# Patient Record
Sex: MALE | Race: WHITE | NOT HISPANIC OR LATINO | Employment: OTHER | URBAN - METROPOLITAN AREA
[De-identification: names, ages, dates, MRNs, and addresses within clinical notes are randomized per-mention and may not be internally consistent; named-entity substitution may affect disease eponyms.]

---

## 2017-08-13 ENCOUNTER — GENERIC CONVERSION - ENCOUNTER (OUTPATIENT)
Dept: OTHER | Facility: OTHER | Age: 67
End: 2017-08-13

## 2020-07-30 ENCOUNTER — OFFICE VISIT (OUTPATIENT)
Dept: FAMILY MEDICINE CLINIC | Facility: CLINIC | Age: 70
End: 2020-07-30
Payer: COMMERCIAL

## 2020-07-30 ENCOUNTER — LAB (OUTPATIENT)
Dept: LAB | Facility: MEDICAL CENTER | Age: 70
End: 2020-07-30
Payer: COMMERCIAL

## 2020-07-30 VITALS
BODY MASS INDEX: 27.5 KG/M2 | SYSTOLIC BLOOD PRESSURE: 158 MMHG | WEIGHT: 203 LBS | HEIGHT: 72 IN | DIASTOLIC BLOOD PRESSURE: 90 MMHG | TEMPERATURE: 97.4 F

## 2020-07-30 DIAGNOSIS — Z12.11 SCREENING FOR COLORECTAL CANCER: Primary | ICD-10-CM

## 2020-07-30 DIAGNOSIS — F41.1 GENERALIZED ANXIETY DISORDER: Primary | ICD-10-CM

## 2020-07-30 DIAGNOSIS — Z11.59 ENCOUNTER FOR HEPATITIS C SCREENING TEST FOR LOW RISK PATIENT: ICD-10-CM

## 2020-07-30 DIAGNOSIS — Z13.29 SCREENING FOR HYPOTHYROIDISM: ICD-10-CM

## 2020-07-30 DIAGNOSIS — Z13.220 SCREENING FOR HYPERLIPIDEMIA: ICD-10-CM

## 2020-07-30 DIAGNOSIS — Z13.228 SCREENING FOR METABOLIC DISORDER: ICD-10-CM

## 2020-07-30 DIAGNOSIS — Z00.00 MEDICARE ANNUAL WELLNESS VISIT, SUBSEQUENT: ICD-10-CM

## 2020-07-30 DIAGNOSIS — Z12.12 SCREENING FOR COLORECTAL CANCER: Primary | ICD-10-CM

## 2020-07-30 LAB
ALBUMIN SERPL BCP-MCNC: 4 G/DL (ref 3.5–5)
ALP SERPL-CCNC: 64 U/L (ref 46–116)
ALT SERPL W P-5'-P-CCNC: 28 U/L (ref 12–78)
ANION GAP SERPL CALCULATED.3IONS-SCNC: 6 MMOL/L (ref 4–13)
AST SERPL W P-5'-P-CCNC: 14 U/L (ref 5–45)
BILIRUB SERPL-MCNC: 0.83 MG/DL (ref 0.2–1)
BUN SERPL-MCNC: 21 MG/DL (ref 5–25)
CALCIUM ALBUM COR SERPL-MCNC: 10.4 MG/DL (ref 8.3–10.1)
CALCIUM SERPL-MCNC: 10.4 MG/DL (ref 8.3–10.1)
CHLORIDE SERPL-SCNC: 110 MMOL/L (ref 100–108)
CHOLEST SERPL-MCNC: 213 MG/DL (ref 50–200)
CO2 SERPL-SCNC: 24 MMOL/L (ref 21–32)
CREAT SERPL-MCNC: 1.04 MG/DL (ref 0.6–1.3)
GFR SERPL CREATININE-BSD FRML MDRD: 73 ML/MIN/1.73SQ M
GLUCOSE P FAST SERPL-MCNC: 114 MG/DL (ref 65–99)
HCV AB SER QL: NORMAL
HDLC SERPL-MCNC: 73 MG/DL
LDLC SERPL CALC-MCNC: 117 MG/DL (ref 0–100)
NONHDLC SERPL-MCNC: 140 MG/DL
POTASSIUM SERPL-SCNC: 4.1 MMOL/L (ref 3.5–5.3)
PROT SERPL-MCNC: 6.8 G/DL (ref 6.4–8.2)
SODIUM SERPL-SCNC: 140 MMOL/L (ref 136–145)
TRIGL SERPL-MCNC: 113 MG/DL
TSH SERPL DL<=0.05 MIU/L-ACNC: 1.83 UIU/ML (ref 0.36–3.74)

## 2020-07-30 PROCEDURE — 1125F AMNT PAIN NOTED PAIN PRSNT: CPT | Performed by: FAMILY MEDICINE

## 2020-07-30 PROCEDURE — 36415 COLL VENOUS BLD VENIPUNCTURE: CPT | Performed by: FAMILY MEDICINE

## 2020-07-30 PROCEDURE — 1160F RVW MEDS BY RX/DR IN RCRD: CPT | Performed by: FAMILY MEDICINE

## 2020-07-30 PROCEDURE — 1170F FXNL STATUS ASSESSED: CPT | Performed by: FAMILY MEDICINE

## 2020-07-30 PROCEDURE — 80061 LIPID PANEL: CPT | Performed by: FAMILY MEDICINE

## 2020-07-30 PROCEDURE — 80053 COMPREHEN METABOLIC PANEL: CPT | Performed by: FAMILY MEDICINE

## 2020-07-30 PROCEDURE — 3008F BODY MASS INDEX DOCD: CPT | Performed by: FAMILY MEDICINE

## 2020-07-30 PROCEDURE — 4040F PNEUMOC VAC/ADMIN/RCVD: CPT | Performed by: FAMILY MEDICINE

## 2020-07-30 PROCEDURE — 84443 ASSAY THYROID STIM HORMONE: CPT | Performed by: FAMILY MEDICINE

## 2020-07-30 PROCEDURE — 3288F FALL RISK ASSESSMENT DOCD: CPT | Performed by: FAMILY MEDICINE

## 2020-07-30 PROCEDURE — 86803 HEPATITIS C AB TEST: CPT

## 2020-07-30 PROCEDURE — 1101F PT FALLS ASSESS-DOCD LE1/YR: CPT | Performed by: FAMILY MEDICINE

## 2020-07-30 PROCEDURE — G0439 PPPS, SUBSEQ VISIT: HCPCS | Performed by: FAMILY MEDICINE

## 2020-07-30 PROCEDURE — 1036F TOBACCO NON-USER: CPT | Performed by: FAMILY MEDICINE

## 2020-07-30 RX ORDER — TRAVOPROST OPHTHALMIC SOLUTION 0.04 MG/ML
SOLUTION OPHTHALMIC
COMMUNITY
Start: 2020-07-20

## 2020-07-30 RX ORDER — BRIMONIDINE TARTRATE 0.1 %
DROPS OPHTHALMIC (EYE)
COMMUNITY
Start: 2020-07-20

## 2020-07-30 RX ORDER — ALPRAZOLAM 0.25 MG/1
TABLET ORAL
Qty: 15 TABLET | Refills: 0 | Status: SHIPPED | OUTPATIENT
Start: 2020-07-30

## 2020-07-30 NOTE — PROGRESS NOTES
Assessment and Plan:     Problem List Items Addressed This Visit     None      Visit Diagnoses     Medicare annual wellness visit, initial    -  Primary        BMI Counseling: Body mass index is 27 53 kg/m²  The BMI is above normal  Nutrition recommendations include decreasing portion sizes, encouraging healthy choices of fruits and vegetables, decreasing fast food intake, moderation in carbohydrate intake and increasing intake of lean protein  Exercise recommendations include exercising 3-5 times per week  No pharmacotherapy was ordered  Patient referred to PCP due to patient being overweight  Preventive health issues were discussed with patient, and age appropriate screening tests were ordered as noted in patient's After Visit Summary  Personalized health advice and appropriate referrals for health education or preventive services given if needed, as noted in patient's After Visit Summary  History of Present Illness:     Patient presents for Welcome to Medicare visit  Patient Care Team:  Giovana Bright DO as PCP - General (Family Medicine)     Review of Systems:     Review of Systems   Constitutional: Negative for activity change, appetite change, diaphoresis, fatigue and fever  HENT: Negative  Eyes: Negative  Respiratory: Negative for apnea, cough, chest tightness, shortness of breath and wheezing  Cardiovascular: Negative for chest pain, palpitations and leg swelling  Gastrointestinal: Negative for abdominal distention, abdominal pain, anal bleeding, constipation, diarrhea, nausea and vomiting  Endocrine: Negative for cold intolerance, heat intolerance, polydipsia, polyphagia and polyuria  Genitourinary: Negative for difficulty urinating, dysuria, flank pain, hematuria and urgency  Musculoskeletal: Negative for arthralgias, back pain, gait problem, joint swelling and myalgias  Skin: Negative for color change, rash and wound     Allergic/Immunologic: Negative for environmental allergies, food allergies and immunocompromised state  Neurological: Negative for dizziness, seizures, syncope, speech difficulty, numbness and headaches  Hematological: Negative for adenopathy  Does not bruise/bleed easily  Psychiatric/Behavioral: Negative for agitation, behavioral problems, hallucinations, sleep disturbance and suicidal ideas  The patient is nervous/anxious  Problem List:     There is no problem list on file for this patient  Past Medical and Surgical History:     History reviewed  No pertinent past medical history    Past Surgical History:   Procedure Laterality Date    APPENDECTOMY  2006      Family History:     Family History   Problem Relation Age of Onset    Heart disease Father     Colon cancer Brother     Colon cancer Daughter       Social History:        Social History     Socioeconomic History    Marital status: /Civil Union     Spouse name: None    Number of children: None    Years of education: None    Highest education level: None   Occupational History    None   Social Needs    Financial resource strain: None    Food insecurity:     Worry: None     Inability: None    Transportation needs:     Medical: None     Non-medical: None   Tobacco Use    Smoking status: Never Smoker    Smokeless tobacco: Never Used   Substance and Sexual Activity    Alcohol use: Yes     Frequency: Monthly or less     Drinks per session: 1 or 2     Comment: Rare    Drug use: Never    Sexual activity: None   Lifestyle    Physical activity:     Days per week: None     Minutes per session: None    Stress: None   Relationships    Social connections:     Talks on phone: None     Gets together: None     Attends Oriental orthodox service: None     Active member of club or organization: None     Attends meetings of clubs or organizations: None     Relationship status: None    Intimate partner violence:     Fear of current or ex partner: None     Emotionally abused: None     Physically abused: None     Forced sexual activity: None   Other Topics Concern    None   Social History Narrative    None      Medications and Allergies:     Current Outpatient Medications   Medication Sig Dispense Refill    ALPHAGAN P 0 1 %       Multiple Vitamins-Minerals (ICAPS PO) Take by mouth daily      Omega-3 Fatty Acids (OMEGA ESSENTIALS BASIC PO) Take by mouth daily      travoprost (TRAVATAN-Z) 0 004 % ophthalmic solution        No current facility-administered medications for this visit  No Known Allergies   Immunizations:     Immunization History   Administered Date(s) Administered    Influenza TIV (IM) 10/04/2013, 10/08/2014, 11/09/2015, 09/07/2016    Pneumococcal Conjugate 13-Valent 08/13/2017    Tdap 09/07/2016    Zoster 10/08/2014    influenza, trivalent, adjuvanted 09/20/2019      Health Maintenance:         Topic Date Due    Hepatitis C Screening  1950    CRC Screening: Colonoscopy  1950         Topic Date Due    Pneumococcal Vaccine: 65+ Years (2 of 2 - PPSV23) 08/13/2018      Medicare Screening Tests and Risk Assessments:     Melissa Saunders is here for his Subsequent Wellness visit  Health Risk Assessment:   Patient rates overall health as excellent  Patient feels that their physical health rating is much better  Eyesight was rated as same  Hearing was rated as same  Patient feels that their emotional and mental health rating is same  Pain experienced in the last 7 days has been none  Patient states that he has experienced no weight loss or gain in last 6 months  Depression Screening:   PHQ-2 Score: 0      Fall Risk Screening: In the past year, patient has experienced: no history of falling in past year      Home Safety:  Patient does not have trouble with stairs inside or outside of their home  Patient has working smoke alarms and has working carbon monoxide detector  Home safety hazards include: none  Nutrition:   Current diet is Regular       Medications:   Patient is currently taking over-the-counter supplements  OTC medications include: Fish oil and vitamin  Patient is able to manage medications  Activities of Daily Living (ADLs)/Instrumental Activities of Daily Living (IADLs):   Walk and transfer into and out of bed and chair?: Yes  Dress and groom yourself?: Yes    Bathe or shower yourself?: Yes    Feed yourself? Yes  Do your laundry/housekeeping?: Yes  Manage your money, pay your bills and track your expenses?: Yes  Make your own meals?: Yes    Do your own shopping?: Yes    Previous Hospitalizations:   Any hospitalizations or ED visits within the last 12 months?: No      Advance Care Planning:   Living will: Yes    Durable POA for healthcare:  Yes    Advanced directive: Yes    Advanced directive counseling given: Yes    Five wishes given: Yes    Patient declined ACP directive: No    End of Life Decisions reviewed with patient: Yes    Provider agrees with end of life decisions: Yes      Cognitive Screening:   Provider or family/friend/caregiver concerned regarding cognition?: No    PREVENTIVE SCREENINGS      Cardiovascular Screening:    General: Risks and Benefits Discussed      Diabetes Screening:     General: Risks and Benefits Discussed      Colorectal Cancer Screening:     General: Risks and Benefits Discussed    Due for: FOBT/FIT      Prostate Cancer Screening:    General: Risks and Benefits Discussed      Osteoporosis Screening:    General: Risks and Benefits Discussed      Abdominal Aortic Aneurysm (AAA) Screening:    Risk factors include: age between 73-69 yo        General: Screening Not Indicated      Lung Cancer Screening:     General: Screening Not Indicated      Hepatitis C Screening:    General: Risks and Benefits Discussed    No exam data present     Physical Exam:     /90   Temp (!) 97 4 °F (36 3 °C) (Temporal)   Ht 6' (1 829 m)   Wt 92 1 kg (203 lb)   BMI 27 53 kg/m²     Physical Exam   Constitutional: He is oriented to person, place, and time  He appears well-developed and well-nourished  No distress  HENT:   Head: Normocephalic  Right Ear: External ear normal    Left Ear: External ear normal    Nose: Nose normal    Mouth/Throat: Oropharynx is clear and moist    Eyes: Pupils are equal, round, and reactive to light  Conjunctivae and EOM are normal  Right eye exhibits no discharge  Left eye exhibits no discharge  No scleral icterus  Neck: Normal range of motion  No tracheal deviation present  No thyromegaly present  Cardiovascular: Normal rate, regular rhythm and normal heart sounds  Exam reveals no gallop and no friction rub  No murmur heard  Pulmonary/Chest: Effort normal and breath sounds normal  No respiratory distress  He has no wheezes  Abdominal: Soft  Bowel sounds are normal  He exhibits no mass  There is no tenderness  There is no guarding  Genitourinary: Prostate normal    Musculoskeletal: He exhibits no edema or deformity  Lymphadenopathy:     He has no cervical adenopathy  Neurological: He is alert and oriented to person, place, and time  No cranial nerve deficit  Skin: Skin is warm and dry  No rash noted  He is not diaphoretic  No erythema  Psychiatric: He has a normal mood and affect   Thought content normal        Justa Cardenas DO

## 2020-07-30 NOTE — PATIENT INSTRUCTIONS

## 2020-08-01 ENCOUNTER — LAB (OUTPATIENT)
Dept: LAB | Facility: MEDICAL CENTER | Age: 70
End: 2020-08-01
Payer: COMMERCIAL

## 2020-08-01 DIAGNOSIS — Z12.12 SCREENING FOR COLORECTAL CANCER: ICD-10-CM

## 2020-08-01 DIAGNOSIS — Z12.11 SCREENING FOR COLORECTAL CANCER: ICD-10-CM

## 2020-08-01 LAB — HEMOCCULT STL QL IA: NEGATIVE

## 2020-08-01 PROCEDURE — G0328 FECAL BLOOD SCRN IMMUNOASSAY: HCPCS

## 2021-02-12 ENCOUNTER — IMMUNIZATIONS (OUTPATIENT)
Dept: FAMILY MEDICINE CLINIC | Facility: HOSPITAL | Age: 71
End: 2021-02-12

## 2021-02-12 DIAGNOSIS — Z23 ENCOUNTER FOR IMMUNIZATION: Primary | ICD-10-CM

## 2021-02-12 PROCEDURE — 91301 SARS-COV-2 / COVID-19 MRNA VACCINE (MODERNA) 100 MCG: CPT

## 2021-02-12 PROCEDURE — 0011A SARS-COV-2 / COVID-19 MRNA VACCINE (MODERNA) 100 MCG: CPT

## 2021-03-10 ENCOUNTER — IMMUNIZATIONS (OUTPATIENT)
Dept: FAMILY MEDICINE CLINIC | Facility: HOSPITAL | Age: 71
End: 2021-03-10

## 2021-03-10 DIAGNOSIS — Z23 ENCOUNTER FOR IMMUNIZATION: Primary | ICD-10-CM

## 2021-03-10 PROCEDURE — 0012A SARS-COV-2 / COVID-19 MRNA VACCINE (MODERNA) 100 MCG: CPT

## 2021-03-10 PROCEDURE — 91301 SARS-COV-2 / COVID-19 MRNA VACCINE (MODERNA) 100 MCG: CPT

## 2021-04-13 ENCOUNTER — VBI (OUTPATIENT)
Dept: ADMINISTRATIVE | Facility: OTHER | Age: 71
End: 2021-04-13

## 2021-08-13 ENCOUNTER — VBI (OUTPATIENT)
Dept: ADMINISTRATIVE | Facility: OTHER | Age: 71
End: 2021-08-13

## 2021-11-16 ENCOUNTER — VBI (OUTPATIENT)
Dept: ADMINISTRATIVE | Facility: OTHER | Age: 71
End: 2021-11-16

## 2022-09-08 ENCOUNTER — VBI (OUTPATIENT)
Dept: ADMINISTRATIVE | Facility: OTHER | Age: 72
End: 2022-09-08

## 2023-01-01 ENCOUNTER — APPOINTMENT (EMERGENCY)
Dept: RADIOLOGY | Facility: HOSPITAL | Age: 73
End: 2023-01-01
Payer: COMMERCIAL

## 2023-01-01 ENCOUNTER — APPOINTMENT (EMERGENCY)
Dept: CT IMAGING | Facility: HOSPITAL | Age: 73
End: 2023-01-01
Payer: COMMERCIAL

## 2023-01-01 ENCOUNTER — HOSPITAL ENCOUNTER (EMERGENCY)
Facility: HOSPITAL | Age: 73
End: 2023-12-24
Attending: EMERGENCY MEDICINE
Payer: COMMERCIAL

## 2023-01-01 VITALS — BODY MASS INDEX: 28.38 KG/M2 | HEIGHT: 74 IN | WEIGHT: 221.12 LBS

## 2023-01-01 DIAGNOSIS — I46.9 CARDIAC ARREST (HCC): Primary | ICD-10-CM

## 2023-01-01 DIAGNOSIS — J69.0 ASPIRATION PNEUMONIA (HCC): ICD-10-CM

## 2023-01-01 DIAGNOSIS — J96.90 RESPIRATORY FAILURE (HCC): ICD-10-CM

## 2023-01-01 LAB
ALBUMIN SERPL BCP-MCNC: 4.1 G/DL (ref 3.5–5)
ALP SERPL-CCNC: 72 U/L (ref 34–104)
ALT SERPL W P-5'-P-CCNC: 59 U/L (ref 7–52)
ANION GAP SERPL CALCULATED.3IONS-SCNC: 14 MMOL/L
ARTERIAL PATENCY WRIST A: YES
AST SERPL W P-5'-P-CCNC: 40 U/L (ref 13–39)
BASE EXCESS BLDA CALC-SCNC: -12.1 MMOL/L
BASOPHILS # BLD MANUAL: 0 THOUSAND/UL (ref 0–0.1)
BASOPHILS NFR MAR MANUAL: 0 % (ref 0–1)
BILIRUB SERPL-MCNC: 0.82 MG/DL (ref 0.2–1)
BUN SERPL-MCNC: 23 MG/DL (ref 5–25)
CALCIUM SERPL-MCNC: 9.4 MG/DL (ref 8.4–10.2)
CARDIAC TROPONIN I PNL SERPL HS: 44 NG/L
CHLORIDE SERPL-SCNC: 106 MMOL/L (ref 96–108)
CK SERPL-CCNC: 119 U/L (ref 39–308)
CO2 SERPL-SCNC: 21 MMOL/L (ref 21–32)
CREAT SERPL-MCNC: 1.33 MG/DL (ref 0.6–1.3)
EOSINOPHIL # BLD MANUAL: 0.12 THOUSAND/UL (ref 0–0.4)
EOSINOPHIL NFR BLD MANUAL: 1 % (ref 0–6)
ERYTHROCYTE [DISTWIDTH] IN BLOOD BY AUTOMATED COUNT: 12.5 % (ref 11.6–15.1)
GFR SERPL CREATININE-BSD FRML MDRD: 52 ML/MIN/1.73SQ M
GLUCOSE SERPL-MCNC: 192 MG/DL (ref 65–140)
HCO3 BLDA-SCNC: 19.9 MMOL/L (ref 22–28)
HCT VFR BLD AUTO: 45.1 % (ref 36.5–49.3)
HGB BLD-MCNC: 14 G/DL (ref 12–17)
HOROWITZ INDEX BLDA+IHG-RTO: 100 MM[HG]
LACTATE SERPL-SCNC: 8 MMOL/L (ref 0.5–2)
LYMPHOCYTES # BLD AUTO: 49 % (ref 14–44)
LYMPHOCYTES # BLD AUTO: 7.21 THOUSAND/UL (ref 0.6–4.47)
MCH RBC QN AUTO: 30.4 PG (ref 26.8–34.3)
MCHC RBC AUTO-ENTMCNC: 31 G/DL (ref 31.4–37.4)
MCV RBC AUTO: 98 FL (ref 82–98)
MONOCYTES # BLD AUTO: 0.58 THOUSAND/UL (ref 0–1.22)
MONOCYTES NFR BLD: 5 % (ref 4–12)
NEUTROPHILS # BLD MANUAL: 3.72 THOUSAND/UL (ref 1.85–7.62)
NEUTS SEG NFR BLD AUTO: 32 % (ref 43–75)
O2 CT BLDA-SCNC: 16.3 ML/DL (ref 16–23)
OXYHGB MFR BLDA: 75.1 % (ref 94–97)
PCO2 BLDA: 74.2 MM HG (ref 36–44)
PEEP RESPIRATORY: 10 CM[H2O]
PH BLDA: 7.05 [PH] (ref 7.35–7.45)
PLATELET # BLD AUTO: 130 THOUSANDS/UL (ref 149–390)
PLATELET BLD QL SMEAR: ABNORMAL
PMV BLD AUTO: 12.3 FL (ref 8.9–12.7)
PO2 BLDA: 53.3 MM HG (ref 75–129)
POTASSIUM SERPL-SCNC: 3.9 MMOL/L (ref 3.5–5.3)
PROT SERPL-MCNC: 5.8 G/DL (ref 6.4–8.4)
RBC # BLD AUTO: 4.61 MILLION/UL (ref 3.88–5.62)
RBC MORPH BLD: NORMAL
SODIUM SERPL-SCNC: 141 MMOL/L (ref 135–147)
SPECIMEN SOURCE: ABNORMAL
VARIANT LYMPHS # BLD AUTO: 13 %
VENT AC: 18
VENT- AC: AC
VT SETTING VENT: 470 ML
WBC # BLD AUTO: 11.63 THOUSAND/UL (ref 4.31–10.16)

## 2023-01-01 PROCEDURE — 85027 COMPLETE CBC AUTOMATED: CPT | Performed by: EMERGENCY MEDICINE

## 2023-01-01 PROCEDURE — 84484 ASSAY OF TROPONIN QUANT: CPT | Performed by: EMERGENCY MEDICINE

## 2023-01-01 PROCEDURE — 71250 CT THORAX DX C-: CPT

## 2023-01-01 PROCEDURE — 36415 COLL VENOUS BLD VENIPUNCTURE: CPT | Performed by: EMERGENCY MEDICINE

## 2023-01-01 PROCEDURE — 96366 THER/PROPH/DIAG IV INF ADDON: CPT

## 2023-01-01 PROCEDURE — 71045 X-RAY EXAM CHEST 1 VIEW: CPT

## 2023-01-01 PROCEDURE — 85007 BL SMEAR W/DIFF WBC COUNT: CPT | Performed by: EMERGENCY MEDICINE

## 2023-01-01 PROCEDURE — 96375 TX/PRO/DX INJ NEW DRUG ADDON: CPT

## 2023-01-01 PROCEDURE — 92950 HEART/LUNG RESUSCITATION CPR: CPT

## 2023-01-01 PROCEDURE — 70450 CT HEAD/BRAIN W/O DYE: CPT

## 2023-01-01 PROCEDURE — 82550 ASSAY OF CK (CPK): CPT | Performed by: EMERGENCY MEDICINE

## 2023-01-01 PROCEDURE — G1004 CDSM NDSC: HCPCS

## 2023-01-01 PROCEDURE — 96367 TX/PROPH/DG ADDL SEQ IV INF: CPT

## 2023-01-01 PROCEDURE — 36600 WITHDRAWAL OF ARTERIAL BLOOD: CPT

## 2023-01-01 PROCEDURE — 96376 TX/PRO/DX INJ SAME DRUG ADON: CPT

## 2023-01-01 PROCEDURE — 94760 N-INVAS EAR/PLS OXIMETRY 1: CPT

## 2023-01-01 PROCEDURE — 94002 VENT MGMT INPAT INIT DAY: CPT

## 2023-01-01 PROCEDURE — 96368 THER/DIAG CONCURRENT INF: CPT

## 2023-01-01 PROCEDURE — 96365 THER/PROPH/DIAG IV INF INIT: CPT

## 2023-01-01 PROCEDURE — 31500 INSERT EMERGENCY AIRWAY: CPT

## 2023-01-01 PROCEDURE — 83605 ASSAY OF LACTIC ACID: CPT | Performed by: EMERGENCY MEDICINE

## 2023-01-01 PROCEDURE — 80053 COMPREHEN METABOLIC PANEL: CPT | Performed by: EMERGENCY MEDICINE

## 2023-01-01 PROCEDURE — 82805 BLOOD GASES W/O2 SATURATION: CPT | Performed by: EMERGENCY MEDICINE

## 2023-01-01 PROCEDURE — 99291 CRITICAL CARE FIRST HOUR: CPT

## 2023-01-01 RX ORDER — SODIUM CHLORIDE, SODIUM GLUCONATE, SODIUM ACETATE, POTASSIUM CHLORIDE, MAGNESIUM CHLORIDE, SODIUM PHOSPHATE, DIBASIC, AND POTASSIUM PHOSPHATE .53; .5; .37; .037; .03; .012; .00082 G/100ML; G/100ML; G/100ML; G/100ML; G/100ML; G/100ML; G/100ML
2000 INJECTION, SOLUTION INTRAVENOUS ONCE
Status: DISCONTINUED | OUTPATIENT
Start: 2023-01-01 | End: 2023-01-01 | Stop reason: HOSPADM

## 2023-01-01 RX ORDER — SODIUM CHLORIDE, SODIUM GLUCONATE, SODIUM ACETATE, POTASSIUM CHLORIDE, MAGNESIUM CHLORIDE, SODIUM PHOSPHATE, DIBASIC, AND POTASSIUM PHOSPHATE .53; .5; .37; .037; .03; .012; .00082 G/100ML; G/100ML; G/100ML; G/100ML; G/100ML; G/100ML; G/100ML
1000 INJECTION, SOLUTION INTRAVENOUS ONCE
Status: COMPLETED | OUTPATIENT
Start: 2023-01-01 | End: 2023-01-01

## 2023-01-01 RX ORDER — DEXMEDETOMIDINE HYDROCHLORIDE 4 UG/ML
.1-.7 INJECTION, SOLUTION INTRAVENOUS
Status: DISCONTINUED | OUTPATIENT
Start: 2023-01-01 | End: 2023-01-01 | Stop reason: HOSPADM

## 2023-01-01 RX ORDER — EPINEPHRINE 0.1 MG/ML
INJECTION INTRAVENOUS CODE/TRAUMA/SEDATION MEDICATION
Status: COMPLETED | OUTPATIENT
Start: 2023-01-01 | End: 2023-01-01

## 2023-01-01 RX ORDER — FENTANYL CITRATE 50 UG/ML
50 INJECTION, SOLUTION INTRAMUSCULAR; INTRAVENOUS ONCE
Status: COMPLETED | OUTPATIENT
Start: 2023-01-01 | End: 2023-01-01

## 2023-01-01 RX ORDER — SODIUM BICARBONATE 84 MG/ML
INJECTION, SOLUTION INTRAVENOUS CODE/TRAUMA/SEDATION MEDICATION
Status: COMPLETED | OUTPATIENT
Start: 2023-01-01 | End: 2023-01-01

## 2023-01-01 RX ORDER — CLINDAMYCIN PHOSPHATE 600 MG/50ML
600 INJECTION, SOLUTION INTRAVENOUS ONCE
Status: DISCONTINUED | OUTPATIENT
Start: 2023-01-01 | End: 2023-01-01 | Stop reason: HOSPADM

## 2023-01-01 RX ORDER — AMIODARONE HYDROCHLORIDE 50 MG/ML
INJECTION, SOLUTION INTRAVENOUS CODE/TRAUMA/SEDATION MEDICATION
Status: COMPLETED | OUTPATIENT
Start: 2023-01-01 | End: 2023-01-01

## 2023-01-01 RX ORDER — MAGNESIUM SULFATE HEPTAHYDRATE 40 MG/ML
2 INJECTION, SOLUTION INTRAVENOUS ONCE
Status: COMPLETED | OUTPATIENT
Start: 2023-01-01 | End: 2023-01-01

## 2023-01-01 RX ORDER — CALCIUM CHLORIDE 100 MG/ML
SYRINGE (ML) INTRAVENOUS CODE/TRAUMA/SEDATION MEDICATION
Status: COMPLETED | OUTPATIENT
Start: 2023-01-01 | End: 2023-01-01

## 2023-01-01 RX ADMIN — SODIUM BICARBONATE 50 MEQ: 84 INJECTION, SOLUTION INTRAVENOUS at 08:53

## 2023-01-01 RX ADMIN — EPINEPHRINE 1 MG: 0.1 INJECTION INTRAVENOUS at 09:03

## 2023-01-01 RX ADMIN — EPINEPHRINE 1 MG: 0.1 INJECTION INTRAVENOUS at 08:59

## 2023-01-01 RX ADMIN — EPINEPHRINE 1 MG: 0.1 INJECTION INTRAVENOUS at 09:16

## 2023-01-01 RX ADMIN — AMIODARONE HYDROCHLORIDE 300 MG: 50 INJECTION, SOLUTION INTRAVENOUS at 09:09

## 2023-01-01 RX ADMIN — EPINEPHRINE 1 MG: 0.1 INJECTION INTRAVENOUS at 08:49

## 2023-01-01 RX ADMIN — SODIUM BICARBONATE 50 MEQ: 84 INJECTION, SOLUTION INTRAVENOUS at 09:04

## 2023-01-01 RX ADMIN — EPINEPHRINE 1 MG: 0.1 INJECTION INTRAVENOUS at 08:45

## 2023-01-01 RX ADMIN — EPINEPHRINE 1 MG: 0.1 INJECTION INTRAVENOUS at 09:20

## 2023-01-01 RX ADMIN — MAGNESIUM SULFATE HEPTAHYDRATE 2 G: 40 INJECTION, SOLUTION INTRAVENOUS at 09:12

## 2023-01-01 RX ADMIN — DEXMEDETOMIDINE HYDROCHLORIDE 0.2 MCG/KG/HR: 400 INJECTION INTRAVENOUS at 10:22

## 2023-01-01 RX ADMIN — SODIUM CHLORIDE, SODIUM GLUCONATE, SODIUM ACETATE, POTASSIUM CHLORIDE, MAGNESIUM CHLORIDE, SODIUM PHOSPHATE, DIBASIC, AND POTASSIUM PHOSPHATE 1000 ML: .53; .5; .37; .037; .03; .012; .00082 INJECTION, SOLUTION INTRAVENOUS at 11:35

## 2023-01-01 RX ADMIN — EPINEPHRINE 1 MG: 0.1 INJECTION INTRAVENOUS at 09:06

## 2023-01-01 RX ADMIN — EPINEPHRINE 1 MG: 0.1 INJECTION INTRAVENOUS at 12:00

## 2023-01-01 RX ADMIN — SODIUM CHLORIDE 100 MCG: 9 INJECTION, SOLUTION INTRAVENOUS at 10:35

## 2023-01-01 RX ADMIN — EPINEPHRINE 10 MCG/MIN: 1 INJECTION, SOLUTION, CONCENTRATE INTRAVENOUS at 09:26

## 2023-01-01 RX ADMIN — SODIUM BICARBONATE 50 MEQ: 84 INJECTION INTRAVENOUS at 10:52

## 2023-01-01 RX ADMIN — FENTANYL CITRATE 50 MCG: 50 INJECTION, SOLUTION INTRAMUSCULAR; INTRAVENOUS at 10:18

## 2023-01-01 RX ADMIN — SODIUM BICARBONATE 50 MEQ: 84 INJECTION, SOLUTION INTRAVENOUS at 12:01

## 2023-01-01 RX ADMIN — EPINEPHRINE 1 MG: 0.1 INJECTION INTRAVENOUS at 09:13

## 2023-01-01 RX ADMIN — EPINEPHRINE 1 MG: 0.1 INJECTION INTRAVENOUS at 08:52

## 2023-01-01 RX ADMIN — EPINEPHRINE 1 MG: 0.1 INJECTION INTRAVENOUS at 08:56

## 2023-01-01 RX ADMIN — CALCIUM CHLORIDE 1 G: 100 INJECTION PARENTERAL at 09:14

## 2023-01-01 RX ADMIN — SODIUM BICARBONATE 50 MEQ: 84 INJECTION, SOLUTION INTRAVENOUS at 09:17

## 2023-01-01 RX ADMIN — SODIUM BICARBONATE 50 MEQ: 84 INJECTION, SOLUTION INTRAVENOUS at 08:46

## 2023-01-01 RX ADMIN — VASOPRESSIN 0.04 UNITS/MIN: 20 INJECTION INTRAVENOUS at 11:27

## 2023-05-15 ENCOUNTER — APPOINTMENT (EMERGENCY)
Dept: RADIOLOGY | Facility: HOSPITAL | Age: 73
End: 2023-05-15

## 2023-05-15 ENCOUNTER — HOSPITAL ENCOUNTER (INPATIENT)
Facility: HOSPITAL | Age: 73
LOS: 1 days | End: 2023-05-16
Attending: EMERGENCY MEDICINE | Admitting: FAMILY MEDICINE

## 2023-05-15 ENCOUNTER — APPOINTMENT (EMERGENCY)
Dept: CT IMAGING | Facility: HOSPITAL | Age: 73
End: 2023-05-15

## 2023-05-15 DIAGNOSIS — R79.89 ELEVATED TSH: ICD-10-CM

## 2023-05-15 DIAGNOSIS — E87.20 LACTIC ACIDOSIS: ICD-10-CM

## 2023-05-15 DIAGNOSIS — R77.8 ELEVATED TROPONIN: ICD-10-CM

## 2023-05-15 DIAGNOSIS — I46.9 CARDIAC ARREST (HCC): Primary | ICD-10-CM

## 2023-05-15 DIAGNOSIS — E83.39 HYPERPHOSPHATEMIA: ICD-10-CM

## 2023-05-15 DIAGNOSIS — R09.2 RESPIRATORY ARREST (HCC): ICD-10-CM

## 2023-05-15 LAB
2HR DELTA HS TROPONIN: 84 NG/L
ABO GROUP BLD: NORMAL
ALBUMIN SERPL BCP-MCNC: 3.8 G/DL (ref 3.5–5)
ALP SERPL-CCNC: 72 U/L (ref 34–104)
ALT SERPL W P-5'-P-CCNC: 123 U/L (ref 7–52)
ANION GAP SERPL CALCULATED.3IONS-SCNC: 13 MMOL/L (ref 4–13)
APTT PPP: 25 SECONDS (ref 23–37)
AST SERPL W P-5'-P-CCNC: 131 U/L (ref 13–39)
BASE EX.OXY STD BLDV CALC-SCNC: 97.2 % (ref 60–80)
BASE EXCESS BLDV CALC-SCNC: -8.3 MMOL/L
BASOPHILS # BLD AUTO: 0.03 THOUSANDS/ÂΜL (ref 0–0.1)
BASOPHILS NFR BLD AUTO: 0 % (ref 0–1)
BILIRUB SERPL-MCNC: 0.58 MG/DL (ref 0.2–1)
BLD GP AB SCN SERPL QL: NEGATIVE
BUN SERPL-MCNC: 18 MG/DL (ref 5–25)
CALCIUM SERPL-MCNC: 8.6 MG/DL (ref 8.4–10.2)
CARDIAC TROPONIN I PNL SERPL HS: 360 NG/L
CARDIAC TROPONIN I PNL SERPL HS: 444 NG/L
CHLORIDE SERPL-SCNC: 104 MMOL/L (ref 96–108)
CO2 SERPL-SCNC: 21 MMOL/L (ref 21–32)
CREAT SERPL-MCNC: 1.42 MG/DL (ref 0.6–1.3)
EOSINOPHIL # BLD AUTO: 0.06 THOUSAND/ÂΜL (ref 0–0.61)
EOSINOPHIL NFR BLD AUTO: 1 % (ref 0–6)
ERYTHROCYTE [DISTWIDTH] IN BLOOD BY AUTOMATED COUNT: 11.9 % (ref 11.6–15.1)
GFR SERPL CREATININE-BSD FRML MDRD: 48 ML/MIN/1.73SQ M
GLUCOSE SERPL-MCNC: 187 MG/DL (ref 65–140)
GLUCOSE SERPL-MCNC: 193 MG/DL (ref 65–140)
HCO3 BLDV-SCNC: 19.1 MMOL/L (ref 24–30)
HCT VFR BLD AUTO: 39.4 % (ref 36.5–49.3)
HGB BLD-MCNC: 13.1 G/DL (ref 12–17)
IMM GRANULOCYTES # BLD AUTO: 0.11 THOUSAND/UL (ref 0–0.2)
IMM GRANULOCYTES NFR BLD AUTO: 2 % (ref 0–2)
INR PPP: 1.09 (ref 0.84–1.19)
LACTATE SERPL-SCNC: 5.6 MMOL/L (ref 0.5–2)
LYMPHOCYTES # BLD AUTO: 1.77 THOUSANDS/ÂΜL (ref 0.6–4.47)
LYMPHOCYTES NFR BLD AUTO: 26 % (ref 14–44)
MAGNESIUM SERPL-MCNC: 2.1 MG/DL (ref 1.9–2.7)
MCH RBC QN AUTO: 30.6 PG (ref 26.8–34.3)
MCHC RBC AUTO-ENTMCNC: 33.2 G/DL (ref 31.4–37.4)
MCV RBC AUTO: 92 FL (ref 82–98)
MONOCYTES # BLD AUTO: 0.56 THOUSAND/ÂΜL (ref 0.17–1.22)
MONOCYTES NFR BLD AUTO: 8 % (ref 4–12)
NEUTROPHILS # BLD AUTO: 4.29 THOUSANDS/ÂΜL (ref 1.85–7.62)
NEUTS SEG NFR BLD AUTO: 63 % (ref 43–75)
NRBC BLD AUTO-RTO: 0 /100 WBCS
O2 CT BLDV-SCNC: 19.5 ML/DL
PCO2 BLDV: 46.6 MM HG (ref 42–50)
PH BLDV: 7.23 [PH] (ref 7.3–7.4)
PHOSPHATE SERPL-MCNC: 6 MG/DL (ref 2.3–4.1)
PLATELET # BLD AUTO: 171 THOUSANDS/UL (ref 149–390)
PMV BLD AUTO: 10.9 FL (ref 8.9–12.7)
PO2 BLDV: 159 MM HG (ref 35–45)
POTASSIUM SERPL-SCNC: 3.8 MMOL/L (ref 3.5–5.3)
PROT SERPL-MCNC: 6.2 G/DL (ref 6.4–8.4)
PROTHROMBIN TIME: 14.3 SECONDS (ref 11.6–14.5)
RBC # BLD AUTO: 4.28 MILLION/UL (ref 3.88–5.62)
RH BLD: POSITIVE
SODIUM SERPL-SCNC: 138 MMOL/L (ref 135–147)
SPECIMEN EXPIRATION DATE: NORMAL
TSH SERPL DL<=0.05 MIU/L-ACNC: 5.98 UIU/ML (ref 0.45–4.5)
WBC # BLD AUTO: 6.82 THOUSAND/UL (ref 4.31–10.16)

## 2023-05-15 RX ORDER — PROPOFOL 10 MG/ML
5-50 INJECTION, EMULSION INTRAVENOUS
Status: DISCONTINUED | OUTPATIENT
Start: 2023-05-15 | End: 2023-05-16 | Stop reason: HOSPADM

## 2023-05-15 RX ORDER — ATROPINE SULFATE 1 MG/ML
1 INJECTION, SOLUTION INTRAVENOUS ONCE
Status: COMPLETED | OUTPATIENT
Start: 2023-05-15 | End: 2023-05-15

## 2023-05-15 RX ORDER — HYDROMORPHONE HCL/PF 1 MG/ML
1 SYRINGE (ML) INJECTION ONCE
Status: COMPLETED | OUTPATIENT
Start: 2023-05-16 | End: 2023-05-16

## 2023-05-15 RX ORDER — EPINEPHRINE IN SOD CHLOR,ISO 1 MG/10 ML
0.1 SYRINGE (ML) INTRAVENOUS ONCE
Status: COMPLETED | OUTPATIENT
Start: 2023-05-15 | End: 2023-05-15

## 2023-05-15 RX ORDER — HYDROMORPHONE HCL/PF 1 MG/ML
1 SYRINGE (ML) INJECTION ONCE
Status: COMPLETED | OUTPATIENT
Start: 2023-05-15 | End: 2023-05-15

## 2023-05-15 RX ORDER — EPINEPHRINE 0.1 MG/ML
2 SYRINGE (ML) INJECTION ONCE
Status: COMPLETED | OUTPATIENT
Start: 2023-05-15 | End: 2023-05-15

## 2023-05-15 RX ORDER — MIDAZOLAM HYDROCHLORIDE 1 MG/ML
1 INJECTION INTRAMUSCULAR; INTRAVENOUS ONCE
Status: COMPLETED | OUTPATIENT
Start: 2023-05-15 | End: 2023-05-15

## 2023-05-15 RX ADMIN — NOREPINEPHRINE BITARTRATE 10 MCG/MIN: 1 INJECTION, SOLUTION, CONCENTRATE INTRAVENOUS at 22:56

## 2023-05-15 RX ADMIN — PROPOFOL 20 MCG/KG/MIN: 10 INJECTION, EMULSION INTRAVENOUS at 21:19

## 2023-05-15 RX ADMIN — EPINEPHRINE 4 MCG/MIN: 1 INJECTION, SOLUTION INTRAMUSCULAR; SUBCUTANEOUS at 23:33

## 2023-05-15 RX ADMIN — IOHEXOL 100 ML: 350 INJECTION, SOLUTION INTRAVENOUS at 22:15

## 2023-05-15 RX ADMIN — EPINEPHRINE 0.1 MG: 0.1 INJECTION, SOLUTION ENDOTRACHEAL; INTRACARDIAC; INTRAVENOUS at 23:01

## 2023-05-15 RX ADMIN — SODIUM BICARBONATE 50 MEQ: 84 INJECTION INTRAVENOUS at 22:28

## 2023-05-15 RX ADMIN — HYDROMORPHONE HYDROCHLORIDE 1 MG: 1 INJECTION, SOLUTION INTRAMUSCULAR; INTRAVENOUS; SUBCUTANEOUS at 22:33

## 2023-05-15 RX ADMIN — ATROPINE SULFATE 1 MG: 1 INJECTION, SOLUTION INTRAVENOUS at 22:53

## 2023-05-15 RX ADMIN — SODIUM CHLORIDE 1000 ML: 0.9 INJECTION, SOLUTION INTRAVENOUS at 21:21

## 2023-05-16 ENCOUNTER — APPOINTMENT (INPATIENT)
Dept: CT IMAGING | Facility: HOSPITAL | Age: 73
End: 2023-05-16

## 2023-05-16 ENCOUNTER — APPOINTMENT (OUTPATIENT)
Dept: RADIOLOGY | Facility: HOSPITAL | Age: 73
End: 2023-05-16

## 2023-05-16 ENCOUNTER — APPOINTMENT (INPATIENT)
Dept: NON INVASIVE DIAGNOSTICS | Facility: HOSPITAL | Age: 73
End: 2023-05-16

## 2023-05-16 ENCOUNTER — APPOINTMENT (INPATIENT)
Dept: RADIOLOGY | Facility: HOSPITAL | Age: 73
End: 2023-05-16

## 2023-05-16 ENCOUNTER — HOSPITAL ENCOUNTER (INPATIENT)
Facility: HOSPITAL | Age: 73
LOS: 9 days | Discharge: HOME HEALTH | End: 2023-05-25
Attending: INTERNAL MEDICINE | Admitting: INTERNAL MEDICINE

## 2023-05-16 VITALS
TEMPERATURE: 98.6 F | WEIGHT: 217.81 LBS | OXYGEN SATURATION: 99 % | HEART RATE: 51 BPM | BODY MASS INDEX: 29.54 KG/M2 | RESPIRATION RATE: 15 BRPM | DIASTOLIC BLOOD PRESSURE: 57 MMHG | SYSTOLIC BLOOD PRESSURE: 110 MMHG

## 2023-05-16 DIAGNOSIS — E03.9 HYPOTHYROIDISM, UNSPECIFIED TYPE: ICD-10-CM

## 2023-05-16 DIAGNOSIS — R00.1 BRADYCARDIA: ICD-10-CM

## 2023-05-16 DIAGNOSIS — S22.43XA CLOSED FRACTURE OF MULTIPLE RIBS OF BOTH SIDES: ICD-10-CM

## 2023-05-16 DIAGNOSIS — I47.21 TORSADES DE POINTES (HCC): ICD-10-CM

## 2023-05-16 DIAGNOSIS — R41.0 DELIRIUM: ICD-10-CM

## 2023-05-16 DIAGNOSIS — J96.01 ACUTE RESPIRATORY FAILURE WITH HYPOXIA (HCC): ICD-10-CM

## 2023-05-16 DIAGNOSIS — I46.9 CARDIAC ARREST (HCC): Primary | ICD-10-CM

## 2023-05-16 DIAGNOSIS — K59.00 CONSTIPATION, UNSPECIFIED CONSTIPATION TYPE: ICD-10-CM

## 2023-05-16 DIAGNOSIS — I47.20 VENTRICULAR TACHYCARDIA (HCC): ICD-10-CM

## 2023-05-16 DIAGNOSIS — I48.0 PAROXYSMAL ATRIAL FIBRILLATION (HCC): ICD-10-CM

## 2023-05-16 DIAGNOSIS — Z86.74 HISTORY OF CARDIAC ARREST: ICD-10-CM

## 2023-05-16 PROBLEM — B44.9 ASPERGILLUS PNEUMONIA (HCC): Status: ACTIVE | Noted: 2023-05-16

## 2023-05-16 PROBLEM — R77.8 ELEVATED TROPONIN: Status: ACTIVE | Noted: 2023-05-16

## 2023-05-16 PROBLEM — E87.20 LACTIC ACID ACIDOSIS: Status: ACTIVE | Noted: 2023-05-16

## 2023-05-16 PROBLEM — R79.89 ELEVATED TROPONIN: Status: ACTIVE | Noted: 2023-05-16

## 2023-05-16 PROBLEM — J69.0 ASPIRATION PNEUMONIA (HCC): Status: ACTIVE | Noted: 2023-05-16

## 2023-05-16 PROBLEM — N17.9 AKI (ACUTE KIDNEY INJURY) (HCC): Status: ACTIVE | Noted: 2023-05-16

## 2023-05-16 PROBLEM — R57.9 SHOCK (HCC): Status: ACTIVE | Noted: 2023-05-16

## 2023-05-16 LAB
4HR DELTA HS TROPONIN: 32 NG/L
ABO GROUP BLD: NORMAL
ALBUMIN SERPL BCP-MCNC: 3.5 G/DL (ref 3.5–5)
ALP SERPL-CCNC: 66 U/L (ref 34–104)
ALT SERPL W P-5'-P-CCNC: 111 U/L (ref 7–52)
ANION GAP SERPL CALCULATED.3IONS-SCNC: 8 MMOL/L (ref 4–13)
ANION GAP SERPL CALCULATED.3IONS-SCNC: 9 MMOL/L (ref 4–13)
AORTIC ROOT: 3.1 CM
APICAL FOUR CHAMBER EJECTION FRACTION: 65 %
AST SERPL W P-5'-P-CCNC: 85 U/L (ref 13–39)
ATRIAL RATE: 36 BPM
ATRIAL RATE: 45 BPM
ATRIAL RATE: 48 BPM
ATRIAL RATE: 48 BPM
ATRIAL RATE: 53 BPM
ATRIAL RATE: 70 BPM
B BURGDOR IGG+IGM SER-ACNC: <0.2 AI
BACTERIA UR QL AUTO: ABNORMAL /HPF
BASE EX.OXY STD BLDV CALC-SCNC: 91.2 % (ref 60–80)
BASE EXCESS BLDA CALC-SCNC: -4.9 MMOL/L
BASE EXCESS BLDA CALC-SCNC: -6.3 MMOL/L
BASE EXCESS BLDV CALC-SCNC: -4.5 MMOL/L
BASOPHILS # BLD AUTO: 0 THOUSANDS/ÂΜL (ref 0–0.1)
BASOPHILS NFR BLD AUTO: 0 % (ref 0–1)
BILIRUB SERPL-MCNC: 0.52 MG/DL (ref 0.2–1)
BILIRUB UR QL STRIP: NEGATIVE
BUN SERPL-MCNC: 18 MG/DL (ref 5–25)
BUN SERPL-MCNC: 19 MG/DL (ref 5–25)
CA-I BLD-SCNC: 1.06 MMOL/L (ref 1.12–1.32)
CALCIUM SERPL-MCNC: 7.8 MG/DL (ref 8.4–10.2)
CALCIUM SERPL-MCNC: 8 MG/DL (ref 8.4–10.2)
CARDIAC TROPONIN I PNL SERPL HS: 329 NG/L (ref 8–18)
CARDIAC TROPONIN I PNL SERPL HS: 392 NG/L
CHLORIDE SERPL-SCNC: 107 MMOL/L (ref 96–108)
CHLORIDE SERPL-SCNC: 107 MMOL/L (ref 96–108)
CK SERPL-CCNC: 256 U/L (ref 39–308)
CLARITY UR: CLEAR
CO2 SERPL-SCNC: 22 MMOL/L (ref 21–32)
CO2 SERPL-SCNC: 22 MMOL/L (ref 21–32)
COLOR UR: YELLOW
CORTIS SERPL-MCNC: 21.9 UG/DL
CREAT SERPL-MCNC: 1.04 MG/DL (ref 0.6–1.3)
CREAT SERPL-MCNC: 1.09 MG/DL (ref 0.6–1.3)
EOSINOPHIL # BLD AUTO: 0 THOUSAND/ÂΜL (ref 0–0.61)
EOSINOPHIL NFR BLD AUTO: 0 % (ref 0–6)
ERYTHROCYTE [DISTWIDTH] IN BLOOD BY AUTOMATED COUNT: 12.2 % (ref 11.6–15.1)
EST. AVERAGE GLUCOSE BLD GHB EST-MCNC: 105 MG/DL
FERRITIN SERPL-MCNC: 446 NG/ML (ref 24–336)
FOLATE SERPL-MCNC: 19.2 NG/ML
FRACTIONAL SHORTENING: 32 (ref 28–44)
GFR SERPL CREATININE-BSD FRML MDRD: 67 ML/MIN/1.73SQ M
GFR SERPL CREATININE-BSD FRML MDRD: 71 ML/MIN/1.73SQ M
GLUCOSE SERPL-MCNC: 111 MG/DL (ref 65–140)
GLUCOSE SERPL-MCNC: 127 MG/DL (ref 65–140)
GLUCOSE SERPL-MCNC: 132 MG/DL (ref 65–140)
GLUCOSE SERPL-MCNC: 138 MG/DL (ref 65–140)
GLUCOSE SERPL-MCNC: 150 MG/DL (ref 65–140)
GLUCOSE SERPL-MCNC: 165 MG/DL (ref 65–140)
GLUCOSE SERPL-MCNC: 181 MG/DL (ref 65–140)
GLUCOSE UR STRIP-MCNC: NEGATIVE MG/DL
HBA1C MFR BLD: 5.3 %
HCO3 BLDA-SCNC: 20.6 MMOL/L (ref 22–28)
HCO3 BLDA-SCNC: 21.5 MMOL/L (ref 22–28)
HCO3 BLDV-SCNC: 21 MMOL/L (ref 24–30)
HCT VFR BLD AUTO: 36.7 % (ref 36.5–49.3)
HGB BLD-MCNC: 11.8 G/DL (ref 12–17)
HGB UR QL STRIP.AUTO: ABNORMAL
HOROWITZ INDEX BLDA+IHG-RTO: 40 MM[HG]
HOROWITZ INDEX BLDA+IHG-RTO: 40 MM[HG]
IMM GRANULOCYTES # BLD AUTO: 0.05 THOUSAND/UL (ref 0–0.2)
IMM GRANULOCYTES NFR BLD AUTO: 0 % (ref 0–2)
INTERVENTRICULAR SEPTUM IN DIASTOLE (PARASTERNAL SHORT AXIS VIEW): 0.9 CM
INTERVENTRICULAR SEPTUM: 0.9 CM (ref 0.6–1.1)
IRON SATN MFR SERPL: 7 % (ref 20–50)
IRON SERPL-MCNC: 17 UG/DL (ref 65–175)
KETONES UR STRIP-MCNC: ABNORMAL MG/DL
LAAS-AP2: 22.6 CM2
LAAS-AP4: 26.2 CM2
LACTATE SERPL-SCNC: 1.5 MMOL/L (ref 0.5–2)
LACTATE SERPL-SCNC: 1.9 MMOL/L (ref 0.5–2)
LACTATE SERPL-SCNC: 2.8 MMOL/L (ref 0.5–2)
LEFT ATRIUM SIZE: 4.9 CM
LEFT ATRIUM VOLUME INDEX (MOD BIPLANE): 38.6
LEFT INTERNAL DIMENSION IN SYSTOLE: 3.6 CM (ref 2.1–4)
LEFT VENTRICULAR INTERNAL DIMENSION IN DIASTOLE: 5.3 CM (ref 3.5–6)
LEFT VENTRICULAR POSTERIOR WALL IN END DIASTOLE: 0.9 CM
LEFT VENTRICULAR STROKE VOLUME: 78 ML
LEUKOCYTE ESTERASE UR QL STRIP: NEGATIVE
LIPASE SERPL-CCNC: 19 U/L (ref 11–82)
LVSV (TEICH): 78 ML
LYMPHOCYTES # BLD AUTO: 0.44 THOUSANDS/ÂΜL (ref 0.6–4.47)
LYMPHOCYTES NFR BLD AUTO: 3 % (ref 14–44)
MAGNESIUM SERPL-MCNC: 1.9 MG/DL (ref 1.9–2.7)
MAGNESIUM SERPL-MCNC: 2 MG/DL (ref 1.9–2.7)
MCH RBC QN AUTO: 30.5 PG (ref 26.8–34.3)
MCHC RBC AUTO-ENTMCNC: 32.2 G/DL (ref 31.4–37.4)
MCV RBC AUTO: 95 FL (ref 82–98)
MONOCYTES # BLD AUTO: 1.42 THOUSAND/ÂΜL (ref 0.17–1.22)
MONOCYTES NFR BLD AUTO: 11 % (ref 4–12)
MUCOUS THREADS UR QL AUTO: ABNORMAL
NEUTROPHILS # BLD AUTO: 11.36 THOUSANDS/ÂΜL (ref 1.85–7.62)
NEUTS SEG NFR BLD AUTO: 86 % (ref 43–75)
NITRITE UR QL STRIP: NEGATIVE
NON-SQ EPI CELLS URNS QL MICRO: ABNORMAL /HPF
NRBC BLD AUTO-RTO: 0 /100 WBCS
O2 CT BLDA-SCNC: 17 ML/DL (ref 16–23)
O2 CT BLDA-SCNC: 17.5 ML/DL (ref 16–23)
O2 CT BLDV-SCNC: 16.7 ML/DL
OXYHGB MFR BLDA: 93.1 % (ref 94–97)
OXYHGB MFR BLDA: 97.8 % (ref 94–97)
P AXIS: 72 DEGREES
P AXIS: 78 DEGREES
P AXIS: 80 DEGREES
P AXIS: 85 DEGREES
PCO2 BLDA: 39.7 MM HG (ref 36–44)
PCO2 BLDA: 52.3 MM HG (ref 36–44)
PCO2 BLDV: 40.2 MM HG (ref 42–50)
PEEP RESPIRATORY: 6 CM[H2O]
PH BLDA: 7.23 [PH] (ref 7.35–7.45)
PH BLDA: 7.33 [PH] (ref 7.35–7.45)
PH BLDV: 7.34 [PH] (ref 7.3–7.4)
PH UR STRIP.AUTO: 5.5 [PH]
PHOSPHATE SERPL-MCNC: 3.4 MG/DL (ref 2.3–4.1)
PHOSPHATE SERPL-MCNC: 3.5 MG/DL (ref 2.3–4.1)
PLATELET # BLD AUTO: 165 THOUSANDS/UL (ref 149–390)
PMV BLD AUTO: 10.9 FL (ref 8.9–12.7)
PO2 BLDA: 131.4 MM HG (ref 75–129)
PO2 BLDA: 80.2 MM HG (ref 75–129)
PO2 BLDV: 76.1 MM HG (ref 35–45)
POTASSIUM SERPL-SCNC: 4.4 MMOL/L (ref 3.5–5.3)
POTASSIUM SERPL-SCNC: 4.4 MMOL/L (ref 3.5–5.3)
PR INTERVAL: 190 MS
PR INTERVAL: 194 MS
PR INTERVAL: 200 MS
PR INTERVAL: 202 MS
PROCALCITONIN SERPL-MCNC: 1.05 NG/ML
PROT SERPL-MCNC: 5.4 G/DL (ref 6.4–8.4)
PROT UR STRIP-MCNC: NEGATIVE MG/DL
QRS AXIS: -11 DEGREES
QRS AXIS: -16 DEGREES
QRS AXIS: -18 DEGREES
QRS AXIS: -6 DEGREES
QRSD INTERVAL: 102 MS
QRSD INTERVAL: 104 MS
QRSD INTERVAL: 104 MS
QRSD INTERVAL: 118 MS
QRSD INTERVAL: 98 MS
QRSD INTERVAL: 98 MS
QT INTERVAL: 402 MS
QT INTERVAL: 442 MS
QT INTERVAL: 476 MS
QT INTERVAL: 484 MS
QT INTERVAL: 492 MS
QT INTERVAL: 500 MS
QTC INTERVAL: 414 MS
QTC INTERVAL: 420 MS
QTC INTERVAL: 432 MS
QTC INTERVAL: 432 MS
QTC INTERVAL: 434 MS
QTC INTERVAL: 459 MS
RA PRESSURE ESTIMATED: 10 MMHG
RBC # BLD AUTO: 3.87 MILLION/UL (ref 3.88–5.62)
RBC #/AREA URNS AUTO: ABNORMAL /HPF
RH BLD: POSITIVE
RIGHT ATRIUM AREA SYSTOLE A4C: 22.4 CM2
RIGHT VENTRICLE ID DIMENSION: 3.9 CM
SL CV LEFT ATRIUM LENGTH A2C: 5.6 CM
SL CV LV EF: 65
SL CV PED ECHO LEFT VENTRICLE DIASTOLIC VOLUME (MOD BIPLANE) 2D: 133 ML
SL CV PED ECHO LEFT VENTRICLE SYSTOLIC VOLUME (MOD BIPLANE) 2D: 55 ML
SODIUM SERPL-SCNC: 137 MMOL/L (ref 135–147)
SODIUM SERPL-SCNC: 138 MMOL/L (ref 135–147)
SP GR UR STRIP.AUTO: 1.02
SPECIMEN SOURCE: ABNORMAL
SPECIMEN SOURCE: ABNORMAL
T WAVE AXIS: 68 DEGREES
T WAVE AXIS: 75 DEGREES
T WAVE AXIS: 78 DEGREES
T WAVE AXIS: 79 DEGREES
T WAVE AXIS: 82 DEGREES
T WAVE AXIS: 91 DEGREES
T3 SERPL-MCNC: 0.8 NG/ML (ref 0.6–1.8)
T4 FREE SERPL-MCNC: 0.67 NG/DL (ref 0.76–1.46)
TIBC SERPL-MCNC: 231 UG/DL (ref 250–450)
TR MAX PG: 36 MMHG
TR PEAK VELOCITY: 3 M/S
TRICUSPID ANNULAR PLANE SYSTOLIC EXCURSION: 2 CM
TRICUSPID VALVE PEAK REGURGITATION VELOCITY: 3.01 M/S
UROBILINOGEN UR QL STRIP.AUTO: 0.2 E.U./DL
VENT AC: 14
VENT AC: 14
VENT- AC: AC
VENTRICULAR RATE: 44 BPM
VENTRICULAR RATE: 45 BPM
VENTRICULAR RATE: 48 BPM
VENTRICULAR RATE: 53 BPM
VENTRICULAR RATE: 56 BPM
VENTRICULAR RATE: 70 BPM
VIT B12 SERPL-MCNC: 139 PG/ML (ref 180–914)
VT SETTING VENT: 470 ML
VT SETTING VENT: 470 ML
WBC # BLD AUTO: 13.27 THOUSAND/UL (ref 4.31–10.16)
WBC #/AREA URNS AUTO: ABNORMAL /HPF

## 2023-05-16 PROCEDURE — 03HY32Z INSERTION OF MONITORING DEVICE INTO UPPER ARTERY, PERCUTANEOUS APPROACH: ICD-10-PCS | Performed by: INTERNAL MEDICINE

## 2023-05-16 PROCEDURE — 06HY33Z INSERTION OF INFUSION DEVICE INTO LOWER VEIN, PERCUTANEOUS APPROACH: ICD-10-PCS | Performed by: INTERNAL MEDICINE

## 2023-05-16 PROCEDURE — 4A133B1 MONITORING OF ARTERIAL PRESSURE, PERIPHERAL, PERCUTANEOUS APPROACH: ICD-10-PCS | Performed by: INTERNAL MEDICINE

## 2023-05-16 PROCEDURE — 4A133J1 MONITORING OF ARTERIAL PULSE, PERIPHERAL, PERCUTANEOUS APPROACH: ICD-10-PCS | Performed by: INTERNAL MEDICINE

## 2023-05-16 PROCEDURE — 5A2204Z RESTORATION OF CARDIAC RHYTHM, SINGLE: ICD-10-PCS | Performed by: INTERNAL MEDICINE

## 2023-05-16 PROCEDURE — 5A1945Z RESPIRATORY VENTILATION, 24-96 CONSECUTIVE HOURS: ICD-10-PCS | Performed by: INTERNAL MEDICINE

## 2023-05-16 RX ORDER — ACETAMINOPHEN 160 MG/5ML
975 SUSPENSION ORAL EVERY 6 HOURS
Status: DISCONTINUED | OUTPATIENT
Start: 2023-05-16 | End: 2023-05-20

## 2023-05-16 RX ORDER — ALBUTEROL SULFATE 2.5 MG/3ML
2.5 SOLUTION RESPIRATORY (INHALATION) EVERY 4 HOURS PRN
Status: DISCONTINUED | OUTPATIENT
Start: 2023-05-16 | End: 2023-05-25 | Stop reason: HOSPADM

## 2023-05-16 RX ORDER — ALBUMIN, HUMAN INJ 5% 5 %
SOLUTION INTRAVENOUS
Status: COMPLETED
Start: 2023-05-16 | End: 2023-05-16

## 2023-05-16 RX ORDER — LIDOCAINE 50 MG/G
1 PATCH TOPICAL DAILY
Status: DISCONTINUED | OUTPATIENT
Start: 2023-05-16 | End: 2023-05-25 | Stop reason: HOSPADM

## 2023-05-16 RX ORDER — SODIUM CHLORIDE, SODIUM GLUCONATE, SODIUM ACETATE, POTASSIUM CHLORIDE, MAGNESIUM CHLORIDE, SODIUM PHOSPHATE, DIBASIC, AND POTASSIUM PHOSPHATE .53; .5; .37; .037; .03; .012; .00082 G/100ML; G/100ML; G/100ML; G/100ML; G/100ML; G/100ML; G/100ML
1000 INJECTION, SOLUTION INTRAVENOUS ONCE
Status: COMPLETED | OUTPATIENT
Start: 2023-05-16 | End: 2023-05-16

## 2023-05-16 RX ORDER — CALCIUM GLUCONATE 20 MG/ML
1 INJECTION, SOLUTION INTRAVENOUS ONCE
Status: COMPLETED | OUTPATIENT
Start: 2023-05-16 | End: 2023-05-16

## 2023-05-16 RX ORDER — ATROPINE SULFATE 1 MG/ML
1 INJECTION, SOLUTION INTRAVENOUS ONCE
Status: COMPLETED | OUTPATIENT
Start: 2023-05-16 | End: 2023-05-16

## 2023-05-16 RX ORDER — CHLORHEXIDINE GLUCONATE 0.12 MG/ML
15 RINSE ORAL EVERY 12 HOURS SCHEDULED
Status: DISCONTINUED | OUTPATIENT
Start: 2023-05-16 | End: 2023-05-16

## 2023-05-16 RX ORDER — FENTANYL CITRATE-0.9 % NACL/PF 10 MCG/ML
50 PLASTIC BAG, INJECTION (ML) INTRAVENOUS CONTINUOUS
Status: DISCONTINUED | OUTPATIENT
Start: 2023-05-16 | End: 2023-05-19

## 2023-05-16 RX ORDER — SODIUM CHLORIDE, SODIUM GLUCONATE, SODIUM ACETATE, POTASSIUM CHLORIDE, MAGNESIUM CHLORIDE, SODIUM PHOSPHATE, DIBASIC, AND POTASSIUM PHOSPHATE .53; .5; .37; .037; .03; .012; .00082 G/100ML; G/100ML; G/100ML; G/100ML; G/100ML; G/100ML; G/100ML
500 INJECTION, SOLUTION INTRAVENOUS ONCE
Status: COMPLETED | OUTPATIENT
Start: 2023-05-16 | End: 2023-05-16

## 2023-05-16 RX ORDER — FENTANYL CITRATE 50 UG/ML
50 INJECTION, SOLUTION INTRAMUSCULAR; INTRAVENOUS
Status: DISCONTINUED | OUTPATIENT
Start: 2023-05-16 | End: 2023-05-19

## 2023-05-16 RX ORDER — SODIUM CHLORIDE, SODIUM GLUCONATE, SODIUM ACETATE, POTASSIUM CHLORIDE, MAGNESIUM CHLORIDE, SODIUM PHOSPHATE, DIBASIC, AND POTASSIUM PHOSPHATE .53; .5; .37; .037; .03; .012; .00082 G/100ML; G/100ML; G/100ML; G/100ML; G/100ML; G/100ML; G/100ML
100 INJECTION, SOLUTION INTRAVENOUS CONTINUOUS
Status: DISCONTINUED | OUTPATIENT
Start: 2023-05-16 | End: 2023-05-18

## 2023-05-16 RX ORDER — METRONIDAZOLE 500 MG/100ML
500 INJECTION, SOLUTION INTRAVENOUS EVERY 8 HOURS
Status: COMPLETED | OUTPATIENT
Start: 2023-05-16 | End: 2023-05-22

## 2023-05-16 RX ORDER — MIDAZOLAM HYDROCHLORIDE 2 MG/2ML
2 INJECTION, SOLUTION INTRAMUSCULAR; INTRAVENOUS EVERY 4 HOURS PRN
Status: DISCONTINUED | OUTPATIENT
Start: 2023-05-16 | End: 2023-05-18

## 2023-05-16 RX ORDER — FAMOTIDINE 10 MG/ML
20 INJECTION, SOLUTION INTRAVENOUS 2 TIMES DAILY
Status: DISCONTINUED | OUTPATIENT
Start: 2023-05-16 | End: 2023-05-20

## 2023-05-16 RX ORDER — ALBUMIN, HUMAN INJ 5% 5 %
25 SOLUTION INTRAVENOUS ONCE
Status: COMPLETED | OUTPATIENT
Start: 2023-05-16 | End: 2023-05-16

## 2023-05-16 RX ORDER — SODIUM CHLORIDE, SODIUM GLUCONATE, SODIUM ACETATE, POTASSIUM CHLORIDE, MAGNESIUM CHLORIDE, SODIUM PHOSPHATE, DIBASIC, AND POTASSIUM PHOSPHATE .53; .5; .37; .037; .03; .012; .00082 G/100ML; G/100ML; G/100ML; G/100ML; G/100ML; G/100ML; G/100ML
100 INJECTION, SOLUTION INTRAVENOUS CONTINUOUS
Status: DISPENSED | OUTPATIENT
Start: 2023-05-16 | End: 2023-05-16

## 2023-05-16 RX ORDER — ROCURONIUM BROMIDE 10 MG/ML
0.6 INJECTION, SOLUTION INTRAVENOUS ONCE
Status: DISCONTINUED | OUTPATIENT
Start: 2023-05-16 | End: 2023-05-16

## 2023-05-16 RX ORDER — MAGNESIUM SULFATE HEPTAHYDRATE 40 MG/ML
2 INJECTION, SOLUTION INTRAVENOUS ONCE
Status: COMPLETED | OUTPATIENT
Start: 2023-05-16 | End: 2023-05-16

## 2023-05-16 RX ORDER — CHLORHEXIDINE GLUCONATE 0.12 MG/ML
15 RINSE ORAL EVERY 12 HOURS SCHEDULED
Status: DISCONTINUED | OUTPATIENT
Start: 2023-05-16 | End: 2023-05-25 | Stop reason: HOSPADM

## 2023-05-16 RX ORDER — CEFTRIAXONE 1 G/50ML
1000 INJECTION, SOLUTION INTRAVENOUS EVERY 24 HOURS
Status: DISCONTINUED | OUTPATIENT
Start: 2023-05-16 | End: 2023-05-18

## 2023-05-16 RX ORDER — PROPOFOL 10 MG/ML
INJECTION, EMULSION INTRAVENOUS
Status: COMPLETED
Start: 2023-05-16 | End: 2023-05-16

## 2023-05-16 RX ORDER — INSULIN LISPRO 100 [IU]/ML
1-6 INJECTION, SOLUTION INTRAVENOUS; SUBCUTANEOUS EVERY 6 HOURS SCHEDULED
Status: DISCONTINUED | OUTPATIENT
Start: 2023-05-16 | End: 2023-05-19

## 2023-05-16 RX ORDER — MIDAZOLAM HYDROCHLORIDE 2 MG/2ML
INJECTION, SOLUTION INTRAMUSCULAR; INTRAVENOUS
Status: COMPLETED
Start: 2023-05-16 | End: 2023-05-16

## 2023-05-16 RX ORDER — PROPOFOL 10 MG/ML
5-50 INJECTION, EMULSION INTRAVENOUS
Status: DISCONTINUED | OUTPATIENT
Start: 2023-05-16 | End: 2023-05-19

## 2023-05-16 RX ORDER — ACETAMINOPHEN 160 MG/5ML
650 SUSPENSION ORAL EVERY 6 HOURS
Status: DISCONTINUED | OUTPATIENT
Start: 2023-05-16 | End: 2023-05-16

## 2023-05-16 RX ORDER — ENOXAPARIN SODIUM 100 MG/ML
40 INJECTION SUBCUTANEOUS DAILY
Status: DISCONTINUED | OUTPATIENT
Start: 2023-05-16 | End: 2023-05-19

## 2023-05-16 RX ORDER — ACETAMINOPHEN 160 MG/5ML
325 SUSPENSION ORAL ONCE
Status: COMPLETED | OUTPATIENT
Start: 2023-05-16 | End: 2023-05-16

## 2023-05-16 RX ADMIN — PROPOFOL 30 MCG/KG/MIN: 10 INJECTION, EMULSION INTRAVENOUS at 03:43

## 2023-05-16 RX ADMIN — PROPOFOL 20 MCG/KG/MIN: 10 INJECTION, EMULSION INTRAVENOUS at 16:17

## 2023-05-16 RX ADMIN — CALCIUM GLUCONATE 1 G: 20 INJECTION, SOLUTION INTRAVENOUS at 06:32

## 2023-05-16 RX ADMIN — HYDROMORPHONE HYDROCHLORIDE 1 MG: 1 INJECTION, SOLUTION INTRAMUSCULAR; INTRAVENOUS; SUBCUTANEOUS at 00:03

## 2023-05-16 RX ADMIN — SODIUM CHLORIDE, SODIUM GLUCONATE, SODIUM ACETATE, POTASSIUM CHLORIDE, MAGNESIUM CHLORIDE, SODIUM PHOSPHATE, DIBASIC, AND POTASSIUM PHOSPHATE 125 ML/HR: .53; .5; .37; .037; .03; .012; .00082 INJECTION, SOLUTION INTRAVENOUS at 08:46

## 2023-05-16 RX ADMIN — Medication 50 MCG/HR: at 03:56

## 2023-05-16 RX ADMIN — NOREPINEPHRINE BITARTRATE 10 MCG/MIN: 1 SOLUTION INTRAVENOUS at 08:36

## 2023-05-16 RX ADMIN — METRONIDAZOLE 500 MG: 500 INJECTION, SOLUTION INTRAVENOUS at 13:13

## 2023-05-16 RX ADMIN — FENTANYL CITRATE 50 MCG: 50 INJECTION INTRAMUSCULAR; INTRAVENOUS at 07:45

## 2023-05-16 RX ADMIN — ACETAMINOPHEN 650 MG: 650 SUSPENSION ORAL at 04:11

## 2023-05-16 RX ADMIN — ACETAMINOPHEN 325 MG: 650 SUSPENSION ORAL at 16:07

## 2023-05-16 RX ADMIN — EPINEPHRINE 2 MCG/MIN: 1 INJECTION, SOLUTION, CONCENTRATE INTRAVENOUS at 04:57

## 2023-05-16 RX ADMIN — ACETAMINOPHEN 975 MG: 650 SUSPENSION ORAL at 23:02

## 2023-05-16 RX ADMIN — SODIUM CHLORIDE, SODIUM GLUCONATE, SODIUM ACETATE, POTASSIUM CHLORIDE, MAGNESIUM CHLORIDE, SODIUM PHOSPHATE, DIBASIC, AND POTASSIUM PHOSPHATE 100 ML/HR: .53; .5; .37; .037; .03; .012; .00082 INJECTION, SOLUTION INTRAVENOUS at 16:08

## 2023-05-16 RX ADMIN — ACETAMINOPHEN 650 MG: 650 SUSPENSION ORAL at 09:15

## 2023-05-16 RX ADMIN — SODIUM CHLORIDE, SODIUM GLUCONATE, SODIUM ACETATE, POTASSIUM CHLORIDE, MAGNESIUM CHLORIDE, SODIUM PHOSPHATE, DIBASIC, AND POTASSIUM PHOSPHATE 125 ML/HR: .53; .5; .37; .037; .03; .012; .00082 INJECTION, SOLUTION INTRAVENOUS at 03:47

## 2023-05-16 RX ADMIN — MIDAZOLAM 2 MG: 1 INJECTION INTRAMUSCULAR; INTRAVENOUS at 17:28

## 2023-05-16 RX ADMIN — FAMOTIDINE 20 MG: 10 INJECTION, SOLUTION INTRAVENOUS at 08:37

## 2023-05-16 RX ADMIN — EPINEPHRINE 2 MCG/MIN: 1 INJECTION, SOLUTION, CONCENTRATE INTRAVENOUS at 10:17

## 2023-05-16 RX ADMIN — PROPOFOL 30 MCG/KG/MIN: 10 INJECTION, EMULSION INTRAVENOUS at 01:36

## 2023-05-16 RX ADMIN — SODIUM CHLORIDE, SODIUM GLUCONATE, SODIUM ACETATE, POTASSIUM CHLORIDE, MAGNESIUM CHLORIDE, SODIUM PHOSPHATE, DIBASIC, AND POTASSIUM PHOSPHATE 1000 ML: .53; .5; .37; .037; .03; .012; .00082 INJECTION, SOLUTION INTRAVENOUS at 06:31

## 2023-05-16 RX ADMIN — MAGNESIUM SULFATE HEPTAHYDRATE 2 G: 40 INJECTION, SOLUTION INTRAVENOUS at 09:06

## 2023-05-16 RX ADMIN — ALBUMIN (HUMAN) 25 G: 12.5 INJECTION, SOLUTION INTRAVENOUS at 07:56

## 2023-05-16 RX ADMIN — FAMOTIDINE 20 MG: 10 INJECTION, SOLUTION INTRAVENOUS at 17:57

## 2023-05-16 RX ADMIN — ENOXAPARIN SODIUM 40 MG: 40 INJECTION SUBCUTANEOUS at 08:37

## 2023-05-16 RX ADMIN — ATROPINE SULFATE 1 MG: 1 INJECTION, SOLUTION INTRAVENOUS at 04:06

## 2023-05-16 RX ADMIN — CALCIUM GLUCONATE 1 G: 20 INJECTION, SOLUTION INTRAVENOUS at 09:06

## 2023-05-16 RX ADMIN — PROPOFOL 30 MCG/KG/MIN: 10 INJECTION, EMULSION INTRAVENOUS at 06:50

## 2023-05-16 RX ADMIN — ACETAMINOPHEN 650 MG: 650 SUSPENSION ORAL at 14:17

## 2023-05-16 RX ADMIN — PROPOFOL 30 MCG/KG/MIN: 10 INJECTION, EMULSION INTRAVENOUS at 21:47

## 2023-05-16 RX ADMIN — METRONIDAZOLE 500 MG: 500 INJECTION, SOLUTION INTRAVENOUS at 20:14

## 2023-05-16 RX ADMIN — CHLORHEXIDINE GLUCONATE 0.12% ORAL RINSE 15 ML: 1.2 LIQUID ORAL at 08:37

## 2023-05-16 RX ADMIN — Medication 150 MCG/HR: at 19:07

## 2023-05-16 RX ADMIN — INSULIN LISPRO 1 UNITS: 100 INJECTION, SOLUTION INTRAVENOUS; SUBCUTANEOUS at 12:33

## 2023-05-16 RX ADMIN — DEXTROSE 150 MG: 50 INJECTION, SOLUTION INTRAVENOUS at 07:53

## 2023-05-16 RX ADMIN — PROPOFOL 30 MCG/KG/MIN: 10 INJECTION, EMULSION INTRAVENOUS at 08:36

## 2023-05-16 RX ADMIN — FENTANYL CITRATE 50 MCG: 50 INJECTION INTRAMUSCULAR; INTRAVENOUS at 15:03

## 2023-05-16 RX ADMIN — AZITHROMYCIN MONOHYDRATE 500 MG: 500 INJECTION, POWDER, LYOPHILIZED, FOR SOLUTION INTRAVENOUS at 04:20

## 2023-05-16 RX ADMIN — SODIUM CHLORIDE, SODIUM GLUCONATE, SODIUM ACETATE, POTASSIUM CHLORIDE, MAGNESIUM CHLORIDE, SODIUM PHOSPHATE, DIBASIC, AND POTASSIUM PHOSPHATE 500 ML: .53; .5; .37; .037; .03; .012; .00082 INJECTION, SOLUTION INTRAVENOUS at 10:17

## 2023-05-16 RX ADMIN — CHLORHEXIDINE GLUCONATE 0.12% ORAL RINSE 15 ML: 1.2 LIQUID ORAL at 23:01

## 2023-05-16 RX ADMIN — Medication 125 MCG/HR: at 13:58

## 2023-05-16 RX ADMIN — FENTANYL CITRATE 50 MCG: 50 INJECTION INTRAMUSCULAR; INTRAVENOUS at 17:57

## 2023-05-16 RX ADMIN — ALBUMIN, HUMAN INJ 5% 25 G: 5 SOLUTION at 07:56

## 2023-05-16 RX ADMIN — CEFTRIAXONE 1000 MG: 1 INJECTION, SOLUTION INTRAVENOUS at 03:57

## 2023-05-16 NOTE — ED PROVIDER NOTES
History  Chief Complaint   Patient presents with   • Cardiac Arrest     Witnessed arrest   paged out, 2016 on scene,  pulses back      The patient is a 70-year-old male with an unknown past medical history who presented to the emergency department postcardiac arrest   EMS was called to the scene of his home after witnessed arrest by his wife  His wife stated that she had seen him vomiting and choking and believe that he had been choking on his own vomit  The patient was unresponsive and appeared to be in PEA on the monitor upon EMS arrival   The patient was given 2 rounds of epinephrine and intubated in the field  He had a blood glucose level of 220 on scene  ROSC was achieved while in transport to the hospital           Prior to Admission Medications   Prescriptions Last Dose Informant Patient Reported? Taking? ALPHAGAN P 0 1 %   Yes No   ALPRAZolam (XANAX) 0 25 mg tablet   No No   Si tablet if needed for severe anxiety not to exceed 3 tablets per week   Multiple Vitamins-Minerals (ICAPS PO)   Yes No   Sig: Take by mouth daily   Omega-3 Fatty Acids (OMEGA ESSENTIALS BASIC PO)   Yes No   Sig: Take by mouth daily   travoprost (TRAVATAN-Z) 0 004 % ophthalmic solution   Yes No      Facility-Administered Medications: None       No past medical history on file  Past Surgical History:   Procedure Laterality Date   • APPENDECTOMY  2006       Family History   Problem Relation Age of Onset   • Heart disease Father    • Colon cancer Brother    • Colon cancer Daughter      I have reviewed and agree with the history as documented      E-Cigarette/Vaping     E-Cigarette/Vaping Substances     Social History     Tobacco Use   • Smoking status: Never   • Smokeless tobacco: Never   Substance Use Topics   • Alcohol use: Yes     Comment: Rare   • Drug use: Never        Review of Systems   Unable to perform ROS: Intubated       Physical Exam  ED Triage Vitals   Temperature Pulse Respirations Blood Pressure SpO2 05/15/23 2116 05/15/23 2102 05/15/23 2230 05/15/23 2102 05/15/23 2100   97 6 °F (36 4 °C) (!) 107 18 140/89 99 %      Temp Source Heart Rate Source Patient Position - Orthostatic VS BP Location FiO2 (%)   05/15/23 2116 -- -- -- --   Temporal          Pain Score       05/15/23 2233       Med Not Given for Pain - for MAR use only             Orthostatic Vital Signs  Vitals:    05/16/23 0026 05/16/23 0027 05/16/23 0032 05/16/23 0037   BP: 128/64 128/64 123/62 121/60   Pulse: (!) 51 (!) 51 (!) 51 (!) 51       Physical Exam  Vitals reviewed  Constitutional:       General: He is in acute distress  Appearance: He is normal weight  Comments: Intubated and sedated     HENT:      Head: Normocephalic and atraumatic  Nose: Nose normal       Mouth/Throat:      Mouth: Mucous membranes are moist       Comments: Intubated with airway clear    Eyes:      Pupils: Pupils are equal, round, and reactive to light  Cardiovascular:      Rate and Rhythm: Normal rate  Rhythm irregular  Pulses: Normal pulses  Heart sounds: Normal heart sounds  No murmur heard  No friction rub  Pulmonary:      Breath sounds: No stridor  No wheezing, rhonchi or rales  Comments: Intubated and sedated  Abdominal:      General: Abdomen is flat  Bowel sounds are normal  There is no distension  Palpations: Abdomen is soft  There is no mass  Hernia: No hernia is present  Musculoskeletal:         General: No swelling or tenderness  Normal range of motion  Cervical back: No rigidity  Right lower leg: No edema  Left lower leg: No edema  Skin:     General: Skin is warm and dry  Capillary Refill: Capillary refill takes less than 2 seconds  Coloration: Skin is not jaundiced  Findings: No erythema or rash     Neurological:      Comments: Intubated and sedated           ED Medications  Medications   propofol (DIPRIVAN) 1000 mg in 100 mL infusion (premix) (30 mcg/kg/min × 98 8 kg Intravenous Rate/Dose Change 5/15/23 2349)   norepinephrine (LEVOPHED) 4 mg (STANDARD CONCENTRATION) IV in sodium chloride 0 9% 250 mL (5 mcg/min Intravenous Rate/Dose Change 5/16/23 0037)   EPINEPHrine 3000 mcg (STANDARD CONCENTRATION) IV in sodium chloride 0 9% 250 mL (4 mcg/min Intravenous New Bag 5/15/23 2333)   midazolam (FOR EMS ONLY) (VERSED) 2 mg/2 mL injection 2 mg (0 mg Does not apply Given to EMS 5/15/23 2110)   EPINEPHrine (FOR EMS ONLY) (ADRENALIN) injection 2 mg (0 mg Does not apply Given to EMS 5/15/23 2110)   sodium chloride 0 9 % bolus 1,000 mL (0 mL Intravenous Stopped 5/15/23 2154)   sodium bicarbonate 8 4 % injection 50 mEq (50 mEq Intravenous Given 5/15/23 2228)   iohexol (OMNIPAQUE) 350 MG/ML injection (SINGLE-DOSE) 100 mL (100 mL Intravenous Given 5/15/23 2215)   HYDROmorphone (DILAUDID) injection 1 mg (1 mg Intravenous Given 5/15/23 2233)   EPINEPHrine (ADRENALIN) injection 0 1 mg (0 1 mg Intravenous Given 5/15/23 2301)   Atropine Sulfate injection 1 mg (1 mg Intravenous Given 5/15/23 2253)   HYDROmorphone (DILAUDID) injection 1 mg (1 mg Intravenous Given 5/16/23 0003)       Diagnostic Studies  Results Reviewed     Procedure Component Value Units Date/Time    Lactic acid 2 Hours [[id number]] Collected: 05/16/23 0032    Lab Status: In process Specimen: Blood from Hand, Left Updated: 05/16/23 0041    HS Troponin I 2hr [[id number]]  (Abnormal) Collected: 05/15/23 2325    Lab Status: Final result Specimen: Blood from Arm, Left Updated: 05/15/23 2353     hs TnI 2hr 444 ng/L      Delta 2hr hsTnI 84 ng/L     HS Troponin I 4hr [[id number]]     Lab Status: No result Specimen: Blood     Lactic acid, plasma (w/reflex if result > 2 0) [[id number]]  (Abnormal) Collected: 05/15/23 2122    Lab Status: Final result Specimen: Blood from Arm, Left Updated: 05/15/23 2227     LACTIC ACID 5 6 mmol/L     Narrative:      Result may be elevated if tourniquet was used during collection      TSH, 3rd generation with Free T4 reflex [[id number]]  (Abnormal) Collected: 05/15/23 2122    Lab Status: Final result Specimen: Blood from Arm, Left Updated: 05/15/23 2205     TSH 3RD GENERATON 5 976 uIU/mL     T4, free [[id number]] Collected: 05/15/23 2122    Lab Status:  In process Specimen: Blood from Arm, Left Updated: 05/15/23 2205    HS Troponin 0hr (reflex protocol) [[id number]]  (Abnormal) Collected: 05/15/23 2122    Lab Status: Final result Specimen: Blood from Arm, Left Updated: 05/15/23 2157     hs TnI 0hr 360 ng/L     Comprehensive metabolic panel [[id number]]  (Abnormal) Collected: 05/15/23 2122    Lab Status: Final result Specimen: Blood from Arm, Left Updated: 05/15/23 2152     Sodium 138 mmol/L      Potassium 3 8 mmol/L      Chloride 104 mmol/L      CO2 21 mmol/L      ANION GAP 13 mmol/L      BUN 18 mg/dL      Creatinine 1 42 mg/dL      Glucose 187 mg/dL      Calcium 8 6 mg/dL       U/L       U/L      Alkaline Phosphatase 72 U/L      Total Protein 6 2 g/dL      Albumin 3 8 g/dL      Total Bilirubin 0 58 mg/dL      eGFR 48 ml/min/1 73sq m     Narrative:      [place] guidelines for Chronic Kidney Disease (CKD):   •  Stage 1 with normal or high GFR (GFR > 90 mL/min/1 73 square meters)  •  Stage 2 Mild CKD (GFR = 60-89 mL/min/1 73 square meters)  •  Stage 3A Moderate CKD (GFR = 45-59 mL/min/1 73 square meters)  •  Stage 3B Moderate CKD (GFR = 30-44 mL/min/1 73 square meters)  •  Stage 4 Severe CKD (GFR = 15-29 mL/min/1 73 square meters)  •  Stage 5 End Stage CKD (GFR <15 mL/min/1 73 square meters)  Note: GFR calculation is accurate only with a steady state creatinine    Magnesium [[id number]]  (Normal) Collected: 05/15/23 2122    Lab Status: Final result Specimen: Blood from Arm, Left Updated: 05/15/23 2152     Magnesium 2 1 mg/dL     Phosphorus [[id number]]  (Abnormal) Collected: 05/15/23 2122    Lab Status: Final result Specimen: Blood from Arm, Left Updated: 05/15/23 0964     Phosphorus 6 0 mg/dL Protime-INR [[id number]]  (Normal) Collected: 05/15/23 2122    Lab Status: Final result Specimen: Blood from Arm, Left Updated: 05/15/23 2144     Protime 14 3 seconds      INR 1 09    APTT [[id number]]  (Normal) Collected: 05/15/23 2122    Lab Status: Final result Specimen: Blood from Arm, Left Updated: 05/15/23 2144     PTT 25 seconds     CBC and differential [[id number]] Collected: 05/15/23 2122    Lab Status: Final result Specimen: Blood from Arm, Left Updated: 05/15/23 2135     WBC 6 82 Thousand/uL      RBC 4 28 Million/uL      Hemoglobin 13 1 g/dL      Hematocrit 39 4 %      MCV 92 fL      MCH 30 6 pg      MCHC 33 2 g/dL      RDW 11 9 %      MPV 10 9 fL      Platelets 774 Thousands/uL      nRBC 0 /100 WBCs      Neutrophils Relative 63 %      Immat GRANS % 2 %      Lymphocytes Relative 26 %      Monocytes Relative 8 %      Eosinophils Relative 1 %      Basophils Relative 0 %      Neutrophils Absolute 4 29 Thousands/µL      Immature Grans Absolute 0 11 Thousand/uL      Lymphocytes Absolute 1 77 Thousands/µL      Monocytes Absolute 0 56 Thousand/µL      Eosinophils Absolute 0 06 Thousand/µL      Basophils Absolute 0 03 Thousands/µL     Blood gas, venous [[id number]]  (Abnormal) Collected: 05/15/23 2122    Lab Status: Final result Specimen: Blood from Arm, Left Updated: 05/15/23 2135     pH, Anant 7 231     pCO2, Anant 46 6 mm Hg      pO2, Anant 159 0 mm Hg      HCO3, Anant 19 1 mmol/L      Base Excess, Anant -8 3 mmol/L      O2 Content, Anant 19 5 ml/dL      O2 HGB, VENOUS 97 2 %     Fingerstick Glucose (POCT) [[id number]]  (Abnormal) Collected: 05/15/23 2109    Lab Status: Final result Updated: 05/15/23 2134     POC Glucose 193 mg/dl     Blood culture #1 [[id number]] Collected: 05/15/23 2122    Lab Status: In process Specimen: Blood from Arm, Left Updated: 05/15/23 2132    Blood culture #2 [[id number]] Collected: 05/15/23 2122    Lab Status:  In process Specimen: Blood from Arm, Left Updated: 05/15/23 2132                 CT head without contrast   Final Result by Vini Peterson MD (05/16 0018)      No acute intracranial abnormality  Workstation performed: [id number]         CT chest abdomen pelvis w contrast   Final Result by Vini Peterson MD (05/16 0030)      NG tube tip is coiled in the esophagus  The tip is above the field-of-view  Recommend repositioning  Small volume of air seen within the bladder likely from insertion of a Stewart catheter tip and balloon which are seen within the bladder  Dependent parenchymal changes are seen in the posterior aspect of the lungs bilaterally  Right 2nd-6th anterior rib fractures  Left second-fifth anterior rib fractures  Workstation performed: [id number]         XR chest 1 view portable   ED Interpretation by Yinka Gardiner DO (05/15 2202)   Chest congestion with possible aspiration bilaterally  Both lung fields are inflated with vascular markings throughout per my interpretation  Procedures  ECG 12 Lead Documentation Only    Date/Time: 5/15/2023 9:09 PM  Performed by: Yinka Gardiner DO  Authorized by: Yinka Gardiner DO     Indications / Diagnosis:  Cardiac arrest  ECG reviewed by me, the ED Provider: yes    Patient location:  ED  Previous ECG:     Previous ECG:  Unavailable    Comparison to cardiac monitor: No    Interpretation:     Interpretation: abnormal    Quality:     Tracing quality:  Limited by artifact  Rate:     ECG rate:  100    ECG rate assessment: tachycardic    Rhythm:     Rhythm: A-V block and atrial flutter    Ectopy:     Ectopy: none    QRS:     QRS axis:  Indeterminate    QRS intervals: Wide  ST segments:     ST segments:  Normal  T waves:     T waves: inverted      Inverted:  AVR and V1  Other findings:     Other findings: prolonged qTc interval    Comments:      Peers to be atrial flutter with a prolonged QT, wide QRS  No acute ischemia noted but difficult to determine due to artifact      ECG 12 Lead Documentation Only    Date/Time: 5/15/2023 9:10 PM  Performed by: Pablo Calderon DO  Authorized by: Pablo Calderon DO     Indications / Diagnosis:  Repeat  ECG reviewed by me, the ED Provider: yes    Patient location:  ED  Previous ECG:     Previous ECG:  Compared to current    Similarity:  No change    Comparison to cardiac monitor: No    Interpretation:     Interpretation: abnormal    Quality:     Tracing quality:  Limited by artifact  Rate:     ECG rate:  89    ECG rate assessment: normal    Rhythm:     Rhythm: atrial flutter    Ectopy:     Ectopy: none    QRS:     QRS axis:  Normal    QRS intervals: Wide  Conduction:     Conduction: normal    ST segments:     ST segments: Difficult to determine  T waves:     T waves comment:  Difficult to determine  Other findings:     Other findings: prolonged qTc interval    Comments:      Appears to be atrial flutter with low voltage QRS and prolonged QT but no acute ischemia  We are assessing troponin  ECG 12 Lead Documentation Only    Date/Time: 5/15/2023 11:08 PM  Performed by: Pablo Calderon DO  Authorized by: Pablo Calderon DO     Indications / Diagnosis:  Bradycardia  ECG reviewed by me, the ED Provider: yes    Patient location:  ED  Previous ECG:     Previous ECG:  Compared to current    Similarity:  Changes noted    Comparison to cardiac monitor: No    Interpretation:     Interpretation: abnormal    Quality:     Tracing quality:  Limited by artifact  Rate:     ECG rate:  45    ECG rate assessment: bradycardic    Rhythm:     Rhythm: sinus rhythm    Ectopy:     Ectopy: none    QRS:     QRS axis:  Normal    QRS intervals: Wide  Conduction:     Conduction: normal    ST segments:     ST segments:  Normal  T waves:     T waves: normal    Comments:      Sinus bradycardia with low voltage QRS  No acute ischemia  ED Course  ED Course as of 05/16/23 0044   Mon May 15, 2023   2159 hs TnI 0hr(!): 360  Initial troponin at 360    Likely secondary to cardiac arrest    2159 Phosphorus(!): 6 0   2159 Blood gas, venous(!)  pH of 7 23  We will order 1 amp of bicarb at this time  2202 XR chest 1 view portable  Chest congestion with possible aspiration bilaterally  Both lung fields are inflated with vascular markings throughout per my interpretation  Tue May 16, 2023   0016 ECG 12 lead  Peers to be atrial flutter with a prolonged QT, wide QRS  No acute ischemia noted but difficult to determine due to artifact  0021 I spoke with Dr Hu Mansfield of critical care about the patient  She recommended admitting the patient here at [facility]   We appreciate her recommendations and will follow  0025 CT head without contrast  IMPRESSION:     No acute intracranial abnormality  0034 CT chest abdomen pelvis w contrast  IMPRESSION:     NG tube tip is coiled in the esophagus  The tip is above the field-of-view  Recommend repositioning      Small volume of air seen within the bladder likely from insertion of a Stewart catheter tip and balloon which are seen within the bladder      Dependent parenchymal changes are seen in the posterior aspect of the lungs bilaterally      Right 2nd-6th anterior rib fractures  Left second-fifth anterior rib fractures  1 I spoke with inpatient [facility] who agreed to admission to the ICU under the care of Dr Orlando Fischer  Medical Decision Making  The patient is a 55-year-old male with an unknown past medical history who presented to the emergency department postcardiac arrest   Upon initial presentation the patient was sedated and intubated and being bagged via BVM  He appeared to be in atrial flutter on the monitor  He was placed on the ventilator with a tidal volume calculated at 6 times his ideal body weight  The patient was placed on a propofol infusion drip for sedation    Based on eyewitness account we have a high clinical suspicion for an aspiration related hypoxic arrest   We have ordered a CBC, CMP, troponin, BNP, mag, Phos, coags, VBG, blood cultures, lactic, chest x-ray, EKG as well as CT without contrast of his head and a CT with contrast of his chest and pelvis  Labs and imaging are pending  Amount and/or Complexity of Data Reviewed  Labs: ordered  Radiology: ordered  Risk  Prescription drug management  Disposition  Final diagnoses:   Cardiac arrest ([facility] [address] [facility] )   [facility]   Lactic acidosis   Elevated troponin   Hyperphosphatemia   Elevated TSH     Time reflects when diagnosis was documented in both MDM as applicable and the Disposition within this note     Time User Action Codes Description Comment    5/16/2023 12:22 AM Larisa Hummingbird Add [I46 9] Cardiac arrest ([facility] )     5/16/2023 12:22 AM Larisa Hummingbird Add [R09 2] Respiratory arrest ([facility] )     5/16/2023 12:22 AM Larias Hummingbird Add [E87 20] Lactic acidosis     5/16/2023 12:22 AM Larisa Hummingbird Add [R77 8] Elevated troponin     5/16/2023 12:23 AM Larisa Hummingbird Add [E83 39] Hyperphosphatemia     5/16/2023 12:23 AM Larisa Hummingbird Add [R79 89] Elevated TSH       ED Disposition     ED Disposition   Admit    Condition   Stable    Date/Time   Tue May 16, 2023 12:43 AM    Comment   Case was discussed with Dr Donneta Blizzard and sl resident and the patient's admission status was agreed to be Admission Status: inpatient status to the service of Dr Pranav Blackwell   Follow-up Information    None         Patient's Medications   Discharge Prescriptions    No medications on file     No discharge procedures on file  PDMP Review       Value Time User    PDMP Reviewed  Yes 7/30/2020  7:45 AM Dulce Rose DO           ED Provider  Attending physically available and evaluated [place]  I managed the patient along with the ED Attending      Electronically Signed by         Samuel Anne DO  05/16/23 0045

## 2023-05-16 NOTE — ASSESSMENT & PLAN NOTE
· CT chest abdomen pelvis at [facility] showing acute anterior fractures on right side 2 through 6, left side 2 through 5    Appear nondisplaced on my view  · On exam patient with symmetrical chest rise with ventilation  · Maintains 6 mL/kg for total volume goal  · Fentanyl infusion and as needed for pain control  · Start Tylenol around-the-clock

## 2023-05-16 NOTE — RESPIRATORY THERAPY NOTE
05/16/23 0155   Respiratory Assessment   Assessment Type Assess only   General Appearance Sedated   Respiratory Pattern Normal   Chest Assessment Chest expansion symmetrical   Cough None   Resp Comments Routine Ventilator check   Vent Information   Vent type     Vent Mode AC/VC   $ Vent Daily Charge-Subsequent Yes   $ Pulse Oximetry Spot Check Charge Completed   SpO2 99 %   AC/VC Settings   Resp Rate (BPM) 12 BPM   Vt (mL) 400 mL   FIO2 (%) (S)  40 %   PEEP (cmH2O) 6 cmH2O   Flow Pattern (LPM) 48 L/min   Trigger Sensitivity Flow (lpm) 3 %   Humidification Heater   Heater Temperature (Set) 98 6 °F (37 °C)   AC/VC Actuals   Resp Rate (BPM) 15 BPM   VT (mL) 418   MV 5 95   MAP (cmH2O) 6 7 cmH2O   Peak Pressure (cmH2O) 13 cmH2O   I/E Ratio (Obs) 1:3 8   $ ETCO2  Yes   Static Compliance (mL/cmH20) 68 mL/cmH2O   ETCO2 (mmHg) 38 mmHg   AC/VC Alarms   High Peak Pressure (cmH2O) 35   High Resp Rate (BPM) 35 BPM   High MV (L/min) 16 L/min   Low MV (L/min) 4 L/min   Vt High (mL) 700 mL   Vt Low (mL) 200 mL   AC/VC Apnea Settings   Resp Rate (BPM) 12 BPM   VT (mL) 400 mL   FIO2 (%) 60 %   Apnea Time (s) 20 S   Maintenance   Resuscitation bag with peep valve at bedside Yes   Water bag changed No   Circuit changed No   IHI Ventilator Associated Pneumonia Bundle   Head of Bed Elevated HOB 45

## 2023-05-16 NOTE — PROCEDURES
Arterial Line Insertion    Date/Time: 5/16/2023 8:50 AM  Performed by: CARMEL Cowan  Authorized by: CARMEL Cowan     Patient location:  ICU  Other Assisting Provider: No    Consent:     Consent obtained:  Verbal    Consent given by:  Spouse    Risks discussed:  Bleeding, ischemia, repeat procedure, infection and pain  Universal protocol:     Procedure explained and questions answered to patient or proxy's satisfaction: yes      Relevant documents present and verified: yes      Test results available and properly labeled: yes      Radiology Images displayed and confirmed  If images not available, report reviewed: yes      Required blood products, implants, devices, and special equipment available: yes      Site/side marked: yes      Immediately prior to procedure a time out was called: yes      Patient identity confirmed: Anonymous protocol, patient vented/unresponsive  Indications:     Indications: hemodynamic monitoring, multiple ABGs, continuous blood pressure monitoring and frequent labs / infusion    Pre-procedure details:     Skin preparation:  Chlorhexidine    Preparation: Patient was prepped and draped in sterile fashion    Anesthesia (see MAR for exact dosages): Anesthesia method:  None  Procedure details:     Location / Tip of Catheter:  Radial    Laterality:  Right    Needle gauge:  20 G    Placement technique:  Seldinger    Number of attempts:  1    Successful placement: yes      Transducer: waveform confirmed    Post-procedure details:     Post-procedure:  Secured with tape and sutured    CMS:  Normal    Patient tolerance of procedure:   Tolerated well, no immediate complications

## 2023-05-16 NOTE — PROCEDURES
Electrical Cardioversion    Date/Time: 5/16/2023 8:52 AM  Performed by: CARMEL Stovall  Authorized by: CARMEL Stovall     Verbal consent obtained?: No    Written consent obtained?: No    Emergent situation    Relevant documents present and verified: Yes    Test results available and properly labeled: Yes    Radiology Images displayed and confirmed  If images not available, report reviewed: Yes    Required items: Required blood products, implants, devices and special equipment available    Patient identity confirmed:   Anonymous protocol, patient vented/unresponsive  Time out: Immediately prior to the procedure a time out was called    Patient sedated: Yes    Sedation:  Propofol  Analgesia:  Fentanyl  Cardioversion basis:  Emergent  Pre-procedure rhythm:  Ventricular tachycardia  Position: Patient was placed in a supine position    Chest area exposed: Chest area was exposed    Electrodes:  Pads  Electrodes placed:  Anterior-lateral  Number of attempts:  1  Attempt 1:     Attempt 1 mode:  Synchronous    Attempt 1 waveform:  Monophasic    Attempt 1 shock (Joules):  170    Attempt 1 outcome:  Conversion to normal sinus rhythm  Post-procedure rhythm:  Normal sinus rhythm (sinus remedios )  Complications: no complications    Patient tolerance:  Patient tolerated the procedure well with no immediate complications

## 2023-05-16 NOTE — NURSING NOTE
1900: Patient's family at bedside  Patient currently on fentanyl, propofol, levophed, and isolyte  Check MAR for gtt rates  Current vent settings include: 18/470VT/50%/6PEEP  Patient is febrile with a temp of 100 9F esophageal probe  Will continue to monitor VS       2140: Patient transported to CT  Transported with RT and two RNs  Vital signs remain stable    2225: Patient transported back to ICU room 4  Patient attached back to ICU monitors  Cooling blanket restarted  Will continue to monitor  3031-2032: Patient HR dropped to 29 on monitor  Provider made aware  Atropine given  Check MAR      [id number]: Provider made aware of patient's low HR  Epi gtt restarted  Check MAR for gtt rate  0230: Patient's temp 95 4F esophageal probe  Provider made aware; will continue to monitor per provider

## 2023-05-16 NOTE — CONSULTS
[address]  Consult  Name: Camacho Mejía 67 y o  male I MRN: [id number]  Unit/Bed#: ICU 04-01 I Date of Admission: 5/16/2023   Date of Service: 5/16/2023 I Hospital Day: 0    Inpatient consult to Cardiology  Consult performed by: Vinnie Aguero MD  Consult ordered by: CARMEL Hair          Assessment/Plan   Elevated troponin  Assessment & Plan  Does not seem indicative of acute coronary syndrome  EKG this morning while patient was in sinus rhythm did not show any ST segment changes and echocardiogram is completely normal     Bradycardia  Assessment & Plan  Etiology unclear  There is junctional escape rhythm alternating with sinus beats  No profound pauses  TSH is slightly elevated and T4 is a bit low but I do not know if those values are low enough to explain current situation  Currently patient seems well perfused and I would not yet push for pacing  * Cardiac arrest [facility])  Assessment & Plan  Etiology remains unclear  He has been bradycardic since the beginning and certainly that could be a factor  If patient awakens fully and bradycardia persists then pacing will be considered for long-term care  Other summary comments:   Care reviewed with critical care  For now supportive care and antibiotics with close observation of rhythm  Pacing will remain an option  T3 is pending as well as a cortisol level  Outpatient Cardiologist: None    HPI: Camacho Mejía is a 67y o  year old male who presented with unresponsiveness at home  By report his wife started CPR and called 911  He was transferred from [facility] after he had return of spontaneous circulation  Initial creatinine was a little bit elevated but this has come down with fluid  He is on some pressors but these are weaning off  Patient is currently sedated  There was no report of chest pain    Although there has been persistent relative bradycardia with junctional rhythm, there have been no pauses per se  EKG:   Briefly in sinus rhythm without any ST segment abnormalities  Mainly rhythm is junctional with competing sinus bradycardia  MOST  RECENT CARDIAC IMAGING:   TTE this morning  Normal LV and RV systolic function without valve abnormalities  Review of Systems: Not obtainable  Historical Information   No past medical history on file  Past Surgical History:   Procedure Laterality Date   • APPENDECTOMY  2006     Social History     Substance and Sexual Activity   Alcohol Use Yes    Comment: Rare     Social History     Substance and Sexual Activity   Drug Use Never     Social History     Tobacco Use   Smoking Status Never   Smokeless Tobacco Never       Family History:   Not obtained  Meds/Allergies   all current active meds have been reviewed  Medications Prior to Admission   Medication   • ALPHAGAN P 0 1 %   • ALPRAZolam (XANAX) 0 25 mg tablet   • Multiple Vitamins-Minerals (ICAPS PO)   • Omega-3 Fatty Acids (OMEGA ESSENTIALS BASIC PO)   • travoprost (TRAVATAN-Z) 0 004 % ophthalmic solution       No Known Allergies    Objective   Vitals: Blood pressure 100/51, pulse (!) 40, temperature 99 3 °F (37 4 °C), resp  rate 14, height 6' (1 829 m), weight 100 kg (221 lb), SpO2 98 %  , Body mass index is 29 97 kg/m² ,   Orthostatic Blood Pressures    Flowsheet Row Most Recent Value   Blood Pressure 100/51 filed at 05/16/2023 0677          Systolic (02DEJ), IEB:867 , Min:74 , QYS:920     Diastolic (07CCA), XVK:67, Min:44, Max:89              Physical Exam:    General: On ventilator and sedated  Eyes:  Anicteric  Oral mucosa:  Moist   Neck:  No JVD  Carotid upstrokes are brisk without bruits  No masses  Chest:  Clear to auscultation  Cardiac:  No palpable PMI  Normal S1 and S2  No murmur gallop or rub  Abdomen:  Soft and nontender  No palpable organomegaly or aortic enlargement  Extremities:  No peripheral edema  Musculoskeletal:  Symmetric     Vascular:  Femoral pulses are brisk without bruits  Popliteal  pulses are intact bilaterally  Pedal pulses are diminished but feet are warm  Neuro: Not performed  Psych: Sedated        Lab Results:     Troponins:    Results from last 7 days   Lab Units 05/16/23  0200 05/15/23  2325 05/15/23  2122   HS TNI 0HR ng/L  --   --  360*   HS TNI 2HR ng/L  --  444*  --    HSTNI D2 ng/L  --  84*  --    HS TNI 4HR ng/L 392*  --   --    HSTNI D4 ng/L 32*  --   --      BNP:       CBC :   Results from last 7 days   Lab Units 05/16/23  0327 05/15/23  2122   WBC Thousand/uL 13 27* 6 82   HEMOGLOBIN g/dL 11 8* 13 1   HEMATOCRIT % 36 7 39 4   MCV fL 95 92   PLATELETS Thousands/uL 165 171     TSH:     CMP:   Results from last 7 days   Lab Units 05/16/23  0825 05/16/23  0327 05/15/23  2122   POTASSIUM mmol/L 4 4 4 4 3 8   CHLORIDE mmol/L 107 107 104   CO2 mmol/L 22 22 21   BUN mg/dL 19 18 18   CREATININE mg/dL 1 04 1 09 1 42*   AST U/L  --  85* 131*   ALT U/L  --  111* 123*   EGFR ml/min/1 73sq m 71 67 48     Lipid Profile:     Coags:   Results from last 7 days   Lab Units 05/15/23  2122   INR  1 09

## 2023-05-16 NOTE — RESPIRATORY THERAPY NOTE
05/16/23 0305   Respiratory Assessment   Assessment Type Assess only   General Appearance Sedated   Respiratory Pattern Normal   Chest Assessment Chest expansion symmetrical   Bilateral Breath Sounds Diminished   Resp Comments   (pt transfered from [address] [place] [address]   pt placed on AC 14/470/40% peep6  ETT 7 5 @ 26 lips  will continue to monitor pt  Pt assessed as per protocol )   O2 Device vent   Vent Information   Vent type     Vent Mode AC/VC   $ Vent Charge-INITIAL Yes   Ventilator Start Yes   $ Pulse Oximetry Spot Check Charge Completed   SpO2 98 %   AC/VC Settings   Resp Rate (BPM) 14 BPM   Vt (mL) 470 mL   FIO2 (%) 40 %   PEEP (cmH2O) 6 cmH2O   Flow Pattern (LPM) 50 L/min   Trigger Sensitivity Flow (lpm) 3 %   Humidification Heater   Heater Temperature (Set) 98 6 °F (37 °C)   AC/VC Actuals   Resp Rate (BPM) 14 BPM   VT (mL) 502   MV 6 68   MAP (cmH2O) 14 cmH2O   Peak Pressure (cmH2O) 20 cmH2O   I/E Ratio (Obs) 1 3 5   Static Compliance (mL/cmH20) 0 mL/cmH2O   Plateau Pressure (cm H2O) 14 cm H2O   AC/VC Alarms   High Peak Pressure (cmH2O) 35   High Resp Rate (BPM) 35 BPM   High MV (L/min) 16 L/min   Low MV (L/min) 4 L/min   Vt High (mL) 700 mL   Vt Low (mL) 200 mL   AC/VC Apnea Settings   Resp Rate (BPM) 14 BPM   VT (mL) 470 mL   FIO2 (%) 100 %   Apnea Time (s) 20 S   Apnea Flow (L/min) 50 L/min   Maintenance   Alarm (pink) cable attached No   Resuscitation bag with peep valve at bedside Yes   Water bag changed No  (new)   Circuit changed No  (new)   Daily Screen   Patient safety screen outcome: Failed   Not Ready for Weaning due to:  Underline problem not resolved     RT Ventilator Management Note  Zafar Vega 67 y o  male MRN: [id number]  Unit/Bed#: ICU 04-01 Encounter: [id number]      Daily Screen         5/16/2023  0305             Patient safety screen outcome[de-identified] Failed    Not Ready for Weaning due to[de-identified] Underline problem not resolved              Physical Exam:   Assessment Type: Assess only  General Appearance: Sedated  Respiratory Pattern: Normal  Chest Assessment: Chest expansion symmetrical  Bilateral Breath Sounds: Diminished  O2 Device: vent      Resp Comments:  (pt transfered from [employer]  pt placed on AC 14/470/40% peep6  ETT 7 5 @ 26 lips  will continue to monitor pt    Pt assessed as per protocol )

## 2023-05-16 NOTE — ASSESSMENT & PLAN NOTE
· Wife reports that he was vomiting at home and she noticed that he was choking on his vomit  Patient with cardiac arrest requiring intubation in the field  · Chest x-ray showing bilateral opacities, right greater than left, concerning for aspiration  · Patient with a severe Score and a low drip score:  We will start azithromycin and Rocephin  · [facility], will repeat blood cultures here  · Obtain UA and culture  · Check procalcitonin  · Trend fever curve and WBC

## 2023-05-16 NOTE — NURSING NOTE
During AM assessment, pt suctioned with inline suctioned  Pt went into vtach, [address] emergently notified, sedation stopped and pt positioned for CPR and [facility] spontanesously revolved  [address] at bedside to insert art line and pt again coughing and went into Sheila Avilla ordered cardioversion at 175 joules and vtach resolved  Amio bolus administered but gtt held per bridger  Sedation resumed  See MAR for medication titrations and administration

## 2023-05-16 NOTE — H&P
[address]  H&P  Name: Angeline Guzman 67 y o  male I MRN: [id number]  Unit/Bed#: ICU 04-01 I Date of Admission: 5/16/2023   Date of Service: 5/16/2023  Hospital Day: 0      Assessment/Plan   * Cardiac arrest [facility])  Assessment & Plan  · Patient from home and was a witnessed aspiration after vomiting  Wife noted that he appeared to be choking and then went unresponsive  When EMS arrived he was PEA cardiac arrest, CPR was started  He had 2 rounds of epi and had return of circulation  Patient intubated in the field and transferred to [address] [place] [address] Joseph he was bradycardic and hypotensive requiring vasopressors and 1 dose of atropine  Patient was transferred to [address] for further care  · PEA cardiac arrest secondary to hypoxia  · Maintain mechanical ventilator, avoid hypoxia and electrolyte abnormalities  · Avoid fevers, currently 36 °C by himself  · Start telemetry monitoring  · Check EKG now  · Check echo    Shock [facility])  Assessment & Plan  · Shock state postcardiac arrest, vasopressors started to wean off prior to transfer to [facility]   Upon arrival [address] was stopped  · Troponin peaked at 444  · Close monitoring of I/os status  Avoid hypotension  · Check lactic acid now  On admission to [facility] it was 5 6  · Check echocardiogram    Acute respiratory failure with hypoxia [facility])  Assessment & Plan  · Secondary to aspiration event and cardiac arrest   Intubated in the field after cardiac arrest  · Maintain AC/VC vent settings  · Patient with number ET tube, 26 at the lip  · Stat chest x-ray on arrival here showing ET tube in good position above the ruben, OG tube in good position below the diaphragm  · Current settings AC/VC 14/470/40/6 ideal body weight 78 3  · Start propofol and fentanyl infusion at 50 mics for sedation    Goal RASS -1  · Consider SAT/SBT in the morning  · VAP precautions    Aspiration pneumonia [facility])  Assessment & Plan  · Wife reports that he was vomiting at home and she noticed that he was choking on his vomit  Patient with cardiac arrest requiring intubation in the field  · Chest x-ray showing bilateral opacities, right greater than left, concerning for aspiration  · Patient with a severe Score and a low drip score: We will start azithromycin and Rocephin  · Blood cultures obtained at [facility], will repeat blood cultures here  · Obtain UA and culture  · Check procalcitonin  · Trend fever curve and WBC    Closed fracture of multiple ribs of both sides  Assessment & Plan  · CT chest abdomen pelvis at [facility] showing acute anterior fractures on right side 2 through 6, left side 2 through 5  Appear nondisplaced on my view  · On exam patient with symmetrical chest rise with ventilation  · Maintains 6 mL/kg for total volume goal  · Fentanyl infusion and as needed for pain control  · Start Tylenol around-the-clock    PIERO (acute kidney injury) ([facility] [address] )  Assessment & Plan  · Unclear baseline, patient was only seen once in our facility prior to today in 2020  Creatinine at that time was 1 08  On admission now creatinine is 1 42  · Stewart placed at [facility]  · Check UA and culture  · Close monitoring of I/os status and renal function daily    Lactic acid acidosis  Assessment & Plan  · Lactic acid postcardiac arrest, initially was 5 6 and trended down to 2 8 after a liter of IV fluids  · Stat lactic acid now         -------------------------------------------------------------------------------------------------------------  Chief Complaint: Witnessed cardiac arrest at home after choking on vomit    History of Present Illness     Ria Johnson is a 67 y o  male who transferred from [facility] after presenting to the ER with an out of hospital cardiac arrest   Wife reports that patient was vomiting and he seemed to be choking on his vomit when he became unresponsive    EMS arrived and he was in PEA cardiac arrest, received 2 rounds of epinephrine and had return of circulation  He was intubated in the field and initially hypotensive postarrest so he was started on vasopressors  In [facility] ER, he was noted to be bradycardic and given atropine  Vasopressors were started to wean off prior to transfer to [facility] for further management  CAT scan showing multiple rib fractures bilateral, right side acute anterior rib fractures from 2 through 6 and left side 2 through 5  On arrival at [address], Levophed was stopped due to stable blood pressures  Admit to ICU under critical care    History obtained from chart review  -------------------------------------------------------------------------------------------------------------  Dispo: Admit to Critical Care     Code Status: Level 1 - Full Code  --------------------------------------------------------------------------------------------------------------  Review of Systems   Unable to perform ROS: Intubated       Physical Exam  Vitals and nursing note reviewed  Constitutional:       Comments: Sedated and intubated on exam   HENT:      Head: Normocephalic and atraumatic  Nose: Nose normal       Mouth/Throat:      Mouth: Mucous membranes are moist       Pharynx: Oropharynx is clear  Eyes:      Conjunctiva/sclera: Conjunctivae normal       Pupils: Pupils are equal, round, and reactive to light  Comments: He was 3 mm bilateral and sluggish to light   Cardiovascular:      Rate and Rhythm: Regular rhythm  Bradycardia present  Pulses: Normal pulses  Heart sounds: Normal heart sounds  Pulmonary:      Breath sounds: Rhonchi present  Comments: AC/VC 14/470/40/6 with SPO2 100%, lung sounds with rhonchi bilateral  Abdominal:      General: Bowel sounds are normal  There is no distension  Palpations: Abdomen is soft  Tenderness: There is no abdominal tenderness     Genitourinary:     Comments: Fully intact with clear yellow urine draining  Musculoskeletal:         General: Normal range of motion  Cervical back: Normal range of motion and neck supple  Right lower leg: No edema  Left lower leg: No edema  Skin:     General: Skin is warm and dry  Neurological:      Cranial Nerves: No cranial nerve deficit  Comments: Sedated at transfer  Pupils 3 mm bilateral and sluggish reaction to light, withdrawing in all extremities equally         --------------------------------------------------------------------------------------------------------------  Vitals:   Vitals:    05/16/23 0305 05/16/23 0310   Pulse: (!) 46    Resp: 16    SpO2: 98%    Weight:  101 kg (221 lb 12 5 oz)     Temp  Min: 96 8 °F (36 °C)  Max: 98 6 °F (37 °C)        Body mass index is 30 08 kg/m²      Laboratory and Diagnostics:  Results from last 7 days   Lab Units 05/16/23  0327 05/15/23  2122   WBC Thousand/uL 13 27* 6 82   HEMOGLOBIN g/dL 11 8* 13 1   HEMATOCRIT % 36 7 39 4   PLATELETS Thousands/uL 165 171   NEUTROS PCT % 86* 63   MONOS PCT % 11 8     Results from last 7 days   Lab Units 05/15/23  2122   SODIUM mmol/L 138   POTASSIUM mmol/L 3 8   CHLORIDE mmol/L 104   CO2 mmol/L 21   ANION GAP mmol/L 13   BUN mg/dL 18   CREATININE mg/dL 1 42*   CALCIUM mg/dL 8 6   GLUCOSE RANDOM mg/dL 187*   ALT U/L 123*   AST U/L 131*   ALK PHOS U/L 72   ALBUMIN g/dL 3 8   TOTAL BILIRUBIN mg/dL 0 58     Results from last 7 days   Lab Units 05/15/23  2122   MAGNESIUM mg/dL 2 1   PHOSPHORUS mg/dL 6 0*      Results from last 7 days   Lab Units 05/15/23  2122   INR  1 09   PTT seconds 25          Results from last 7 days   Lab Units 05/16/23  0032 05/15/23  2122   LACTIC ACID mmol/L 2 8* 5 6*     ABG:    VBG:  Results from last 7 days   Lab Units 05/16/23  0327   PH MARIFER  7 336   PCO2 MARIFER mm Hg 40 2*   PO2 MARIFER mm Hg 76 1*   HCO3 MARIFER mmol/L 21 0*   BASE EXC MARIFER mmol/L -4 5           Micro:        EKG: Sinus bradycardia, 45 bpm  Imaging: I have personally reviewed pertinent reports  and I have personally reviewed pertinent films in PACS      Historical Information   No past medical history on file  Past Surgical History:   Procedure Laterality Date   • APPENDECTOMY  2006     Social History   Social History     Substance and Sexual Activity   Alcohol Use Yes    Comment: Rare     Social History     Substance and Sexual Activity   Drug Use Never     Social History     Tobacco Use   Smoking Status Never   Smokeless Tobacco Never     Exercise History: Ambulates  Family History:   Family History   Problem Relation Age of Onset   • Heart disease Father    • Colon cancer Brother    • Colon cancer Daughter            Medications:  Current Facility-Administered Medications   Medication Dose Route Frequency   • cefTRIAXone (ROCEPHIN) IVPB (premix in dextrose) 1,000 mg 50 mL  1,000 mg Intravenous Q24H    And   • azithromycin (ZITHROMAX) 500 mg in sodium chloride 0 9 % 250 mL IVPB  500 mg Intravenous Q24H   • chlorhexidine (PERIDEX) 0 12 % oral rinse 15 mL  15 mL Mouth/Throat Q12H [address]   • enoxaparin (LOVENOX) subcutaneous injection 40 mg  40 mg Subcutaneous Daily   • Famotidine (PF) (PEPCID) injection 20 mg  20 mg Intravenous BID   • fentaNYL 1000 mcg in sodium chloride 0 9% 100mL infusion  50 mcg/hr Intravenous Continuous   • fentanyl citrate (PF) 100 MCG/2ML 50 mcg  50 mcg Intravenous Q1H PRN   • insulin lispro (HumaLOG) 100 units/mL subcutaneous injection 1-6 Units  1-6 Units Subcutaneous Q6H [address]   • multi-electrolyte (PLASMALYTE-A/ISOLYTE-S PH 7 4) IV solution  125 mL/hr Intravenous Continuous   • NOREPINEPHRINE 4 MG  ML NSS (CMPD ORDER) infusion  1-30 mcg/min Intravenous Titrated   • propofol (DIPRIVAN) 1000 mg in 100 mL infusion (premix)  5-50 mcg/kg/min Intravenous Titrated     Home medications:  Prior to Admission Medications   Prescriptions Last Dose Informant Patient Reported? Taking?    ALPHAGAN P 0 1 %   Yes No   ALPRAZolam (XANAX) 0 25 mg tablet   No No   Si tablet if "needed for severe anxiety not to exceed 3 tablets per week   Multiple Vitamins-Minerals (ICAPS PO)   Yes No   Sig: Take by mouth daily   Omega-3 Fatty Acids (OMEGA ESSENTIALS BASIC PO)   Yes No   Sig: Take by mouth daily   travoprost (TRAVATAN-Z) 0 004 % ophthalmic solution   Yes No      Facility-Administered Medications: None     Allergies:  No Known Allergies    ------------------------------------------------------------------------------------------------------------  Advance Directive and Living Will:      Power of :    POLST:    ------------------------------------------------------------------------------------------------------------  Anticipated Length of Stay is > 2 midnights    Care Time Delivered:   Upon my evaluation, this patient had a high probability of imminent or life-threatening deterioration due to Acute hypoxic respiratory failure postcardiac arrest, which required my direct attention, intervention, and personal management  I have personally provided 60 minutes (0300 to 0400) of critical care time, exclusive of procedures, teaching, family meetings, and any prior time recorded by providers other than myself  CARMEL Gregory        Portions of the record may have been created with voice recognition software  Occasional wrong word or \"sound a like\" substitutions may have occurred due to the inherent limitations of voice recognition software    Read the chart carefully and recognize, using context, where substitutions have occurred  "

## 2023-05-16 NOTE — EMTALA/ACUTE CARE TRANSFER
[address] [facility] EMERGENCY DEPARTMENT  [address] [facility] [address]  Dept: [address] [place] TRANSFER CONSENT    NAME Jocelyne Connelly                                         1950                              MRN [id number]    I have been informed of my rights regarding examination, treatment, and transfer   by Dr Kaitlynn Helton DO    Benefits: Specialized equipment and/or services available at the receiving facility (Include comment)________________________ (Critical care)    Risks: Potential for delay in receiving treatment, Loss of IV, Increased discomfort during transfer, Potential deterioration of medical condition, Possible worsening of condition or death during transfer (Motor vehicle crash)      Consent for Transfer:  I acknowledge that my medical condition has been evaluated and explained to me by the emergency department physician or other qualified medical person and/or my attending physician, who has recommended that I be transferred to the service of  Accepting Physician: Dr Eugenie Ramos at [address] [place] [address] Name, [address] : Kristy; [facility]  The above potential benefits of such transfer, the potential risks associated with such transfer, and the probable risks of not being transferred have been explained to me, and I fully understand them  The doctor has explained that, in my case, the benefits of transfer outweigh the risks  I agree to be transferred  I authorize the performance of emergency medical procedures and treatments upon me in both transit and upon arrival at the receiving facility  Additionally, I authorize the release of any and all medical records to the receiving facility and request they be transported with me, if possible  I understand that the safest mode of transportation during a medical emergency is an ambulance and that the Hospital advocates the use of this mode of transport   Risks of traveling to the receiving facility by car, including absence of medical control, life sustaining equipment, such as oxygen, and medical personnel has been explained to me and I fully understand them  (LOUANN CORRECT BOX BELOW)  [  ]  I consent to the stated transfer and to be transported by ambulance/helicopter  [  ]  I consent to the stated transfer, but refuse transportation by ambulance and accept full responsibility for my transportation by car  I understand the risks of non-ambulance transfers and I exonerate the Hospital and its staff from any deterioration in my condition that results from this refusal     X___________________________________________    DATE  23  TIME________  Signature of patient or legally responsible individual signing on patient behalf           RELATIONSHIP TO PATIENT_________________________          Provider Certification    NAME Kimberly Kowalski                                        [facility] 1950                              MRN [id number]    A medical screening exam was performed on the above named patient  Based on the examination:    Condition Necessitating Transfer The primary encounter diagnosis was Cardiac arrest ([address] )  Diagnoses of Respiratory arrest ([address] ), Lactic acidosis, Elevated troponin, Hyperphosphatemia, and Elevated TSH were also pertinent to this visit      Patient Condition: The patient has been stabilized such that within reasonable medical probability, no material deterioration of the patient condition or the condition of the unborn child(boone) is likely to result from the transfer    Reason for Transfer: Level of Care needed not available at this facility    Transfer Requirements: [address]; [place]   · Space available and qualified personnel available for treatment as acknowledged by    · Agreed to accept transfer and to provide appropriate medical treatment as acknowledged by       Dr Feng All  · Appropriate medical records of the examination and treatment of the patient are provided at the time of transfer   [address] [facility], [address] _______  · Transfer will be performed by qualified personnel from    and appropriate transfer equipment as required, including the use of necessary and appropriate life support measures  Provider Certification: I have examined the patient and explained the following risks and benefits of being transferred/refusing transfer to the patient/family:  General risk, such as traffic hazards, adverse weather conditions, rough terrain or turbulence, possible failure of equipment (including vehicle or aircraft), or consequences of actions of persons outside the control of the transport personnel      Based on these reasonable risks and benefits to the patient and/or the unborn child(boone), and based upon the information available at the time of the patient’s examination, I certify that the medical benefits reasonably to be expected from the provision of appropriate medical treatments at another medical facility outweigh the increasing risks, if any, to the individual’s medical condition, and in the case of labor to the unborn child, from effecting the transfer      X____________________________________________ DATE 05/16/23        TIME_______      ORIGINAL - SEND TO MEDICAL RECORDS   COPY - SEND WITH PATIENT DURING TRANSFER

## 2023-05-16 NOTE — ASSESSMENT & PLAN NOTE
Etiology remains unclear  He has been bradycardic since the beginning and certainly that could be a factor  If patient awakens fully and bradycardia persists then pacing will be considered for long-term care

## 2023-05-16 NOTE — PLAN OF CARE
Problem: SAFETY,RESTRAINT: NV/NON-SELF DESTRUCTIVE BEHAVIOR  Goal: Remains free of harm/injury (restraint for non violent/non self-detsructive behavior)  Description: INTERVENTIONS:  - Instruct patient/family regarding restraint use   - Assess and monitor physiologic and psychological status   - Provide interventions and comfort measures to meet assessed patient needs   - Identify and implement measures to help patient regain control  - Assess readiness for release of restraint   Outcome: Progressing  Goal: Returns to optimal restraint-free functioning  Description: INTERVENTIONS:  - Assess the patient's behavior and symptoms that indicate continued need for restraint  - Identify and implement measures to help patient regain control  - Assess readiness for release of restraint   Outcome: Progressing     Problem: PAIN - ADULT  Goal: Verbalizes/displays adequate comfort level or baseline comfort level  Description: Interventions:  - Encourage patient to monitor pain and request assistance  - Assess pain using appropriate pain scale  - Administer analgesics based on type and severity of pain and evaluate response  - Implement non-pharmacological measures as appropriate and evaluate response  - Consider cultural and social influences on pain and pain management  - Notify physician/advanced practitioner if interventions unsuccessful or patient reports new pain  Outcome: Progressing     Problem: INFECTION - ADULT  Goal: Absence or prevention of progression during hospitalization  Description: INTERVENTIONS:  - Assess and monitor for signs and symptoms of infection  - Monitor lab/diagnostic results  - Monitor all insertion sites, i e  indwelling lines, tubes, and drains  - Monitor endotracheal if appropriate and nasal secretions for changes in amount and color  - [place] appropriate cooling/warming therapies per order  - Administer medications as ordered  - Instruct and encourage patient and family to use good hand hygiene technique  - Identify and instruct in appropriate isolation precautions for identified infection/condition  Outcome: Progressing  Goal: Absence of fever/infection during neutropenic period  Description: INTERVENTIONS:  - Monitor WBC    Outcome: Progressing     Problem: SAFETY ADULT  Goal: Patient will remain free of falls  Description: INTERVENTIONS:  - Educate patient/family on patient safety including physical limitations  - Instruct patient to call for assistance with activity   - Consult OT/PT to assist with strengthening/mobility   - Keep Call bell within reach  - Keep bed low and locked with side rails adjusted as appropriate  - Keep care items and personal belongings within reach  - Initiate and maintain comfort rounds  - Make Fall Risk Sign visible to staf  - Apply yellow socks and bracelet for high fall risk patients  - Consider moving patient to room near nurses station  Outcome: Progressing  Goal: Maintain or return to baseline ADL function  Description: INTERVENTIONS:  -  Assess patient's ability to carry out ADLs; assess patient's baseline for ADL function and identify physical deficits which impact ability to perform ADLs (bathing, care of mouth/teeth, toileting, grooming, dressing, etc )  - Assess/evaluate cause of self-care deficits   - Assess range of motion  - Assess patient's mobility; develop plan if impaired  - Assess patient's need for assistive devices and provide as appropriate  - Encourage maximum independence but intervene and supervise when necessary  - Involve family in performance of ADLs  - Assess for home care needs following discharge   - Consider OT consult to assist with ADL evaluation and planning for discharge  - Provide patient education as appropriate  Outcome: Progressing  Goal: Maintains/Returns to pre admission functional level  Description: INTERVENTIONS:  - Perform BMAT or MOVE assessment daily    - Set and communicate daily mobility goal to care team and patient/family/caregiver  - Collaborate with rehabilitation services on mobility goals if consulted  - Out of bed for toileting  - Record patient progress and toleration of activity level   Outcome: Progressing     Problem: DISCHARGE PLANNING  Goal: Discharge to home or other facility with appropriate resources  Description: INTERVENTIONS:  - Identify barriers to discharge w/patient and caregiver  - Arrange for needed discharge resources and transportation as appropriate  - Identify discharge learning needs (meds, wound care, etc )  - Arrange for interpretive services to assist at discharge as needed  - Refer to Case Management Department for coordinating discharge planning if the patient needs post-hospital services based on physician/advanced practitioner order or complex needs related to functional status, cognitive ability, or social support system  Outcome: Progressing     Problem: Knowledge Deficit  Goal: Patient/family/caregiver demonstrates understanding of disease process, treatment plan, medications, and discharge instructions  Description: Complete learning assessment and assess knowledge base    Interventions:  - Provide teaching at level of understanding  - Provide teaching via preferred learning methods  Outcome: Progressing     Problem: NEUROSENSORY - ADULT  Goal: Achieves stable or improved neurological status  Description: INTERVENTIONS  - Monitor and report changes in neurological status  - Monitor vital signs such as temperature, blood pressure, glucose, and any other labs ordered   - Initiate measures to prevent increased intracranial pressure  - Monitor for seizure activity and implement precautions if appropriate      Outcome: Progressing  Goal: Remains free of injury related to seizures activity  Description: INTERVENTIONS  - Maintain airway, patient safety  and administer oxygen as ordered  - Monitor patient for seizure activity, document and report duration and description of seizure to physician/advanced practitioner  - If seizure occurs,  ensure patient safety during seizure  - Reorient patient post seizure  - Seizure pads on all 4 side rails  - Instruct patient/family to notify RN of any seizure activity including if an aura is experienced  - Instruct patient/family to call for assistance with activity based on nursing assessment  - Administer anti-seizure medications if ordered    Outcome: Progressing  Goal: Achieves maximal functionality and self care  Description: INTERVENTIONS  - Monitor swallowing and airway patency with patient fatigue and changes in neurological status  - Encourage and assist patient to increase activity and self care     - Encourage visually impaired, hearing impaired and aphasic patients to use assistive/communication devices  Outcome: Progressing     Problem: CARDIOVASCULAR - ADULT  Goal: Maintains optimal cardiac output and hemodynamic stability  Description: INTERVENTIONS:  - Monitor I/O, vital signs and rhythm  - Monitor for S/S and trends of decreased cardiac output  - Administer and titrate ordered vasoactive medications to optimize hemodynamic stability  - Assess quality of pulses, skin color and temperature  - Assess for signs of decreased coronary artery perfusion  - Instruct patient to report change in severity of symptoms  Outcome: Progressing  Goal: Absence of cardiac dysrhythmias or at baseline rhythm  Description: INTERVENTIONS:  - Continuous cardiac monitoring, vital signs, obtain 12 lead EKG if ordered  - Administer antiarrhythmic and heart rate control medications as ordered  - Monitor electrolytes and administer replacement therapy as ordered  Outcome: Progressing     Problem: RESPIRATORY - ADULT  Goal: Achieves optimal ventilation and oxygenation  Description: INTERVENTIONS:  - Assess for changes in respiratory status  - Assess for changes in mentation and behavior  - Position to facilitate oxygenation and minimize respiratory effort  - Oxygen administered by appropriate delivery if ordered  - Initiate smoking cessation education as indicated  - Encourage broncho-pulmonary hygiene including cough, deep breathe, Incentive Spirometry  - Assess the need for suctioning and aspirate as needed  - Assess and instruct to report SOB or any respiratory difficulty  - Respiratory Therapy support as indicated  Outcome: Progressing     Problem: GENITOURINARY - ADULT  Goal: Maintains or returns to baseline urinary function  Description: INTERVENTIONS:  - Assess urinary function  - Encourage oral fluids to ensure adequate hydration if ordered  - Administer IV fluids as ordered to ensure adequate hydration  - Administer ordered medications as needed  - Offer frequent toileting  - Follow urinary retention protocol if ordered  Outcome: Progressing  Goal: Absence of urinary retention  Description: INTERVENTIONS:  - Assess patient’s ability to void and empty bladder  - Monitor I/O  - Bladder scan as needed  - Discuss with physician/AP medications to alleviate retention as needed  - Discuss catheterization for long term situations as appropriate  Outcome: Progressing  Goal: Urinary catheter remains patent  Description: INTERVENTIONS:  - Assess patency of urinary catheter  - If patient has a chronic franklin, consider changing catheter if non-functioning  - Follow guidelines for intermittent irrigation of non-functioning urinary catheter  Outcome: Progressing

## 2023-05-16 NOTE — ASSESSMENT & PLAN NOTE
· Lactic acid postcardiac arrest, initially was 5 6 and trended down to 2 8 after a liter of IV fluids  · Stat lactic acid now

## 2023-05-16 NOTE — UTILIZATION REVIEW
Initial Clinical Review    Admission: Date/Time/Statement:   Admission Orders (From admission, onward)     Ordered        23 0047  Inpatient Admission  Once                      Orders Placed This Encounter   Procedures   • Inpatient Admission     Standing Status:   Standing     Number of Occurrences:   1     Order Specific Question:   Level of Care     Answer:   Critical Care [15]     Order Specific Question:   Estimated length of stay     Answer:   More than 2 Midnights     Order Specific Question:   Certification     Answer:   I certify that inpatient services are medically necessary for this patient for a duration of greater than two midnights  See H&P and MD Progress Notes for additional information about the patient's course of treatment  ED Arrival Information     Expected   -    Arrival   5/15/2023 21:00    Acuity   Immediate            Means of arrival   Ambulance    Escorted by   [facility]    Service   Hospitalist    Admission type   Emergency            Arrival complaint   -           Chief Complaint   Patient presents with   • Cardiac Arrest     Witnessed arrest   paged out, 2016 on scene,  pulses back        Initial Presentation: 67 y o  male presents to ed via ems for evaluation and treatment of witnessed cardiac arrest at scene  Wife witness patient vomiting and choking on his vomit before becoming unresponsive  Intubated by ems and given iv epinephrine  PMHX UNKNOWN  Clinical assessment significant for VB 31 / 46 6 / 159 / 19 1,  Phos 6 0, CR 1 42, glucose 187, Trop 360, Tsh  5 976, LA 5 6  EKG : tachycardia, AV block and atrial flutter, inverted T waves, prolonged q TC interval, wide QRS  Unclear ischemia followed by bradycardia w/o ischemia  Initially treated with iv atropine, iv dilaudid ,iv epinephrine gtt, iv levophed gtt, iv Na bicarb, iv  9% ns bolus, iv propofol  Urinary catheter wit temperature probe placed, NG /OG placed   Intubated on mechanical ventilation  Date: 5-  Discharged to [facility] for higher level of care at [address] [place] [address])  Diagnoses of Respiratory arrest ([address] ), Lactic acidosis, Elevated troponin, Hyperphosphatemia, and Elevated TSH were also pertinent to this visit      ED Triage Vitals   05/15/23 2116 05/15/23 2102 05/15/23 2230 05/15/23 2102 05/15/23 2100   97 6 °F (36 4 °C) (!) 107 18 140/89 99 %      Temporal          Pain Score          05/16/23 100 kg (221 lb)     Additional Vital Signs:     Date and Time Temp Pulse SpO2 Resp BP   05/16/23 0213 98 6 °F (37 °C) 51 (Abnormal)   99 % 15 110/57   05/16/23 0208 98 6 °F (37 °C) 50 (Abnormal)   99 % -- 115/58   05/16/23 0200 98 6 °F (37 °C) 50 (Abnormal)   100 % -- 123/62   05/16/23 0155 -- 50 (Abnormal)   -- -- 111/55   05/16/23 0155 98 4 °F (36 9 °C) 49 (Abnormal)   -- -- 112/56   05/16/23 0155 -- -- 99 % -- --   05/16/23 0150 98 4 °F (36 9 °C) 51 (Abnormal)   98 % -- 126/65   05/16/23 0145 98 4 °F (36 9 °C) 51 (Abnormal)   99 % -- 96/50   05/16/23 0140 98 4 °F (36 9 °C) 51 (Abnormal)   99 % -- 116/57   05/16/23 0135 98 2 °F (36 8 °C) 51 (Abnormal)   99 % -- 109/59   05/16/23 0125 98 2 °F (36 8 °C) 50 (Abnormal)   99 % -- 111/55   05/16/23 0122 98 1 °F (36 7 °C) 51 (Abnormal)   99 % -- 113/59   05/16/23 0117 98 1 °F (36 7 °C) 51 (Abnormal)   99 % -- 130/59   05/16/23 0112 98 1 °F (36 7 °C) 54 (Abnormal)   98 % -- 161/77   05/16/23 0108 97 9 °F (36 6 °C) 52 (Abnormal)   100 % -- 142/62   05/16/23 0103 97 9 °F (36 6 °C) 54 (Abnormal)   99 % -- 180/81 (Abnormal)     05/16/23 0058 97 7 °F (36 5 °C) 53 (Abnormal)   99 % -- 141/70   05/16/23 0053 97 7 °F (36 5 °C) 54 (Abnormal)   100 % -- 110/59   05/16/23 0048 97 7 °F (36 5 °C) 51 (Abnormal)   100 % -- 117/61   05/16/23 0043 97 5 °F (36 4 °C) 51 (Abnormal)   100 % -- 120/59   05/16/23 0038 97 5 °F (36 4 °C) 51 (Abnormal)   100 % -- 121/60   05/16/23 0037 97 5 °F (36 4 °C) 51 (Abnormal)   100 % -- 121/60 05/16/23 0032 97 5 °F (36 4 °C) 51 (Abnormal)   100 % -- 123/62   05/16/23 0027 97 5 °F (36 4 °C) 51 (Abnormal)   100 % -- 128/64   05/16/23 0026 97 5 °F (36 4 °C) 51 (Abnormal)   100 % -- 128/64   05/16/23 0021 97 5 °F (36 4 °C) 50 (Abnormal)   100 % -- 130/64   05/16/23 0016 97 3 °F (36 3 °C)   (Abnormal)   50 (Abnormal)   100 % -- 132/64   05/16/23 0011 97 3 °F (36 3 °C)   (Abnormal)   50 (Abnormal)   100 % -- 137/66   05/16/23 0007 97 3 °F (36 3 °C)   (Abnormal)   50 (Abnormal)   100 % -- 150/75   05/16/23 0002 97 3 °F (36 3 °C)   (Abnormal)   50 (Abnormal)   100 % -- 144/72   05/16/23 0001 97 3 °F (36 3 °C)   (Abnormal)   50 (Abnormal)   100 % -- 144/72   05/15/23 2357 97 3 °F (36 3 °C)   (Abnormal)   49 (Abnormal)   100 % -- 143/70   05/15/23 2353 97 2 °F (36 2 °C) (Abnormal)   49 (Abnormal)   100 % -- 155/75   05/15/23 2352 97 2 °F (36 2 °C) (Abnormal)   48 (Abnormal)   100 % -- 155/75   05/15/23 2347 97 2 °F (36 2 °C)   (Abnormal)   45 (Abnormal)   100 % -- 136/70   05/15/23 2341 97 2 °F (36 2 °C)   (Abnormal)   41 (Abnormal)   100 % -- 128/68   05/15/23 2336 97 2 °F (36 2 °C)   (Abnormal)   41 (Abnormal)   100 % -- 127/68   05/15/23 2331 97 2 °F (36 2 °C)   (Abnormal)   41 (Abnormal)   100 % -- 132/70   05/15/23 2327 97 °F (36 1 °C)   (Abnormal)   41 (Abnormal)   100 % -- 135/71   05/15/23 2322 97 °F (36 1 °C)   (Abnormal)   41 (Abnormal)   100 % -- 129/67   05/15/23 2317 97 °F (36 1 °C)   (Abnormal)   40 (Abnormal)   100 % -- 131/67   05/15/23 2315 97 °F (36 1 °C)   (Abnormal)   40 (Abnormal)   -- -- --   05/15/23 2310 97 °F (36 1 °C)   (Abnormal)   42 (Abnormal)   100 % -- 139/67   05/15/23 2308 97 °F (36 1 °C)   (Abnormal)   44 (Abnormal)   100 % -- 182/88 (Abnormal)     05/15/23 2303 97 °F (36 1 °C) (Abnormal)   52 (Abnormal)   99 % -- 75/50 (Abnormal)     05/15/23 2258 97 °F (36 1 °C)   (Abnormal)   43 (Abnormal)   100 % -- 78/51 (Abnormal)     05/15/23 2253 97 °F (36 1 °C)   (Abnormal)   38 (Abnormal)   100 % -- 74/48 (Abnormal)     05/15/23 2251 97 °F (36 1 °C)   (Abnormal)   38 (Abnormal)   100 % -- 75/47 (Abnormal)     05/15/23 2246 96 8 °F (36 °C)   (Abnormal)   34 (Abnormal)   100 % -- 79/48 (Abnormal)     05/15/23 2241 96 8 °F (36 °C)   (Abnormal)   70 100 % -- 105/59   05/15/23 2240 -- -- -- -- 105/59   05/15/23 2236 96 8 °F (36 °C)   (Abnormal)   72 100 % -- 117/66   05/15/23 2233 -- -- -- -- --   05/15/23 2231 -- -- -- 18 --   05/15/23 2230 96 8 °F (36 °C)     (Abnormal)   75 100 % 18 112/62   05/15/23 2226 96 8 °F (36 °C)   (Abnormal)   77 100 % -- 110/65   05/15/23 2151 97 5 °F (36 4 °C) 80 100 % -- 89/54 (Abnormal)     05/15/23 2146 97 5 °F (36 4 °C) 101 100 % -- 101/69   05/15/23 2141 97 3 °F (36 3 °C)   (Abnormal)   68 100 % -- 105/72   05/15/23 2136 -- 89 100 % -- 105/66   05/15/23 2131 -- 67 98 % -- 87/55 (Abnormal)     05/15/23 2127 -- 100 98 % -- 99/54   05/15/23 2126 -- 93 98 % -- 99/54   05/15/23 2122 -- 96 99 % -- 85/60 (Abnormal)     05/15/23 2121 -- 88 99 % -- 85/60 (Abnormal)     05/15/23 2117 -- 83 98 % -- 97/63   05/15/23 2116 97 6 °F (36 4 °C) 85 99 % -- 97/63   05/15/23 2112 -- 100 98 % -- 142/70   05/15/23 2107 -- 102 99 % -- 142/89   05/15/23 2102 -- 107 (Abnormal)   97 % -- 140/89   05/15/23 2100 -- -- 99 % -- --         Pertinent Labs/Diagnostic Test Results:     Date/Time: 5/15/2023 9:09 PM        Indications / Diagnosis:  Cardiac arrest  ECG reviewed by me, the ED Provider: yes    Patient location:  ED  Previous ECG:     Previous ECG:  Unavailable    Comparison to cardiac monitor: No    Interpretation:     Interpretation: abnormal    Quality:     Tracing quality:  Limited by artifact  Rate:     ECG rate:  100    ECG rate assessment: tachycardic    Rhythm:     Rhythm: A-V block and atrial flutter    Ectopy:     Ectopy: none    QRS:     QRS axis:  Indeterminate    QRS intervals:   Wide  ST segments:     ST segments:  Normal  T waves:     T waves: inverted Inverted:  AVR and V1  Other findings:     Other findings: prolonged qTc interval    Comments:      Peers to be atrial flutter with a prolonged QT, wide QRS  No acute ischemia noted but difficult to determine due to artifact      Date/Time: 5/15/2023 11:08 PM       Indications / Diagnosis:  Bradycardia  ECG reviewed by me, the ED Provider: yes    Patient location:  ED  Previous ECG:     Previous ECG:  Compared to current    Similarity:  Changes noted    Comparison to cardiac monitor: No    Interpretation:     Interpretation: abnormal    Quality:     Tracing quality:  Limited by artifact  Rate:     ECG rate:  45    ECG rate assessment: bradycardic    Rhythm:     Rhythm: sinus rhythm    Ectopy:     Ectopy: none    QRS:     QRS axis:  Normal    QRS intervals: Wide  Conduction:     Conduction: normal    ST segments:     ST segments:  Normal  T waves:     T waves: normal    Comments:      Sinus bradycardia with low voltage QRS  No acute ischemia  XR chest 1 view portable   ED  (05/15 2202)   Chest congestion with possible aspiration bilaterally  Both lung fields are inflated with vascular markings throughout per my interpretation           Results from last 7 days   Lab Units 05/16/23  0327 05/15/23  2122   WBC Thousand/uL 13 27* 6 82   HEMOGLOBIN g/dL 11 8* 13 1   HEMATOCRIT % 36 7 39 4   PLATELETS Thousands/uL 165 171   NEUTROS ABS Thousands/µL 11 36* 4 29         Results from last 7 days   Lab Units 05/16/23  0825 05/16/23  0327 05/15/23  2122   SODIUM mmol/L 137 138 138   POTASSIUM mmol/L 4 4 4 4 3 8   CHLORIDE mmol/L 107 107 104   CO2 mmol/L 22 22 21   ANION GAP mmol/L 8 9 13   BUN mg/dL 19 18 18   CREATININE mg/dL 1 04 1 09 1 42*   EGFR ml/min/1 73sq m 71 67 48   CALCIUM mg/dL 7 8* 8 0* 8 6   CALCIUM, IONIZED mmol/L 1 06*  --   --    MAGNESIUM mg/dL 1 9 2 0 2 1   PHOSPHORUS mg/dL 3 4 3 5 6 0*     Results from last 7 days   Lab Units 05/16/23  0327 05/15/23  2122   AST U/L 85* 131*   ALT U/L 111* 123*   ALK PHOS U/L 66 72   TOTAL PROTEIN g/dL 5 4* 6 2*   ALBUMIN g/dL 3 5 3 8   TOTAL BILIRUBIN mg/dL 0 52 0 58     Results from last 7 days   Lab Units 05/16/23  0602 05/16/23  0321 05/15/23  2109   POC GLUCOSE mg/dl 132 165* 193*     Results from last 7 days   Lab Units 05/16/23  0825 05/16/23  0327 05/15/23  2122   GLUCOSE RANDOM mg/dL 181* 138 187*         Results from last 7 days   Lab Units 05/16/23 0327   HEMOGLOBIN A1C % 5 3   EAG mg/dl 105       Results from last 7 days   Lab Units 05/16/23  0825   PH ART  7 333*   PCO2 ART mm Hg 39 7   PO2 ART mm Hg 131 4*   HCO3 ART mmol/L 20 6*   BASE EXC ART mmol/L -4 9   O2 CONTENT ART mL/dL 17 0   O2 HGB, ARTERIAL % 97 8*   ABG SOURCE  Line, Arterial     Results from last 7 days   Lab Units 05/16/23  0327 05/15/23  2122   PH MARIFER  7 336 7 231*   PCO2 MARIFER mm Hg 40 2* 46 6   PO2 MARIFER mm Hg 76 1* 159 0*   HCO3 MARIFER mmol/L 21 0* 19 1*   BASE EXC MARIFER mmol/L -4 5 -8 3   O2 CONTENT MARIFER ml/dL 16 7 19 5   O2 HGB, VENOUS % 91 2* 97 2*             Results from last 7 days   Lab Units 05/16/23  0200 05/15/23  2325 05/15/23  2122   HS TNI 0HR ng/L  --   --  360*   HS TNI 2HR ng/L  --  444*  --    HSTNI D2 ng/L  --  84*  --    HS TNI 4HR ng/L 392*  --   --    HSTNI D4 ng/L 32*  --   --          Results from last 7 days   Lab Units 05/15/23  2122   PROTIME seconds 14 3   INR  1 09   PTT seconds 25     Results from last 7 days   Lab Units 05/15/23  2122   TSH 3RD GENERATON uIU/mL 5 976*     Results from last 7 days   Lab Units 05/16/23 0327   PROCALCITONIN ng/ml 1 05*     Results from last 7 days   Lab Units 05/16/23  0928 05/16/23  0327 05/16/23  0032 05/15/23  2122   LACTIC ACID mmol/L 1 5 1 9 2 8* 5 6*       Results from last 7 days   Lab Units 05/16/23  0333   CLARITY UA  Clear   COLOR UA  Yellow   SPEC GRAV UA  1 020   PH UA  5 5   GLUCOSE UA mg/dl Negative   KETONES UA mg/dl 15 (1+)*   BLOOD UA  2+*   PROTEIN UA mg/dl Negative   NITRITE UA  Negative   BILIRUBIN UA  Negative UROBILINOGEN UA E U /dl 0 2   LEUKOCYTES UA  Negative   WBC UA /hpf 0-1   RBC UA /hpf 10-20*   BACTERIA UA /hpf Occasional   EPITHELIAL CELLS WET PREP /hpf Occasional   MUCUS THREADS  Occasional*       Results from last 7 days   Lab Units 05/16/23  0326 05/15/23  2122   BLOOD CULTURE  Received in Microbiology Lab  Culture in Progress  Received in Microbiology Lab  Culture in Progress  Received in Microbiology Lab  Culture in Progress  Received in Microbiology Lab  Culture in Progress                 ED Treatment:   Medication Administration from 05/15/2023 2100 to 05/16/2023 1056       Date/Time Order Dose Route Action     05/15/2023 2110 EDT midazolam (FOR EMS ONLY) (VERSED) 2 mg/2 mL injection 2 mg   Given to EMS     05/15/2023 2110 EDT EPINEPHrine (FOR EMS ONLY) (ADRENALIN) injection 2 mg   Given to EMS     05/16/2023 0136 EDT propofol (DIPRIVAN) 1000 mg in 100 mL infusion (premix) 30 mcg/kg/min Intravenous New Bag     05/15/2023 2349 EDT propofol (DIPRIVAN) 1000 mg in 100 mL infusion (premix) 30 mcg/kg/min Intravenous Rate/Dose Change     05/15/2023 2229 EDT propofol (DIPRIVAN) 1000 mg in 100 mL infusion (premix) 25 mcg/kg/min Intravenous Rate/Dose Change     05/15/2023 2119 EDT propofol (DIPRIVAN) 1000 mg in 100 mL infusion (premix) 20 mcg/kg/min Intravenous New Bag     05/15/2023 2154 EDT sodium chloride 0 9 % bolus 1,000 mL 0 mL Intravenous Stopped     05/15/2023 2121 EDT sodium chloride 0 9 % bolus 1,000 mL 1,000 mL Intravenous New Bag     05/15/2023 2228 EDT sodium bicarbonate 8 4 % injection 50 mEq 50 mEq Intravenous Given     05/15/2023 2233 EDT HYDROmorphone (DILAUDID) injection 1 mg 1 mg Intravenous Given     05/16/2023 0206 EDT norepinephrine (LEVOPHED) 4 mg (STANDARD CONCENTRATION) IV in sodium chloride 0 9% 250 mL 2 mcg/min Intravenous Rate/Dose Change     05/16/2023 0117 EDT norepinephrine (LEVOPHED) 4 mg (STANDARD CONCENTRATION) IV in sodium chloride 0 9% 250 mL 3 mcg/min Intravenous Rate/Dose Change     05/16/2023 0106 EDT norepinephrine (LEVOPHED) 4 mg (STANDARD CONCENTRATION) IV in sodium chloride 0 9% 250 mL 4 mcg/min Intravenous Rate/Dose Change     05/16/2023 0037 EDT norepinephrine (LEVOPHED) 4 mg (STANDARD CONCENTRATION) IV in sodium chloride 0 9% 250 mL 5 mcg/min Intravenous Rate/Dose Change     05/16/2023 0027 EDT norepinephrine (LEVOPHED) 4 mg (STANDARD CONCENTRATION) IV in sodium chloride 0 9% 250 mL 6 mcg/min Intravenous Rate/Dose Change     05/16/2023 0017 EDT norepinephrine (LEVOPHED) 4 mg (STANDARD CONCENTRATION) IV in sodium chloride 0 9% 250 mL 7 mcg/min Intravenous Rate/Dose Change     05/16/2023 0006 EDT norepinephrine (LEVOPHED) 4 mg (STANDARD CONCENTRATION) IV in sodium chloride 0 9% 250 mL 8 mcg/min Intravenous Rate/Dose Change     05/15/2023 2356 EDT norepinephrine (LEVOPHED) 4 mg (STANDARD CONCENTRATION) IV in sodium chloride 0 9% 250 mL 9 mcg/min Intravenous Rate/Dose Change     05/15/2023 2256 EDT norepinephrine (LEVOPHED) 4 mg (STANDARD CONCENTRATION) IV in sodium chloride 0 9% 250 mL 10 mcg/min Intravenous New Bag     05/16/2023 0144 EDT EPINEPHrine 3000 mcg (STANDARD CONCENTRATION) IV in sodium chloride 0 9% 250 mL 0 mcg/min Intravenous Stopped     05/16/2023 0126 EDT EPINEPHrine 3000 mcg (STANDARD CONCENTRATION) IV in sodium chloride 0 9% 250 mL 1 mcg/min Intravenous Rate/Dose Change     05/16/2023 0116 EDT EPINEPHrine 3000 mcg (STANDARD CONCENTRATION) IV in sodium chloride 0 9% 250 mL 2 mcg/min Intravenous Rate/Dose Change     05/16/2023 0107 EDT EPINEPHrine 3000 mcg (STANDARD CONCENTRATION) IV in sodium chloride 0 9% 250 mL 3 mcg/min Intravenous Rate/Dose Change     05/15/2023 2333 EDT EPINEPHrine 3000 mcg (STANDARD CONCENTRATION) IV in sodium chloride 0 9% 250 mL 4 mcg/min Intravenous New Bag     05/15/2023 2301 EDT EPINEPHrine (ADRENALIN) injection 0 1 mg 0 1 mg Intravenous Given     05/15/2023 2253 EDT Atropine Sulfate injection 1 mg 1 mg Intravenous Given 05/16/2023 0003 EDT HYDROmorphone (DILAUDID) injection 1 mg 1 mg Intravenous Given        No past medical history on file  Present on Admission:  None       Admitting Diagnosis: Cardiac arrest [facility]) [I46 9]  Age/Sex: 67 y o  male      Network Utilization Review Department  ATTENTION: Please call with any questions or concerns to [phone] and carefully listen to the prompts so that you are directed to the right person  All voicemails are confidential   Yudith Hartley all requests for admission clinical reviews, approved or denied determinations and any other requests to dedicated fax number below belonging to the campus where the patient is receiving treatment   List of dedicated fax numbers for the Facilities:  [address] DENIALS (Administrative/Medical Necessity) [phone]   [address] (Maternity/NICU/Pediatrics) [phone]   [address] [phone]   [facility] [address] [phone]   [address] [address] [facility] [place] [address] [place] [address] [address] Fowler[facility]en [facility] [phone]   [address] [place] [address] [place] [address] 815 [facility] [phone]

## 2023-05-16 NOTE — NURSING NOTE
Late entry due to patient care  ~1889-9820: Assumed pt care at 1100  See flowsheets for assessment and MAR for meds given and titrations  ~1415: Pt's bladder temp increasing to 101; Dr Marivel Ahn aware and instructed to give scheduled Tylenol early at this time  ~1500: Pt's bladder temp continuing to increase to >101 5  Tamara Howell CRNP at bedside  Pt placed on cooling blanket with sheet between skin and cooling blanket for skin protection  Pt is also becoming restless at this time with SBP increasing to 170s  PRN Fentanyl given and Propofol gtt restarted  Epi gtt decreased per ANASTASIA Jaffe      ~3756-1566: Pt has large amounts of pink/red frothy sputum, Dr Marivel Ahn and ANASTASIA Ashely at bedside  CXR ordered, see MAR for meds given  Pt desatting to [de-identified], RT at bedside

## 2023-05-16 NOTE — ASSESSMENT & PLAN NOTE
· Unclear baseline, patient was only seen once in our facility prior to today in 2020  Creatinine at that time was 1 08    On admission now creatinine is 1 42  · Stewart placed at [facility]  · Check UA and culture  · Close monitoring of I/os status and renal function daily

## 2023-05-16 NOTE — ASSESSMENT & PLAN NOTE
· Shock state postcardiac arrest, vasopressors started to wean off prior to transfer to [facility]   Upon arrival [address] was stopped  · Troponin peaked at 444  · Close monitoring of I/os status  Avoid hypotension  · Check lactic acid now    On admission to [facility] it was 5 6  · Check echocardiogram

## 2023-05-16 NOTE — ASSESSMENT & PLAN NOTE
· Patient from home and was a witnessed aspiration after vomiting  Wife noted that he appeared to be choking and then went unresponsive  When EMS arrived he was PEA cardiac arrest, CPR was started  He had 2 rounds of epi and had return of circulation  Patient intubated in the field and transferred to [address] [place] [address] Joseph he was bradycardic and hypotensive requiring vasopressors and 1 dose of atropine  Patient was transferred to [address] for further care  · PEA cardiac arrest secondary to hypoxia  · Maintain mechanical ventilator, avoid hypoxia and electrolyte abnormalities  · Avoid fevers, currently 36 °C by himself    · Start telemetry monitoring  · Check EKG now  · Check echo

## 2023-05-16 NOTE — ASSESSMENT & PLAN NOTE
Etiology unclear  There is junctional escape rhythm alternating with sinus beats  No profound pauses  TSH is slightly elevated and T4 is a bit low but I do not know if those values are low enough to explain current situation  Currently patient seems well perfused and I would not yet push for pacing

## 2023-05-16 NOTE — ASSESSMENT & PLAN NOTE
· Secondary to aspiration event and cardiac arrest   Intubated in the field after cardiac arrest  · Maintain AC/VC vent settings  · Patient with number ET tube, 26 at the lip  · Stat chest x-ray on arrival here showing ET tube in good position above the ruben, OG tube in good position below the diaphragm  · Current settings AC/VC 14/470/40/6 ideal body weight 78 3  · Start propofol and fentanyl infusion at 50 mics for sedation    Goal RASS -1  · Consider SAT/SBT in the morning  · VAP precautions

## 2023-05-16 NOTE — NUTRITION
05/16/23 1049   Biochemical Data,Medical Tests, and Procedures   Biochemical Data/Medical Tests/Procedures Lab values reviewed; Meds reviewed   Labs (Comment) 5/16/23 glucose 181, Ca 7 8, ionized Ca 1 06   Meds (Comment) pepcid, insulin, magnesium sulfate, isolyte, epinephrine, fentanyl, plasmalyte, norepinephrine, propofol   Nutrition-Focused Physical Exam   Nutrition-Focused Physical Exam Findings RN skin assessment reviewed; No edema documented; No skin issues documented   Medical-Related Concerns Unknown past medical history  Current PO Intake   Current Diet Order NPO   Estimated calorie intake compared to estimated need Nutrient needs not met  PES Statement   Oral or Nutritional Support Intake (2) Inadequate oral intake NI-2 1   Related to Other (Comment)  (clinical condition)   As evidenced by: Intake < estimated needs   Recommendations/Interventions   Malnutrition/BMI Present No  (will continue to monitor)   Summary Consult - restraints/intubated  Unable to obtain nutrition history at this time  Pt is intubated and sedated  Chart utilized for assessment  Presents via EMS to [facility] ED s/p cardiac arrest with possible aspiration event  Unknown past medical history  Weight history reviewed - data limited  7/30/20 203#, 5/16/23 221# via bed scale  Skin integrity intact per flow sheet documentation  NPO in setting of being intubated and sedated  Propofol running at 17 8 mL/hr per flow sheets - providing 470 kcals daily  RD to follow closely  Interventions/Recommendations Monitor I & O's;Initiate diet; Initiate EN   Education Assessment   Education Patient/caregiver not appropriate for education at this time   Patient Nutrition Goals   Goal Nutrition via appropriate route

## 2023-05-16 NOTE — ASSESSMENT & PLAN NOTE
Does not seem indicative of acute coronary syndrome    EKG this morning while patient was in sinus rhythm did not show any ST segment changes and echocardiogram is completely normal

## 2023-05-16 NOTE — ED NOTES
50mg IV push bolus of Propofol given by Dr Diego Mccollum at this time        [facility]  05/15/23 6743

## 2023-05-16 NOTE — PROCEDURES
Central Line Insertion    Date/Time: 5/16/2023 8:56 AM  Performed by: CARMEL Christensen  Authorized by: CARMEL Christensen     Patient location:  ICU  Consent:     Consent obtained:  Verbal    Consent given by:  Spouse    Risks discussed:  Arterial puncture, incorrect placement, nerve damage, bleeding and infection  Universal protocol:     Procedure explained and questions answered to patient or proxy's satisfaction: yes      Relevant documents present and verified: yes      Test results available and properly labeled: yes      Radiology Images displayed and confirmed  If images not available, report reviewed: yes      Required blood products, implants, devices, and special equipment available: yes      Site/side marked: yes      Immediately prior to procedure, a time out was called: yes      Patient identity confirmed: Anonymous protocol, patient vented/unresponsive  Pre-procedure details:     Hand hygiene: Hand hygiene performed prior to insertion      Sterile barrier technique: All elements of maximal sterile technique followed      Skin preparation:  2% chlorhexidine    Skin preparation agent: Skin preparation agent completely dried prior to procedure    Indications:     Central line indications: medications requiring central line, hemodynamic monitoring and no peripheral vascular access    Anesthesia (see MAR for exact dosages):      Anesthesia method:  None  Procedure details:     Location:  Right femoral    Vessel type: vein      Laterality:  Right    Approach: percutaneous technique used      Patient position:  Flat    Catheter type:  Triple lumen 20cm    Catheter size:  7 Fr    Landmarks identified: yes      Ultrasound guidance: yes      Ultrasound image availability:  Not saved    Sterile ultrasound techniques: Sterile gel and sterile probe covers were used      Manometry confirmation: yes      Number of attempts:  1    Successful placement: yes    Post-procedure details:     Post-procedure: Dressing applied and line sutured    Assessment:  Blood return through all ports    Patient tolerance of procedure:   Tolerated well, no immediate complications

## 2023-05-16 NOTE — CASE MANAGEMENT
Case Management Progress Note    Patient name Angeline Guzman  Location ICU 04/ICU 04- MRN [id number]  : 1950 Date 2023       LOS (days): 0  Geometric Mean LOS (GMLOS) (days):   Days to [facility]:        OBJECTIVE:    Current admission status: Inpatient  Preferred Pharmacy:   [id number] [address]  [address] [place] [address] [address]  [facility] [address]  Phone: [phone] Fax: [phone]    Primary Care Provider: Saulo Hernandez DO    Primary Insurance: Yesenia Quiñones [facility]  Secondary Insurance:     PROGRESS NOTE:  Reviewed the chart for CM  purposes  Pt is S/P Cardiac arrest   Vented, will follow for care needs

## 2023-05-16 NOTE — RESPIRATORY THERAPY NOTE
05/15/23 2100   Respiratory Assessment   Assessment Type Assess only   General Appearance Sedated;Unresponsive   Respiratory Pattern Normal   Chest Assessment Chest expansion symmetrical   Bilateral Breath Sounds Clear;Diminished   Cough None   Resp Comments Patient placed on mechanical ventilation, after EMT's received ROSQUE   Vent Information   Vent type     Vent Mode AC/VC   $ Vent Charge-INITIAL Yes   Ventilator Start Yes   Is the patient reintubated? No   $ Pulse Oximetry Spot Check Charge Completed   SpO2 99 %   AC/VC Settings   Resp Rate (BPM) 12 BPM   Vt (mL) 400 mL   FIO2 (%) 60 %   PEEP (cmH2O) 6 cmH2O   Flow Pattern (LPM) 48 L/min   Trigger Sensitivity Flow (lpm) 3 %   Humidification Heat and moisture exchanger   AC/VC Actuals   Resp Rate (BPM) 23 BPM   VT (mL) 412   MV 9   MAP (cmH2O) 8 6 cmH2O   Peak Pressure (cmH2O) 14 cmH2O   I/E Ratio (Obs) 1:2 3   $ ETCO2  Yes   AC/VC Alarms   High Peak Pressure (cmH2O) 35   High Resp Rate (BPM) 35 BPM   High MV (L/min) 16 L/min   Low MV (L/min) 4 L/min   Vt High (mL) 700 mL   Vt Low (mL) 255 mL   AC/VC Apnea Settings   Resp Rate (BPM) 12 BPM   VT (mL) 400 mL   FIO2 (%) 60 %   Apnea Time (s) 20 S   Maintenance   Resuscitation bag with peep valve at bedside Yes   Water bag changed No  (HME being used)   Circuit changed Yes  (New initial circuit)     Patient intubated and placed on mechanical ventilation after EMT's achieved ROSC  ET tube (8 0) pulled back to 26 cm at the lip post CXR, lung sounds are audible bilaterally with good aeration

## 2023-05-16 NOTE — RESPIRATORY THERAPY NOTE
05/16/23 0305   Respiratory Protocol   Protocol Initiated? Yes   Protocol Selection Respiratory   Language Barrier? Yes  (pt intubated)   Medical & Social History Reviewed? Yes   Diagnostic Studies Reviewed? Yes   Physical Assessment Performed? Yes   Respiratory Plan Vent/NIV/HFNC   Respiratory Assessment   Assessment Type Assess only   General Appearance Sedated   Respiratory Pattern Normal   Chest Assessment Chest expansion symmetrical   Bilateral Breath Sounds Diminished   Resp Comments   (pt transfered from [facility]   pt placed on AC 14/470/40% peep6  ETT 7 5 @ 26 lips  will continue to monitor pt    Pt assessed as per protocol )   O2 Device vent   Additional Assessments   Pulse (!) 46   Respirations 16   SpO2 98 %

## 2023-05-17 ENCOUNTER — APPOINTMENT (OUTPATIENT)
Dept: RADIOLOGY | Facility: HOSPITAL | Age: 73
End: 2023-05-17

## 2023-05-17 ENCOUNTER — APPOINTMENT (INPATIENT)
Dept: GASTROENTEROLOGY | Facility: HOSPITAL | Age: 73
End: 2023-05-17

## 2023-05-17 PROBLEM — D64.9 ANEMIA: Status: ACTIVE | Noted: 2023-05-17

## 2023-05-17 PROBLEM — I47.21 TORSADES DE POINTES (HCC): Status: ACTIVE | Noted: 2023-05-17

## 2023-05-17 PROBLEM — N17.9 AKI (ACUTE KIDNEY INJURY) (HCC): Status: RESOLVED | Noted: 2023-05-16 | Resolved: 2023-05-17

## 2023-05-17 PROBLEM — E87.20 LACTIC ACID ACIDOSIS: Status: RESOLVED | Noted: 2023-05-16 | Resolved: 2023-05-17

## 2023-05-17 LAB
ALBUMIN SERPL BCP-MCNC: 3.2 G/DL (ref 3.5–5)
ALP SERPL-CCNC: 50 U/L (ref 34–104)
ALT SERPL W P-5'-P-CCNC: 59 U/L (ref 7–52)
ANION GAP SERPL CALCULATED.3IONS-SCNC: 12 MMOL/L (ref 4–13)
ANION GAP SERPL CALCULATED.3IONS-SCNC: 7 MMOL/L (ref 4–13)
ANION GAP SERPL CALCULATED.3IONS-SCNC: 8 MMOL/L (ref 4–13)
ANION GAP SERPL CALCULATED.3IONS-SCNC: 9 MMOL/L (ref 4–13)
AST SERPL W P-5'-P-CCNC: 28 U/L (ref 13–39)
ATRIAL RATE: 133 BPM
ATRIAL RATE: 22 BPM
ATRIAL RATE: 234 BPM
ATRIAL RATE: 37 BPM
ATRIAL RATE: 45 BPM
ATRIAL RATE: 51 BPM
ATRIAL RATE: 61 BPM
ATRIAL RATE: 88 BPM
ATRIAL RATE: 89 BPM
BASE EXCESS BLDA CALC-SCNC: -1.8 MMOL/L
BASE EXCESS BLDA CALC-SCNC: -3.9 MMOL/L
BASE EXCESS BLDA CALC-SCNC: -8 MMOL/L
BASOPHILS # BLD AUTO: 0.02 THOUSANDS/ÂΜL (ref 0–0.1)
BASOPHILS NFR BLD AUTO: 0 % (ref 0–1)
BILIRUB SERPL-MCNC: 0.54 MG/DL (ref 0.2–1)
BUN SERPL-MCNC: 14 MG/DL (ref 5–25)
BUN SERPL-MCNC: 16 MG/DL (ref 5–25)
BUN SERPL-MCNC: 17 MG/DL (ref 5–25)
BUN SERPL-MCNC: 19 MG/DL (ref 5–25)
CA-I BLD-SCNC: 1.14 MMOL/L (ref 1.12–1.32)
CA-I BLD-SCNC: 1.15 MMOL/L (ref 1.12–1.32)
CALCIUM ALBUM COR SERPL-MCNC: 8.7 MG/DL (ref 8.3–10.1)
CALCIUM SERPL-MCNC: 8.1 MG/DL (ref 8.4–10.2)
CALCIUM SERPL-MCNC: 8.1 MG/DL (ref 8.4–10.2)
CALCIUM SERPL-MCNC: 8.2 MG/DL (ref 8.4–10.2)
CALCIUM SERPL-MCNC: 8.3 MG/DL (ref 8.4–10.2)
CHLORIDE SERPL-SCNC: 107 MMOL/L (ref 96–108)
CO2 SERPL-SCNC: 20 MMOL/L (ref 21–32)
CO2 SERPL-SCNC: 21 MMOL/L (ref 21–32)
CO2 SERPL-SCNC: 22 MMOL/L (ref 21–32)
CO2 SERPL-SCNC: 24 MMOL/L (ref 21–32)
CREAT SERPL-MCNC: 0.84 MG/DL (ref 0.6–1.3)
CREAT SERPL-MCNC: 0.85 MG/DL (ref 0.6–1.3)
CREAT SERPL-MCNC: 0.88 MG/DL (ref 0.6–1.3)
CREAT SERPL-MCNC: 1.01 MG/DL (ref 0.6–1.3)
EOSINOPHIL # BLD AUTO: 0.01 THOUSAND/ÂΜL (ref 0–0.61)
EOSINOPHIL NFR BLD AUTO: 0 % (ref 0–6)
ERYTHROCYTE [DISTWIDTH] IN BLOOD BY AUTOMATED COUNT: 12.6 % (ref 11.6–15.1)
FLUAV RNA RESP QL NAA+PROBE: NEGATIVE
FLUBV RNA RESP QL NAA+PROBE: NEGATIVE
GFR SERPL CREATININE-BSD FRML MDRD: 73 ML/MIN/1.73SQ M
GFR SERPL CREATININE-BSD FRML MDRD: 85 ML/MIN/1.73SQ M
GFR SERPL CREATININE-BSD FRML MDRD: 87 ML/MIN/1.73SQ M
GFR SERPL CREATININE-BSD FRML MDRD: 87 ML/MIN/1.73SQ M
GLUCOSE SERPL-MCNC: 145 MG/DL (ref 65–140)
GLUCOSE SERPL-MCNC: 150 MG/DL (ref 65–140)
GLUCOSE SERPL-MCNC: 154 MG/DL (ref 65–140)
GLUCOSE SERPL-MCNC: 159 MG/DL (ref 65–140)
GLUCOSE SERPL-MCNC: 163 MG/DL (ref 65–140)
GLUCOSE SERPL-MCNC: 170 MG/DL (ref 65–140)
GLUCOSE SERPL-MCNC: 180 MG/DL (ref 65–140)
GLUCOSE SERPL-MCNC: 195 MG/DL (ref 65–140)
HCO3 BLDA-SCNC: 19.3 MMOL/L (ref 22–28)
HCO3 BLDA-SCNC: 21.5 MMOL/L (ref 22–28)
HCO3 BLDA-SCNC: 23.1 MMOL/L (ref 22–28)
HCT VFR BLD AUTO: 33.8 % (ref 36.5–49.3)
HGB BLD-MCNC: 10.9 G/DL (ref 12–17)
HOROWITZ INDEX BLDA+IHG-RTO: 50 MM[HG]
IMM GRANULOCYTES # BLD AUTO: 0.02 THOUSAND/UL (ref 0–0.2)
IMM GRANULOCYTES NFR BLD AUTO: 0 % (ref 0–2)
LYMPHOCYTES # BLD AUTO: 1.23 THOUSANDS/ÂΜL (ref 0.6–4.47)
LYMPHOCYTES NFR BLD AUTO: 12 % (ref 14–44)
LYMPHOCYTES NFR BLD AUTO: 3 %
MACROPHAGES NFR FLD: 20 %
MAGNESIUM SERPL-MCNC: 2.4 MG/DL (ref 1.9–2.7)
MAGNESIUM SERPL-MCNC: 2.5 MG/DL (ref 1.9–2.7)
MAGNESIUM SERPL-MCNC: 2.5 MG/DL (ref 1.9–2.7)
MAGNESIUM SERPL-MCNC: 2.6 MG/DL (ref 1.9–2.7)
MCH RBC QN AUTO: 30.5 PG (ref 26.8–34.3)
MCHC RBC AUTO-ENTMCNC: 32.2 G/DL (ref 31.4–37.4)
MCV RBC AUTO: 95 FL (ref 82–98)
MONOCYTES # BLD AUTO: 1.21 THOUSAND/ÂΜL (ref 0.17–1.22)
MONOCYTES NFR BLD AUTO: 12 % (ref 4–12)
NEUTROPHILS # BLD AUTO: 7.95 THOUSANDS/ÂΜL (ref 1.85–7.62)
NEUTS SEG NFR BLD AUTO: 76 % (ref 43–75)
NEUTS SEG NFR BLD AUTO: 77 %
NRBC BLD AUTO-RTO: 0 /100 WBCS
O2 CT BLDA-SCNC: 16.3 ML/DL (ref 16–23)
O2 CT BLDA-SCNC: 16.5 ML/DL (ref 16–23)
O2 CT BLDA-SCNC: 18.6 ML/DL (ref 16–23)
OXYHGB MFR BLDA: 90.9 % (ref 94–97)
OXYHGB MFR BLDA: 97.8 % (ref 94–97)
OXYHGB MFR BLDA: 97.8 % (ref 94–97)
P AXIS: 52 DEGREES
P AXIS: 70 DEGREES
P AXIS: 74 DEGREES
P AXIS: 79 DEGREES
P AXIS: 88 DEGREES
PCO2 BLDA: 39.5 MM HG (ref 36–44)
PCO2 BLDA: 40.5 MM HG (ref 36–44)
PCO2 BLDA: 46.8 MM HG (ref 36–44)
PEEP RESPIRATORY: 6 CM[H2O]
PH BLDA: 7.23 [PH] (ref 7.35–7.45)
PH BLDA: 7.34 [PH] (ref 7.35–7.45)
PH BLDA: 7.38 [PH] (ref 7.35–7.45)
PHOSPHATE SERPL-MCNC: 1.8 MG/DL (ref 2.3–4.1)
PHOSPHATE SERPL-MCNC: 2.5 MG/DL (ref 2.3–4.1)
PHOSPHATE SERPL-MCNC: 2.5 MG/DL (ref 2.3–4.1)
PHOSPHATE SERPL-MCNC: 2.7 MG/DL (ref 2.3–4.1)
PLATELET # BLD AUTO: 141 THOUSANDS/UL (ref 149–390)
PMV BLD AUTO: 10.7 FL (ref 8.9–12.7)
PO2 BLDA: 132 MM HG (ref 75–129)
PO2 BLDA: 218.3 MM HG (ref 75–129)
PO2 BLDA: 70.3 MM HG (ref 75–129)
POTASSIUM SERPL-SCNC: 3.6 MMOL/L (ref 3.5–5.3)
POTASSIUM SERPL-SCNC: 4 MMOL/L (ref 3.5–5.3)
POTASSIUM SERPL-SCNC: 4 MMOL/L (ref 3.5–5.3)
POTASSIUM SERPL-SCNC: 4.4 MMOL/L (ref 3.5–5.3)
PR INTERVAL: 182 MS
PR INTERVAL: 190 MS
PR INTERVAL: 198 MS
PR INTERVAL: 204 MS
PROCALCITONIN SERPL-MCNC: 1.9 NG/ML
PROT SERPL-MCNC: 5.2 G/DL (ref 6.4–8.4)
QRS AXIS: -11 DEGREES
QRS AXIS: -11 DEGREES
QRS AXIS: -13 DEGREES
QRS AXIS: -19 DEGREES
QRS AXIS: -34 DEGREES
QRS AXIS: -44 DEGREES
QRS AXIS: -5 DEGREES
QRS AXIS: -6 DEGREES
QRS AXIS: -7 DEGREES
QRSD INTERVAL: 100 MS
QRSD INTERVAL: 100 MS
QRSD INTERVAL: 102 MS
QRSD INTERVAL: 102 MS
QRSD INTERVAL: 104 MS
QRSD INTERVAL: 104 MS
QRSD INTERVAL: 124 MS
QRSD INTERVAL: 86 MS
QRSD INTERVAL: 92 MS
QT INTERVAL: 364 MS
QT INTERVAL: 398 MS
QT INTERVAL: 412 MS
QT INTERVAL: 420 MS
QT INTERVAL: 450 MS
QT INTERVAL: 456 MS
QT INTERVAL: 494 MS
QT INTERVAL: 536 MS
QT INTERVAL: 554 MS
QTC INTERVAL: 420 MS
QTC INTERVAL: 420 MS
QTC INTERVAL: 422 MS
QTC INTERVAL: 427 MS
QTC INTERVAL: 447 MS
QTC INTERVAL: 474 MS
QTC INTERVAL: 484 MS
QTC INTERVAL: 547 MS
QTC INTERVAL: 626 MS
RBC # BLD AUTO: 3.57 MILLION/UL (ref 3.88–5.62)
RSV RNA RESP QL NAA+PROBE: NEGATIVE
SARS-COV-2 RNA RESP QL NAA+PROBE: NEGATIVE
SODIUM SERPL-SCNC: 137 MMOL/L (ref 135–147)
SODIUM SERPL-SCNC: 137 MMOL/L (ref 135–147)
SODIUM SERPL-SCNC: 138 MMOL/L (ref 135–147)
SODIUM SERPL-SCNC: 139 MMOL/L (ref 135–147)
SPECIMEN SOURCE: ABNORMAL
T WAVE AXIS: 102 DEGREES
T WAVE AXIS: 106 DEGREES
T WAVE AXIS: 109 DEGREES
T WAVE AXIS: 164 DEGREES
T WAVE AXIS: 81 DEGREES
T WAVE AXIS: 95 DEGREES
T WAVE AXIS: 95 DEGREES
T WAVE AXIS: 96 DEGREES
T WAVE AXIS: 96 DEGREES
TOTAL CELLS COUNTED SPEC: 100
VENT AC: 18
VENTRICULAR RATE: 102 BPM
VENTRICULAR RATE: 37 BPM
VENTRICULAR RATE: 45 BPM
VENTRICULAR RATE: 51 BPM
VENTRICULAR RATE: 61 BPM
VENTRICULAR RATE: 71 BPM
VENTRICULAR RATE: 77 BPM
VENTRICULAR RATE: 89 BPM
VENTRICULAR RATE: 89 BPM
VT SETTING VENT: 470 ML
WBC # BLD AUTO: 10.44 THOUSAND/UL (ref 4.31–10.16)

## 2023-05-17 PROCEDURE — 02HK3JZ INSERTION OF PACEMAKER LEAD INTO RIGHT VENTRICLE, PERCUTANEOUS APPROACH: ICD-10-PCS | Performed by: INTERNAL MEDICINE

## 2023-05-17 PROCEDURE — 0BD18ZX EXTRACTION OF TRACHEA, VIA NATURAL OR ARTIFICIAL OPENING ENDOSCOPIC, DIAGNOSTIC: ICD-10-PCS | Performed by: INTERNAL MEDICINE

## 2023-05-17 PROCEDURE — 5A1223Z PERFORMANCE OF CARDIAC PACING, CONTINUOUS: ICD-10-PCS | Performed by: INTERNAL MEDICINE

## 2023-05-17 PROCEDURE — 5A12012 PERFORMANCE OF CARDIAC OUTPUT, SINGLE, MANUAL: ICD-10-PCS | Performed by: INTERNAL MEDICINE

## 2023-05-17 PROCEDURE — 0BDM8ZX EXTRACTION OF BILATERAL LUNGS, VIA NATURAL OR ARTIFICIAL OPENING ENDOSCOPIC, DIAGNOSTIC: ICD-10-PCS | Performed by: INTERNAL MEDICINE

## 2023-05-17 PROCEDURE — 0BD28ZX EXTRACTION OF CARINA, VIA NATURAL OR ARTIFICIAL OPENING ENDOSCOPIC, DIAGNOSTIC: ICD-10-PCS | Performed by: INTERNAL MEDICINE

## 2023-05-17 PROCEDURE — 5A2204Z RESTORATION OF CARDIAC RHYTHM, SINGLE: ICD-10-PCS | Performed by: INTERNAL MEDICINE

## 2023-05-17 PROCEDURE — 0B958ZX DRAINAGE OF RIGHT MIDDLE LOBE BRONCHUS, VIA NATURAL OR ARTIFICIAL OPENING ENDOSCOPIC, DIAGNOSTIC: ICD-10-PCS | Performed by: INTERNAL MEDICINE

## 2023-05-17 RX ORDER — EPINEPHRINE IN SOD CHLOR,ISO 1 MG/10 ML
SYRINGE (ML) INTRAVENOUS CODE/TRAUMA/SEDATION MEDICATION
Status: DISCONTINUED | OUTPATIENT
Start: 2023-05-17 | End: 2023-05-25 | Stop reason: HOSPADM

## 2023-05-17 RX ORDER — MAGNESIUM SULFATE 1 G/100ML
INJECTION INTRAVENOUS
Status: COMPLETED
Start: 2023-05-17 | End: 2023-05-17

## 2023-05-17 RX ORDER — SODIUM CHLORIDE, SODIUM GLUCONATE, SODIUM ACETATE, POTASSIUM CHLORIDE, MAGNESIUM CHLORIDE, SODIUM PHOSPHATE, DIBASIC, AND POTASSIUM PHOSPHATE .53; .5; .37; .037; .03; .012; .00082 G/100ML; G/100ML; G/100ML; G/100ML; G/100ML; G/100ML; G/100ML
500 INJECTION, SOLUTION INTRAVENOUS ONCE
Status: COMPLETED | OUTPATIENT
Start: 2023-05-17 | End: 2023-05-17

## 2023-05-17 RX ORDER — AMOXICILLIN 250 MG
2 CAPSULE ORAL
Status: DISCONTINUED | OUTPATIENT
Start: 2023-05-17 | End: 2023-05-25 | Stop reason: HOSPADM

## 2023-05-17 RX ORDER — POTASSIUM CHLORIDE 29.8 MG/ML
40 INJECTION INTRAVENOUS ONCE
Status: COMPLETED | OUTPATIENT
Start: 2023-05-17 | End: 2023-05-17

## 2023-05-17 RX ORDER — ATROPINE SULFATE 1 MG/ML
INJECTION, SOLUTION INTRAVENOUS
Status: COMPLETED
Start: 2023-05-17 | End: 2023-05-17

## 2023-05-17 RX ORDER — LIDOCAINE HYDROCHLORIDE 10 MG/ML
10 INJECTION, SOLUTION EPIDURAL; INFILTRATION; INTRACAUDAL; PERINEURAL ONCE
Status: COMPLETED | OUTPATIENT
Start: 2023-05-17 | End: 2023-05-17

## 2023-05-17 RX ORDER — CALCIUM GLUCONATE 20 MG/ML
1 INJECTION, SOLUTION INTRAVENOUS ONCE
Status: COMPLETED | OUTPATIENT
Start: 2023-05-17 | End: 2023-05-17

## 2023-05-17 RX ORDER — METHYLPREDNISOLONE SODIUM SUCCINATE 125 MG/2ML
60 INJECTION, POWDER, LYOPHILIZED, FOR SOLUTION INTRAMUSCULAR; INTRAVENOUS EVERY 8 HOURS SCHEDULED
Status: DISCONTINUED | OUTPATIENT
Start: 2023-05-17 | End: 2023-05-19

## 2023-05-17 RX ORDER — SODIUM CHLORIDE, SODIUM GLUCONATE, SODIUM ACETATE, POTASSIUM CHLORIDE, MAGNESIUM CHLORIDE, SODIUM PHOSPHATE, DIBASIC, AND POTASSIUM PHOSPHATE .53; .5; .37; .037; .03; .012; .00082 G/100ML; G/100ML; G/100ML; G/100ML; G/100ML; G/100ML; G/100ML
1000 INJECTION, SOLUTION INTRAVENOUS ONCE
Status: COMPLETED | OUTPATIENT
Start: 2023-05-17 | End: 2023-05-17

## 2023-05-17 RX ORDER — ATROPINE SULFATE 1 MG/ML
1 INJECTION, SOLUTION INTRAVENOUS ONCE
Status: COMPLETED | OUTPATIENT
Start: 2023-05-17 | End: 2023-05-17

## 2023-05-17 RX ADMIN — INSULIN LISPRO 1 UNITS: 100 INJECTION, SOLUTION INTRAVENOUS; SUBCUTANEOUS at 05:43

## 2023-05-17 RX ADMIN — NOREPINEPHRINE BITARTRATE 11 MCG/MIN: 1 SOLUTION INTRAVENOUS at 09:32

## 2023-05-17 RX ADMIN — PROPOFOL 30 MCG/KG/MIN: 10 INJECTION, EMULSION INTRAVENOUS at 23:38

## 2023-05-17 RX ADMIN — INSULIN LISPRO 1 UNITS: 100 INJECTION, SOLUTION INTRAVENOUS; SUBCUTANEOUS at 23:23

## 2023-05-17 RX ADMIN — CHLORHEXIDINE GLUCONATE 0.12% ORAL RINSE 15 ML: 1.2 LIQUID ORAL at 21:10

## 2023-05-17 RX ADMIN — ACETAMINOPHEN 975 MG: 650 SUSPENSION ORAL at 23:12

## 2023-05-17 RX ADMIN — CHLORHEXIDINE GLUCONATE 0.12% ORAL RINSE 15 ML: 1.2 LIQUID ORAL at 08:39

## 2023-05-17 RX ADMIN — ATROPINE SULFATE 1 MG: 1 INJECTION, SOLUTION INTRAVENOUS at 00:22

## 2023-05-17 RX ADMIN — FAMOTIDINE 20 MG: 10 INJECTION, SOLUTION INTRAVENOUS at 08:39

## 2023-05-17 RX ADMIN — SODIUM PHOSPHATE, MONOBASIC, MONOHYDRATE AND SODIUM PHOSPHATE, DIBASIC, ANHYDROUS 12 MMOL: 142; 276 INJECTION, SOLUTION INTRAVENOUS at 14:39

## 2023-05-17 RX ADMIN — POTASSIUM CHLORIDE 40 MEQ: 29.8 INJECTION, SOLUTION INTRAVENOUS at 08:39

## 2023-05-17 RX ADMIN — NOREPINEPHRINE BITARTRATE 6 MCG/MIN: 1 SOLUTION INTRAVENOUS at 00:41

## 2023-05-17 RX ADMIN — METRONIDAZOLE 500 MG: 500 INJECTION, SOLUTION INTRAVENOUS at 04:54

## 2023-05-17 RX ADMIN — METRONIDAZOLE 500 MG: 500 INJECTION, SOLUTION INTRAVENOUS at 11:59

## 2023-05-17 RX ADMIN — AZITHROMYCIN MONOHYDRATE 500 MG: 500 INJECTION, POWDER, LYOPHILIZED, FOR SOLUTION INTRAVENOUS at 04:06

## 2023-05-17 RX ADMIN — INSULIN LISPRO 1 UNITS: 100 INJECTION, SOLUTION INTRAVENOUS; SUBCUTANEOUS at 11:57

## 2023-05-17 RX ADMIN — METRONIDAZOLE 500 MG: 500 INJECTION, SOLUTION INTRAVENOUS at 19:39

## 2023-05-17 RX ADMIN — CEFTRIAXONE 1000 MG: 1 INJECTION, SOLUTION INTRAVENOUS at 03:15

## 2023-05-17 RX ADMIN — METHYLPREDNISOLONE SODIUM SUCCINATE 60 MG: 125 INJECTION, POWDER, FOR SOLUTION INTRAMUSCULAR; INTRAVENOUS at 16:24

## 2023-05-17 RX ADMIN — SENNOSIDES AND DOCUSATE SODIUM 2 TABLET: 50; 8.6 TABLET ORAL at 23:11

## 2023-05-17 RX ADMIN — ACETAMINOPHEN 975 MG: 650 SUSPENSION ORAL at 11:56

## 2023-05-17 RX ADMIN — PROPOFOL 20 MCG/KG/MIN: 10 INJECTION, EMULSION INTRAVENOUS at 17:40

## 2023-05-17 RX ADMIN — CALCIUM GLUCONATE 1 G: 20 INJECTION, SOLUTION INTRAVENOUS at 08:02

## 2023-05-17 RX ADMIN — FENTANYL CITRATE 50 MCG: 50 INJECTION INTRAMUSCULAR; INTRAVENOUS at 13:28

## 2023-05-17 RX ADMIN — SODIUM CHLORIDE, SODIUM GLUCONATE, SODIUM ACETATE, POTASSIUM CHLORIDE, MAGNESIUM CHLORIDE, SODIUM PHOSPHATE, DIBASIC, AND POTASSIUM PHOSPHATE 100 ML/HR: .53; .5; .37; .037; .03; .012; .00082 INJECTION, SOLUTION INTRAVENOUS at 03:19

## 2023-05-17 RX ADMIN — MIDAZOLAM 2 MG: 1 INJECTION INTRAMUSCULAR; INTRAVENOUS at 13:28

## 2023-05-17 RX ADMIN — ACETAMINOPHEN 975 MG: 650 SUSPENSION ORAL at 05:46

## 2023-05-17 RX ADMIN — SODIUM CHLORIDE, SODIUM GLUCONATE, SODIUM ACETATE, POTASSIUM CHLORIDE, MAGNESIUM CHLORIDE, SODIUM PHOSPHATE, DIBASIC, AND POTASSIUM PHOSPHATE 500 ML: .53; .5; .37; .037; .03; .012; .00082 INJECTION, SOLUTION INTRAVENOUS at 08:16

## 2023-05-17 RX ADMIN — ACETAMINOPHEN 975 MG: 650 SUSPENSION ORAL at 16:24

## 2023-05-17 RX ADMIN — PROPOFOL 30 MCG/KG/MIN: 10 INJECTION, EMULSION INTRAVENOUS at 03:15

## 2023-05-17 RX ADMIN — SODIUM CHLORIDE, SODIUM GLUCONATE, SODIUM ACETATE, POTASSIUM CHLORIDE, MAGNESIUM CHLORIDE, SODIUM PHOSPHATE, DIBASIC, AND POTASSIUM PHOSPHATE 100 ML/HR: .53; .5; .37; .037; .03; .012; .00082 INJECTION, SOLUTION INTRAVENOUS at 20:58

## 2023-05-17 RX ADMIN — Medication 50 MCG/HR: at 17:18

## 2023-05-17 RX ADMIN — Medication 75 MCG/HR: at 02:31

## 2023-05-17 RX ADMIN — PROPOFOL 30 MCG/KG/MIN: 10 INJECTION, EMULSION INTRAVENOUS at 13:28

## 2023-05-17 RX ADMIN — MAGNESIUM SULFATE HEPTAHYDRATE 1 G: 1 INJECTION, SOLUTION INTRAVENOUS at 08:02

## 2023-05-17 RX ADMIN — MIDAZOLAM 2 MG: 1 INJECTION INTRAMUSCULAR; INTRAVENOUS at 08:10

## 2023-05-17 RX ADMIN — NOREPINEPHRINE BITARTRATE 10 MCG/MIN: 1 SOLUTION INTRAVENOUS at 15:56

## 2023-05-17 RX ADMIN — SODIUM CHLORIDE, SODIUM GLUCONATE, SODIUM ACETATE, POTASSIUM CHLORIDE, MAGNESIUM CHLORIDE, SODIUM PHOSPHATE, DIBASIC, AND POTASSIUM PHOSPHATE 1000 ML: .53; .5; .37; .037; .03; .012; .00082 INJECTION, SOLUTION INTRAVENOUS at 09:12

## 2023-05-17 RX ADMIN — EPINEPHRINE 1 MG: 0.1 INJECTION INTRACARDIAC; INTRAVENOUS at 07:42

## 2023-05-17 RX ADMIN — LIDOCAINE HYDROCHLORIDE 10 ML: 10 INJECTION, SOLUTION EPIDURAL; INFILTRATION; INTRACAUDAL; PERINEURAL at 13:28

## 2023-05-17 RX ADMIN — METHYLPREDNISOLONE SODIUM SUCCINATE 60 MG: 125 INJECTION, POWDER, FOR SOLUTION INTRAMUSCULAR; INTRAVENOUS at 23:11

## 2023-05-17 RX ADMIN — ENOXAPARIN SODIUM 40 MG: 40 INJECTION SUBCUTANEOUS at 08:39

## 2023-05-17 RX ADMIN — FAMOTIDINE 20 MG: 10 INJECTION, SOLUTION INTRAVENOUS at 17:39

## 2023-05-17 NOTE — NURSING NOTE
[id number] RN in to assess pt  SAT performed  Pt following commands, pupils 3 round and reactive  Pt began coughing, oral and inline suctioning performed  Pt went into pulseless vtach  Code blue called  CPR initiated immediately  CARMEL Shanks at bedside and code leader  See code documentation for event  In brief, cpr performed x 2, shocked x 2, epi x 1, briefly transcutaneously paced, 1 gram Mg and 1 gram Ca  ROSC obtained  Aib RVR intial rhythm and converted to NSR  Dr Kannan Mcallister at bedside  80 Dr Henderson Click at bedside for cordis and transvenous pacer wire placement  Pacer placed and appropriate response noted  See flowsheets for pacer documentation

## 2023-05-17 NOTE — ASSESSMENT & PLAN NOTE
Further discussion with patient wife reveals some likelihood of aspiration of tobacco prior to arrest at home

## 2023-05-17 NOTE — UTILIZATION REVIEW
NOTIFICATION OF ADMISSION DISCHARGE   This is a Notification of Discharge from [address]  Please be advised that this patient has been discharge from our facility  Below you will find the admission and discharge date and time including the patient’s disposition  UTILIZATION REVIEW CONTACT:  Kaur Hoyt  Utilization   Network Utilization Review Department  Phone: [phone] x carefully listen to the prompts  All voicemails are confidential   Email: [email or web address]     ADMISSION INFORMATION  PRESENTATION DATE: 5/15/2023  9:00 PM  OBERVATION ADMISSION DATE:   INPATIENT ADMISSION DATE: 5/16/23 12:25 AM   DISCHARGE DATE: 5/16/2023  2:44 AM   DISPOSITION:[facility]    IMPORTANT INFORMATION:  Send all requests for admission clinical reviews, approved or denied determinations and any other requests to dedicated fax number below belonging to the campus where the patient is receiving treatment   List of dedicated fax numbers:  [address] DENIALS (Administrative/Medical Necessity) [phone]   [address] (Maternity/NICU/Pediatrics) [phone]   [facility] [phone]   [facility] [phone]   Marco[address] Michellea 134 [phone]   220 [facility] [phone] 90 [facility] [phone]   [address] [place], [place] [address] 119 [phone]   [facility]  [phone] 4058 [facility] [phone] 412 [facility] [address] [facility] [phone]

## 2023-05-17 NOTE — ASSESSMENT & PLAN NOTE
· Bradycardic in the 30s-40s since admission     · Continue EPI gtt as needed for HR goal 40+  · BP stable with bradycardia  · Cards following, likely need a PPM when more stable  · Pacer pads on pt

## 2023-05-17 NOTE — PHYSICAL THERAPY NOTE
Physical Therapy Cancellation Note       05/17/23 1115   PT Last Visit   PT Visit Date 05/17/23   Note Type   Note type Evaluation; Cancelled Session   Cancel Reasons Intubated/sedated       PT evaluation will be discontinued at this time  Please re-consult when medically appropriate      Shirline Hence

## 2023-05-17 NOTE — ASSESSMENT & PLAN NOTE
Etiology unclear  There is junctional escape rhythm alternating with sinus beats  No profound pauses  TSH is slightly elevated and T4 is a bit low but I do not know if those values are low enough to explain current situation  This remains at odds to his overall condition  Epinephrine has not helped heart rate very much in general   Seems worth trying Isuprel and if ineffective temporary pacing  We will review with critical care service

## 2023-05-17 NOTE — PLAN OF CARE
Problem: CARDIOVASCULAR - ADULT  Goal: Maintains optimal cardiac output and hemodynamic stability  Description: INTERVENTIONS:  - Monitor I/O, vital signs and rhythm  - Monitor for S/S and trends of decreased cardiac output  - Administer and titrate ordered vasoactive medications to optimize hemodynamic stability  - Assess quality of pulses, skin color and temperature  - Assess for signs of decreased coronary artery perfusion  - Instruct patient to report change in severity of symptoms  Outcome: Not Progressing  Goal: Absence of cardiac dysrhythmias or at baseline rhythm  Description: INTERVENTIONS:  - Continuous cardiac monitoring, vital signs, obtain 12 lead EKG if ordered  - Administer antiarrhythmic and heart rate control medications as ordered  - Monitor electrolytes and administer replacement therapy as ordered  Outcome: Not Progressing     Problem: RESPIRATORY - ADULT  Goal: Achieves optimal ventilation and oxygenation  Description: INTERVENTIONS:  - Assess for changes in respiratory status  - Assess for changes in mentation and behavior  - Position to facilitate oxygenation and minimize respiratory effort  - Oxygen administered by appropriate delivery if ordered  - Initiate smoking cessation education as indicated  - Encourage broncho-pulmonary hygiene including cough, deep breathe, Incentive Spirometry  - Assess the need for suctioning and aspirate as needed  - Assess and instruct to report SOB or any respiratory difficulty  - Respiratory Therapy support as indicated  Outcome: Not Progressing

## 2023-05-17 NOTE — ASSESSMENT & PLAN NOTE
Does not seem indicative of acute coronary syndrome  Echo remains normal reveals normal LV systolic function without wall motion maladies

## 2023-05-17 NOTE — RESPIRATORY THERAPY NOTE
05/16/23 2115   Respiratory Assessment   Assessment Type Assess only   General Appearance Unresponsive   Respiratory Pattern Assisted   Chest Assessment Chest expansion symmetrical   Bilateral Breath Sounds Diminished;Coarse   Resp Comments pt remains on vent day 1 on ACVC+ 470 18 50% +8 , Ett 7 5 @ 25 a2 teeth, moved from L to Center , skin and strap intact with two finger pas to back of head, no changes made pt going to ct scan for pan scan, pt remians unresponsive will cont to monitor   O2 Device vent   Vent Information   Vent ID [id number]   Vent type     Vent Mode AC/VC+   $ Pulse Oximetry Spot Check Charge Completed   SpO2 99 %   AC/VC+ Settings   Resp Rate (BPM) 18 BPM   VT (mL) 470 mL   Insp Time (S) 0 92 S   FIO2 (%) 50 %   PEEP (cmH2O) 6 cmH2O   Rise Time (%) 50 %   Trigger Sensitivity Flow (LPM) 3 LPM   Humidification Heater   Heater Temp 98 4 °F (36 9 °C)   AC/VC+ Actuals   Resp Rate (BPM) 20 BPM   VT (mL) 526 mL   MV (Obs) 8 96   MAP (cmH2O) 14 cmH2O   Peak Pressure (cmH2O) 31 cmH2O   I:E Ratio (Obs) 1:2 6   Static Compliance (mL/cmH20) 34 mL/cmH2O   Plateau Pressure (cm H2O) 22 cm H2O   Heater Temperature (Obs) 98 4 °F (36 9 °C)   AC/VC+ ALARMS   High Peak Pressure (cmH2O) 45 cmH2O   High Resp Rate (BPM) 40 BPM   High MV (L/min) 17 L/min   Low MV (L/min) 4 L/min   High [place] VTE (mL) 1200 mL   Low [place] VTE (mL) 350 mL   High Spont VTE (mL) 1200 mL   Low Spont VTE (mL) 350 mL   High Insp [place]anza VT (mL) 1130 mL   AC/VC+ Apnea Settings   Resp Rate (BPM) 18 BPM   VT (mL) 470 mL   FIO2 (%) 100 %   Apnea Time (s) 20 S   Apnea Flow (L/min) 50 L/min   Maintenance   Alarm (pink) cable attached No   Resuscitation bag with peep valve at bedside Yes   Water bag changed No   Circuit changed No   Daily Screen   Patient safety screen outcome: Failed   Not Ready for Weaning due to:  Underline problem not resolved;Going on Transport intubated   IHI Ventilator Associated Pneumonia Bundle   Daily Assessment of Readiness to Extubate Yes   Head of Bed Elevated HOB 30   ETT  Oral 7 5 mm   Placement Date: 05/15/23   Previously placed by?: Present on admission  Type: Oral  Tube Size: 7 5 mm  Secured at (cm): 26@ lips  Comments: pt intubated in field   Secured at (cm) 25   Measured from Teeth   Secured Location (S)  Center   Repositioned (S)  Left to [address] [place] by [employer] tube little  (skin and strap intact with two finger pass to back of head)   Site Condition Dry   Cuff Pressure (cm H2O)   (in green zone)   HI-LO Suction  Continuous low suction   HI-LO Secretions Thin;Moderate; Tan   HI-LO Intervention Flushed   RT Ventilator Management Note  Nghia Card 67 y o  male MRN: [id number]  Unit/Bed#: ICU 04-01 Encounter: [id number]      Daily Screen         5/16/2023  0733 5/16/2023 2115          Patient safety screen outcome[de-identified] Failed Failed      Not Ready for Weaning due to[de-identified] Underline problem not resolved Underline problem not resolved;Going on Transport intubated                Physical Exam:   Assessment Type: Assess only  General Appearance: Unresponsive  Respiratory Pattern: Assisted  Chest Assessment: Chest expansion symmetrical  Bilateral Breath Sounds: Diminished, Coarse  O2 Device: vent      Resp Comments: pt remains on vent day 1 on ACVC+ 470 18 50% +8 , Ett 7 5 @ 25 a2 teeth, moved from L to Center , skin and strap intact with two finger pas to back of head, no changes made pt going to ct scan for pan scan, pt remians unresponsive will cont to monitor

## 2023-05-17 NOTE — PLAN OF CARE
Problem: SAFETY,RESTRAINT: NV/NON-SELF DESTRUCTIVE BEHAVIOR  Goal: Remains free of harm/injury (restraint for non violent/non self-detsructive behavior)  Description: INTERVENTIONS:  - Instruct patient/family regarding restraint use   - Assess and monitor physiologic and psychological status   - Provide interventions and comfort measures to meet assessed patient needs   - Identify and implement measures to help patient regain control  - Assess readiness for release of restraint   Outcome: Progressing  Goal: Returns to optimal restraint-free functioning  Description: INTERVENTIONS:  - Assess the patient's behavior and symptoms that indicate continued need for restraint  - Identify and implement measures to help patient regain control  - Assess readiness for release of restraint   Outcome: Progressing     Problem: PAIN - ADULT  Goal: Verbalizes/displays adequate comfort level or baseline comfort level  Description: Interventions:  - Encourage patient to monitor pain and request assistance  - Assess pain using appropriate pain scale  - Administer analgesics based on type and severity of pain and evaluate response  - Implement non-pharmacological measures as appropriate and evaluate response  - Consider cultural and social influences on pain and pain management  - Notify physician/advanced practitioner if interventions unsuccessful or patient reports new pain  Outcome: Progressing     Problem: INFECTION - ADULT  Goal: Absence or prevention of progression during hospitalization  Description: INTERVENTIONS:  - Assess and monitor for signs and symptoms of infection  - Monitor lab/diagnostic results  - Monitor all insertion sites, i e  indwelling lines, tubes, and drains  - Monitor endotracheal if appropriate and nasal secretions for changes in amount and color  - [place] appropriate cooling/warming therapies per order  - Administer medications as ordered  - Instruct and encourage patient and family to use good hand hygiene technique  - Identify and instruct in appropriate isolation precautions for identified infection/condition  Outcome: Progressing  Goal: Absence of fever/infection during neutropenic period  Description: INTERVENTIONS:  - Monitor WBC    Outcome: Progressing     Problem: SAFETY ADULT  Goal: Patient will remain free of falls  Description: INTERVENTIONS:  - Educate patient/family on patient safety including physical limitations  - Instruct patient to call for assistance with activity   - Consult OT/PT to assist with strengthening/mobility   - Keep Call bell within reach  - Keep bed low and locked with side rails adjusted as appropriate  - Keep care items and personal belongings within reach  - Initiate and maintain comfort rounds  - Make Fall Risk Sign visible to staff  - Apply yellow socks and bracelet for high fall risk patients  - Consider moving patient to room near nurses station  Outcome: Progressing  Goal: Maintain or return to baseline ADL function  Description: INTERVENTIONS:  -  Assess patient's ability to carry out ADLs; assess patient's baseline for ADL function and identify physical deficits which impact ability to perform ADLs (bathing, care of mouth/teeth, toileting, grooming, dressing, etc )  - Assess/evaluate cause of self-care deficits   - Assess range of motion  - Assess patient's mobility; develop plan if impaired  - Assess patient's need for assistive devices and provide as appropriate  - Encourage maximum independence but intervene and supervise when necessary  - Involve family in performance of ADLs  - Assess for home care needs following discharge   - Consider OT consult to assist with ADL evaluation and planning for discharge  - Provide patient education as appropriate  Outcome: Progressing  Goal: Maintains/Returns to pre admission functional level  Description: INTERVENTIONS:  - Perform BMAT or MOVE assessment daily    - Set and communicate daily mobility goal to care team and patient/family/caregiver  - Collaborate with rehabilitation services on mobility goals if consulted  - Out of bed for toileting  - Record patient progress and toleration of activity level   Outcome: Progressing     Problem: DISCHARGE PLANNING  Goal: Discharge to home or other facility with appropriate resources  Description: INTERVENTIONS:  - Identify barriers to discharge w/patient and caregiver  - Arrange for needed discharge resources and transportation as appropriate  - Identify discharge learning needs (meds, wound care, etc )  - Arrange for interpretive services to assist at discharge as needed  - Refer to Case Management Department for coordinating discharge planning if the patient needs post-hospital services based on physician/advanced practitioner order or complex needs related to functional status, cognitive ability, or social support system  Outcome: Progressing     Problem: Knowledge Deficit  Goal: Patient/family/caregiver demonstrates understanding of disease process, treatment plan, medications, and discharge instructions  Description: Complete learning assessment and assess knowledge base    Interventions:  - Provide teaching at level of understanding  - Provide teaching via preferred learning methods  Outcome: Progressing     Problem: NEUROSENSORY - ADULT  Goal: Achieves stable or improved neurological status  Description: INTERVENTIONS  - Monitor and report changes in neurological status  - Monitor vital signs such as temperature, blood pressure, glucose, and any other labs ordered   - Initiate measures to prevent increased intracranial pressure  - Monitor for seizure activity and implement precautions if appropriate      Outcome: Progressing  Goal: Remains free of injury related to seizures activity  Description: INTERVENTIONS  - Maintain airway, patient safety  and administer oxygen as ordered  - Monitor patient for seizure activity, document and report duration and description of seizure to physician/advanced practitioner  - If seizure occurs,  ensure patient safety during seizure  - Reorient patient post seizure  - Seizure pads on all 4 side rails  - Instruct patient/family to notify RN of any seizure activity including if an aura is experienced  - Instruct patient/family to call for assistance with activity based on nursing assessment  - Administer anti-seizure medications if ordered    Outcome: Progressing  Goal: Achieves maximal functionality and self care  Description: INTERVENTIONS  - Monitor swallowing and airway patency with patient fatigue and changes in neurological status  - Encourage and assist patient to increase activity and self care     - Encourage visually impaired, hearing impaired and aphasic patients to use assistive/communication devices  Outcome: Progressing     Problem: CARDIOVASCULAR - ADULT  Goal: Maintains optimal cardiac output and hemodynamic stability  Description: INTERVENTIONS:  - Monitor I/O, vital signs and rhythm  - Monitor for S/S and trends of decreased cardiac output  - Administer and titrate ordered vasoactive medications to optimize hemodynamic stability  - Assess quality of pulses, skin color and temperature  - Assess for signs of decreased coronary artery perfusion  - Instruct patient to report change in severity of symptoms  Outcome: Progressing  Goal: Absence of cardiac dysrhythmias or at baseline rhythm  Description: INTERVENTIONS:  - Continuous cardiac monitoring, vital signs, obtain 12 lead EKG if ordered  - Administer antiarrhythmic and heart rate control medications as ordered  - Monitor electrolytes and administer replacement therapy as ordered  Outcome: Progressing     Problem: RESPIRATORY - ADULT  Goal: Achieves optimal ventilation and oxygenation  Description: INTERVENTIONS:  - Assess for changes in respiratory status  - Assess for changes in mentation and behavior  - Position to facilitate oxygenation and minimize respiratory effort  - Oxygen administered by appropriate delivery if ordered  - Initiate smoking cessation education as indicated  - Encourage broncho-pulmonary hygiene including cough, deep breathe, Incentive Spirometry  - Assess the need for suctioning and aspirate as needed  - Assess and instruct to report SOB or any respiratory difficulty  - Respiratory Therapy support as indicated  Outcome: Progressing     Problem: GENITOURINARY - ADULT  Goal: Maintains or returns to baseline urinary function  Description: INTERVENTIONS:  - Assess urinary function  - Encourage oral fluids to ensure adequate hydration if ordered  - Administer IV fluids as ordered to ensure adequate hydration  - Administer ordered medications as needed  - Offer frequent toileting  - Follow urinary retention protocol if ordered  Outcome: Progressing  Goal: Absence of urinary retention  Description: INTERVENTIONS:  - Assess patient’s ability to void and empty bladder  - Monitor I/O  - Bladder scan as needed  - Discuss with physician/AP medications to alleviate retention as needed  - Discuss catheterization for long term situations as appropriate  Outcome: Progressing  Goal: Urinary catheter remains patent  Description: INTERVENTIONS:  - Assess patency of urinary catheter  - If patient has a chronic franklin, consider changing catheter if non-functioning  - Follow guidelines for intermittent irrigation of non-functioning urinary catheter  Outcome: Progressing     Problem: Prexisting or High Potential for Compromised Skin Integrity  Goal: Skin integrity is maintained or improved  Description: INTERVENTIONS:  - Identify patients at risk for skin breakdown  - Assess and monitor skin integrity  - Assess and monitor nutrition and hydration status  - Monitor labs   - Assess for incontinence   - Turn and reposition patient  - Assist with mobility/ambulation  - Relieve pressure over bony prominences  - Avoid friction and shearing  - Provide appropriate hygiene as needed including keeping skin clean and dry  - Evaluate need for skin moisturizer/barrier cream  - Collaborate with interdisciplinary team   - Patient/family teaching  - Consider wound care consult   Outcome: Progressing     Problem: MOBILITY - ADULT  Goal: Maintain or return to baseline ADL function  Description: INTERVENTIONS:  -  Assess patient's ability to carry out ADLs; assess patient's baseline for ADL function and identify physical deficits which impact ability to perform ADLs (bathing, care of mouth/teeth, toileting, grooming, dressing, etc )  - Assess/evaluate cause of self-care deficits   - Assess range of motion  - Assess patient's mobility; develop plan if impaired  - Assess patient's need for assistive devices and provide as appropriate  - Encourage maximum independence but intervene and supervise when necessary  - Involve family in performance of ADLs  - Assess for home care needs following discharge   - Consider OT consult to assist with ADL evaluation and planning for discharge  - Provide patient education as appropriate  Outcome: Progressing  Goal: Maintains/Returns to pre admission functional level  Description: INTERVENTIONS:  - Perform BMAT or MOVE assessment daily    - Set and communicate daily mobility goal to care team and patient/family/caregiver  - Out of bed for toileting  - Record patient progress and toleration of activity level   Outcome: Progressing     Problem: Nutrition/Hydration-ADULT  Goal: Nutrient/Hydration intake appropriate for improving, restoring or maintaining nutritional needs  Description: Monitor and assess patient's nutrition/hydration status for malnutrition  Collaborate with interdisciplinary team and initiate plan and interventions as ordered  Monitor patient's weight and dietary intake as ordered or per policy  Utilize nutrition screening tool and intervene as necessary  Determine patient's food preferences and provide high-protein, high-caloric foods as appropriate       INTERVENTIONS:  - Monitor oral intake, urinary output, labs, and treatment plans  - Assess nutrition and hydration status and recommend course of action  - Evaluate amount of meals eaten  - Assist patient with eating if necessary   - Allow adequate time for meals  - Recommend/ encourage appropriate diets, oral nutritional supplements, and vitamin/mineral supplements  - Order, calculate, and assess calorie counts as needed  - Recommend, monitor, and adjust tube feedings and TPN/PPN based on assessed needs  - Assess need for intravenous fluids  - Provide specific nutrition/hydration education as appropriate  - Include patient/family/caregiver in decisions related to nutrition  Outcome: Progressing

## 2023-05-17 NOTE — UTILIZATION REVIEW
Initial Clinical Review    Admission: Date/Time/Statement:   Admission Orders (From admission, onward)     Ordered        05/16/23 0309  Inpatient Admission  Once                      Orders Placed This Encounter   Procedures   • Inpatient Admission     Standing Status:   Standing     Number of Occurrences:   1     Order Specific Question:   Level of Care     Answer:   Critical Care [15]     Order Specific Question:   Estimated length of stay     Answer:   More than 2 Midnights     Order Specific Question:   Certification     Answer:   I certify that inpatient services are medically necessary for this patient for a duration of greater than two midnights  See H&P and MD Progress Notes for additional information about the patient's course of treatment  Initial Presentation: 67 y o  male who presented initially to [facility] ED then transferred to [facility], admitted Inpatient status post PEA arrest  Family found him agonal breathing with food particles around his clothes, started CPR  EMS, received 2 rounds of epinephrine with ROSC  Intubated/shock state in the ER was bradycardic given atropine  Transferred to [facility] for higher level of care  Plan:  ICU, overnight sustained V Tach/with pulse prompted by suction first then by coughing, synchronized cardioverted  150 amiodarone  Was in junctional rhythm all night HR 40s  No TTM started  Prior to [address], pt sedated and having purposeful movements, opened eyes to stimuli  Continue intubation and norepinephrine/Epi, cardiology consulted, transcutaneous pacing prn, send sputum cx, keep NPO, accuchecks w/ SSI, monitor electrolytes and replete prn, fu on blood cxs, maintain OG tube and urinary catheter, Janell insertion  CM following for dispo planning  5/16 Per Cardiology: elevated trop does not seem indicative of acute coronary syndrome    EKG this morning while patient was in sinus rhythm did not show any ST segment changes and echocardiogram is completely normal  Unclear etiology for bradycardia and cardiac arrest  Continue supportive care and abx with close observation of rhythm, possible pacing, fu on T3 and cortisol level  Date: 5/17   Day 2:   Overnight remained on acvc  Requiring levophed for BP support and LD epi for HR  Continue above tx plan including intubation, monitor labs, cardiology following, maintain pacer pads on pt, titrate vasopressors, consider SAT/SBT daily, continue IV empiric abx for aspiration pneumonia  Continue fentanyl infusion for BL multiple rib fxs  Date: 5/18    Day 3: Has surpassed a 2nd midnight with active treatments and services  Continue ICU level of care  Freq neuro checks, cardiology following, fu on cxs, continue IV ABX, continue above tx plan       Triage Vitals   Temperature Pulse Respirations Blood Pressure SpO2   05/16/23 0356 05/16/23 0305 05/16/23 0305 05/16/23 0356 05/16/23 0305   99 1 °F (37 3 °C) (!) 46 16 94/53 98 %      Temp Source Heart Rate Source Patient Position - Orthostatic VS BP Location FiO2 (%)   05/16/23 0716 05/16/23 1200 05/16/23 1200 05/16/23 1200 05/16/23 1900   Bladder Monitor Lying Left arm 50      Pain Score       05/16/23 0356       Med Not Given for Pain - for MAR use only          Wt Readings from Last 1 Encounters:   05/18/23 108 kg (238 lb 12 1 oz)     Additional Vital Signs:   Date/Time Temp Pulse Resp BP MAP (mmHg) SpO2 FiO2 (%) O2 Device Patient Position - Orthostatic VS   05/17/23 0815 98 4 °F (36 9 °C) 60 18 -- -- 90 % -- -- --   05/17/23 0811 98 4 °F (36 9 °C) 64 18 -- -- 90 % -- -- --   05/17/23 0810 98 4 °F (36 9 °C) 62 14 -- -- 89 % Abnormal  -- -- --   05/17/23 0805 98 2 °F (36 8 °C) 64 27 Abnormal  -- -- 84 % Abnormal  -- -- --   05/17/23 0800 98 1 °F (36 7 °C) 106 Abnormal  16 -- -- 88 % Abnormal  -- -- --   05/17/23 0757 98 1 °F (36 7 °C) 106 Abnormal  18 -- -- 88 % Abnormal  -- -- --   05/17/23 0755 97 9 °F (36 6 °C) 104 16 -- -- 86 % Abnormal  -- -- --   05/17/23 0754 97 9 °F (36 6 °C) 103 20 -- -- 87 % Abnormal  -- -- --   05/17/23 0751 97 7 °F (36 5 °C) 110 Abnormal  25 Abnormal  -- -- 94 % -- -- --   05/17/23 0750 97 7 °F (36 5 °C) 119 Abnormal  24 Abnormal  -- -- 96 % -- -- --   05/17/23 0748 97 7 °F (36 5 °C) 122 Abnormal  17 -- -- 97 % -- -- --   05/17/23 0745 97 5 °F (36 4 °C) 127 Abnormal  7 Abnormal  -- -- 80 % Abnormal  -- -- --   05/17/23 0742 97 5 °F (36 4 °C) 112 Abnormal  18 -- -- 94 % -- -- --   05/17/23 0740 97 5 °F (36 4 °C) 0 Abnormal  97 Abnormal  -- -- 73 % Abnormal  -- -- --   05/17/23 0735 97 3 °F (36 3 °C) Abnormal  47 Abnormal  17 -- -- 100 % -- -- --   05/17/23 0730 97 3 °F (36 3 °C) Abnormal  45 Abnormal  20 -- -- 100 % -- -- --   05/17/23 0725 97 2 °F (36 2 °C) Abnormal  42 Abnormal  18 -- -- 100 % -- -- --   05/17/23 0720 97 2 °F (36 2 °C) Abnormal  42 Abnormal  18 -- -- 99 % -- -- --   05/17/23 0715 97 2 °F (36 2 °C) Abnormal  42 Abnormal  18 -- -- 99 % -- -- --   05/17/23 0710 97 °F (36 1 °C) Abnormal  41 Abnormal  18 -- -- 100 % -- -- --   05/17/23 0630 96 4 °F (35 8 °C) Abnormal  40 Abnormal  4 Abnormal  -- -- 100 % -- -- --   05/17/23 0600 96 3 °F (35 7 °C) Abnormal  39 Abnormal  18 -- -- 99 % -- -- --   05/17/23 0530 95 9 °F (35 5 °C) Abnormal  38 Abnormal  30 Abnormal  -- -- 99 % -- -- --   05/17/23 0500 95 5 °F (35 3 °C) Abnormal  37 Abnormal  18 -- -- 99 % -- -- --   05/17/23 0430 95 5 °F (35 3 °C) Abnormal  37 Abnormal  18 -- -- 99 % -- -- --   05/17/23 0400 95 4 °F (35 2 °C) Abnormal  37 Abnormal  18 -- -- 99 % -- -- --   05/17/23 0330 95 4 °F (35 2 °C) Abnormal  37 Abnormal  18 -- -- 97 % -- -- --   05/17/23 0300 95 4 °F (35 2 °C) Abnormal  37 Abnormal  18 -- -- 97 % -- -- --   05/17/23 0230 95 5 °F (35 3 °C) Abnormal  38 Abnormal  18 -- -- 97 % -- -- --   05/17/23 0200 95 4 °F (35 2 °C) Abnormal  39 Abnormal  18 -- -- 97 % -- -- --   05/17/23 0130 96 1 °F (35 6 °C) Abnormal  39 Abnormal  18 -- -- 96 % -- -- --   05/17/23 0100 97 2 °F (36 2 °C) Abnormal  38 Abnormal  18 -- -- 96 % -- -- --   05/17/23 0030 97 9 °F (36 6 °C) 41 Abnormal  18 -- -- 97 % -- -- --   05/17/23 0000 98 2 °F (36 8 °C) 77 17 113/55 73 97 % -- -- --   05/16/23 2300 99 9 °F (37 7 °C) 46 Abnormal  16 -- -- 95 % -- -- --   05/16/23 2230 99 5 °F (37 5 °C) 49 Abnormal  12 -- -- 98 % -- -- --   05/16/23 2200 99 3 °F (37 4 °C) 49 Abnormal  14 116/63 85 95 % -- -- --   05/16/23 2130 99 7 °F (37 6 °C) 45 Abnormal  12 -- -- 98 % -- -- --   05/16/23 2115 -- -- -- -- -- 99 % 50 Ventilator --   05/16/23 2100 99 9 °F (37 7 °C) 48 Abnormal  8 Abnormal  121/66 87 97 % -- -- --   05/16/23 2030 99 9 °F (37 7 °C) 48 Abnormal  11 Abnormal  -- -- 98 % -- -- --   05/16/23 2000 99 9 °F (37 7 °C) 48 Abnormal  11 Abnormal  113/66 85 97 % -- -- --   05/16/23 1930 100 2 °F (37 9 °C) 50 Abnormal  18 -- -- 91 % -- -- --   05/16/23 1900 100 9 °F (38 3 °C) Abnormal  45 Abnormal  18 96/53 69 96 % 50 Ventilator Lying   05/16/23 1845 -- 42 Abnormal  18 -- -- 94 % -- -- --   05/16/23 1830 -- 47 Abnormal  18 -- -- 91 % -- -- --   05/16/23 1821 -- -- -- -- -- 88 % Abnormal  -- -- --   05/16/23 1815 -- 44 Abnormal  14 -- -- 87 % Abnormal  -- -- --   05/16/23 1800 101 8 °F (38 8 °C) Abnormal  46 Abnormal  17 94/55 70 91 % -- -- --   05/16/23 1700 -- 45 Abnormal  12 140/64 92 93 % -- -- --   05/16/23 1600 103 °F (39 4 °C) Abnormal  50 Abnormal  22 136/60 87 97 % -- -- --   05/16/23 1559 -- -- -- -- -- 96 % -- -- --   05/16/23 1545 -- 50 Abnormal  20 -- -- 92 % -- -- --   05/16/23 1530 102 4 °F (39 1 °C) Abnormal  50 Abnormal  17 -- -- 91 % -- -- --   05/16/23 1515 -- 51 Abnormal  20 -- -- 92 % -- -- --   05/16/23 1500 101 9 °F (38 8 °C) Abnormal  49 Abnormal  20 142/65 94 99 % -- -- --   05/16/23 1445 101 3 °F (38 5 °C) Abnormal  49 Abnormal  17 -- -- 98 % -- -- --   05/16/23 1430 -- 48 Abnormal  18 -- -- 96 % -- -- --   05/16/23 1415 -- 47 Abnormal  18 -- -- 95 % -- -- --   05/16/23 1400 100 9 °F (38 3 °C) Abnormal  48 Abnormal  16 111/53 77 96 % -- -- --   05/16/23 1345 -- 46 Abnormal  18 -- -- 97 % -- -- --   05/16/23 1330 -- 46 Abnormal  15 -- -- 96 % -- -- --   05/16/23 1315 -- 45 Abnormal  15 -- -- 96 % -- -- --   05/16/23 1300 100 4 °F (38 °C) 45 Abnormal  17 123/56 81 96 % -- -- --   05/16/23 1245 -- 56 19 -- -- 97 % -- -- --   05/16/23 1230 -- 42 Abnormal  17 -- -- 96 % -- -- --   05/16/23 1215 -- 43 Abnormal  18 -- -- 97 % -- -- --   05/16/23 1200 99 9 °F (37 7 °C) 41 Abnormal  15 111/55 79 96 % -- Ventilator Lying   05/16/23 1145 -- 41 Abnormal  15 -- -- 96 % -- -- --   05/16/23 1130 -- 41 Abnormal  15 -- -- 96 % -- -- --   05/16/23 1115 -- 41 Abnormal  14 -- -- 96 % -- -- --   05/16/23 1110 -- -- -- -- -- 96 % -- -- --   05/16/23 1100 -- 41 Abnormal  16 107/57 78 96 % -- -- --   05/16/23 1030 99 9 °F (37 7 °C) 40 Abnormal  15 -- -- 97 % -- -- --   05/16/23 1015 99 9 °F (37 7 °C) 40 Abnormal  15 -- -- 97 % -- -- --   05/16/23 1000 99 9 °F (37 7 °C) 39 Abnormal  15 105/50 70 97 % -- -- --   05/16/23 0945 99 9 °F (37 7 °C) 38 Abnormal  14 -- -- 97 % -- -- --   05/16/23 0930 99 9 °F (37 7 °C) 39 Abnormal  14 -- -- 97 % -- -- --   05/16/23 0915 99 7 °F (37 6 °C) 40 Abnormal  14 -- -- 96 % -- -- --   05/16/23 0900 99 7 °F (37 6 °C) 40 Abnormal  14 107/56 76 98 % -- -- --   05/16/23 0845 99 5 °F (37 5 °C) 41 Abnormal  14 -- -- 97 % -- -- --   05/16/23 0830 99 5 °F (37 5 °C) 41 Abnormal  14 -- -- 96 % -- -- --   05/16/23 0815 99 5 °F (37 5 °C) 40 Abnormal  14 100/53 74 98 % -- -- --   05/16/23 0805 99 3 °F (37 4 °C) 40 Abnormal  14 100/51 71 98 % -- -- --   05/16/23 0800 99 3 °F (37 4 °C) 40 Abnormal  30 Abnormal  84/47 Abnormal  61 Abnormal  97 % -- -- --   05/16/23 0755 99 1 °F (37 3 °C) 46 Abnormal  14 88/46 Abnormal  65 98 % -- -- --   05/16/23 0750 99 1 °F (37 3 °C) 49 Abnormal  15 115/57 82 98 % -- -- --   05/16/23 0745 99 °F (37 2 °C) 62 14 150/67 96 99 % -- -- --   05/16/23 0740 99 °F (37 2 °C) 51 Abnormal  15 137/58 92 97 % -- -- --   05/16/23 0735 99 °F (37 2 °C) 50 Abnormal  15 157/67 97 100 % -- -- --   05/16/23 0730 99 °F (37 2 °C) 62 30 Abnormal  -- -- 100 % -- -- --   05/16/23 0729 -- 48 Abnormal  -- 107/55 -- -- -- -- --   05/16/23 0716 98 8 °F (37 1 °C) 48 Abnormal  15 107/55 78 99 % -- -- --   05/16/23 0630 99 °F (37 2 °C) 41 Abnormal  13 104/53 75 99 % -- -- --   05/16/23 0600 98 8 °F (37 1 °C) 41 Abnormal  14 83/50 Abnormal  61 Abnormal  99 % -- -- --   05/16/23 0530 98 8 °F (37 1 °C) 43 Abnormal  14 95/55 72 100 % -- -- --   05/16/23 0500 99 °F (37 2 °C) 42 Abnormal  14 90/55 70 99 % -- -- --   05/16/23 0430 99 °F (37 2 °C) 43 Abnormal  14 98/53 71 99 % -- -- --   05/16/23 0415 99 1 °F (37 3 °C) 47 Abnormal  16 82/48 Abnormal  61 Abnormal  99 % -- -- --   05/16/23 0400 99 1 °F (37 3 °C) 41 Abnormal  16 82/44 Abnormal  59 Abnormal  93 % -- -- --   05/16/23 0356 99 1 °F (37 3 °C) 44 Abnormal  14 94/53 68 98 % -- -- --   05/16/23 0330 -- 45 Abnormal  29 Abnormal  -- -- 90 % -- -- --   05/16/23 0305 -- 46 Abnormal  16 -- -- 98 % -- -- --     Pertinent Labs/Diagnostic Test Results:   XR chest portable ICU   Final Result by Marni Taveras MD (05/17 7817)      Tip of right IJ catheter near cavoatrial junction  Mild pulmonary vascular congestion  Bilateral pleural effusions  Probable atelectasis and/or consolidation in the bases of both lower lobes  Workstation performed: [id number]         XR chest portable   Final Result by Estrada Garza MD (05/17 9661)      NG tube below the diaphragm but coiled in the cervical esophagus  Moderate bibasilar opacity which could be due to atelectasis and/or pneumonia  Workstation performed: [id number]         CT head wo contrast   Final Result by Gil Sanchez MD (05/17 3621)      No acute intracranial CT abnormality  No significant interval change                    Workstation performed: [id number] CT chest abdomen pelvis wo contrast   Final Result by Joshua Gerard MD (05/17 1014)      1  Significant worsening of bilateral lower lobe aspiration/pneumonia  2  NG tube with tip and sideport in the stomach, noting that the sideport is near the gastroesophageal junction  Consider advancement  The study was marked in [facility] for immediate notification  Workstation performed: [id number]         XR chest portable ICU   Final Result by Purvi Maldonado MD (05/17 1548)      Mild pulmonary vascular congestion  Otherwise, no evidence of acute abnormality in the chest                   Workstation performed: [id number]         XR chest portable ICU   Final Result by Jocy Ivan MD (05/16 1628)      ET tube 4 5 cm above the ruben  Bilateral aspiration better seen on CT  Workstation performed: [id number]         STAT CXR ICU   Final Result by Jocy Ivan MD (05/16 1626)      ET tube 2 5 cm above the ruben  Bilateral aspiration better shown on CT                 Workstation performed: [id number]           Results from last 7 days   Lab Units 05/17/23  2217   SARS-COV-2  Negative     Results from last 7 days   Lab Units 05/18/23  0439 05/17/23  0555 05/16/23  0327 05/15/23  2122   WBC Thousand/uL 7 11 10 44* 13 27* 6 82   HEMOGLOBIN g/dL 10 5* 10 9* 11 8* 13 1   HEMATOCRIT % 32 2* 33 8* 36 7 39 4   PLATELETS Thousands/uL 123* 141* 165 171   NEUTROS ABS Thousands/µL  --  7 95* 11 36* 4 29   BANDS PCT % 6  --   --   --          Results from last 7 days   Lab Units 05/18/23  0439 05/17/23  1149 05/17/23  0759 05/17/23  0555 05/17/23  0047 05/16/23  0825   SODIUM mmol/L 138 138 139 137 137 137   POTASSIUM mmol/L 4 4 4 4 3 6 4 0 4 0 4 4   CHLORIDE mmol/L 109* 107 107 107 107 107   CO2 mmol/L 24 24 20* 21 22 22   ANION GAP mmol/L 5 7 12 9 8 8   BUN mg/dL 12 14 17 16 19 19   CREATININE mg/dL 0 77 0 84 1 01 0 85 0 88 1 04   EGFR ml/min/1 73sq m 90 87 73 87 85 71 CALCIUM mg/dL 8 5 8 3* 8 2* 8 1* 8 1* 7 8*   CALCIUM, IONIZED mmol/L 1 18 1 14 1 15  --   --  1 06*   MAGNESIUM mg/dL 2 3 2 6 2 5 2 4 2 5 1 9   PHOSPHORUS mg/dL 1 7* 1 8* 2 7 2 5 2 5 3 4     Results from last 7 days   Lab Units 05/18/23  0439 05/17/23  0555 05/16/23  0327 05/15/23  2122   AST U/L 26 28 85* 131*   ALT U/L 47 59* 111* 123*   ALK PHOS U/L 49 50 66 72   TOTAL PROTEIN g/dL 5 0* 5 2* 5 4* 6 2*   ALBUMIN g/dL 2 9* 3 2* 3 5 3 8   TOTAL BILIRUBIN mg/dL 0 47 0 54 0 52 0 58     Results from last 7 days   Lab Units 05/18/23  0531 05/17/23  2321 05/17/23  1738 05/17/23  1149 05/17/23  0541 05/17/23  0536 05/16/23  2340 05/16/23  1734 05/16/23  1202 05/16/23  0602 05/16/23  0321 05/15/23  2109   POC GLUCOSE mg/dl 155* 154* 143* 150* 163* 170* 111 127 150* 132 165* 193*     Results from last 7 days   Lab Units 05/18/23  0439 05/17/23  1149 05/17/23  0759 05/17/23  0555 05/17/23  0047 05/16/23  0825 05/16/23  0327 05/15/23  2122   GLUCOSE RANDOM mg/dL 170* 159* 195* 180* 145* 181* 138 187*         Results from last 7 days   Lab Units 05/16/23  0327   HEMOGLOBIN A1C % 5 3   EAG mg/dl 105      Results from last 7 days   Lab Units 05/18/23  0439 05/17/23  1149 05/17/23  0800   PH ART  7 391 7 343* 7 234*   PCO2 ART mm Hg 39 5 40 5 46 8*   PO2 ART mm Hg 119 8 218 3* 70 3*   HCO3 ART mmol/L 23 4 21 5* 19 3*   BASE EXC ART mmol/L -1 3 -3 9 -8 0   O2 CONTENT ART mL/dL 15 4* 18 6 16 5   O2 HGB, ARTERIAL % 96 7 97 8* 90 9*   ABG SOURCE  Line, Arterial Line, Arterial Line, Arterial     Results from last 7 days   Lab Units 05/16/23  0327 05/15/23  2122   PH MARIFER  7 336 7 231*   PCO2 MARIFER mm Hg 40 2* 46 6   PO2 MARIFER mm Hg 76 1* 159 0*   HCO3 MARIFER mmol/L 21 0* 19 1*   BASE EXC MARIFER mmol/L -4 5 -8 3   O2 CONTENT MARIFER ml/dL 16 7 19 5   O2 HGB, VENOUS % 91 2* 97 2*     Results from last 7 days   Lab Units 05/16/23 0327   CK TOTAL U/L 256     Results from last 7 days   Lab Units 05/16/23  0200 05/15/23  2325 05/15/23  2122   HS TNI 0HR ng/L  --   --  360*   HS TNI 2HR ng/L  --  444*  --    HSTNI D2 ng/L  --  84*  --    HS TNI 4HR ng/L 392*  --   --    HSTNI D4 ng/L 32*  --   --          Results from last 7 days   Lab Units 05/15/23  2122   PROTIME seconds 14 3   INR  1 09   PTT seconds 25     Results from last 7 days   Lab Units 05/15/23  2122   TSH 3RD GENERATON uIU/mL 5 976*     Results from last 7 days   Lab Units 05/18/23  0439 05/17/23  0555 05/16/23  0327   PROCALCITONIN ng/ml 2 17* 1 90* 1 05*     Results from last 7 days   Lab Units 05/16/23  0928 05/16/23  0327 05/16/23  0032 05/15/23  2122   LACTIC ACID mmol/L 1 5 1 9 2 8* 5 6*     Results from last 7 days   Lab Units 05/16/23  1323   FERRITIN ng/mL 446*   IRON SATURATION % 7*   IRON ug/dL 17*   TIBC ug/dL 231*     Results from last 7 days   Lab Units 05/16/23  0327   LIPASE u/L 19     Results from last 7 days   Lab Units 05/16/23  0333   CLARITY UA  Clear   COLOR UA  Yellow   SPEC GRAV UA  1 020   PH UA  5 5   GLUCOSE UA mg/dl Negative   KETONES UA mg/dl 15 (1+)*   BLOOD UA  2+*   PROTEIN UA mg/dl Negative   NITRITE UA  Negative   BILIRUBIN UA  Negative   UROBILINOGEN UA E U /dl 0 2   LEUKOCYTES UA  Negative   WBC UA /hpf 0-1   RBC UA /hpf 10-20*   BACTERIA UA /hpf Occasional   EPITHELIAL CELLS WET PREP /hpf Occasional   MUCUS THREADS  Occasional*     Results from last 7 days   Lab Units 05/16/23  1557 05/16/23  0326 05/15/23  2122   BLOOD CULTURE   --  No Growth at 24 hrs  No Growth at 24 hrs  No Growth at 24 hrs  No Growth at 24 hrs  SPUTUM CULTURE  Culture too young- will reincubate  --   --    GRAM STAIN RESULT  3+ Polys*  1+ Gram positive cocci in pairs*  1+ Gram variable rods*  --   --        No past medical history on file    Present on Admission:  • Cardiac arrest [facility])  • Acute respiratory failure with hypoxia (HCC)  • (Resolved) Lactic acid acidosis  • Closed fracture of multiple ribs of both sides  • Acute kidney injury ([facility] [address] )  • Shock ([facility] [address] )  • Aspiration pneumonia [facility])  • Bradycardia  • Elevated troponin      Admitting Diagnosis: Cardiac arrest ([facility] [address] ) [I46 9]  Age/Sex: 67 y o  male  Admission Orders:  Scheduled Medications:  acetaminophen, 975 mg, Oral, Q6H  cefTRIAXone, 1,000 mg, Intravenous, Q24H   And  azithromycin, 500 mg, Intravenous, Q24H  chlorhexidine, 15 mL, Mouth/Throat, Q12H BAYRON  enoxaparin, 40 mg, Subcutaneous, Daily  Famotidine (PF), 20 mg, Intravenous, BID  insulin lispro, 1-6 Units, Subcutaneous, Q6H BAYRON  lidocaine, 1 patch, Topical, Daily  lidocaine, 1 patch, Topical, Daily  methylPREDNISolone sodium succinate, 60 mg, Intravenous, Q8H BAYRON  metroNIDAZOLE, 500 mg, Intravenous, Q8H  senna-docusate sodium, 2 tablet, Oral, HS  sodium phosphate, 21 mmol, Intravenous, Once      Continuous IV Infusions:  fentaNYL, 50 mcg/hr, Intravenous, Continuous  multi-electrolyte, 100 mL/hr, Intravenous, Continuous  norepinephrine, 1-30 mcg/min, Intravenous, Titrated  propofol, 5-50 mcg/kg/min, Intravenous, Titrated      PRN Meds:  albuterol, 2 5 mg, Nebulization, Q4H PRN  EPINEPHrine, , Intravenous, Code/Trauma/Sedation Med  fentanyl citrate (PF), 50 mcg, Intravenous, Q1H PRN  midazolam, 2 mg, Intravenous, Q4H PRN    scd    IP CONSULT TO CASE MANAGEMENT  IP CONSULT TO NUTRITION SERVICES  IP CONSULT TO CARDIOLOGY    Network Utilization Review Department  ATTENTION: Please call with any questions or concerns to [phone] and carefully listen to the prompts so that you are directed to the right person  All voicemails are confidential   Kalpana Mcdaniel all requests for admission clinical reviews, approved or denied determinations and any other requests to dedicated fax number below belonging to the campus where the patient is receiving treatment   List of dedicated fax numbers for the Facilities:  [address] [place] [address] DENIALS (Administrative/Medical Necessity) [phone]   [address] [place] [address] (Maternity/NICU/Pediatrics) [phone]   [address] [place] [address] [address] [place] [phone]   [facility] [address] [phone]   [address] [place] [address] [place]to  [facility] [address] [place] [address] 310 [facility] 134 815 [facility] [phone] None

## 2023-05-17 NOTE — RESPIRATORY THERAPY NOTE
1315 Bedside bronchoscopy done by Dr Ba Chimera   Specimen ordered and labeled  Samples transported to the lab

## 2023-05-17 NOTE — ASSESSMENT & PLAN NOTE
· Patient from home and was a witnessed aspiration after vomiting  Wife noted that he appeared to be choking and then went unresponsive  When EMS arrived he was PEA cardiac arrest, CPR was started  He had 2 rounds of epi and had return of circulation  Patient intubated in the field and transferred to [address] [place] [address] [place] he was bradycardic and hypotensive requiring vasopressors and 1 dose of atropine    Patient was transferred to [address] for further care  · 5/16 PEA cardiac arrest  · Maintain mechanical ventilator, avoid hypoxia and electrolyte abnormalities  · Avoid fevers, PRN cooling blanket  · 5/16 ECHO EF65%, Trace MVR, mild TVR  · 5/16 Episode of VT after ETT suctioning requiring shock  · Cardiology following

## 2023-05-17 NOTE — PROCEDURES
Transvenous Pacing Wire Insertion    Date/Time: 5/17/2023 11:22 AM  Performed by: Augusto Hewitt MD  Authorized by: Augusto Hewitt MD     Patient location:  ICU  Consent:     Consent obtained:  Verbal    Consent given by:  Spouse    Risks discussed:  Pneumothorax, air embolism, bleeding, incorrect placement, damage to surrounding structures and infection    Alternatives discussed:  No treatment  Universal protocol:     Procedure explained and questions answered to patient or proxy's satisfaction: yes      Patient identity confirmed:  Arm band  Pre-procedure details:     Sterile barrier technique: All elements of maximal sterile technique followed      Skin preparation:  2% chlorhexidine  Sedation:     Sedation type:  Propofol and fentanyl  Procedure details:     Patient position:  Flat    Venous introducer: new sheath/introducer placed      Location:  Right internal jugular    Number of attempts:  1    Pacing catheter size:  5 Fr    TVP depth at introducer hub (cm):  43  Pacer settings:     Wire type: balloon tip      Ventricular site placed: right ventricle outflow tract      Temporary pacer function: turned on    Post-procedure details:     TVP catheter securement: TVP catheter cover in place and TVP catheter hub locked      Image confirmation:  Chest x-ray pending    Post-procedure complications: none      Patient tolerance of procedure: Tolerated well, no immediate complications  Comments:      Initially obtained ventricular Trying at 45 cm however chest x-ray showed pacer wire tip at RA/likely in the coronary sinus  Performed multiple attempts of retracting to 20 cm then inflated the balloon with advancing the wire  Eventually obtained ventricular capturing at 12mA, at a rate of 80 bpm   Confirmed by ultrasound/wire past the tricuspid valve seems to be at the RV OT/touching the septum at 43 cm  Chest x-ray showed no pneumothorax

## 2023-05-17 NOTE — ASSESSMENT & PLAN NOTE
· Secondary to aspiration event and cardiac arrest   Intubated in the field after cardiac arrest  · Maintain AC/VC vent settings  · Patient with number ET tube, 26 at the lip  · 5/16  chest x-ray on arrival here showing ET tube in good position above the ruben, OG tube in good position below the diaphragm  · Current settings AC/VC 18/470/50/6 ideal body weight 78 3  · Cont propofol and fentanyl infusion for sedation    Goal RASS -1  · Consider SAT/SBT daily   · VAP precautions  · Continue empiric Abx for asp PNA, Day 2

## 2023-05-17 NOTE — ASSESSMENT & PLAN NOTE
· Wife reports that he was vomiting at home and she noticed that he was choking on his vomit    Patient with cardiac arrest requiring intubation in the field  · Chest x-ray showing bilateral opacities, right greater than left, concerning for aspiration  · Patient with a severe CAP Score and a low drip score  · Day 2: Flagyl, azithromycin and Rocephin  · 5/16 Pt bronched, F/U CXR this AM  · F/U BCs and bronch cxs  · PCT 1 05,___  · Trend fever curve and WBC

## 2023-05-17 NOTE — ASSESSMENT & PLAN NOTE
This appeared to be initiated after a pause associated with a PVC  That would be 1 more reason to elect for pacing as a preventative measure  I am reluctant to add amiodarone because this is just going to suppress underlying heart rate

## 2023-05-17 NOTE — ASSESSMENT & PLAN NOTE
· Shock state and bradycardic postcardiac arrest    · Close monitoring of I/os status    Avoid hypotension  · Titrate vasopressors for SBP > 90 and HR 40+

## 2023-05-17 NOTE — PROGRESS NOTES
[facility]  Interval Progress Note: Critical Care  Name: Omega Arias  MRN: [id number]  Unit/Bed#: ICU 04-01 I Date of Admission: 5/16/2023   Date of Service: 5/17/2023 I Hospital Day: 1    Interval Events:          Patient noted to be in pulseless VT after suctioning  CPR immediately started  Received 1mg Epi  Underwent defib x1, CPR resumes  On pulse check noted to still be in VT, underwent defib again with conversion to afib/rvr and ROSC  Shortly after ROSC, patient converted to SB rhythm rate 40s with PAC/PVC  Pertinent New Data:         CBC:   Lab Results   Component Value Date    WBC 10 44 (H) 05/17/2023    HGB 10 9 (L) 05/17/2023    HCT 33 8 (L) 05/17/2023    MCV 95 05/17/2023     (L) 05/17/2023    MCH 30 5 05/17/2023    MCHC 32 2 05/17/2023    RDW 12 6 05/17/2023    MPV 10 7 05/17/2023    NRBC 0 05/17/2023   , CMP:   Lab Results   Component Value Date    SODIUM 138 05/17/2023    K 4 4 05/17/2023     05/17/2023    CO2 24 05/17/2023    BUN 14 05/17/2023    CREATININE 0 84 05/17/2023    CALCIUM 8 3 (L) 05/17/2023    AST 28 05/17/2023    ALT 59 (H) 05/17/2023    ALKPHOS 50 05/17/2023    EGFR 87 05/17/2023   , ABG:   Lab Results   Component Value Date    PHART 7 343 (L) 05/17/2023    AFD3YSW 40 5 05/17/2023    PO2ART 218 3 (H) 05/17/2023    NKC9SLX 21 5 (L) 05/17/2023    BEART -3 9 05/17/2023    SOURCE Line, Arterial 05/17/2023   , Magnesium: No components found for: MAG, Phosphorous:   Lab Results   Component Value Date    PHOS 1 8 (L) 05/17/2023     chest xrayI have personally reviewed pertinent reports     and I have personally reviewed pertinent films in PACS      Assessment and Plan  · Diagnosis: VT cardiac arrest, suspect R on T phenomenon with underlying bradycardia   · Plan: Discussed with cardiology and critical care attending, will plan for TVP with eventual PPM   · Continue levophed and epi, titrate for MAP>65  · Check STAT BMP, Mag, Phos, iCal - replete electrolytes       Billing Level:  During this visit, Critical care services were medically necessary as this patient had a high probability of imminent or life-threatening deterioration due to acute encephalopathy, shock and acute respiratory failure, required my direct attention, intervention, and personal management to implement the following: EKG interpretation, CXR interpretation , vent management, defibrillation, directing of titration of vasoactive drugs, directing of titration of sedation, fluid management, bedside management, test review, records review, case discussed with consultants  and direct patient care  I have personally provided 45 minutes on 05/17/23 of critical care time, exclusive of procedures, teaching, family meetings, and any prior time recorded by providers other than myself      SIGNATURE: Kari Fischer

## 2023-05-17 NOTE — PROGRESS NOTES
[facility] [address]  Progress Note  Name: Olga Burroughs  MRN: [id number]  Unit/Bed#: ICU 04-01 I Date of Admission: 5/16/2023   Date of Service: 5/17/2023 I Hospital Day: 1    Assessment/Plan   * Cardiac arrest [facility])  Assessment & Plan  · Patient from home and was a witnessed aspiration after vomiting  Wife noted that he appeared to be choking and then went unresponsive  When EMS arrived he was PEA cardiac arrest, CPR was started  He had 2 rounds of epi and had return of circulation  Patient intubated in the field and transferred to [address] [place] [address] Joseph he was bradycardic and hypotensive requiring vasopressors and 1 dose of atropine  Patient was transferred to [address] for further care  · 5/16 PEA cardiac arrest  · Maintain mechanical ventilator, avoid hypoxia and electrolyte abnormalities  · Avoid fevers, PRN cooling blanket  · 5/16 ECHO EF65%, Trace MVR, mild TVR  · 5/16 Episode of VT after ETT suctioning requiring shock  · Cardiology following     Shock [facility])  Assessment & Plan  · Shock state and bradycardic postcardiac arrest    · Close monitoring of I/os status  Avoid hypotension  · Titrate vasopressors for SBP > 90 and HR 40+    Acute respiratory failure with hypoxia (HCC)  Assessment & Plan  · Secondary to aspiration event and cardiac arrest   Intubated in the field after cardiac arrest  · Maintain AC/VC vent settings  · Patient with number ET tube, 26 at the lip  · 5/16  chest x-ray on arrival here showing ET tube in good position above the ruben, OG tube in good position below the diaphragm  · Current settings AC/VC 18/470/50/6 ideal body weight 78 3  · Cont propofol and fentanyl infusion for sedation  Goal RASS -1  · Consider SAT/SBT daily   · VAP precautions  · Continue empiric Abx for asp PNA, Day 2    Aspiration pneumonia ([facility] [address] )  Assessment & Plan  · Wife reports that he was vomiting at home and she noticed that he was choking on his vomit    Patient with cardiac arrest requiring intubation in the field  · Chest x-ray showing bilateral opacities, right greater than left, concerning for aspiration  · Patient with a severe CAP Score and a low drip score  · Day 2: Flagyl, azithromycin and Rocephin  · 5/16 Pt bronched, F/U CXR this AM  · F/U BCs and bronch cxs  · PCT 1 05,___  · Trend fever curve and WBC    Closed fracture of multiple ribs of both sides  Assessment & Plan  · CT chest abdomen pelvis at [facility] showing acute anterior fractures on right side 2 through 6, left side 2 through 5  Appear nondisplaced on cxr  · On exam patient with symmetrical chest rise with ventilation  · Maintains 6 mL/kg for total volume goal  · Fentanyl infusion and PRN, continue lidocaine patches to chest  · Continue Tylenol around-the-clock    Elevated troponin  Assessment & Plan  · Trop peaked at 444    Bradycardia  Assessment & Plan  · Bradycardic in the 30s-40s since admission  · Continue EPI gtt as needed for HR goal 40+  · BP stable with bradycardia  · Cards following, likely need a PPM when more stable  · Pacer pads on pt              ICU Core Measures     Vented Patient  VAP Bundle  VAP bundle ordered     A: Assess, Prevent, and Manage Pain · Has pain been assessed? Yes  · Need for changes to pain regimen? No   B: Both Spontaneous Awakening Trials (SATs) and Spontaneous Breathing Trials (SBTs) · Plan to perform spontaneous awakening trial today? Yes   · Plan to perform spontaneous breathing trial today? Yes   · Obvious barriers to extubation? Yes   C: Choice of Sedation · RASS Goal: -2 Light Sedation  · Need for changes to sedation or analgesia regimen? No   D: Delirium · CAM-ICU: Unable to perform secondary to Acute cognitive dysfunction   E: Early Mobility  · Plan for early mobility? Yes   F: Family Engagement · Plan for family engagement today? Yes     Antibiotic Review: Awaiting culture results  Review of Invasive Devices:   Pat Plan: Continue for accurate I/O monitoring for 48 hours  Central access plan: Medications requiring central line  [place] Plan: Keep arterial line for hemodynamic monitoring    Prophylaxis:  VTE VTE covered by:  enoxaparin, Subcutaneous, 40 mg at 05/16/23 0837       Stress Ulcer  covered byFamotidine (PF) (PEPCID) injection 20 mg [[id number]]     Subjective   HPI/24hr events: Overnight remained on acvc  Requiring levophed for BP support and LD epi for HR  Review of Systems   Unable to perform ROS: Intubated        Objective                            Vitals I/O      Most Recent Min/Max in 24hrs   Temp 98 2 °F (36 8 °C) Temp  Min: 98 2 °F (36 8 °C)  Max: 103 °F (39 4 °C)   Pulse 77 Pulse  Min: 38  Max: 77   Resp 17 Resp  Min: 8  Max: 30   /55 BP  Min: 82/48  Max: 157/67   O2 Sat 97 % SpO2  Min: 87 %  Max: 100 %      Intake/Output Summary (Last 24 hours) at 5/17/2023 0256  Last data filed at 5/17/2023 0200  Gross per 24 hour   Intake 4768 94 ml   Output 1430 ml   Net 3338 94 ml         Diet NPO     Invasive Monitoring Physical exam   Arterial Line  Janell BP    No data recorded   MAP    No data recorded    Physical Exam  Vitals and nursing note reviewed  Constitutional:       Appearance: He is ill-appearing and toxic-appearing  HENT:      Head: Normocephalic and atraumatic  Nose: Nose normal       Mouth/Throat:      Mouth: Mucous membranes are moist       Pharynx: Oropharynx is clear  Eyes:      Conjunctiva/sclera: Conjunctivae normal       Pupils: Pupils are equal, round, and reactive to light  Cardiovascular:      Rate and Rhythm: Bradycardia present  Pulses: Normal pulses  Heart sounds: Normal heart sounds  Pulmonary:      Breath sounds: Rhonchi present  Comments: ACVC 18/470/50/6 SPO2 00%  LS rhonchi b/l  Abdominal:      General: Bowel sounds are normal  There is no distension  Palpations: Abdomen is soft  Genitourinary:     Comments: Stewart intact  Musculoskeletal:         General: Normal range of motion  Cervical back: Normal range of motion and neck supple  Skin:     General: Skin is warm and dry  Neurological:      Comments: Sedated due to hemodynamic instability  Pupils 3 mm b/l and sluggish          Diagnostic Studies    EKG:SB, HR 37  Imaging:  I have personally reviewed pertinent reports         Medications:  Scheduled PRN   acetaminophen, 975 mg, Q6H  cefTRIAXone, 1,000 mg, Q24H   And  azithromycin, 500 mg, Q24H  chlorhexidine, 15 mL, Q12H BAYRON  enoxaparin, 40 mg, Daily  Famotidine (PF), 20 mg, BID  insulin lispro, 1-6 Units, Q6H BAYRON  lidocaine, 1 patch, Daily  lidocaine, 1 patch, Daily  metroNIDAZOLE, 500 mg, Q8H      albuterol, 2 5 mg, Q4H PRN  fentanyl citrate (PF), 50 mcg, Q1H PRN  midazolam, 2 mg, Q4H PRN       Continuous    epinephrine, 2 mcg/min, Last Rate: 2 mcg/min (05/17/23 0051)  fentaNYL, 75 mcg/hr, Last Rate: 75 mcg/hr (05/17/23 0231)  multi-electrolyte, 100 mL/hr, Last Rate: 100 mL/hr (05/16/23 1608)  norepinephrine, 1-30 mcg/min, Last Rate: 6 mcg/min (05/17/23 0041)  propofol, 5-50 mcg/kg/min, Last Rate: 30 mcg/kg/min (05/16/23 2147)         Labs:    CBC    Recent Labs     05/15/23  2122 05/16/23  0327   WBC 6 82 13 27*   HGB 13 1 11 8*   HCT 39 4 36 7    165     BMP    Recent Labs     05/16/23 0825 05/17/23 0047   SODIUM 137 137   K 4 4 4 0    107   CO2 22 22   AGAP 8 8   BUN 19 19   CREATININE 1 04 0 88   CALCIUM 7 8* 8 1*       Coags    Recent Labs     05/15/23  2122   INR 1 09   PTT 25        Additional Electrolytes  Recent Labs     05/16/23 0825 05/17/23 0047   MG 1 9 2 5   PHOS 3 4 2 5   CAIONIZED 1 06*  --           Blood Gas    Recent Labs     05/17/23 0048   PHART 7 384   VTZ4UXS 39 5   PO2ART 132 0*   [id number] 23 1   BEART -1 8   SOURCE Line, Arterial     Recent Labs     05/16/23 0327 05/16/23  0825 05/17/23  0048   PHVEN 7 336  --   --    DIU1QHC 40 2*  --   --    PO2VEN 76 1*  --   --    SSI7WBX 21 0*  --   --    BEVEN -4 5  --   --    SOURCE  --    < > Line, Arterial    < > = values in this interval not displayed  LFTs  Recent Labs     05/15/23  2122 05/16/23  0327   * 111*   * 85*   ALKPHOS 72 66   ALB 3 8 3 5   TBILI 0 58 0 52       Infectious  Recent Labs     05/16/23 0327   PROCALCITONI 1 05*     Glucose  Recent Labs     05/15/23  2122 05/16/23  0327 05/16/23  0825 05/17/23  0047   GLUC 187* 138 181* 145*               Critical Care Time Delivered: Upon my evaluation, this patient had a high probability of imminent or life-threatening deterioration due to Septic shock, which required my direct attention, intervention, and personal management  I have personally provided 30 minutes of critical care time, exclusive of procedures, teaching, family meetings, and any prior time recorded by providers other than myself       CARMEL Daily

## 2023-05-17 NOTE — PROGRESS NOTES
[facility] [address]  Progress Note  Name: Lissy Dobbs  MRN: [id number]  Unit/Bed#: ICU 04-01 I Date of Admission: 5/16/2023   Date of Service: 5/17/2023 I Hospital Day: 1    Assessment/Plan   Bradycardia  Assessment & Plan  Etiology unclear  There is junctional escape rhythm alternating with sinus beats  No profound pauses  TSH is slightly elevated and T4 is a bit low but I do not know if those values are low enough to explain current situation  This remains at odds to his overall condition  Epinephrine has not helped heart rate very much in general   Seems worth trying Isuprel and if ineffective temporary pacing  We will review with critical care service  Elevated troponin  Assessment & Plan  Does not seem indicative of acute coronary syndrome  Echo remains normal reveals normal LV systolic function without wall motion maladies  Torsades de pointes [facility])  Assessment & Plan  This appeared to be initiated after a pause associated with a PVC  That would be 1 more reason to elect for pacing as a preventative measure  I am reluctant to add amiodarone because this is just going to suppress underlying heart rate  Aspiration pneumonia [facility])  Assessment & Plan  Further discussion with patient wife reveals some likelihood of aspiration of tobacco prior to arrest at home  Outpatient Cardiologist: None      Subjective:   Patient seen and examined  Now again sedated  Patient was waking up prior to ventricular tachycardia this morning  Summary comments: This morning during suctioning patient developed polymorphic ventricular tachycardia  Looking back it seems as if there was a PVC with a longer RR interval prior to the ventricular tachycardia  I can conjecture that perhaps this is even part of the precipitation of the original arrest at home though difficult to be sure  There certainly is an underlying relative bradycardia    When fever is no longer an issue high likelihood of needing a device and I would be more in favor of an ICD than just a pacer given this findings  Telemetry/ECG/Cardiac testing:   See above regarding ventricular tachycardia  TTE 5/16/2023: Normal LV and RV systolic function without valve abnormalities  Vitals: Blood pressure 113/55, pulse 60, temperature 98 4 °F (36 9 °C), resp  rate 18, height 6' (1 829 m), weight 109 kg (240 lb 4 8 oz), SpO2 92 % ,   Orthostatic Blood Pressures    Flowsheet Row Most Recent Value   Blood Pressure 113/55 filed at 05/17/2023 0000   Patient Position - Orthostatic VS Lying filed at 05/16/2023 1900      ,   Weight (last 2 days)     Date/Time Weight    05/17/23 0600 109 (240 3)     Weight: cooling blanket under patient at 05/17/23 0600    05/16/23 0729 100 (221)    05/16/23 0310 101 (221 78)          Physical Exam:  General: On ventilator and sedated  Eyes:  Anicteric  Oral mucosa:  Moist   Neck:  No JVD  Carotid upstrokes are brisk without bruits  No masses  Chest:  Clear to auscultation  Cardiac:  No palpable PMI  Normal S1 and S2  No murmur gallop or rub  Abdomen:  Soft and nontender  No palpable organomegaly or aortic enlargement  Extremities:  No peripheral edema  Musculoskeletal:  Symmetric  Vascular:  Femoral pulses are brisk without bruits  Popliteal  pulses are intact bilaterally  Pedal pulses are diminished but feet are warm  Neuro: Not performed  Psych: Sedated             Medications:      Current Facility-Administered Medications:   •  acetaminophen (TYLENOL) oral suspension 975 mg, 975 mg, Oral, Q6H, CARMEL Manning, 975 mg at 05/17/23 0546  •  albuterol inhalation solution 2 5 mg, 2 5 mg, Nebulization, Q4H PRN, Padma Noonan MD  •  cefTRIAXone (ROCEPHIN) IVPB (premix in dextrose) 1,000 mg 50 mL, 1,000 mg, Intravenous, Q24H, Last Rate: 100 mL/hr at 05/17/23 0315, 1,000 mg at 05/17/23 0315 **AND** azithromycin (ZITHROMAX) 500 mg in sodium chloride 0 9 % 250 mL IVPB, 500 mg, Intravenous, Q24H, CARMEL Gonzalez, Last Rate: 250 mL/hr at 05/17/23 0406, 500 mg at 05/17/23 0406  •  chlorhexidine (PERIDEX) 0 12 % oral rinse 15 mL, 15 mL, Mouth/Throat, Q12H [address], CARMEL Cuello, 15 mL at 05/17/23 5436  •  enoxaparin (LOVENOX) subcutaneous injection 40 mg, 40 mg, Subcutaneous, Daily, CARMEL Cuello, 40 mg at 05/17/23 1187  •  EPINEPHrine (ADRENALIN) injection, , Intravenous, Code/Trauma/Sedation Med, CARMEL Manning, 1 mg at 05/17/23 1484  •  EPINEPHrine 3000 mcg (STANDARD CONCENTRATION) IV in sodium chloride 0 9% 250 mL, 2 mcg/min, Intravenous, Titrated, CARMEL Manning, Last Rate: 10 mL/hr at 05/17/23 0051, 2 mcg/min at 05/17/23 0051  •  Famotidine (PF) (PEPCID) injection 20 mg, 20 mg, Intravenous, BID, CARMEL Cuello, 20 mg at 05/17/23 4458  •  fentaNYL 1000 mcg in sodium chloride 0 9% 100mL infusion, 50 mcg/hr, Intravenous, Continuous, CARMEL Manning, Last Rate: 7 5 mL/hr at 05/17/23 0811, 75 mcg/hr at 05/17/23 0811  •  fentanyl citrate (PF) 100 MCG/2ML 50 mcg, 50 mcg, Intravenous, Q1H PRN, CARMEL Cuello, 50 mcg at 05/16/23 1757  •  insulin lispro (HumaLOG) 100 units/mL subcutaneous injection 1-6 Units, 1-6 Units, Subcutaneous, Q6H [address], 1 Units at 05/17/23 0543 **AND** Fingerstick Glucose (POCT), , , Q6H, CARMEL Hernandez  •  lidocaine (LIDODERM) 5 % patch 1 patch, 1 patch, Topical, Daily, CARMEL Manning  •  lidocaine (LIDODERM) 5 % patch 1 patch, 1 patch, Topical, Daily, CARMEL Manning  •  metroNIDAZOLE (FLAGYL) IVPB (premix) 500 mg 100 mL, 500 mg, Intravenous, Q8H, CARMEL Manning, Last Rate: 200 mL/hr at 05/17/23 0454, 500 mg at 05/17/23 0454  •  midazolam (VERSED) injection 2 mg, 2 mg, Intravenous, Q4H PRN, CARMEL Manning, 2 mg at 05/17/23 0810  •  multi-electrolyte (PLASMALYTE-A/ISOLYTE-S PH 7 4) IV solution, 100 mL/hr, Intravenous, Continuous, CARMEL Manning, Last Rate: 100 mL/hr at 05/17/23 0319, 100 mL/hr at 05/17/23 0319  •  NOREPINEPHRINE 4 MG  ML NSS (CMPD ORDER) infusion, 1-30 mcg/min, Intravenous, Titrated, CARMEL Jesus, Last Rate: 30 mL/hr at 05/17/23 0840, 8 mcg/min at 05/17/23 0840  •  potassium chloride 40 mEq IVPB (premix), 40 mEq, Intravenous, Once, CARMEL Manning, Last Rate: 25 mL/hr at 05/17/23 0839, 40 mEq at 05/17/23 1172  •  propofol (DIPRIVAN) 1000 mg in 100 mL infusion (premix), 5-50 mcg/kg/min, Intravenous, Titrated, CARMEL Jesus, Last Rate: 17 8 mL/hr at 05/17/23 0840, 30 mcg/kg/min at 05/17/23 0840     Labs & Results:    Troponins:    Results from last 7 days   Lab Units 05/16/23  0200 05/15/23  2325 05/15/23  2122   HS TNI 0HR ng/L  --   --  360*   HS TNI 2HR ng/L  --  444*  --    HSTNI D2 ng/L  --  84*  --    HS TNI 4HR ng/L 392*  --   --    HSTNI D4 ng/L 32*  --   --         BNP:     CBC with diff:   Results from last 7 days   Lab Units 05/17/23  0555 05/16/23  0327   WBC Thousand/uL 10 44* 13 27*   HEMOGLOBIN g/dL 10 9* 11 8*   HEMATOCRIT % 33 8* 36 7   MCV fL 95 95   PLATELETS Thousands/uL 141* 165     TSH:     CMP:   Results from last 7 days   Lab Units 05/17/23  0759 05/17/23  0555 05/16/23  0825 05/16/23  0327   POTASSIUM mmol/L 3 6 4 0   < > 4 4   CHLORIDE mmol/L 107 107   < > 107   CO2 mmol/L 20* 21   < > 22   BUN mg/dL 17 16   < > 18   CREATININE mg/dL 1 01 0 85   < > 1 09   AST U/L  --  28  --  85*   ALT U/L  --  59*  --  111*   EGFR ml/min/1 73sq m 73 87   < > 67    < > = values in this interval not displayed       Lipid Profile:     Coags:   Results from last 7 days   Lab Units 05/15/23  2122   INR  1 09

## 2023-05-17 NOTE — RESPIRATORY THERAPY NOTE
05/17/23 0352   Respiratory Assessment   General Appearance Unresponsive   Respiratory Pattern Assisted   Bilateral Breath Sounds Diminished;Coarse   Resp Comments pt remains on current settings on the vent  pt suctioned as needed  ETT secure  will continue to monitor pt   O2 Device vent   Vent Information   Vent ID [id number]   Vent type     Vent Mode AC/VC+   $ Vent Daily Charge-Subsequent Yes   $ Pulse Oximetry Spot Check Charge Completed   AC/VC+ Settings   Resp Rate (BPM) 18 BPM   VT (mL) 470 mL   Insp Time (S) 0 92 S   FIO2 (%) 50 %   PEEP (cmH2O) 6 cmH2O   Rise Time (%) 50 %   Trigger Sensitivity Flow (LPM) 3 LPM   Humidification Heater   Heater Temp 98 6 °F (37 °C)   AC/VC+ Actuals   Resp Rate (BPM) 18 BPM   VT (mL) 504 mL   MV (Obs) 9 02   MAP (cmH2O) 9 9 cmH2O   Peak Pressure (cmH2O) 20 cmH2O   I:E Ratio (Obs) 1:2 6   Static Compliance (mL/cmH20) 42 mL/cmH2O   Plateau Pressure (cm H2O) 18 cm H2O   Heater Temperature (Obs) 98 6 °F (37 °C)   AC/VC+ ALARMS   High Peak Pressure (cmH2O) 45 cmH2O   High Resp Rate (BPM) 40 BPM   High MV (L/min) 17 L/min   Low MV (L/min) 4 L/min   High [place] VTE (mL) 1200 mL   Low [place] VTE (mL) 350 mL   High Spont VTE (mL) 1200 mL   Low Spont VTE (mL) 300 mL   High Insp [place] VT (mL) 1130 mL   AC/VC+ Apnea Settings   Resp Rate (BPM) 18 BPM   VT (mL) 470 mL   FIO2 (%) 100 %   Apnea Time (s) 20 S   Apnea Flow (L/min) 50 L/min   Maintenance   Alarm (pink) cable attached No   Resuscitation bag with peep valve at bedside Yes   Water bag changed Yes   Circuit changed No   Daily Screen   Patient safety screen outcome: Failed   Not Ready for Weaning due to:  Underline problem not resolved   IHI Ventilator Associated Pneumonia Bundle   Head of Bed Elevated HOB 30   ETT  Oral 7 5 mm   Placement Date: 05/15/23   Previously placed by?: Present on admission  Type: Oral  Tube Size: 7 5 mm  Secured at (cm): 26@ lips  Comments: pt intubated in field   Secured at (cm) 26   Measured from Lips Secured Location Left   Repositioned Center to Left   Secured by Commercial tube little   Site Condition Dry   Cuff Pressure (cm H2O)   (green zone)   HI-LO Suction  Continuous low suction   HI-LO Secretions Scant   HI-LO Intervention Patent     RT Ventilator Management Note  Celestine Bryant 67 y o  male MRN: [id number]  Unit/Bed#: ICU 04-01 Encounter: [id number]      Daily Screen         5/16/2023 2115 5/17/2023  0352          Patient safety screen outcome[de-identified] Failed Failed      Not Ready for Weaning due to[de-identified] Underline problem not resolved;Going on Transport intubated Underline problem not resolved                Physical Exam:   Assessment Type: Assess only  General Appearance: Unresponsive  Respiratory Pattern: Assisted  Chest Assessment: Chest expansion symmetrical  Bilateral Breath Sounds: Diminished, Coarse  O2 Device: vent      Resp Comments: pt remains on current settings on the vent  pt suctioned as needed  ETT secure   will continue to monitor pt

## 2023-05-17 NOTE — ASSESSMENT & PLAN NOTE
· CT chest abdomen pelvis at [facility] showing acute anterior fractures on right side 2 through 6, left side 2 through 5    Appear nondisplaced on cxr  · On exam patient with symmetrical chest rise with ventilation  · Maintains 6 mL/kg for total volume goal  · Fentanyl infusion and PRN, continue lidocaine patches to chest  · Continue Tylenol around-the-clock

## 2023-05-18 ENCOUNTER — ANESTHESIA EVENT (INPATIENT)
Dept: PERIOP | Facility: HOSPITAL | Age: 73
End: 2023-05-18

## 2023-05-18 ENCOUNTER — ANESTHESIA (INPATIENT)
Dept: PERIOP | Facility: HOSPITAL | Age: 73
End: 2023-05-18

## 2023-05-18 ENCOUNTER — APPOINTMENT (OUTPATIENT)
Dept: RADIOLOGY | Facility: HOSPITAL | Age: 73
End: 2023-05-18

## 2023-05-18 ENCOUNTER — APPOINTMENT (INPATIENT)
Dept: RADIOLOGY | Facility: HOSPITAL | Age: 73
End: 2023-05-18

## 2023-05-18 LAB
ALBUMIN SERPL BCP-MCNC: 2.9 G/DL (ref 3.5–5)
ALP SERPL-CCNC: 49 U/L (ref 34–104)
ALT SERPL W P-5'-P-CCNC: 47 U/L (ref 7–52)
ANION GAP SERPL CALCULATED.3IONS-SCNC: 5 MMOL/L (ref 4–13)
ANION GAP SERPL CALCULATED.3IONS-SCNC: 6 MMOL/L (ref 4–13)
ANISOCYTOSIS BLD QL SMEAR: PRESENT
AST SERPL W P-5'-P-CCNC: 26 U/L (ref 13–39)
ATRIAL RATE: 60 BPM
BASE EXCESS BLDA CALC-SCNC: -1.3 MMOL/L
BASOPHILS # BLD MANUAL: 0 THOUSAND/UL (ref 0–0.1)
BASOPHILS NFR MAR MANUAL: 0 % (ref 0–1)
BILIRUB SERPL-MCNC: 0.47 MG/DL (ref 0.2–1)
BUN SERPL-MCNC: 12 MG/DL (ref 5–25)
BUN SERPL-MCNC: 18 MG/DL (ref 5–25)
BURR CELLS BLD QL SMEAR: PRESENT
CA-I BLD-SCNC: 1.16 MMOL/L (ref 1.12–1.32)
CA-I BLD-SCNC: 1.18 MMOL/L (ref 1.12–1.32)
CALCIUM ALBUM COR SERPL-MCNC: 9.4 MG/DL (ref 8.3–10.1)
CALCIUM SERPL-MCNC: 8.5 MG/DL (ref 8.4–10.2)
CALCIUM SERPL-MCNC: 8.7 MG/DL (ref 8.4–10.2)
CHLORIDE SERPL-SCNC: 107 MMOL/L (ref 96–108)
CHLORIDE SERPL-SCNC: 109 MMOL/L (ref 96–108)
CO2 SERPL-SCNC: 24 MMOL/L (ref 21–32)
CO2 SERPL-SCNC: 26 MMOL/L (ref 21–32)
CREAT SERPL-MCNC: 0.77 MG/DL (ref 0.6–1.3)
CREAT SERPL-MCNC: 1.03 MG/DL (ref 0.6–1.3)
EOSINOPHIL # BLD MANUAL: 0 THOUSAND/UL (ref 0–0.4)
EOSINOPHIL NFR BLD MANUAL: 0 % (ref 0–6)
ERYTHROCYTE [DISTWIDTH] IN BLOOD BY AUTOMATED COUNT: 12.7 % (ref 11.6–15.1)
GFR SERPL CREATININE-BSD FRML MDRD: 72 ML/MIN/1.73SQ M
GFR SERPL CREATININE-BSD FRML MDRD: 90 ML/MIN/1.73SQ M
GLUCOSE SERPL-MCNC: 143 MG/DL (ref 65–140)
GLUCOSE SERPL-MCNC: 155 MG/DL (ref 65–140)
GLUCOSE SERPL-MCNC: 162 MG/DL (ref 65–140)
GLUCOSE SERPL-MCNC: 170 MG/DL (ref 65–140)
GLUCOSE SERPL-MCNC: 177 MG/DL (ref 65–140)
GLUCOSE SERPL-MCNC: 180 MG/DL (ref 65–140)
HCO3 BLDA-SCNC: 23.4 MMOL/L (ref 22–28)
HCT VFR BLD AUTO: 32.2 % (ref 36.5–49.3)
HGB BLD-MCNC: 10.5 G/DL (ref 12–17)
HOROWITZ INDEX BLDA+IHG-RTO: 65 MM[HG]
L PNEUMO1 AG UR QL IA.RAPID: NEGATIVE
LYMPHOCYTES # BLD AUTO: 0.36 THOUSAND/UL (ref 0.6–4.47)
LYMPHOCYTES # BLD AUTO: 5 % (ref 14–44)
MAGNESIUM SERPL-MCNC: 2.2 MG/DL (ref 1.9–2.7)
MAGNESIUM SERPL-MCNC: 2.3 MG/DL (ref 1.9–2.7)
MCH RBC QN AUTO: 30.8 PG (ref 26.8–34.3)
MCHC RBC AUTO-ENTMCNC: 32.6 G/DL (ref 31.4–37.4)
MCV RBC AUTO: 94 FL (ref 82–98)
MONOCYTES # BLD AUTO: 0.36 THOUSAND/UL (ref 0–1.22)
MONOCYTES NFR BLD: 5 % (ref 4–12)
NEUTROPHILS # BLD MANUAL: 6.4 THOUSAND/UL (ref 1.85–7.62)
NEUTS BAND NFR BLD MANUAL: 6 % (ref 0–8)
NEUTS SEG NFR BLD AUTO: 84 % (ref 43–75)
O2 CT BLDA-SCNC: 15.4 ML/DL (ref 16–23)
OXYHGB MFR BLDA: 96.7 % (ref 94–97)
P AXIS: 69 DEGREES
PCO2 BLDA: 39.5 MM HG (ref 36–44)
PEEP RESPIRATORY: 10 CM[H2O]
PH BLDA: 7.39 [PH] (ref 7.35–7.45)
PHOSPHATE SERPL-MCNC: 1.7 MG/DL (ref 2.3–4.1)
PHOSPHATE SERPL-MCNC: 2.4 MG/DL (ref 2.3–4.1)
PLATELET # BLD AUTO: 123 THOUSANDS/UL (ref 149–390)
PLATELET BLD QL SMEAR: ABNORMAL
PMV BLD AUTO: 11.4 FL (ref 8.9–12.7)
PO2 BLDA: 119.8 MM HG (ref 75–129)
POTASSIUM SERPL-SCNC: 3.9 MMOL/L (ref 3.5–5.3)
POTASSIUM SERPL-SCNC: 4.4 MMOL/L (ref 3.5–5.3)
PR INTERVAL: 222 MS
PROCALCITONIN SERPL-MCNC: 2.17 NG/ML
PROT SERPL-MCNC: 5 G/DL (ref 6.4–8.4)
QRS AXIS: -6 DEGREES
QRSD INTERVAL: 100 MS
QT INTERVAL: 460 MS
QTC INTERVAL: 460 MS
RBC # BLD AUTO: 3.41 MILLION/UL (ref 3.88–5.62)
RBC MORPH BLD: PRESENT
RHODAMINE-AURAMINE STN SPEC: NORMAL
RHODAMINE-AURAMINE STN SPEC: NORMAL
S PNEUM AG UR QL: NEGATIVE
SARS-COV-2 RNA SPEC QL NAA+PROBE: NOT DETECTED
SODIUM SERPL-SCNC: 138 MMOL/L (ref 135–147)
SODIUM SERPL-SCNC: 139 MMOL/L (ref 135–147)
SPECIMEN SOURCE: ABNORMAL
T WAVE AXIS: 122 DEGREES
VENT AC: 18
VENTRICULAR RATE: 60 BPM
VT SETTING VENT: 470 ML
WBC # BLD AUTO: 7.11 THOUSAND/UL (ref 4.31–10.16)

## 2023-05-18 PROCEDURE — 02HK3KZ INSERTION OF DEFIBRILLATOR LEAD INTO RIGHT VENTRICLE, PERCUTANEOUS APPROACH: ICD-10-PCS | Performed by: INTERNAL MEDICINE

## 2023-05-18 PROCEDURE — 0JH608Z INSERTION OF DEFIBRILLATOR GENERATOR INTO CHEST SUBCUTANEOUS TISSUE AND FASCIA, OPEN APPROACH: ICD-10-PCS | Performed by: INTERNAL MEDICINE

## 2023-05-18 PROCEDURE — 02H63KZ INSERTION OF DEFIBRILLATOR LEAD INTO RIGHT ATRIUM, PERCUTANEOUS APPROACH: ICD-10-PCS | Performed by: INTERNAL MEDICINE

## 2023-05-18 DEVICE — IMPLANTABLE CARDIOVERTER DEFIBRILLATOR
Type: IMPLANTABLE DEVICE | Site: CHEST | Status: FUNCTIONAL
Brand: GALLANT™

## 2023-05-18 DEVICE — PACING LEAD
Type: IMPLANTABLE DEVICE | Site: CHEST | Status: FUNCTIONAL
Brand: TENDRIL™

## 2023-05-18 DEVICE — DEFIBRILLATION LEAD
Type: IMPLANTABLE DEVICE | Site: CHEST | Status: FUNCTIONAL
Brand: DURATA™

## 2023-05-18 RX ORDER — ALBUMIN, HUMAN INJ 5% 5 %
12.5 SOLUTION INTRAVENOUS ONCE
Status: DISCONTINUED | OUTPATIENT
Start: 2023-05-18 | End: 2023-05-18

## 2023-05-18 RX ORDER — CEFEPIME HYDROCHLORIDE 2 G/50ML
2000 INJECTION, SOLUTION INTRAVENOUS EVERY 12 HOURS
Status: DISCONTINUED | OUTPATIENT
Start: 2023-05-18 | End: 2023-05-19

## 2023-05-18 RX ORDER — FUROSEMIDE 10 MG/ML
20 INJECTION INTRAMUSCULAR; INTRAVENOUS ONCE
Status: COMPLETED | OUTPATIENT
Start: 2023-05-18 | End: 2023-05-18

## 2023-05-18 RX ORDER — MAGNESIUM SULFATE HEPTAHYDRATE 40 MG/ML
2 INJECTION, SOLUTION INTRAVENOUS ONCE
Status: COMPLETED | OUTPATIENT
Start: 2023-05-18 | End: 2023-05-19

## 2023-05-18 RX ORDER — SODIUM CHLORIDE 9 MG/ML
INJECTION, SOLUTION INTRAVENOUS AS NEEDED
Status: DISCONTINUED | OUTPATIENT
Start: 2023-05-18 | End: 2023-05-18 | Stop reason: HOSPADM

## 2023-05-18 RX ORDER — ENOXAPARIN SODIUM 100 MG/ML
40 INJECTION SUBCUTANEOUS 2 TIMES DAILY
Status: DISCONTINUED | OUTPATIENT
Start: 2023-05-18 | End: 2023-05-18

## 2023-05-18 RX ORDER — LIDOCAINE HYDROCHLORIDE 10 MG/ML
INJECTION, SOLUTION EPIDURAL; INFILTRATION; INTRACAUDAL; PERINEURAL AS NEEDED
Status: DISCONTINUED | OUTPATIENT
Start: 2023-05-18 | End: 2023-05-18 | Stop reason: HOSPADM

## 2023-05-18 RX ORDER — ALBUMIN, HUMAN INJ 5% 5 %
25 SOLUTION INTRAVENOUS ONCE
Status: COMPLETED | OUTPATIENT
Start: 2023-05-18 | End: 2023-05-18

## 2023-05-18 RX ADMIN — PROPOFOL 10 MCG/KG/MIN: 10 INJECTION, EMULSION INTRAVENOUS at 12:54

## 2023-05-18 RX ADMIN — FAMOTIDINE 20 MG: 10 INJECTION, SOLUTION INTRAVENOUS at 08:17

## 2023-05-18 RX ADMIN — SODIUM CHLORIDE, SODIUM GLUCONATE, SODIUM ACETATE, POTASSIUM CHLORIDE, MAGNESIUM CHLORIDE, SODIUM PHOSPHATE, DIBASIC, AND POTASSIUM PHOSPHATE 100 ML/HR: .53; .5; .37; .037; .03; .012; .00082 INJECTION, SOLUTION INTRAVENOUS at 07:02

## 2023-05-18 RX ADMIN — ENOXAPARIN SODIUM 40 MG: 40 INJECTION SUBCUTANEOUS at 08:17

## 2023-05-18 RX ADMIN — ACETAMINOPHEN 975 MG: 650 SUSPENSION ORAL at 18:26

## 2023-05-18 RX ADMIN — ACETAMINOPHEN 975 MG: 650 SUSPENSION ORAL at 05:28

## 2023-05-18 RX ADMIN — METRONIDAZOLE 500 MG: 500 INJECTION, SOLUTION INTRAVENOUS at 05:25

## 2023-05-18 RX ADMIN — SODIUM PHOSPHATE, MONOBASIC, MONOHYDRATE 21 MMOL: 276; 142 INJECTION, SOLUTION INTRAVENOUS at 07:11

## 2023-05-18 RX ADMIN — METRONIDAZOLE 500 MG: 500 INJECTION, SOLUTION INTRAVENOUS at 20:39

## 2023-05-18 RX ADMIN — SENNOSIDES AND DOCUSATE SODIUM 2 TABLET: 50; 8.6 TABLET ORAL at 21:02

## 2023-05-18 RX ADMIN — Medication 50 MCG/HR: at 12:54

## 2023-05-18 RX ADMIN — INSULIN LISPRO 1 UNITS: 100 INJECTION, SOLUTION INTRAVENOUS; SUBCUTANEOUS at 05:32

## 2023-05-18 RX ADMIN — METRONIDAZOLE 500 MG: 500 INJECTION, SOLUTION INTRAVENOUS at 11:41

## 2023-05-18 RX ADMIN — PROPOFOL 30 MCG/KG/MIN: 10 INJECTION, EMULSION INTRAVENOUS at 04:58

## 2023-05-18 RX ADMIN — INSULIN LISPRO 1 UNITS: 100 INJECTION, SOLUTION INTRAVENOUS; SUBCUTANEOUS at 18:18

## 2023-05-18 RX ADMIN — SODIUM CHLORIDE, SODIUM GLUCONATE, SODIUM ACETATE, POTASSIUM CHLORIDE, MAGNESIUM CHLORIDE, SODIUM PHOSPHATE, DIBASIC, AND POTASSIUM PHOSPHATE 100 ML/HR: .53; .5; .37; .037; .03; .012; .00082 INJECTION, SOLUTION INTRAVENOUS at 20:42

## 2023-05-18 RX ADMIN — MIDAZOLAM 2 MG: 1 INJECTION INTRAMUSCULAR; INTRAVENOUS at 07:57

## 2023-05-18 RX ADMIN — CHLORHEXIDINE GLUCONATE 0.12% ORAL RINSE 15 ML: 1.2 LIQUID ORAL at 20:39

## 2023-05-18 RX ADMIN — INSULIN LISPRO 1 UNITS: 100 INJECTION, SOLUTION INTRAVENOUS; SUBCUTANEOUS at 11:46

## 2023-05-18 RX ADMIN — METHYLPREDNISOLONE SODIUM SUCCINATE 60 MG: 125 INJECTION, POWDER, FOR SOLUTION INTRAMUSCULAR; INTRAVENOUS at 05:29

## 2023-05-18 RX ADMIN — NOREPINEPHRINE BITARTRATE 2 MCG/MIN: 1 SOLUTION INTRAVENOUS at 10:14

## 2023-05-18 RX ADMIN — ACETAMINOPHEN 975 MG: 650 SUSPENSION ORAL at 10:14

## 2023-05-18 RX ADMIN — METHYLPREDNISOLONE SODIUM SUCCINATE 60 MG: 125 INJECTION, POWDER, FOR SOLUTION INTRAMUSCULAR; INTRAVENOUS at 21:01

## 2023-05-18 RX ADMIN — AZITHROMYCIN MONOHYDRATE 500 MG: 500 INJECTION, POWDER, LYOPHILIZED, FOR SOLUTION INTRAVENOUS at 04:40

## 2023-05-18 RX ADMIN — CEFEPIME HYDROCHLORIDE 2000 MG: 2 INJECTION, SOLUTION INTRAVENOUS at 10:14

## 2023-05-18 RX ADMIN — ALBUMIN (HUMAN) 25 G: 12.5 INJECTION, SOLUTION INTRAVENOUS at 21:35

## 2023-05-18 RX ADMIN — CEFTRIAXONE 1000 MG: 1 INJECTION, SOLUTION INTRAVENOUS at 04:02

## 2023-05-18 RX ADMIN — FAMOTIDINE 20 MG: 10 INJECTION, SOLUTION INTRAVENOUS at 18:18

## 2023-05-18 RX ADMIN — PROPOFOL 25 MCG/KG/MIN: 10 INJECTION, EMULSION INTRAVENOUS at 22:28

## 2023-05-18 RX ADMIN — METHYLPREDNISOLONE SODIUM SUCCINATE 60 MG: 125 INJECTION, POWDER, FOR SOLUTION INTRAMUSCULAR; INTRAVENOUS at 15:09

## 2023-05-18 RX ADMIN — MAGNESIUM SULFATE HEPTAHYDRATE 2 G: 40 INJECTION, SOLUTION INTRAVENOUS at 22:28

## 2023-05-18 RX ADMIN — FUROSEMIDE 20 MG: 10 INJECTION, SOLUTION INTRAMUSCULAR; INTRAVENOUS at 18:18

## 2023-05-18 RX ADMIN — CHLORHEXIDINE GLUCONATE 0.12% ORAL RINSE 15 ML: 1.2 LIQUID ORAL at 08:17

## 2023-05-18 RX ADMIN — FENTANYL CITRATE 50 MCG: 50 INJECTION INTRAMUSCULAR; INTRAVENOUS at 18:30

## 2023-05-18 RX ADMIN — CEFEPIME HYDROCHLORIDE 2000 MG: 2 INJECTION, SOLUTION INTRAVENOUS at 20:39

## 2023-05-18 NOTE — ASSESSMENT & PLAN NOTE
· In setting of cardiac arrest w/arrhythmias as noted above as well as aspiration pneumonia   · CT C/A/P on admission to [facility] revealed significant worsening of bilateral lower lobe aspiration/pneumonia  · TTE as noted above  · COVID/FLU/RSV negative  · Blood cultures from 05/15 and 05/16 w/NGTD  · UA w/out evidence of infection  · Urine strep/legionella negative  · Sputum culture positive for 2+ Staph aureus - sensitivities pending  · BALs from 05/17 pending  · Lactic acid 5 6 on admission to [facility] (in setting of cardiac arrest) - has since cleared  · Procalcitonin 1 05 - 1 90 - 2 17  · Is s/p aggressive IVF resuscitation  · No longer requiring vasopressor support - maintain MAP > 65  · Continue IV Cefepime/Flagyl/Azithromycin, total antibiotic D#4 - can likely deescalate antibiotics pending sputum sensitivities   · Monitor fever and WBC curve  · Trend procalcitonin

## 2023-05-18 NOTE — ASSESSMENT & PLAN NOTE
· Events on 05/16:  · Wife reports witnessing patient vomiting, choking, and, then, went unresponsive - wife immediately started CPR  · PEA arrest on EMS arrival - received 2 rounds of ACLS and achieved ROSC  · Intubated in field and transferred to [facility] ED  · Bradycardic/hypotensive on arrival to [facility] ED - required vasopressors and Atropine  · Unclear etiology: ? arrhythmia vs hypoxic event 2/2 aspiration event vs vasovagal response 2/2 choking w/underlying (chronic) bradycardia  · Episode of sustained VT after ETT suctioning requiring Amio bolus and cardioversion x1 on 05/16   · VT/Torsades de Pointes arrest after suctioning on 05/17 - received CPR x2 rounds, Epi x1, shock x2 - ROSC achieved w/AF RVR, then conversion to sinus bradycardia w/rates in 40s  · Is s/p temporary TVP on 05/17  · Now s/p AICD on 05/18  · TTE from 05/16 w/EF 65%; no RWMA  · No indication for TTM given patient following commands  · Maintain normothermia and normoglycemia  · Cardiology following - appreciate recommendations  · Frequent neuro checks

## 2023-05-18 NOTE — ASSESSMENT & PLAN NOTE
· Bradycardic in 35s to 45s since admission  · Unclear etiology, though suspect this may be chronic   · Is s/p temporary TVP on 05/17 as noted above  · Now s/p AICD implantation on 05/18  · Lyme Ab negative  · [facility] Spotted Fever IgG/M pending  · TSH 5 976 w/T4 0 67, however this may be inaccurate s/p cardiac arrest/shock state  · T3 0 80  · Cardiology following - appreciate recommendations  · Continue cardiac monitoring

## 2023-05-18 NOTE — PLAN OF CARE
Problem: SAFETY,RESTRAINT: NV/NON-SELF DESTRUCTIVE BEHAVIOR  Goal: Remains free of harm/injury (restraint for non violent/non self-detsructive behavior)  Description: INTERVENTIONS:  - Instruct patient/family regarding restraint use   - Assess and monitor physiologic and psychological status   - Provide interventions and comfort measures to meet assessed patient needs   - Identify and implement measures to help patient regain control  - Assess readiness for release of restraint   Outcome: Progressing  Goal: Returns to optimal restraint-free functioning  Description: INTERVENTIONS:  - Assess the patient's behavior and symptoms that indicate continued need for restraint  - Identify and implement measures to help patient regain control  - Assess readiness for release of restraint   Outcome: Progressing     Problem: PAIN - ADULT  Goal: Verbalizes/displays adequate comfort level or baseline comfort level  Description: Interventions:  - Encourage patient to monitor pain and request assistance  - Assess pain using appropriate pain scale  - Administer analgesics based on type and severity of pain and evaluate response  - Implement non-pharmacological measures as appropriate and evaluate response  - Consider cultural and social influences on pain and pain management  - Notify physician/advanced practitioner if interventions unsuccessful or patient reports new pain  Outcome: Progressing     Problem: INFECTION - ADULT  Goal: Absence or prevention of progression during hospitalization  Description: INTERVENTIONS:  - Assess and monitor for signs and symptoms of infection  - Monitor lab/diagnostic results  - Monitor all insertion sites, i e  indwelling lines, tubes, and drains  - Monitor endotracheal if appropriate and nasal secretions for changes in amount and color  - [place] appropriate cooling/warming therapies per order  - Administer medications as ordered  - Instruct and encourage patient and family to use good hand hygiene technique  - Identify and instruct in appropriate isolation precautions for identified infection/condition  Outcome: Progressing  Goal: Absence of fever/infection during neutropenic period  Description: INTERVENTIONS:  - Monitor WBC    Outcome: Progressing     Problem: SAFETY ADULT  Goal: Patient will remain free of falls  Description: INTERVENTIONS:  - Educate patient/family on patient safety including physical limitations  - Instruct patient to call for assistance with activity   - Consult OT/PT to assist with strengthening/mobility   - Keep Call bell within reach  - Keep bed low and locked with side rails adjusted as appropriate  - Keep care items and personal belongings within reach  - Initiate and maintain comfort rounds  - Make Fall Risk Sign visible to staff  - Apply yellow socks and bracelet for high fall risk patients  - Consider moving patient to room near nurses station  Outcome: Progressing  Goal: Maintain or return to baseline ADL function  Description: INTERVENTIONS:  -  Assess patient's ability to carry out ADLs; assess patient's baseline for ADL function and identify physical deficits which impact ability to perform ADLs (bathing, care of mouth/teeth, toileting, grooming, dressing, etc )  - Assess/evaluate cause of self-care deficits   - Assess range of motion  - Assess patient's mobility; develop plan if impaired  - Assess patient's need for assistive devices and provide as appropriate  - Encourage maximum independence but intervene and supervise when necessary  - Involve family in performance of ADLs  - Assess for home care needs following discharge   - Consider OT consult to assist with ADL evaluation and planning for discharge  - Provide patient education as appropriate  Outcome: Progressing  Goal: Maintains/Returns to pre admission functional level  Description: INTERVENTIONS:  - Perform BMAT or MOVE assessment daily    - Set and communicate daily mobility goal to care team and patient/family/caregiver  - Collaborate with rehabilitation services on mobility goals if consulted  - Out of bed for toileting  - Record patient progress and toleration of activity level   Outcome: Progressing     Problem: DISCHARGE PLANNING  Goal: Discharge to home or other facility with appropriate resources  Description: INTERVENTIONS:  - Identify barriers to discharge w/patient and caregiver  - Arrange for needed discharge resources and transportation as appropriate  - Identify discharge learning needs (meds, wound care, etc )  - Arrange for interpretive services to assist at discharge as needed  - Refer to Case Management Department for coordinating discharge planning if the patient needs post-hospital services based on physician/advanced practitioner order or complex needs related to functional status, cognitive ability, or social support system  Outcome: Progressing     Problem: Knowledge Deficit  Goal: Patient/family/caregiver demonstrates understanding of disease process, treatment plan, medications, and discharge instructions  Description: Complete learning assessment and assess knowledge base    Interventions:  - Provide teaching at level of understanding  - Provide teaching via preferred learning methods  Outcome: Progressing     Problem: NEUROSENSORY - ADULT  Goal: Achieves stable or improved neurological status  Description: INTERVENTIONS  - Monitor and report changes in neurological status  - Monitor vital signs such as temperature, blood pressure, glucose, and any other labs ordered   - Initiate measures to prevent increased intracranial pressure  - Monitor for seizure activity and implement precautions if appropriate      Outcome: Progressing  Goal: Remains free of injury related to seizures activity  Description: INTERVENTIONS  - Maintain airway, patient safety  and administer oxygen as ordered  - Monitor patient for seizure activity, document and report duration and description of seizure to physician/advanced practitioner  - If seizure occurs,  ensure patient safety during seizure  - Reorient patient post seizure  - Seizure pads on all 4 side rails  - Instruct patient/family to notify RN of any seizure activity including if an aura is experienced  - Instruct patient/family to call for assistance with activity based on nursing assessment  - Administer anti-seizure medications if ordered    Outcome: Progressing  Goal: Achieves maximal functionality and self care  Description: INTERVENTIONS  - Monitor swallowing and airway patency with patient fatigue and changes in neurological status  - Encourage and assist patient to increase activity and self care     - Encourage visually impaired, hearing impaired and aphasic patients to use assistive/communication devices  Outcome: Progressing     Problem: CARDIOVASCULAR - ADULT  Goal: Maintains optimal cardiac output and hemodynamic stability  Description: INTERVENTIONS:  - Monitor I/O, vital signs and rhythm  - Monitor for S/S and trends of decreased cardiac output  - Administer and titrate ordered vasoactive medications to optimize hemodynamic stability  - Assess quality of pulses, skin color and temperature  - Assess for signs of decreased coronary artery perfusion  - Instruct patient to report change in severity of symptoms  Outcome: Progressing  Goal: Absence of cardiac dysrhythmias or at baseline rhythm  Description: INTERVENTIONS:  - Continuous cardiac monitoring, vital signs, obtain 12 lead EKG if ordered  - Administer antiarrhythmic and heart rate control medications as ordered  - Monitor electrolytes and administer replacement therapy as ordered  Outcome: Progressing     Problem: RESPIRATORY - ADULT  Goal: Achieves optimal ventilation and oxygenation  Description: INTERVENTIONS:  - Assess for changes in respiratory status  - Assess for changes in mentation and behavior  - Position to facilitate oxygenation and minimize respiratory effort  - Oxygen administered by appropriate delivery if ordered  - Initiate smoking cessation education as indicated  - Encourage broncho-pulmonary hygiene including cough, deep breathe, Incentive Spirometry  - Assess the need for suctioning and aspirate as needed  - Assess and instruct to report SOB or any respiratory difficulty  - Respiratory Therapy support as indicated  Outcome: Progressing     Problem: GENITOURINARY - ADULT  Goal: Maintains or returns to baseline urinary function  Description: INTERVENTIONS:  - Assess urinary function  - Encourage oral fluids to ensure adequate hydration if ordered  - Administer IV fluids as ordered to ensure adequate hydration  - Administer ordered medications as needed  - Offer frequent toileting  - Follow urinary retention protocol if ordered  Outcome: Progressing  Goal: Absence of urinary retention  Description: INTERVENTIONS:  - Assess patient’s ability to void and empty bladder  - Monitor I/O  - Bladder scan as needed  - Discuss with physician/AP medications to alleviate retention as needed  - Discuss catheterization for long term situations as appropriate  Outcome: Progressing  Goal: Urinary catheter remains patent  Description: INTERVENTIONS:  - Assess patency of urinary catheter  - If patient has a chronic franklin, consider changing catheter if non-functioning  - Follow guidelines for intermittent irrigation of non-functioning urinary catheter  Outcome: Progressing     Problem: Prexisting or High Potential for Compromised Skin Integrity  Goal: Skin integrity is maintained or improved  Description: INTERVENTIONS:  - Identify patients at risk for skin breakdown  - Assess and monitor skin integrity  - Assess and monitor nutrition and hydration status  - Monitor labs   - Assess for incontinence   - Turn and reposition patient  - Assist with mobility/ambulation  - Relieve pressure over bony prominences  - Avoid friction and shearing  - Provide appropriate hygiene as needed including keeping skin clean and dry  - Evaluate need for skin moisturizer/barrier cream  - Collaborate with interdisciplinary team   - Patient/family teaching  - Consider wound care consult   Outcome: Progressing     Problem: MOBILITY - ADULT  Goal: Maintain or return to baseline ADL function  Description: INTERVENTIONS:  -  Assess patient's ability to carry out ADLs; assess patient's baseline for ADL function and identify physical deficits which impact ability to perform ADLs (bathing, care of mouth/teeth, toileting, grooming, dressing, etc )  - Assess/evaluate cause of self-care deficits   - Assess range of motion  - Assess patient's mobility; develop plan if impaired  - Assess patient's need for assistive devices and provide as appropriate  - Encourage maximum independence but intervene and supervise when necessary  - Involve family in performance of ADLs  - Assess for home care needs following discharge   - Consider OT consult to assist with ADL evaluation and planning for discharge  - Provide patient education as appropriate  Outcome: Progressing  Goal: Maintains/Returns to pre admission functional level  Description: INTERVENTIONS:  - Perform BMAT or MOVE assessment daily    - Set and communicate daily mobility goal to care team and patient/family/caregiver  - Collaborate with rehabilitation services on mobility goals if consulted  - Out of bed for toileting  - Record patient progress and toleration of activity level   Outcome: Progressing     Problem: Nutrition/Hydration-ADULT  Goal: Nutrient/Hydration intake appropriate for improving, restoring or maintaining nutritional needs  Description: Monitor and assess patient's nutrition/hydration status for malnutrition  Collaborate with interdisciplinary team and initiate plan and interventions as ordered  Monitor patient's weight and dietary intake as ordered or per policy  Utilize nutrition screening tool and intervene as necessary   Determine patient's food preferences and provide high-protein, high-caloric foods as appropriate       INTERVENTIONS:  - Monitor oral intake, urinary output, labs, and treatment plans  - Assess nutrition and hydration status and recommend course of action  - Evaluate amount of meals eaten  - Assist patient with eating if necessary   - Allow adequate time for meals  - Recommend/ encourage appropriate diets, oral nutritional supplements, and vitamin/mineral supplements  - Order, calculate, and assess calorie counts as needed  - Recommend, monitor, and adjust tube feedings and TPN/PPN based on assessed needs  - Assess need for intravenous fluids  - Provide specific nutrition/hydration education as appropriate  - Include patient/family/caregiver in decisions related to nutrition  Outcome: Progressing

## 2023-05-18 NOTE — ASSESSMENT & PLAN NOTE
Not having trouble with oxygenation and white count is coming down  Still on a ventilator and will remain on their until permanent pacer was implanted if this can be done shortly

## 2023-05-18 NOTE — NURSING NOTE
1900: Patient currently on fentanyl, propofol, levophed, and isolyte  Check MAR for gtt rates  Vent settings as follows: 18/470VT/90%/10PEEP  RN at bedside to assess patient  Patient awakens to voice, does not follow commands  Patient does withdraw from painful stimulus  Pupils are a 3 brisk, round, reactive  VS dell, cooling blanket in use  Will continue to monitor  2000: RT decreased FiO2 to 85%  2110: RT decreased FiO2 to 75%  2317: RT decreased FiO2 to 65%  0428: RT decreased FiO2 to 55%

## 2023-05-18 NOTE — PROGRESS NOTES
[facility]  Progress Note  Name: Willian Hathaway I  MRN: [id number]  Unit/Bed#: ICU  I Date of Admission: 2023   Date of Service: 2023 I Hospital Day: 2    Assessment/Plan   * Cardiac arrest [facility])  Assessment & Plan  In retrospect bradycardia related torsade was seen while in the hospital and is some some likelihood of this being the issue at home as well  See comments under torsade  Plan at present will be pacer/ICD implant  Torsades de pointes [facility])  Assessment & Plan  This appeared to be initiated after a pause associated with a PVC  Temporary pacing has resolved ectopy and ventricular arrhythmia  Safest approach would be to placing with an ICD  I also reviewed this conceptually with my EP colleagues and they fully concur  Aspiration pneumonia [facility])  Assessment & Plan  Not having trouble with oxygenation and white count is coming down  Still on a ventilator and will remain on their until permanent pacer was implanted if this can be done shortly  Outpatient Cardiologist: None      Subjective:   Patient seen and examined  Sedated  By description however he is able to follow commands when sedation is allowed to  but reinstated as he becomes agitated and hypertensive  Summary comments:  Pacing has eliminated dysrhythmia and blood pressure has been stable  White count continues to come down  Low-grade temp yesterday but less than before and bacteremia seems highly unlikely  Tenuous with temporary pacer although it has at present a good threshold  Permanent device implant will be planned  We will discuss later this morning with family and my goal is to do this later this afternoon  Telemetry/ECG/Cardiac testing:   Pacing with adequate capture threshold  TTE 2023: Normal LV and RV systolic function without valve abnormalities  Vitals: Blood pressure 99/56, pulse 60, temperature 98 8 °F (37 1 °C), resp   rate 18, height 6' (1 829 m), weight 108 kg (238 lb 12 1 oz), SpO2 95 % ,   Orthostatic Blood Pressures    Flowsheet Row Most Recent Value   Blood Pressure 99/56 filed at 05/18/2023 0400   Patient Position - Orthostatic VS Lying filed at 05/16/2023 1900      ,   Weight (last 2 days)     Date/Time Weight    05/18/23 0430 108 (238 76)    05/17/23 0600 109 (240 3)     Weight: cooling blanket under patient at 05/17/23 0600    05/16/23 0729 100 (221)    05/16/23 0310 101 (221 78)          Physical Exam:    General: On ventilator and sedated  Eyes:  Anicteric  Oral mucosa:  Moist   Neck:  No JVD  Carotid upstrokes are brisk without bruits  No masses  Chest:  Clear to auscultation  Cardiac:  No palpable PMI  Normal S1 and S2  No murmur gallop or rub  Abdomen:  Soft and nontender  No palpable organomegaly or aortic enlargement  Extremities:  1 diffuse edema  Musculoskeletal:  Symmetric  Vascular:  Femoral pulses are brisk without bruits  Popliteal  pulses are intact bilaterally  Pedal pulses are diminished but feet are warm  Neuro: Not performed  Psych: Sedated             Medications:      Current Facility-Administered Medications:   •  acetaminophen (TYLENOL) oral suspension 975 mg, 975 mg, Oral, Q6H, CARMEL Manning, 975 mg at 05/18/23 9808  •  albuterol inhalation solution 2 5 mg, 2 5 mg, Nebulization, Q4H PRN, Alexander Jaimes MD  •  cefTRIAXone (ROCEPHIN) IVPB (premix in dextrose) 1,000 mg 50 mL, 1,000 mg, Intravenous, Q24H, Last Rate: 100 mL/hr at 05/18/23 0402, 1,000 mg at 05/18/23 0402 **AND** azithromycin (ZITHROMAX) 500 mg in sodium chloride 0 9 % 250 mL IVPB, 500 mg, Intravenous, Q24H, CARMEL Cheek, Last Rate: 250 mL/hr at 05/18/23 0440, 500 mg at 05/18/23 0440  •  chlorhexidine (PERIDEX) 0 12 % oral rinse 15 mL, 15 mL, Mouth/Throat, Q12H BAYRON, CARMEL Cheek, 15 mL at 05/18/23 0817  •  enoxaparin (LOVENOX) subcutaneous injection 40 mg, 40 mg, Subcutaneous, Daily, Tejinder Underwood, CARMEL, 40 mg at 05/18/23 9334  •  EPINEPHrine (ADRENALIN) injection, , Intravenous, Code/Trauma/Sedation Med, CARMEL Manning, 1 mg at 05/17/23 8369  •  Famotidine (PF) (PEPCID) injection 20 mg, 20 mg, Intravenous, BID, CARMEL Andrade, 20 mg at 05/18/23 7215  •  fentaNYL 1000 mcg in sodium chloride 0 9% 100mL infusion, 50 mcg/hr, Intravenous, Continuous, CARMEL Manning, Last Rate: 5 mL/hr at 05/18/23 0748, 50 mcg/hr at 05/18/23 0748  •  fentanyl citrate (PF) 100 MCG/2ML 50 mcg, 50 mcg, Intravenous, Q1H PRN, CARMEL Andrade, 50 mcg at 05/17/23 1328  •  insulin lispro (HumaLOG) 100 units/mL subcutaneous injection 1-6 Units, 1-6 Units, Subcutaneous, Q6H [address], 1 Units at 05/18/23 0532 **AND** Fingerstick Glucose (POCT), , , Q6H, CARMEL Guzman  •  lidocaine (LIDODERM) 5 % patch 1 patch, 1 patch, Topical, Daily, CARMEL Manning  •  lidocaine (LIDODERM) 5 % patch 1 patch, 1 patch, Topical, Daily, CARMEL Manning  •  methylPREDNISolone sodium succinate (Solu-MEDROL) injection 60 mg, 60 mg, Intravenous, Q8H BAYRON, CARMEL Manning, 60 mg at 05/18/23 0529  •  metroNIDAZOLE (FLAGYL) IVPB (premix) 500 mg 100 mL, 500 mg, Intravenous, Q8H, CARMEL Manning, Last Rate: 200 mL/hr at 05/18/23 0525, 500 mg at 05/18/23 0525  •  midazolam (VERSED) injection 2 mg, 2 mg, Intravenous, Q4H PRN, CARMEL Manning, 2 mg at 05/18/23 0757  •  multi-electrolyte (PLASMALYTE-A/ISOLYTE-S PH 7 4) IV solution, 100 mL/hr, Intravenous, Continuous, CARMEL Manning, Last Rate: 100 mL/hr at 05/18/23 0702, 100 mL/hr at 05/18/23 0702  •  NOREPINEPHRINE 4 MG  ML NSS (CMPD ORDER) infusion, 1-30 mcg/min, Intravenous, Titrated, JESSA AndradeNP, Last Rate: 7 5 mL/hr at 05/18/23 0807, 2 mcg/min at 05/18/23 0807  •  propofol (DIPRIVAN) 1000 mg in 100 mL infusion (premix), 5-50 mcg/kg/min, Intravenous, Titrated, [place]ne JESSA MarieNP, Last Rate: 5 9 mL/hr at 05/18/23 0749, 10 mcg/kg/min at 05/18/23 0749  •  senna-docusate sodium (SENOKOT S) 8 6-50 mg per tablet 2 tablet, 2 tablet, Oral, HS, CARMEL Massey, 2 tablet at 05/17/23 2311  •  sodium phosphate 21 mmol in dextrose 5 % 250 mL Infusion, 21 mmol, Intravenous, Once, [place] CARMEL Escobar, Last Rate: 62 5 mL/hr at 05/18/23 0711, 21 mmol at 05/18/23 0711     Labs & Results:    Troponins:    Results from last 7 days   Lab Units 05/16/23  0200 05/15/23  2325 05/15/23  2122   HS TNI 0HR ng/L  --   --  360*   HS TNI 2HR ng/L  --  444*  --    HSTNI D2 ng/L  --  84*  --    HS TNI 4HR ng/L 392*  --   --    HSTNI D4 ng/L 32*  --   --         BNP:     CBC with diff:   Results from last 7 days   Lab Units 05/18/23  0439 05/17/23  0555   WBC Thousand/uL 7 11 10 44*   HEMOGLOBIN g/dL 10 5* 10 9*   HEMATOCRIT % 32 2* 33 8*   MCV fL 94 95   PLATELETS Thousands/uL 123* 141*     TSH:     CMP:   Results from last 7 days   Lab Units 05/18/23  0439 05/17/23  1149 05/17/23  0759 05/17/23  0555   POTASSIUM mmol/L 4 4 4 4   < > 4 0   CHLORIDE mmol/L 109* 107   < > 107   CO2 mmol/L 24 24   < > 21   BUN mg/dL 12 14   < > 16   CREATININE mg/dL 0 77 0 84   < > 0 85   AST U/L 26  --   --  28   ALT U/L 47  --   --  59*   EGFR ml/min/1 73sq m 90 87   < > 87    < > = values in this interval not displayed       Lipid Profile:     Coags:   Results from last 7 days   Lab Units 05/15/23  2122   INR  1 09

## 2023-05-18 NOTE — ASSESSMENT & PLAN NOTE
· Creatinine 1 42 on admission to [facility]  · Baseline creatinine unclear: only previous creatinine noted in 2020 was 1 08  · In setting of cardiac arrest, hypoperfusion, likely hypovolemia, shock state w/hypotension  · Is s/p aggressive IVF resuscitation  · Continue vasopressor support to maintain MAP > 65 - currently requiring low dose Levophed gtt  · Avoid nephrotoxic agents and further hypotension  · Trend renal indices  · Optimize electrolytes  · Strict I/O  · Daily weights

## 2023-05-18 NOTE — ASSESSMENT & PLAN NOTE
· Intubated in field 2/2 cardiac arrest - had witnessed aspiration event  · Imaging consistent w/bilateral lower lobe pneumonia  · Sputum culture positive for 2+ Staph aureus - sensitivities pending  · Urine strep/legionella negative  · Is s/p bronch on 05/17 - BALs pending  · Current vent settings: ACVC 18/470/40/6  · Follow ABG and adjust vent settings as necessary  · Currently sedated w/IV Propofol/Fentanyl gtt's for goal RASS -1 to -2  · Continue IV antibiotics for aspiration pneumonia as noted above  · Continue w/IV diuresis as patient is +9L overall  · Aggressive pulmonary hygiene  · Daily SAT/SBT  · VAP precautions  · Titrate FiO2 for goal SpO2 > 90%

## 2023-05-18 NOTE — ANESTHESIA POSTPROCEDURE EVALUATION
Post-Op Assessment Note    CV Status:  Stable  Pain Score: 0    Pain management: adequate     Mental Status:  Sleepy   Hydration Status:  Stable   PONV Controlled:  None   Airway Patency:  Patent      Post Op Vitals Reviewed: Yes      Staff: CRNA         No notable events documented      BP   152/76   Temp   99   Pulse  60   Resp   14   SpO2   94%

## 2023-05-18 NOTE — ASSESSMENT & PLAN NOTE
· CT C/A/P on admission to [facility] revealed right 2nd-6th anterior rib fractures; left second-fifth anterior rib fractures  · Continue IV Fentanyl gtt while intubated as well as scheduled Lidocaine patches and APAP

## 2023-05-18 NOTE — DISCHARGE INSTR - AVS FIRST PAGE
Instructions for going home after your Pacemaker or Defibrillator Surgery        WHAT YOU NEED TO KNOW:   A pacemaker or defirillator is a battery-powered device that is implanted into your chest to help regulate your heart rate or prevent fainting or arrhythmia  DISCHARGE INSTRUCTIONS:   Medicines:   Pain medicine: You may need medicine to take away or decrease pain  Learn how to take your medicine  Ask what medicine and how much you should take  Be sure you know how, when, and how often to take it  Do not wait until the pain is severe before you take your medicine  Tell caregivers if your pain does not decrease  Follow up with your cardiologist after your procedure: You may need a follow-up visit 7 to 10 days after you leave the hospital  Your cardiologist will check your wound and make sure that your device is working correctly  These should already be scheduled but if not or the time is inconvenient, call the office promptly  Follow the instructions to check your device:  Your cardiologist will check your device and the battery regularly, usually every 3 to 6 months  A computer may be used to  check your device over the telephone between visits  Pacemaker or defibrillator batteries usually last 5 to 11 years  The entire unit will be replaced when the battery gets low  This is a simpler procedure than the original one to implant your pacemaker or defibrillator  Wound care:  Keep your incisions clean and dry for 7 to 10 days after your procedure  There may be glue on the wound  This needs 24 hours to cure so no washing of the site before then  Afterwards if the wound gets wet just pat it dry  Do not scrub it  Contact the office if the area is around the device is unusually red or painful, tender, or has drainage  Also the office needs to be contacted if there is fever               Living with your pacemaker or defibrillator:     Carry your pacemaker or defibrillator ID card: Make sure you receive a pacemaker or defibrillator ID card  Carry it with you at all times  It lists important information about your pacemaker  Show it to airport security if you travel  Avoid electrical interference:  Avoid welding equipment, MRI machines, and other equipment with large magnets or electric fields  These things could interfere with how your pacemaker works  Use your cell phone on the ear opposite from your pacemaker  Do not carry your cell phone in your shirt pocket over your chest   Most new devices however are compatible with an MRI if none under the proper circumstances  Seek care immediately if:     You feel your heart suddenly beating very slowly or quickly  You become too weak or dizzy to stand, or you pass out  You feel lightheaded, short of breath, or have chest pain

## 2023-05-18 NOTE — ASSESSMENT & PLAN NOTE
This appeared to be initiated after a pause associated with a PVC  Temporary pacing has resolved ectopy and ventricular arrhythmia  Safest approach would be to placing with an ICD  I also reviewed this conceptually with my EP colleagues and they fully concur

## 2023-05-18 NOTE — RESPIRATORY THERAPY NOTE
Resp care   05/18/23 8816   Respiratory Assessment   Resp Comments Pt arrived from OR s/p pacemaker  Pt placed on vent with previous settings

## 2023-05-18 NOTE — ASSESSMENT & PLAN NOTE
· Hgb trended down to 10 9  · Baseline Hgb unclear given no prior records available   · Suspect this is 2/2 shock state 2/2 cardiac arrest vs FELIX  · Iron sat 7, folate 19 2, B12 139  · Will plan for PO supplementation when able  · Trend Hgb and transfuse for Hgb < 7

## 2023-05-18 NOTE — ASSESSMENT & PLAN NOTE
· Intubated in field 2/2 cardiac arrest - had witnessed aspiration event  · Imaging consistent w/bilateral lower lobe pneumonia  · Sputum culture pending  · Urine strep/legionella pending  · Is s/p bronch on 05/17 - BALs pending  · Current vent settings: ACVC 18/470/75/10  · Follow ABG and adjust vent settings as necessary  · Currently sedated w/IV Propofol/Fentanyl gtt's for goal RASS -1 to -2  · Continue IV antibiotics for aspiration pneumonia as noted above  · Aggressive pulmonary hygiene  · Daily SAT/SBT  · VAP precautions  · Titrate FiO2 for goal SpO2 > 90%

## 2023-05-18 NOTE — ASSESSMENT & PLAN NOTE
· Troponin 064 - 369 - 991 - 964  · Non-MI elevation in setting of cardiac arrest, shock state  · TTE w/out RWMA  · ECG w/out ST elevations  · Continue cardiac monitoring

## 2023-05-18 NOTE — ASSESSMENT & PLAN NOTE
· CT C/A/P on admission to [facility] revealed right 2nd-6th anterior rib fractures; left second-fifth anterior rib fractures  · Continue IV Fentanyl gtt while intubated as well as scheduled Lidocaine patches and APAP

## 2023-05-18 NOTE — RESPIRATORY THERAPY NOTE
RT Ventilator Management Note  Cass Rene 67 y o  male MRN: [id number]  Unit/Bed#: ICU 04-01 Encounter: [id number]      Daily Screen         5/18/2023  0428 5/18/2023  0741          Patient safety screen outcome[de-identified] Failed Passed (P)       Not Ready for Weaning due to[de-identified] Underline problem not resolved --      Spont breathing trial % for 30 min: -- Yes (P)                 Physical Exam:   Respiratory Pattern: Assisted  Chest Assessment: Chest expansion symmetrical  Bilateral Breath Sounds: Diminished  Suction: (P) ET Tube  O2 Device: vent      Resp Comments: (P) Pt  placed on SBT this morning  Possible extubation later today

## 2023-05-18 NOTE — ASSESSMENT & PLAN NOTE
· In setting of cardiac arrest w/arrhythmias as noted above as well as aspiration pneumonia   · CT C/A/P on admission to [facility] revealed significant worsening of bilateral lower lobe aspiration/pneumonia  · TTE as noted above  · COVID/FLU/RSV negative  · Blood cultures from 05/15 and 05/16 w/NGTD  · UA w/out evidence of infection  · Urine strep/legionella pending  · Sputum culture pending  · BALs from 05/17 pending  · Lactic acid 5 6 on admission to [facility] (in setting of cardiac arrest) - has since cleared  · Procalcitonin 1 05 - 1 90 - 2 17  · Is s/p aggressive IVF resuscitation  · Continue Levophed gtt to maintain MAP > 65  · Continue IV Ceftriaxone/Flagyl/Azithromycin D#3  · Monitor fever and WBC curve  · Trend procalcitonin

## 2023-05-18 NOTE — ASSESSMENT & PLAN NOTE
· Bradycardic in 35s to 45s since admission  · Unclear etiology, though suspect this may be chronic   · Is s/p temporary TVP on 05/17 as noted above - plan for PPM today if remains afebrile  · Lyme Ab negative  · [facility] Spotted Fever IgG/M pending  · TSH 5 976 w/T4 0 67, however this may be inaccurate s/p cardiac arrest/shock state  · T3 0 80  · Cardiology following - appreciate recommendations  · Continue cardiac monitoring

## 2023-05-18 NOTE — OP NOTE
OPERATIVE REPORT  PATIENT NAME: Zafar Vega    :  1950  MRN: [id number]  Pt Location: [facility] OR ROOM 02    SURGERY DATE: 2023      Dual ICD implantation with Fluoroscopy    Indication:Sick Sinus Syndrome  Ventricular Tachycardia      Site:Left subclavian  Device:  Brand: Abbott    Atrial lead:  Brand: Abbott  Fluoroscopic position:Right Atrial Appendage  Active Fixation    Ventricular lead:  Brand:Abbott  Fluoroscopic position:RV apex  Active Fixation    Anesthesia:  Local with sedation  On a ventilator preop and during the case  Estimated blood loss:  Less than 10 cc  Complications:  None    Device and Lead details:        Procedure: The patient was prepped and draped in the usual sterile manner after consent was obtained and the patient was appropriately identified  1/2 inch incision was made from the midclavicular line to the deltopectoral groove  Cutdown was made with sharp and blunt dissection to the prepectoral fascia  A pocket was formed bluntly and a 4 x 4 was placed into the pocket for hemostasis  The subclavian vein was entered on a single pass and a guidewire was placed through the needle  This was confirmed to the superior vena cava by fluoroscopy  A second stick and wire were then placed  Sheath was placed over the first wire and the wire was removed  The ventricular lead was passed through the sheath which was peeled away  Using fluoroscopic guidance, the lead was manipulated into the right ventricular apex and when appropriate position was found lead was secured to the tissue entry site with 0 silk around the stay collar  10 volts did not cause diaphragmatic stimulation  Sheath was placed over the second wire and the wire was removed  The atrial lead was passed through the sheath which was peeled away    Using fluoroscopic guidance, the lead was manipulated into the right atrium and when appropriate position was found lead was secured to the tissue entry site with 0 silk around the stay collar  10 volts did not cause diaphragmatic stimulation  The 4 x 4 was removed from the pocket and the pocket was copiously irrigated with sterile saline  There was good hemostasis  The device was brought to the table attached to the lead and secured to the underlying fascia with 0 silk through the header block  2-0 Vicryl was used to close the pocket deeply in an interrupted manner  Two 0 Vicryl was used for running closure of the subcutaneous fashion fat  4-O Vicryl was then used for interrupted subcuticular closure of the skin  Dermabond was placed on the wound and the the patient was transferred to the ICU good condition          SIGNATURE: Gil Fernandes MD  DATE: May 18, 2023  TIME: 5:43 PM

## 2023-05-18 NOTE — ASSESSMENT & PLAN NOTE
· Events on 05/16:  · Wife reports witnessing patient vomiting, choking, and, then, went unresponsive - wife immediately started CPR  · PEA arrest on EMS arrival - received 2 rounds of ACLS and achieved ROSC  · Intubated in field and transferred to [facility]  · Bradycardic/hypotensive on arrival to [facility] ED - required vasopressors and Atropine  · Unclear etiology: ? arrhythmia vs hypoxic event 2/2 aspiration event vs vasovagal response 2/2 choking w/underlying (chronic) bradycardia  · Episode of sustained VT after ETT suctioning requiring Amio bolus and cardioversion x1 on 05/16   · VT/Torsades de Pointes arrest after suctioning on 05/17 - received CPR x2 rounds, Epi x1, shock x2 - ROSC achieved w/AF RVR, then conversion to sinus bradycardia w/rates in 40s  · Is s/p temporary TVP on 05/17 2/2 bradycardic arrhythmias - plan for PPM today if remains afebrile  · TTE from 05/16 w/EF 65%; no RWMA  · No indication for TTM given patient following commands  · Maintain normothermia and normoglycemia  · Cardiology following - appreciate recommendations  · Frequent neuro checks

## 2023-05-18 NOTE — PROGRESS NOTES
Vaishali U  66   Progress Note  Name: Anup Vargas  MRN: [id number]  Unit/Bed#: ICU 04-01 I Date of Admission: 5/16/2023   Date of Service: 5/18/2023 I Hospital Day: 2    Assessment/Plan   * Cardiac arrest [facility])  Assessment & Plan  · Events on 05/16:  · Wife reports witnessing patient vomiting, choking, and, then, went unresponsive - wife immediately started CPR  · PEA arrest on EMS arrival - received 2 rounds of ACLS and achieved ROSC  · Intubated in field and transferred to [facility] ED  · Bradycardic/hypotensive on arrival to [facility] ED - required vasopressors and Atropine  · Unclear etiology: ? arrhythmia vs hypoxic event 2/2 aspiration event vs vasovagal response 2/2 choking w/underlying (chronic) bradycardia  · Episode of sustained VT after ETT suctioning requiring Amio bolus and cardioversion x1 on 05/16   · VT/Torsades de Pointes arrest after suctioning on 05/17 - received CPR x2 rounds, Epi x1, shock x2 - ROSC achieved w/AF RVR, then conversion to sinus bradycardia w/rates in 40s  · Is s/p temporary TVP on 05/17 2/2 bradycardic arrhythmias - plan for PPM today if remains afebrile  · TTE from 05/16 w/EF 65%; no RWMA  · No indication for TTM given patient following commands  · Maintain normothermia and normoglycemia  · Cardiology following - appreciate recommendations  · Frequent neuro checks    Shock ([facility] [address] )  Assessment & Plan  · In setting of cardiac arrest w/arrhythmias as noted above as well as aspiration pneumonia   · CT C/A/P on admission to [facility] revealed significant worsening of bilateral lower lobe aspiration/pneumonia  · TTE as noted above  · COVID/FLU/RSV negative  · Blood cultures from 05/15 and 05/16 w/NGTD  · UA w/out evidence of infection  · Urine strep/legionella pending  · Sputum culture pending  · BALs from 05/17 pending  · Lactic acid 5 6 on admission to [facility] (in setting of cardiac arrest) - has since cleared  · Procalcitonin 1 05 - 1 90 - 2 17  · Is s/p aggressive IVF resuscitation  · Continue Levophed gtt to maintain MAP > 65  · Continue IV Ceftriaxone/Flagyl/Azithromycin D#3  · Monitor fever and WBC curve  · Trend procalcitonin    Aspiration pneumonia (HCC)  Assessment & Plan  · Please see plan as noted above    Acute respiratory failure with hypoxia (HCC)  Assessment & Plan  · Intubated in field 2/2 cardiac arrest - had witnessed aspiration event  · Imaging consistent w/bilateral lower lobe pneumonia  · Sputum culture pending  · Urine strep/legionella pending  · Is s/p bronch on 05/17 - BALs pending  · Current vent settings: ACVC 18/470/75/10  · Follow ABG and adjust vent settings as necessary  · Currently sedated w/IV Propofol/Fentanyl gtt's for goal RASS -1 to -2  · Continue IV antibiotics for aspiration pneumonia as noted above  · Aggressive pulmonary hygiene  · Daily SAT/SBT  · VAP precautions  · Titrate FiO2 for goal SpO2 > 90%    Bradycardia  Assessment & Plan  · Bradycardic in 30s to 40s since admission  · Unclear etiology, though suspect this may be chronic   · Is s/p temporary TVP on 05/17 as noted above - plan for PPM today if remains afebrile  · Lyme Ab negative  · [facility] Spotted Fever IgG/M pending  · TSH 5 976 w/T4 0 67, however this may be inaccurate s/p cardiac arrest/shock state  · T3 0 80  · Cardiology following - appreciate recommendations  · Continue cardiac monitoring    Elevated troponin  Assessment & Plan  · Troponin 144 - 025 - 820 - 264  · Non-MI elevation in setting of cardiac arrest, shock state  · TTE w/out RWMA  · ECG w/out ST elevations  · Continue cardiac monitoring    Acute kidney injury ([facility] [address] )  Assessment & Plan  · Creatinine 1 42 on admission to [facility]  · Baseline creatinine unclear: only previous creatinine noted in 2020 was 1 08  · In setting of cardiac arrest, hypoperfusion, likely hypovolemia, shock state w/hypotension  · Is s/p aggressive IVF resuscitation  · Continue vasopressor support to maintain MAP > 65 - currently requiring low dose Levophed gtt  · Avoid nephrotoxic agents and further hypotension  · Trend renal indices  · Optimize electrolytes  · Strict I/O  · Daily weights    Anemia  Assessment & Plan  · Hgb trended down to 10 9  · Baseline Hgb unclear given no prior records available   · Suspect this is 2/2 shock state 2/2 cardiac arrest vs FELIX  · Iron sat 7, folate 19 2, B12 139  · Will plan for PO supplementation when able  · Trend Hgb and transfuse for Hgb < 7    Closed fracture of multiple ribs of both sides  Assessment & Plan  · CT C/A/P on admission to [facility] revealed right 2nd-6th anterior rib fractures; left second-fifth anterior rib fractures  · Continue IV Fentanyl gtt while intubated as well as scheduled Lidocaine patches and APAP         ICU Core Measures     Vented Patient  VAP Bundle  VAP bundle ordered     A: Assess, Prevent, and Manage Pain · Has pain been assessed? Yes  · Need for changes to pain regimen? No   B: Both Spontaneous Awakening Trials (SATs) and Spontaneous Breathing Trials (SBTs) · Plan to perform spontaneous awakening trial today? Yes   · Plan to perform spontaneous breathing trial today? Yes   · Obvious barriers to extubation? No   C: Choice of Sedation · RASS Goal: -1 Drowsy  · Need for changes to sedation or analgesia regimen? No   D: Delirium · CAM-ICU: Negative   E: Early Mobility  · Plan for early mobility? Yes   F: Family Engagement · Plan for family engagement today? Yes       Antibiotic Review: Continue broad spectrum secondary to severity of illness  Review of Invasive Devices:     Stewart Plan: Voiding trial after improvement in ambulation   Central access plan: Medications requiring central line  Monroe Plan: Keep arterial line for hemodynamic monitoring, frequent ABGs and frequent labs    Prophylaxis:  VTE VTE covered by:  enoxaparin, Subcutaneous, 40 mg at 05/17/23 0839       Stress Ulcer  covered byFamotidine (PF) (PEPCID) injection 20 mg [[id number]]       Subjective   HPI/24hr events: · FiO2 titrated down to 55% overnight  · Remains on low dose Levophed  · No acute events overnight       Review of Systems   Unable to perform ROS: Intubated        Objective                            Vitals I/O      Most Recent Min/Max in 24hrs   Temp 98 6 °F (37 °C) Temp  Min: 96 3 °F (35 7 °C)  Max: 100 8 °F (38 2 °C)   Pulse 60 Pulse  Min: 0  Max: 127   Resp 18 Resp  Min: 4  Max: 97   BP 99/56 BP  Min: 99/56  Max: 99/56   O2 Sat 98 % SpO2  Min: 73 %  Max: 100 %      Intake/Output Summary (Last 24 hours) at 5/18/2023 0539  Last data filed at 5/18/2023 0400  Gross per 24 hour   Intake 5669 53 ml   Output 1820 ml   Net 3849 53 ml         Diet NPO     Invasive Monitoring Physical exam   Arterial Line  Janell BP    No data recorded   MAP    No data recorded     Physical Exam  Vitals and nursing note reviewed  Constitutional:       Interventions: He is sedated, intubated and restrained  HENT:      Head: Normocephalic  Eyes:      Pupils: Pupils are equal, round, and reactive to light  Cardiovascular:      Rate and Rhythm: Normal rate  Pulses: Normal pulses  Heart sounds: Normal heart sounds  No murmur heard  Comments: V-Paced (100%)  Pulmonary:      Effort: He is intubated  Comments: Coarse breath sounds bilaterally  Abdominal:      General: Bowel sounds are normal       Palpations: Abdomen is soft  Genitourinary:     Comments: Stewart cath intact and draining yellow urine   Musculoskeletal:      Cervical back: Neck supple  Right lower leg: Edema present  Left lower leg: Edema present  Comments: Trace bilateral LE edema   Skin:     General: Skin is warm and dry  Capillary Refill: Capillary refill takes less than 2 seconds  Neurological:      GCS: GCS eye subscore is 3  GCS verbal subscore is 1  GCS motor subscore is 4  Psychiatric:      Comments: Deferred            Diagnostic Studies      EKG: V-Paced  Imaging:  I have personally reviewed pertinent reports     and I have personally reviewed pertinent films in PACS     Medications:  Scheduled PRN   acetaminophen, 975 mg, Q6H  cefTRIAXone, 1,000 mg, Q24H   And  azithromycin, 500 mg, Q24H  chlorhexidine, 15 mL, Q12H BAYRON  enoxaparin, 40 mg, Daily  Famotidine (PF), 20 mg, BID  insulin lispro, 1-6 Units, Q6H BAYRON  lidocaine, 1 patch, Daily  lidocaine, 1 patch, Daily  methylPREDNISolone sodium succinate, 60 mg, Q8H BAYRON  metroNIDAZOLE, 500 mg, Q8H  senna-docusate sodium, 2 tablet, HS  sodium phosphate, 21 mmol, Once      albuterol, 2 5 mg, Q4H PRN  EPINEPHrine, , Code/Trauma/Sedation Med  fentanyl citrate (PF), 50 mcg, Q1H PRN  midazolam, 2 mg, Q4H PRN       Continuous    fentaNYL, 50 mcg/hr, Last Rate: 50 mcg/hr (05/17/23 2470)  multi-electrolyte, 100 mL/hr, Last Rate: 100 mL/hr (05/17/23 1002)  norepinephrine, 1-30 mcg/min, Last Rate: 4 mcg/min (05/18/23 0632)  propofol, 5-50 mcg/kg/min, Last Rate: 25 mcg/kg/min (05/18/23 9015)         Labs:    CBC    Recent Labs     05/17/23  0555 05/18/23 0439   WBC 10 44* 7 11   HGB 10 9* 10 5*   HCT 33 8* 32 2*   * 123*   BANDSPCT  --  6     BMP    Recent Labs     05/17/23  1149 05/18/23 0439   SODIUM 138 138   K 4 4 4 4    109*   CO2 24 24   AGAP 7 5   BUN 14 12   CREATININE 0 84 0 77   CALCIUM 8 3* 8 5       Coags    No recent results     Additional Electrolytes  Recent Labs     05/17/23  1149 05/18/23  0439   MG 2 6 2 3   PHOS 1 8* 1 7*   CAIONIZED 1 14 1 18          Blood Gas    Recent Labs     05/18/23 0439   PHART 7 391   LUR1TXR 39 5   PO2ART 119 8   WNP9TJT 23 4   BEART -1 3   SOURCE Line, Arterial     Recent Labs     05/18/23 0439   SOURCE Line, Arterial    LFTs  Recent Labs     05/17/23  0555 05/18/23 0439   ALT 59* 47   AST 28 26   ALKPHOS 50 49   ALB 3 2* 2 9*   TBILI 0 54 0 47       Infectious  Recent Labs     05/17/23  0555 05/18/23  0439   PROCALCITONI 1 90* 2 17*     Glucose  Recent Labs     05/17/23  0555 05/17/23  0759 05/17/23  1149 05/18/23  0439   GLUC 180* Kasi Zepeda

## 2023-05-18 NOTE — ASSESSMENT & PLAN NOTE
In retrospect bradycardia related torsade was seen while in the hospital and is some some likelihood of this being the issue at home as well  See comments under torsade  Plan at present will be pacer/ICD implant

## 2023-05-18 NOTE — ASSESSMENT & PLAN NOTE
· Creatinine 1 42 on admission to [facility]  · Baseline creatinine unclear: only previous creatinine noted in 2020 was 1 08  · In setting of cardiac arrest, hypoperfusion, likely hypovolemia, shock state w/hypotension  · Is s/p aggressive IVF resuscitation  · Continue vasopressor support to maintain MAP > 65 - currently requiring low dose Levophed gtt  · Avoid nephrotoxic agents and further hypotension  · Trend renal indices  · Optimize electrolytes  · Strict I/O  · Daily weights

## 2023-05-18 NOTE — ASSESSMENT & PLAN NOTE
· Troponin 069 - 134 - 787 - 109  · Non-MI elevation in setting of cardiac arrest, shock state  · TTE w/out RWMA  · ECG w/out ST elevations  · Continue cardiac monitoring

## 2023-05-18 NOTE — RESPIRATORY THERAPY NOTE
05/18/23 0428   Respiratory Assessment   Respiratory Pattern Assisted   Chest Assessment Chest expansion symmetrical   Bilateral Breath Sounds Diminished   Resp Comments pts fio2 weaned to 55%, otherwise pt remains on current vent settings  pt suctioned a needed  ETT secured  will continue to monitor pt  O2 Device vent   Vent Information   Vent ID [id number]   Vent type     Vent Mode AC/VC+   $ Vent Daily Charge-Subsequent Yes   $ Pulse Oximetry Spot Check Charge Completed   SpO2 98 %   AC/VC+ Settings   Resp Rate (BPM) 18 BPM   VT (mL) 470 mL   Insp Time (S) 0 92 S   FIO2 (%) (S)  55 %   PEEP (cmH2O) 10 cmH2O   Rise Time (%) 50 %   Trigger Sensitivity Flow (LPM) 3 LPM   Humidification Heater   Heater Temp 98 6 °F (37 °C)   AC/VC+ Actuals   Resp Rate (BPM) 18 BPM   VT (mL) 510 mL   MV (Obs) 9 21   MAP (cmH2O) 14 cmH2O   Peak Pressure (cmH2O) 23 cmH2O   I:E Ratio (Obs) 1:2 6   Static Compliance (mL/cmH20) 50 mL/cmH2O   Plateau Pressure (cm H2O) 20 cm H2O   Heater Temperature (Obs) 98 6 °F (37 °C)   AC/VC+ ALARMS   High Peak Pressure (cmH2O) 45 cmH2O   High Resp Rate (BPM) 40 BPM   High MV (L/min) 17 L/min   Low MV (L/min) 4 L/min   High VT (mL) 1200 mL   Low VT (mL) 350 mL   High [place] VTE (mL) 1200 mL   Low [place] VTE (mL) 350 mL   High [place] VTE (mL) 1200 mL   Low Spont VTE (mL) 300 mL   High Insp [place] VT (mL) 1130 mL   AC/VC+ Apnea Settings   Resp Rate (BPM) 18 BPM   VT (mL) 470 mL   FIO2 (%) 100 %   Apnea Time (s) 20 S   Apnea Flow (L/min) 50 L/min   Maintenance   Alarm (pink) cable attached No   Resuscitation bag with peep valve at bedside Yes   Water bag changed Yes   Circuit changed No   Daily Screen   Patient safety screen outcome: Failed   Not Ready for Weaning due to:  Underline problem not resolved   ETT  Oral 7 5 mm   Placement Date: 05/15/23   Previously placed by?: Present on admission  Type: Oral  Tube Size: 7 5 mm  Secured at (cm): 26@ lips  Comments: pt intubated in field   Secured at (cm) 26 Measured from Lips   Secured Location (S)  Right   Repositioned Left to Right   Secured by Commercial tube little   Site Condition Dry   Cuff Pressure (cm H2O)   (green zone)   HI-LO Suction  Continuous low suction   HI-LO Secretions Scant   HI-LO Intervention Patent   RT Ventilator Management Note  Martin Grewal 67 y o  male MRN: [id number]  Unit/Bed#: ICU 04-01 Encounter: [id number]      Daily Screen         5/17/2023  0821 5/18/2023  0428          Patient safety screen outcome[de-identified] Failed Failed      Not Ready for Weaning due to[de-identified] Underline problem not resolved;FiO2 >60%;PEEP > 8cmH2O Underline problem not resolved                Physical Exam:   Respiratory Pattern: Assisted  Chest Assessment: Chest expansion symmetrical  Bilateral Breath Sounds: Diminished  Suction: Oral  O2 Device: vent      Resp Comments: pts fio2 weaned to 55%, otherwise pt remains on current vent settings  pt suctioned a needed  ETT secured  will continue to monitor pt

## 2023-05-18 NOTE — ANESTHESIA PREPROCEDURE EVALUATION
Procedure:  INSERTION AUTOMATIC IMPLANTABLE CARDIOVERTER DEFIBRILLATOR (AICD) (Chest)    Relevant Problems   CARDIO   (+) Cardiac arrest (HCC)   (+) Torsades de pointes (HCC)      /RENAL   (+) Acute kidney injury ([facility] [address] )      HEMATOLOGY   (+) Anemia      PULMONARY   (+) Acute respiratory failure with hypoxia (HCC)   (+) Aspiration pneumonia ([facility] [address] )        Patient arrived at [address] [place] [address] 5/15/23 for PEA arrest (2 rounds of epi, then ROSC)  Intubated, in shock, atropine given for bradycardia and transferred to [facility] for ICU  During hospitalization, developed V tach with pulse 2/2 suctioning; synchronized cardioversion & amiodarone given  Patient treated for aspiration PNA  Benny Heath Then further developed sustained VT without pulse with subsequent CPR 3 min & defib with ROSC  Temp TVP placed         Echo 5/16/23  •  Left Ventricle: Left ventricular cavity size is normal  Wall thickness is normal  The left ventricular ejection fraction is 65%  Systolic function is normal  Wall motion is normal   •  Right Ventricle: Systolic function is normal     Radial arterial line, cordis and femoral line in place  Patient intubated  Lab Results   Component Value Date    WBC 7 11 05/18/2023    HGB 10 5 (L) 05/18/2023    HCT 32 2 (L) 05/18/2023    MCV 94 05/18/2023     (L) 05/18/2023     Lab Results   Component Value Date    SODIUM 138 05/18/2023    K 4 4 05/18/2023     (H) 05/18/2023    CO2 24 05/18/2023    BUN 12 05/18/2023    CREATININE 0 77 05/18/2023    GLUC 170 (H) 05/18/2023    CALCIUM 8 5 05/18/2023     Lab Results   Component Value Date    INR 1 09 05/15/2023    PROTIME 14 3 05/15/2023     Lab Results   Component Value Date    HGBA1C 5 3 05/16/2023          Physical Exam    Airway  Comment: Intubated with 7 5 mm ETT           Dental       Cardiovascular      Pulmonary      Other Findings        Anesthesia Plan  ASA Score- 4     Anesthesia Type- general with ASA Monitors           Additional Monitors:   Airway Plan: ETT  Comment: Patient already intubated  Plan Factors-    Chart reviewed  EKG reviewed  Existing labs reviewed  Patient summary reviewed  Induction- intravenous and inhalational     Postoperative Plan-     Informed Consent- Anesthetic plan and risks discussed with spouse  Consent obtained from wife, [place] at bedside

## 2023-05-18 NOTE — RESPIRATORY THERAPY NOTE
RT Ventilator Management Note  Shavon Levine 67 y o  male MRN: [id number]  Unit/Bed#: ICU 04-01 Encounter: [id number]      Daily Screen         5/18/2023  0428 5/18/2023  0741          Patient safety screen outcome[de-identified] Failed Passed      Not Ready for Weaning due to[de-identified] Underline problem not resolved --      Spont breathing trial % for 30 min: -- Yes                Physical Exam:   Respiratory Pattern: Assisted  Chest Assessment: Chest expansion symmetrical  Bilateral Breath Sounds: Diminished  Suction: ET Tube  O2 Device: vent      Resp Comments: (P) Pt placed back on AC settings due to apnea with restart of sedation

## 2023-05-18 NOTE — ASSESSMENT & PLAN NOTE
· Hgb trended down to 10 9  · Baseline Hgb unclear given no prior records available   · Suspect this is 2/2 shock state 2/2 cardiac arrest vs FELIX  · Iron sat 7, folate 19 2, B12 139  · Will require IV Venofer when infectious process has improved as well as PO B12/iron supplementation when appropriate  · Trend Hgb and transfuse for Hgb < 7

## 2023-05-19 PROBLEM — I48.0 PAROXYSMAL ATRIAL FIBRILLATION (HCC): Status: ACTIVE | Noted: 2023-05-19

## 2023-05-19 PROBLEM — Z95.0 PACEMAKER: Status: ACTIVE | Noted: 2023-05-19

## 2023-05-19 PROBLEM — Z95.810 ICD (IMPLANTABLE CARDIOVERTER-DEFIBRILLATOR) IN PLACE: Status: ACTIVE | Noted: 2023-05-19

## 2023-05-19 LAB
ALBUMIN SERPL BCP-MCNC: 3.5 G/DL (ref 3.5–5)
ALP SERPL-CCNC: 52 U/L (ref 34–104)
ALT SERPL W P-5'-P-CCNC: 37 U/L (ref 7–52)
ANION GAP SERPL CALCULATED.3IONS-SCNC: 6 MMOL/L (ref 4–13)
AST SERPL W P-5'-P-CCNC: 18 U/L (ref 13–39)
BASOPHILS # BLD AUTO: 0.01 THOUSANDS/ÂΜL (ref 0–0.1)
BASOPHILS NFR BLD AUTO: 0 % (ref 0–1)
BILIRUB SERPL-MCNC: 0.69 MG/DL (ref 0.2–1)
BUN SERPL-MCNC: 22 MG/DL (ref 5–25)
CA-I BLD-SCNC: 1.14 MMOL/L (ref 1.12–1.32)
CALCIUM SERPL-MCNC: 8.8 MG/DL (ref 8.4–10.2)
CHLORIDE SERPL-SCNC: 108 MMOL/L (ref 96–108)
CHOLEST SERPL-MCNC: 113 MG/DL
CO2 SERPL-SCNC: 26 MMOL/L (ref 21–32)
CREAT SERPL-MCNC: 0.9 MG/DL (ref 0.6–1.3)
EOSINOPHIL # BLD AUTO: 0 THOUSAND/ÂΜL (ref 0–0.61)
EOSINOPHIL NFR BLD AUTO: 0 % (ref 0–6)
ERYTHROCYTE [DISTWIDTH] IN BLOOD BY AUTOMATED COUNT: 12.5 % (ref 11.6–15.1)
GFR SERPL CREATININE-BSD FRML MDRD: 85 ML/MIN/1.73SQ M
GLUCOSE SERPL-MCNC: 158 MG/DL (ref 65–140)
GLUCOSE SERPL-MCNC: 161 MG/DL (ref 65–140)
GLUCOSE SERPL-MCNC: 166 MG/DL (ref 65–140)
GLUCOSE SERPL-MCNC: 169 MG/DL (ref 65–140)
GLUCOSE SERPL-MCNC: 174 MG/DL (ref 65–140)
HCT VFR BLD AUTO: 28.7 % (ref 36.5–49.3)
HDLC SERPL-MCNC: 35 MG/DL
HGB BLD-MCNC: 9.3 G/DL (ref 12–17)
IMM GRANULOCYTES # BLD AUTO: 0.03 THOUSAND/UL (ref 0–0.2)
IMM GRANULOCYTES NFR BLD AUTO: 0 % (ref 0–2)
LDLC SERPL CALC-MCNC: 42 MG/DL (ref 0–100)
LDLC SERPL DIRECT ASSAY-MCNC: 47 MG/DL (ref 0–100)
LYMPHOCYTES # BLD AUTO: 0.45 THOUSANDS/ÂΜL (ref 0.6–4.47)
LYMPHOCYTES NFR BLD AUTO: 6 % (ref 14–44)
MAGNESIUM SERPL-MCNC: 2.5 MG/DL (ref 1.9–2.7)
MCH RBC QN AUTO: 30.9 PG (ref 26.8–34.3)
MCHC RBC AUTO-ENTMCNC: 32.4 G/DL (ref 31.4–37.4)
MCV RBC AUTO: 95 FL (ref 82–98)
MONOCYTES # BLD AUTO: 0.43 THOUSAND/ÂΜL (ref 0.17–1.22)
MONOCYTES NFR BLD AUTO: 6 % (ref 4–12)
NEUTROPHILS # BLD AUTO: 6.41 THOUSANDS/ÂΜL (ref 1.85–7.62)
NEUTS SEG NFR BLD AUTO: 88 % (ref 43–75)
NONHDLC SERPL-MCNC: 78 MG/DL
NRBC BLD AUTO-RTO: 0 /100 WBCS
PHOSPHATE SERPL-MCNC: 2.2 MG/DL (ref 2.3–4.1)
PLATELET # BLD AUTO: 145 THOUSANDS/UL (ref 149–390)
PMV BLD AUTO: 11.3 FL (ref 8.9–12.7)
POTASSIUM SERPL-SCNC: 3.9 MMOL/L (ref 3.5–5.3)
PROCALCITONIN SERPL-MCNC: 1.63 NG/ML
PROT SERPL-MCNC: 5.4 G/DL (ref 6.4–8.4)
R RICKETTSI IGG SER QL IA: NEGATIVE
RBC # BLD AUTO: 3.01 MILLION/UL (ref 3.88–5.62)
SODIUM SERPL-SCNC: 140 MMOL/L (ref 135–147)
TRIGL SERPL-MCNC: 178 MG/DL
WBC # BLD AUTO: 7.33 THOUSAND/UL (ref 4.31–10.16)

## 2023-05-19 RX ORDER — METHYLPREDNISOLONE SODIUM SUCCINATE 125 MG/2ML
60 INJECTION, POWDER, LYOPHILIZED, FOR SOLUTION INTRAMUSCULAR; INTRAVENOUS EVERY 12 HOURS SCHEDULED
Status: DISCONTINUED | OUTPATIENT
Start: 2023-05-19 | End: 2023-05-20

## 2023-05-19 RX ORDER — INSULIN LISPRO 100 [IU]/ML
1-5 INJECTION, SOLUTION INTRAVENOUS; SUBCUTANEOUS
Status: DISCONTINUED | OUTPATIENT
Start: 2023-05-19 | End: 2023-05-22

## 2023-05-19 RX ORDER — ONDANSETRON 2 MG/ML
4 INJECTION INTRAMUSCULAR; INTRAVENOUS EVERY 6 HOURS PRN
Status: DISCONTINUED | OUTPATIENT
Start: 2023-05-19 | End: 2023-05-20

## 2023-05-19 RX ORDER — LEVALBUTEROL INHALATION SOLUTION 1.25 MG/3ML
1.25 SOLUTION RESPIRATORY (INHALATION)
Status: DISCONTINUED | OUTPATIENT
Start: 2023-05-19 | End: 2023-05-23

## 2023-05-19 RX ORDER — AMIODARONE HYDROCHLORIDE 100 MG/1
200 TABLET ORAL
Status: DISCONTINUED | OUTPATIENT
Start: 2023-05-20 | End: 2023-05-25 | Stop reason: HOSPADM

## 2023-05-19 RX ORDER — METOPROLOL TARTRATE 5 MG/5ML
5 INJECTION INTRAVENOUS ONCE
Status: COMPLETED | OUTPATIENT
Start: 2023-05-19 | End: 2023-05-19

## 2023-05-19 RX ORDER — ALBUMIN (HUMAN) 12.5 G/50ML
25 SOLUTION INTRAVENOUS ONCE
Status: COMPLETED | OUTPATIENT
Start: 2023-05-19 | End: 2023-05-19

## 2023-05-19 RX ORDER — ALBUMIN (HUMAN) 12.5 G/50ML
SOLUTION INTRAVENOUS
Status: DISPENSED
Start: 2023-05-19 | End: 2023-05-19

## 2023-05-19 RX ORDER — LEVOTHYROXINE SODIUM 0.05 MG/1
50 TABLET ORAL
Status: DISCONTINUED | OUTPATIENT
Start: 2023-05-19 | End: 2023-05-25 | Stop reason: HOSPADM

## 2023-05-19 RX ORDER — AMIODARONE HYDROCHLORIDE 100 MG/1
200 TABLET ORAL
Status: DISCONTINUED | OUTPATIENT
Start: 2023-06-18 | End: 2023-05-25 | Stop reason: HOSPADM

## 2023-05-19 RX ORDER — CEFTRIAXONE 1 G/50ML
1000 INJECTION, SOLUTION INTRAVENOUS EVERY 24 HOURS
Status: COMPLETED | OUTPATIENT
Start: 2023-05-19 | End: 2023-05-22

## 2023-05-19 RX ORDER — DEXMEDETOMIDINE HYDROCHLORIDE 4 UG/ML
.1-.7 INJECTION, SOLUTION INTRAVENOUS
Status: DISCONTINUED | OUTPATIENT
Start: 2023-05-19 | End: 2023-05-20

## 2023-05-19 RX ORDER — ALBUMIN, HUMAN INJ 5% 5 %
25 SOLUTION INTRAVENOUS ONCE
Status: COMPLETED | OUTPATIENT
Start: 2023-05-19 | End: 2023-05-19

## 2023-05-19 RX ORDER — OLANZAPINE 10 MG/1
10 TABLET, ORALLY DISINTEGRATING ORAL
Status: DISCONTINUED | OUTPATIENT
Start: 2023-05-19 | End: 2023-05-19

## 2023-05-19 RX ORDER — AMIODARONE HYDROCHLORIDE 100 MG/1
200 TABLET ORAL 2 TIMES DAILY WITH MEALS
Status: DISCONTINUED | OUTPATIENT
Start: 2023-06-03 | End: 2023-05-25 | Stop reason: HOSPADM

## 2023-05-19 RX ORDER — HYDRALAZINE HYDROCHLORIDE 20 MG/ML
5 INJECTION INTRAMUSCULAR; INTRAVENOUS EVERY 6 HOURS PRN
Status: DISCONTINUED | OUTPATIENT
Start: 2023-05-19 | End: 2023-05-25 | Stop reason: HOSPADM

## 2023-05-19 RX ORDER — LANOLIN ALCOHOL/MO/W.PET/CERES
6 CREAM (GRAM) TOPICAL
Status: DISCONTINUED | OUTPATIENT
Start: 2023-05-19 | End: 2023-05-25 | Stop reason: HOSPADM

## 2023-05-19 RX ORDER — FUROSEMIDE 10 MG/ML
10 INJECTION INTRAMUSCULAR; INTRAVENOUS ONCE
Status: COMPLETED | OUTPATIENT
Start: 2023-05-19 | End: 2023-05-19

## 2023-05-19 RX ORDER — GUAIFENESIN 600 MG/1
600 TABLET, EXTENDED RELEASE ORAL EVERY 12 HOURS SCHEDULED
Status: DISCONTINUED | OUTPATIENT
Start: 2023-05-19 | End: 2023-05-25 | Stop reason: HOSPADM

## 2023-05-19 RX ORDER — MINERAL OIL AND PETROLATUM 150; 830 MG/G; MG/G
OINTMENT OPHTHALMIC
Status: DISCONTINUED | OUTPATIENT
Start: 2023-05-19 | End: 2023-05-25 | Stop reason: HOSPADM

## 2023-05-19 RX ADMIN — METRONIDAZOLE 500 MG: 500 INJECTION, SOLUTION INTRAVENOUS at 11:30

## 2023-05-19 RX ADMIN — HYDRALAZINE HYDROCHLORIDE 5 MG: 20 INJECTION INTRAMUSCULAR; INTRAVENOUS at 09:08

## 2023-05-19 RX ADMIN — Medication 6 MG: at 21:02

## 2023-05-19 RX ADMIN — CHLORHEXIDINE GLUCONATE 0.12% ORAL RINSE 15 ML: 1.2 LIQUID ORAL at 08:30

## 2023-05-19 RX ADMIN — AZITHROMYCIN MONOHYDRATE 500 MG: 500 INJECTION, POWDER, LYOPHILIZED, FOR SOLUTION INTRAVENOUS at 03:33

## 2023-05-19 RX ADMIN — ALBUMIN (HUMAN) 25 G: 2.5 SOLUTION INTRAVENOUS at 03:37

## 2023-05-19 RX ADMIN — APIXABAN 5 MG: 5 TABLET, FILM COATED ORAL at 11:52

## 2023-05-19 RX ADMIN — GUAIFENESIN 600 MG: 600 TABLET ORAL at 11:59

## 2023-05-19 RX ADMIN — LEVALBUTEROL HYDROCHLORIDE 1.25 MG: 1.25 SOLUTION RESPIRATORY (INHALATION) at 12:39

## 2023-05-19 RX ADMIN — METHYLPREDNISOLONE SODIUM SUCCINATE 60 MG: 125 INJECTION, POWDER, FOR SOLUTION INTRAMUSCULAR; INTRAVENOUS at 05:09

## 2023-05-19 RX ADMIN — METOPROLOL TARTRATE 5 MG: 5 INJECTION INTRAVENOUS at 08:31

## 2023-05-19 RX ADMIN — SENNOSIDES AND DOCUSATE SODIUM 2 TABLET: 50; 8.6 TABLET ORAL at 21:02

## 2023-05-19 RX ADMIN — FAMOTIDINE 20 MG: 10 INJECTION, SOLUTION INTRAVENOUS at 18:08

## 2023-05-19 RX ADMIN — ONDANSETRON 4 MG: 2 INJECTION INTRAMUSCULAR; INTRAVENOUS at 16:23

## 2023-05-19 RX ADMIN — INSULIN LISPRO 1 UNITS: 100 INJECTION, SOLUTION INTRAVENOUS; SUBCUTANEOUS at 17:06

## 2023-05-19 RX ADMIN — HYDRALAZINE HYDROCHLORIDE 5 MG: 20 INJECTION INTRAMUSCULAR; INTRAVENOUS at 21:11

## 2023-05-19 RX ADMIN — ALBUMIN (HUMAN) 25 G: 0.25 INJECTION, SOLUTION INTRAVENOUS at 03:45

## 2023-05-19 RX ADMIN — PROPOFOL 25 MCG/KG/MIN: 10 INJECTION, EMULSION INTRAVENOUS at 03:17

## 2023-05-19 RX ADMIN — ENOXAPARIN SODIUM 40 MG: 40 INJECTION SUBCUTANEOUS at 08:30

## 2023-05-19 RX ADMIN — FAMOTIDINE 20 MG: 10 INJECTION, SOLUTION INTRAVENOUS at 08:30

## 2023-05-19 RX ADMIN — METRONIDAZOLE 500 MG: 500 INJECTION, SOLUTION INTRAVENOUS at 20:44

## 2023-05-19 RX ADMIN — ONDANSETRON 4 MG: 2 INJECTION INTRAMUSCULAR; INTRAVENOUS at 23:25

## 2023-05-19 RX ADMIN — AMIODARONE HYDROCHLORIDE 0.5 MG/MIN: 50 INJECTION, SOLUTION INTRAVENOUS at 18:08

## 2023-05-19 RX ADMIN — AMIODARONE HYDROCHLORIDE 150 MG: 50 INJECTION, SOLUTION INTRAVENOUS at 11:52

## 2023-05-19 RX ADMIN — CHLORHEXIDINE GLUCONATE 0.12% ORAL RINSE 15 ML: 1.2 LIQUID ORAL at 20:44

## 2023-05-19 RX ADMIN — LEVOTHYROXINE SODIUM 50 MCG: 50 TABLET ORAL at 11:53

## 2023-05-19 RX ADMIN — CYANOCOBALAMIN TAB 500 MCG 250 MCG: 500 TAB at 11:59

## 2023-05-19 RX ADMIN — GUAIFENESIN 600 MG: 600 TABLET ORAL at 20:55

## 2023-05-19 RX ADMIN — ONDANSETRON 4 MG: 2 INJECTION INTRAMUSCULAR; INTRAVENOUS at 11:29

## 2023-05-19 RX ADMIN — LEVALBUTEROL HYDROCHLORIDE 1.25 MG: 1.25 SOLUTION RESPIRATORY (INHALATION) at 21:45

## 2023-05-19 RX ADMIN — METHYLPREDNISOLONE SODIUM SUCCINATE 60 MG: 125 INJECTION, POWDER, FOR SOLUTION INTRAMUSCULAR; INTRAVENOUS at 20:43

## 2023-05-19 RX ADMIN — ACETAMINOPHEN 975 MG: 650 SUSPENSION ORAL at 00:15

## 2023-05-19 RX ADMIN — SODIUM CHLORIDE 200 MG: 9 INJECTION, SOLUTION INTRAVENOUS at 13:13

## 2023-05-19 RX ADMIN — INSULIN LISPRO 1 UNITS: 100 INJECTION, SOLUTION INTRAVENOUS; SUBCUTANEOUS at 01:15

## 2023-05-19 RX ADMIN — INSULIN LISPRO 1 UNITS: 100 INJECTION, SOLUTION INTRAVENOUS; SUBCUTANEOUS at 11:35

## 2023-05-19 RX ADMIN — APIXABAN 5 MG: 5 TABLET, FILM COATED ORAL at 18:08

## 2023-05-19 RX ADMIN — METRONIDAZOLE 500 MG: 500 INJECTION, SOLUTION INTRAVENOUS at 05:06

## 2023-05-19 RX ADMIN — INSULIN LISPRO 1 UNITS: 100 INJECTION, SOLUTION INTRAVENOUS; SUBCUTANEOUS at 05:15

## 2023-05-19 RX ADMIN — CEFTRIAXONE 1000 MG: 1 INJECTION, SOLUTION INTRAVENOUS at 12:00

## 2023-05-19 RX ADMIN — ACETAMINOPHEN 975 MG: 650 SUSPENSION ORAL at 05:06

## 2023-05-19 RX ADMIN — ACETAMINOPHEN 975 MG: 650 SUSPENSION ORAL at 18:08

## 2023-05-19 RX ADMIN — DEXMEDETOMIDINE HYDROCHLORIDE 0.1 MCG/KG/HR: 400 INJECTION INTRAVENOUS at 22:50

## 2023-05-19 RX ADMIN — AMIODARONE HYDROCHLORIDE 1 MG/MIN: 50 INJECTION, SOLUTION INTRAVENOUS at 12:02

## 2023-05-19 RX ADMIN — CEFEPIME HYDROCHLORIDE 2000 MG: 2 INJECTION, SOLUTION INTRAVENOUS at 08:30

## 2023-05-19 RX ADMIN — FUROSEMIDE 10 MG: 10 INJECTION, SOLUTION INTRAMUSCULAR; INTRAVENOUS at 09:08

## 2023-05-19 NOTE — PLAN OF CARE
Problem: SAFETY,RESTRAINT: NV/NON-SELF DESTRUCTIVE BEHAVIOR  Goal: Remains free of harm/injury (restraint for non violent/non self-detsructive behavior)  Description: INTERVENTIONS:  - Instruct patient/family regarding restraint use   - Assess and monitor physiologic and psychological status   - Provide interventions and comfort measures to meet assessed patient needs   - Identify and implement measures to help patient regain control  - Assess readiness for release of restraint   Outcome: Completed  Goal: Returns to optimal restraint-free functioning  Description: INTERVENTIONS:  - Assess the patient's behavior and symptoms that indicate continued need for restraint  - Identify and implement measures to help patient regain control  - Assess readiness for release of restraint   Outcome: Completed     Problem: INFECTION - ADULT  Goal: Absence or prevention of progression during hospitalization  Description: INTERVENTIONS:  - Assess and monitor for signs and symptoms of infection  - Monitor lab/diagnostic results  - Monitor all insertion sites, i e  indwelling lines, tubes, and drains  - Monitor endotracheal if appropriate and nasal secretions for changes in amount and color  - [place] appropriate cooling/warming therapies per order  - Administer medications as ordered  - Instruct and encourage patient and family to use good hand hygiene technique  - Identify and instruct in appropriate isolation precautions for identified infection/condition  Outcome: Progressing     Problem: CARDIOVASCULAR - ADULT  Goal: Maintains optimal cardiac output and hemodynamic stability  Description: INTERVENTIONS:  - Monitor I/O, vital signs and rhythm  - Monitor for S/S and trends of decreased cardiac output  - Administer and titrate ordered vasoactive medications to optimize hemodynamic stability  - Assess quality of pulses, skin color and temperature  - Assess for signs of decreased coronary artery perfusion  - Instruct patient to report change in severity of symptoms  Outcome: Progressing  Goal: Absence of cardiac dysrhythmias or at baseline rhythm  Description: INTERVENTIONS:  - Continuous cardiac monitoring, vital signs, obtain 12 lead EKG if ordered  - Administer antiarrhythmic and heart rate control medications as ordered  - Monitor electrolytes and administer replacement therapy as ordered  Outcome: Progressing     Problem: RESPIRATORY - ADULT  Goal: Achieves optimal ventilation and oxygenation  Description: INTERVENTIONS:  - Assess for changes in respiratory status  - Assess for changes in mentation and behavior  - Position to facilitate oxygenation and minimize respiratory effort  - Oxygen administered by appropriate delivery if ordered  - Initiate smoking cessation education as indicated  - Encourage broncho-pulmonary hygiene including cough, deep breathe, Incentive Spirometry  - Assess the need for suctioning and aspirate as needed  - Assess and instruct to report SOB or any respiratory difficulty  - Respiratory Therapy support as indicated  Outcome: Progressing

## 2023-05-19 NOTE — ASSESSMENT & PLAN NOTE
Either related to current respiratory illness were underlying sick sinus syndrome  For now will load with amiodarone and also add Eliquis  I am not committing to either for the longer term however and this will ultimately be readdressed in a month or so

## 2023-05-19 NOTE — RESPIRATORY THERAPY NOTE
05/18/23 1936   Respiratory Assessment   Respiratory Pattern Assisted   Chest Assessment Chest expansion symmetrical   Bilateral Breath Sounds Diminished   Suction ET Tube   Resp Comments   (pt received on current vent settings without incident  pt suctioned as needed  ETT secure   will continue to monitor pt)   O2 Device vent   Vent Information   Vent ID [id number]   Vent type     Vent Mode AC/VC   $ Pulse Oximetry Spot Check Charge Completed   AC/VC Settings   Resp Rate (BPM) 18 BPM   Vt (mL) 470 mL   FIO2 (%) 40 %   PEEP (cmH2O) 6 cmH2O   Flow Pattern (LPM) 60 L/min   Trigger Sensitivity Flow (lpm) 3 %   Humidification Heater   Heater Temperature (Set) 98 6 °F (37 °C)   AC/VC Actuals   Resp Rate (BPM) 18 BPM   VT (mL) 502   MV 9 11   MAP (cmH2O) 9 6 cmH2O   Peak Pressure (cmH2O) 21 cmH2O   I/E Ratio (Obs) 1:2 9   Heater Temperature (Obs) 98 6 °F (37 °C)   Static Compliance (mL/cmH20) 64 mL/cmH2O   Plateau Pressure (cm H2O) 14 cm H2O   AC/VC Alarms   High Peak Pressure (cmH2O) 45   High Resp Rate (BPM) 40 BPM   High MV (L/min) 20 L/min   Low MV (L/min) 4 L/min   Vt High (mL) 1400 mL   Vt Low (mL) 350 mL   AC/VC Apnea Settings   Resp Rate (BPM) 18 BPM   VT (mL) 470 mL   FIO2 (%) 100 %   Apnea Time (s) 20 S   Apnea Flow (L/min) 60 L/min   Maintenance   Alarm (pink) cable attached No   Resuscitation bag with peep valve at bedside Yes   Water bag changed No   Circuit changed No   IHI Ventilator Associated Pneumonia Bundle   Head of Bed Elevated HOB 30   ETT  Oral 7 5 mm   Placement Date: 05/15/23   Previously placed by?: Present on admission  Type: Oral  Tube Size: 7 5 mm  Secured at (cm): 26@ lips  Comments: pt intubated in field   Secured at (cm) 26   Measured from Lips   Secured Location Left   Repositioned Center to Left   Secured by Commercial tube little   Site Condition Dry   Cuff Pressure (cm H2O)   (green zone)   HI-LO Suction  Continuous low suction   HI-LO Secretions Scant   HI-LO Intervention Patent RT Ventilator Management Note  Celestine Bryant 67 y o  male MRN: [id number]  Unit/Bed#: ICU 04-01 Encounter: [id number]      Daily Screen         5/18/2023  0428 5/18/2023  0741          Patient safety screen outcome[de-identified] Failed Passed      Not Ready for Weaning due to[de-identified] Underline problem not resolved --      Spont breathing trial % for 30 min: -- Yes                Physical Exam:   Respiratory Pattern: Assisted  Chest Assessment: Chest expansion symmetrical  Bilateral Breath Sounds: Diminished  Suction: ET Tube  O2 Device: vent      Resp Comments: (P)  (pt received on current vent settings without incident  pt suctioned as needed  ETT secure   will continue to monitor pt)

## 2023-05-19 NOTE — RESPIRATORY THERAPY NOTE
05/19/23 0321   Respiratory Assessment   Assessment Type Assess only   General Appearance Sedated   Respiratory Pattern Assisted   Chest Assessment Chest expansion symmetrical   Bilateral Breath Sounds Diminished;Coarse   Resp Comments uneventful shift pt sedated cira well will cont to monitor   O2 Device vent   Vent Information   Vent ID [id number]   Vent type     Vent Mode AC/VC   $ Pulse Oximetry Spot Check Charge Completed   SpO2 94 %   AC/VC Settings   Resp Rate (BPM) 18 BPM   Vt (mL) 470 mL   FIO2 (%) 40 %   PEEP (cmH2O) 6 cmH2O   Flow Pattern (LPM) 60 L/min   Trigger Sensitivity Flow (lpm) 3 %   Humidification Heater   Heater Temperature (Set) 98 6 °F (37 °C)   AC/VC Actuals   Resp Rate (BPM) 18 BPM   VT (mL) 503   MV 8 82   MAP (cmH2O) 10 cmH2O   Peak Pressure (cmH2O) 22 cmH2O   I/E Ratio (Obs) 1:2 9   Heater Temperature (Obs) 98 4 °F (36 9 °C)   Static Compliance (mL/cmH20) 52 mL/cmH2O   Plateau Pressure (cm H2O) 16 cm H2O   AC/VC Alarms   High Peak Pressure (cmH2O) 45   High Resp Rate (BPM) 40 BPM   High MV (L/min) 20 L/min   Low MV (L/min) 4 L/min   Vt High (mL) 1000 mL   Vt Low (mL) 350 mL   AC/VC Apnea Settings   Resp Rate (BPM) 18 BPM   VT (mL) 470 mL   FIO2 (%) 100 %   Apnea Time (s) 20 S   Apnea Flow (L/min) 60 L/min   Maintenance   Alarm (pink) cable attached No   Resuscitation bag with peep valve at bedside Yes   Water bag changed No   Circuit changed No   IHI Ventilator Associated Pneumonia Bundle   Daily Assessment of Readiness to Extubate Yes   Head of Bed Elevated HOB 30   ETT  Oral 7 5 mm   Placement Date: 05/15/23   Previously placed by?: Present on admission  Type: Oral  Tube Size: 7 5 mm  Secured at (cm): 26@ lips  Comments: pt intubated in field   Secured at (cm) 26   Measured from Lips   Secured Location Left   Secured by Commercial tube little  (unchanged)   Site Condition Dry; Cool   Cuff Pressure (cm H2O)   (green zone)   HI-LO Suction  Continuous low suction   HI-LO Secretions Scant;Thin;Tan   HI-LO Intervention Patent

## 2023-05-19 NOTE — RESPIRATORY THERAPY NOTE
05/19/23 0100   Respiratory Assessment   Assessment Type Assess only   General Appearance Sedated   Respiratory Pattern Assisted   Chest Assessment Chest expansion symmetrical   Bilateral Breath Sounds Diminished;Coarse   Suction ET Tube   Resp Comments pt remains on vent day 5, ACVC 18 470 40% +6, cira well, pt has permenant pacemaker palced 5/18, company here to interegate, no vent changes will cont to monitor   O2 Device vent   Vent Information   Vent ID [id number]   Vent type     Vent Mode AC/VC   $ Vent Daily Charge-Subsequent Yes   $ Pulse Oximetry Spot Check Charge Completed   SpO2 94 %   AC/VC Settings   Resp Rate (BPM) 18 BPM   Vt (mL) 470 mL   FIO2 (%) 40 %   PEEP (cmH2O) 6 cmH2O   Flow Pattern (LPM) 60 L/min   Trigger Sensitivity Flow (lpm) 3 %   Humidification Heater   Heater Temperature (Set) 98 6 °F (37 °C)   AC/VC Actuals   Resp Rate (BPM) 18 BPM   VT (mL) 503   MV 8 83   MAP (cmH2O) 9 6 cmH2O   Peak Pressure (cmH2O) 18 cmH2O   I/E Ratio (Obs) 1:2 9   Heater Temperature (Obs) 98 4 °F (36 9 °C)   Static Compliance (mL/cmH20) 54 mL/cmH2O   Plateau Pressure (cm H2O) 15 cm H2O   AC/VC Alarms   High Peak Pressure (cmH2O) 45   High Resp Rate (BPM) 40 BPM   High MV (L/min) 20 L/min   Low MV (L/min) 4 L/min   Vt High (mL) 1000 mL   Vt Low (mL) 350 mL   AC/VC Apnea Settings   Resp Rate (BPM) 18 BPM   VT (mL) 470 mL   FIO2 (%) 100 %   Apnea Time (s) 20 S   Apnea Flow (L/min) 60 L/min   Maintenance   Alarm (pink) cable attached No   Resuscitation bag with peep valve at bedside Yes   Water bag changed No   Circuit changed No   IHI Ventilator Associated Pneumonia Bundle   Daily Assessment of Readiness to Extubate Yes   Head of Bed Elevated HOB 30   ETT  Oral 7 5 mm   Placement Date: 05/15/23   Previously placed by?: Present on admission  Type: Oral  Tube Size: 7 5 mm  Secured at (cm): 26@ lips  Comments: pt intubated in field   Secured at (cm) 26   Measured from Lips   Secured Location Left   Secured by Commercial tube little  (unchanged)   Site Condition Dry; Cool   Cuff Pressure (cm H2O)   (green zone)   HI-LO Suction  Continuous low suction   HI-LO Secretions Scant;Thin;Tan   HI-LO Intervention Patent     RT Ventilator Management Note  Orlando Sorensen 67 y o  male MRN: [id number]  Unit/Bed#: ICU 04-01 Encounter: [id number]      Daily Screen         5/18/2023  0428 5/18/2023  0741          Patient safety screen outcome[de-identified] Failed Passed      Not Ready for Weaning due to[de-identified] Underline problem not resolved --      Spont breathing trial % for 30 min: -- Yes                Physical Exam:   Assessment Type: Assess only  General Appearance: Sedated  Respiratory Pattern: Assisted  Chest Assessment: Chest expansion symmetrical  Bilateral Breath Sounds: Diminished, Coarse  Suction: ET Tube  O2 Device: vent      Resp Comments: pt remains on vent day 5, ACVC 18 470 40% +6, cira well, pt has permenant pacemaker palced 5/18, company here to interegate, no vent changes will cont to monitor

## 2023-05-19 NOTE — ASSESSMENT & PLAN NOTE
· POD 1 LCW ICD/pacer placement for SSS  · Overnight with ventricular paced tachycardia with rate 120  · S/p  Interrogation with improvement  · Monitor on tele

## 2023-05-19 NOTE — RESPIRATORY THERAPY NOTE
05/19/23 0819   Respiratory Assessment   Resp Comments Pt extubated to 2lnc  Coarse BS bilat, no stridor, strong voice  Pt cira very well  Will cont  to monitor     Oxygen Therapy/Pulse Ox   O2 Device Nasal cannula   Nasal Cannula O2 Flow Rate (L/min) 2 L/min   Calculated FIO2 (%) - Nasal Cannula 28   SpO2 95 %

## 2023-05-19 NOTE — NUTRITION
05/19/23 1230   Biochemical Data,Medical Tests, and Procedures   Biochemical Data/Medical Tests/Procedures Lab values reviewed; Meds reviewed   Labs (Comment) 5/19/23 glucose 169, P 2 2, H&H 9 3/28 7   Meds (Comment) eliquis, vitamin B12, pepcid, insulin, levothyroxine, senna   Nutrition-Focused Physical Exam   Nutrition-Focused Physical Exam Findings RN skin assessment reviewed; No edema documented; Wound   Current PO Intake   Current Diet Order Cardiac diet, thin liquids   Current Meal Intake Other (Comment)  (diet recently initiated)   PES Statement   Problem Continue previous diagnosis   Recommendations/Interventions   Summary Nutrition follow up assessment  Extubated 5/19 to 2 L NC  No edema  Wound at chest noted per flow sheets  Passed RN dysphagia screening  Diet recently initiated, pt was NPO  Ordered for Cardiac diet, thin liquids  Diet as ordered is appropriate  Pt reports having a good appetite  He has 3 meals daily, follows a regular diet with NKFA  Reports no difficulties chewing or swallowing at baseline  Pt's wife cooks and grocery shops  Pt reports use of salt at home - adds to food at table  RD continues following  Interventions/Recommendations Continue current diet order;Monitor I & O's   Education Assessment   Education Patient/caregiver not appropriate for education at this time  (extubated today, will assess need for cardiac diet education at follow up)   Patient Nutrition Goals   Goal Adequate hydration; Adequate intake

## 2023-05-19 NOTE — PROGRESS NOTES
[address]  Progress Note  Name: Nancy Donato  MRN: [id number]  Unit/Bed#: ICU 04-01 I Date of Admission: 5/16/2023   Date of Service: 5/19/2023 I Hospital Day: 3    Assessment/Plan   * Cardiac arrest [facility])  Assessment & Plan  · Events on 05/16:  · Wife reports witnessing patient vomiting, choking, and, then, went unresponsive - wife immediately started CPR  · PEA arrest on EMS arrival - received 2 rounds of ACLS and achieved ROSC  · Intubated in field and transferred to [facility] ED  · Bradycardic/hypotensive on arrival to [facility] ED - required vasopressors and Atropine  · Unclear etiology: ? arrhythmia vs hypoxic event 2/2 aspiration event vs vasovagal response 2/2 choking w/underlying (chronic) bradycardia  · Episode of sustained VT after ETT suctioning requiring Amio bolus and cardioversion x1 on 05/16   · VT/[place] arrest after suctioning on 05/17 - received CPR x2 rounds, Epi x1, shock x2 - ROSC achieved w/AF RVR, then conversion to sinus bradycardia w/rates in 40s  · Is s/p temporary TVP on 05/17  · Now s/p AICD on 05/18  · TTE from 05/16 w/EF 65%; no RWMA  · No indication for TTM given patient following commands  · Maintain normothermia and normoglycemia  · Cardiology following - appreciate recommendations  · Frequent neuro checks    s/p ICD  Assessment & Plan  · POD 1 LCW ICD/pacer placement for SSS  · Overnight with ventricular paced tachycardia with rate 120  · S/p  Interrogation with improvement  · Monitor on tele     Shock [facility])  Assessment & Plan  · In setting of cardiac arrest w/arrhythmias as noted above as well as aspiration pneumonia   · CT C/A/P on admission to [facility] revealed significant worsening of bilateral lower lobe aspiration/pneumonia  · TTE as noted above  · COVID/FLU/RSV negative  · Blood cultures from 05/15 and 05/16 w/NGTD  · UA w/out evidence of infection  · Urine strep/legionella negative  · Sputum culture positive for 2+ Staph aureus - sensitivities pending  · BALs from 05/17 pending  · Lactic acid 5 6 on admission to [facility] (in setting of cardiac arrest) - has since cleared  · Procalcitonin 1 05 - 1 90 - 2 17  · Is s/p aggressive IVF resuscitation  · No longer requiring vasopressor support - maintain MAP > 65  · Continue IV Cefepime/Flagyl/Azithromycin, total antibiotic D#4 - can likely deescalate antibiotics pending sputum sensitivities   · Monitor fever and WBC curve  · Trend procalcitonin    Aspiration pneumonia (HCC)  Assessment & Plan  · Please see plan as noted above    Acute respiratory failure with hypoxia ([address] )  Assessment & Plan  · Intubated in field 2/2 cardiac arrest - had witnessed aspiration event  · Imaging consistent w/bilateral lower lobe pneumonia  · Sputum culture positive for 2+ Staph aureus - sensitivities pending  · Urine strep/legionella negative  · Is s/p bronch on 05/17 - BALs pending  · Current vent settings: ACVC 18/470/40/6  · Follow ABG and adjust vent settings as necessary  · Currently sedated w/IV Propofol/Fentanyl gtt's for goal RASS -1 to -2  · Continue IV antibiotics for aspiration pneumonia as noted above  · Continue w/IV diuresis as patient is +9L overall  · Aggressive pulmonary hygiene  · Daily SAT/SBT  · VAP precautions  · Titrate FiO2 for goal SpO2 > 90%    Bradycardia  Assessment & Plan  · Bradycardic in 30s to 40s since admission  · Unclear etiology, though suspect this may be chronic   · Is s/p temporary TVP on 05/17 as noted above  · Now s/p AICD implantation on 05/18  · Lyme Ab negative  · [facility] Spotted Fever IgG/M pending  · TSH 5 976 w/T4 0 67, however this may be inaccurate s/p cardiac arrest/shock state  · T3 0 80  · Cardiology following - appreciate recommendations  · Continue cardiac monitoring    Elevated troponin  Assessment & Plan  · Troponin 198 - 967 - 281 - 375  · Non-MI elevation in setting of cardiac arrest, shock state  · TTE w/out RWMA  · ECG w/out ST elevations  · Continue cardiac monitoring    Acute kidney injury [facility])  Assessment & Plan  · Creatinine 1 42 on admission to [facility]  · Baseline creatinine unclear: only previous creatinine noted in 2020 was 1 08  · In setting of cardiac arrest, hypoperfusion, likely hypovolemia, shock state w/hypotension  · Is s/p aggressive IVF resuscitation  · Continue vasopressor support to maintain MAP > 65 - currently requiring low dose Levophed gtt  · Avoid nephrotoxic agents and further hypotension  · Trend renal indices  · Optimize electrolytes  · Strict I/O  · Daily weights    Anemia  Assessment & Plan  · Hgb trended down to 10 9  · Baseline Hgb unclear given no prior records available   · Suspect this is 2/2 shock state 2/2 cardiac arrest vs FELIX  · Iron sat 7, folate 19 2, B12 139  · Will require IV Venofer when infectious process has improved as well as PO B12/iron supplementation when appropriate  · Trend Hgb and transfuse for Hgb < 7    Closed fracture of multiple ribs of both sides  Assessment & Plan  · CT C/A/P on admission to [facility] revealed right 2nd-6th anterior rib fractures; left second-fifth anterior rib fractures  · Continue IV Fentanyl gtt while intubated as well as scheduled Lidocaine patches and APAP             ICU Core Measures     Vented Patient  VAP Bundle  VAP bundle ordered     A: Assess, Prevent, and Manage Pain · Has pain been assessed? Yes  · Need for changes to pain regimen? No   B: Both Spontaneous Awakening Trials (SATs) and Spontaneous Breathing Trials (SBTs) · Plan to perform spontaneous awakening trial today? Yes   · Plan to perform spontaneous breathing trial today? Yes   · Obvious barriers to extubation? No   C: Choice of Sedation · RASS Goal: 0 Alert and Calm  · Need for changes to sedation or analgesia regimen? No   D: Delirium · CAM-ICU: Unable to perform secondary to Acute cognitive dysfunction   E: Early Mobility  · Plan for early mobility? Yes   F: Family Engagement · Plan for family engagement today?  Yes Antibiotic Review: Continue broad spectrum secondary to severity of illness  Review of Invasive Devices: Pat Plan: Continue for accurate I/O monitoring for 48 hours  Central access plan: Will obtain peripheral access and discontinue prior to transfer  [place] Plan: Discontinue arterial line    Prophylaxis:  VTE VTE covered by:  enoxaparin, Subcutaneous, 40 mg at 05/18/23 0817       Stress Ulcer  covered byFamotidine (PF) (PEPCID) injection 20 mg [[id number]]       Subjective   HPI/24hr events:   · Titrated off of vasopressors   · Underwent ICD placement last evening, overnight with paced ventricular tachycardic rhythm - interrogated and adjusted with improvement  · Received 20mg IV lasix  · No other acute events    Review of Systems   Unable to perform ROS: Intubated        Objective                            Vitals I/O      Most Recent Min/Max in 24hrs   Temp (!) 97 3 °F (36 3 °C) Temp  Min: 95 7 °F (35 4 °C)  Max: 99 3 °F (37 4 °C)   Pulse 60 Pulse  Min: 60  Max: 112   Resp 18 Resp  Min: 5  Max: 21   BP 99/56 No data recorded   O2 Sat 93 % SpO2  Min: 93 %  Max: 100 %      Intake/Output Summary (Last 24 hours) at 5/19/2023 0538  Last data filed at 5/19/2023 1056  Gross per 24 hour   Intake 3261 6 ml   Output 1405 ml   Net 1856 6 ml         Diet NPO     Invasive Monitoring Physical exam   Arterial Line  Janell BP    No data recorded   MAP    No data recorded    Physical Exam  Constitutional:       General: He is not in acute distress  Appearance: He is ill-appearing  Interventions: He is sedated and intubated  HENT:      Head: Normocephalic and atraumatic  Mouth/Throat:      Mouth: Mucous membranes are moist    Eyes:      Pupils: Pupils are equal, round, and reactive to light  Cardiovascular:      Rate and Rhythm: Normal rate and regular rhythm  Pulses: Normal pulses  Heart sounds: Normal heart sounds  No murmur heard  No friction rub  No gallop        Comments: paced  Pulmonary:      Effort: Pulmonary effort is normal  No respiratory distress  He is intubated  Breath sounds: Rhonchi present  No wheezing or rales  Abdominal:      General: Bowel sounds are normal  There is no distension  Palpations: Abdomen is soft  Tenderness: There is no abdominal tenderness  Musculoskeletal:         General: Swelling present  Cervical back: Neck supple  Right lower leg: Edema present  Left lower leg: Edema present  Skin:     General: Skin is warm and dry  Capillary Refill: Capillary refill takes less than 2 seconds  Coloration: Skin is pale  Neurological:      Comments: Intubated and sedated  Opens eyes to voice  Follows commands weakly in all extremities  Diagnostic Studies      EKG: A pace 60  Imaging:  I have personally reviewed pertinent reports     and I have personally reviewed pertinent films in PACS     Medications:  Scheduled PRN   acetaminophen, 975 mg, Q6H  azithromycin, 500 mg, Q24H  cefepime, 2,000 mg, Q12H  chlorhexidine, 15 mL, Q12H BAYRON  enoxaparin, 40 mg, Daily  Famotidine (PF), 20 mg, BID  insulin lispro, 1-6 Units, Q6H BAYRON  lidocaine, 1 patch, Daily  lidocaine, 1 patch, Daily  methylPREDNISolone sodium succinate, 60 mg, Q8H BAYRON  metroNIDAZOLE, 500 mg, Q8H  senna-docusate sodium, 2 tablet, HS      albuterol, 2 5 mg, Q4H PRN  EPINEPHrine, , Code/Trauma/Sedation Med  fentanyl citrate (PF), 50 mcg, Q1H PRN       Continuous    fentaNYL, 50 mcg/hr, Last Rate: 50 mcg/hr (05/18/23 1724)  norepinephrine, 1-30 mcg/min, Last Rate: Stopped (05/18/23 1722)  propofol, 5-50 mcg/kg/min, Last Rate: 25 mcg/kg/min (05/19/23 0317)         Labs:    CBC    Recent Labs     05/18/23 0439 05/19/23  0513   WBC 7 11 7 33   HGB 10 5* 9 3*   HCT 32 2* 28 7*   * 145*   BANDSPCT 6  --      BMP    Recent Labs     05/18/23 0439 05/18/23 2053   SODIUM 138 139   K 4 4 3 9   * 107   CO2 24 26   AGAP 5 6   BUN 12 18   CREATININE 0 77 1  03   CALCIUM 8 5 8 7       Coags    No recent results     Additional Electrolytes  Recent Labs     05/18/23  0439 05/18/23 2053 05/19/23  0513   MG 2 3 2 2  --    PHOS 1 7* 2 4  --    CAIONIZED 1 18 1 16 1 14          Blood Gas    Recent Labs     05/18/23  0439   PHART 7 391   GQN4PWH 39 5   PO2ART 119 8   HXM2ZIJ 23 4   BEART -1 3   SOURCE Line, Arterial     Recent Labs     05/18/23  0439   SOURCE Line, Arterial    LFTs  Recent Labs     05/17/23  0555 05/18/23  0439   ALT 59* 47   AST 28 26   ALKPHOS 50 49   ALB 3 2* 2 9*   TBILI 0 54 0 47       Infectious  Recent Labs     05/17/23  0555 05/18/23  0439   PROCALCITONI 1 90* 2 17*     Glucose  Recent Labs     05/17/23  0759 05/17/23  1149 05/18/23  0439 05/18/23 2053   GLUC 195* 159* 170* 180*               Critical Care Time Delivered: Upon my evaluation, this patient had a high probability of imminent or life-threatening deterioration due to respiratory failure, which required my direct attention, intervention, and personal management  I have personally provided 28 minutes of critical care time, exclusive of procedures, teaching, family meetings, and any prior time recorded by providers other than myself       Marshall Huynh

## 2023-05-19 NOTE — PLAN OF CARE
Problem: SAFETY,RESTRAINT: NV/NON-SELF DESTRUCTIVE BEHAVIOR  Goal: Remains free of harm/injury (restraint for non violent/non self-detsructive behavior)  Description: INTERVENTIONS:  - Instruct patient/family regarding restraint use   - Assess and monitor physiologic and psychological status   - Provide interventions and comfort measures to meet assessed patient needs   - Identify and implement measures to help patient regain control  - Assess readiness for release of restraint   Outcome: Progressing  Goal: Returns to optimal restraint-free functioning  Description: INTERVENTIONS:  - Assess the patient's behavior and symptoms that indicate continued need for restraint  - Identify and implement measures to help patient regain control  - Assess readiness for release of restraint   Outcome: Progressing     Problem: PAIN - ADULT  Goal: Verbalizes/displays adequate comfort level or baseline comfort level  Description: Interventions:  - Encourage patient to monitor pain and request assistance  - Assess pain using appropriate pain scale  - Administer analgesics based on type and severity of pain and evaluate response  - Implement non-pharmacological measures as appropriate and evaluate response  - Consider cultural and social influences on pain and pain management  - Notify physician/advanced practitioner if interventions unsuccessful or patient reports new pain  Outcome: Progressing     Problem: INFECTION - ADULT  Goal: Absence or prevention of progression during hospitalization  Description: INTERVENTIONS:  - Assess and monitor for signs and symptoms of infection  - Monitor lab/diagnostic results  - Monitor all insertion sites, i e  indwelling lines, tubes, and drains  - Monitor endotracheal if appropriate and nasal secretions for changes in amount and color  - Ray appropriate cooling/warming therapies per order  - Administer medications as ordered  - Instruct and encourage patient and family to use good hand hygiene technique  - Identify and instruct in appropriate isolation precautions for identified infection/condition  Outcome: Progressing  Goal: Absence of fever/infection during neutropenic period  Description: INTERVENTIONS:  - Monitor WBC    Outcome: Progressing     Problem: SAFETY ADULT  Goal: Patient will remain free of falls  Description: INTERVENTIONS:  - Educate patient/family on patient safety including physical limitations  - Instruct patient to call for assistance with activity   - Consult OT/PT to assist with strengthening/mobility   - Keep Call bell within reach  - Keep bed low and locked with side rails adjusted as appropriate  - Keep care items and personal belongings within reach  - Initiate and maintain comfort rounds  - Make Fall Risk Sign visible to staff  - Apply yellow socks and bracelet for high fall risk patients  - Consider moving patient to room near nurses station  Outcome: Progressing  Goal: Maintain or return to baseline ADL function  Description: INTERVENTIONS:  -  Assess patient's ability to carry out ADLs; assess patient's baseline for ADL function and identify physical deficits which impact ability to perform ADLs (bathing, care of mouth/teeth, toileting, grooming, dressing, etc )  - Assess/evaluate cause of self-care deficits   - Assess range of motion  - Assess patient's mobility; develop plan if impaired  - Assess patient's need for assistive devices and provide as appropriate  - Encourage maximum independence but intervene and supervise when necessary  - Involve family in performance of ADLs  - Assess for home care needs following discharge   - Consider OT consult to assist with ADL evaluation and planning for discharge  - Provide patient education as appropriate  Outcome: Progressing  Goal: Maintains/Returns to pre admission functional level  Description: INTERVENTIONS:  - Perform BMAT or MOVE assessment daily    - Set and communicate daily mobility goal to care team and patient/family/caregiver  - Collaborate with rehabilitation services on mobility goals if consulted  - Out of bed for toileting  - Record patient progress and toleration of activity level   Outcome: Progressing     Problem: DISCHARGE PLANNING  Goal: Discharge to home or other facility with appropriate resources  Description: INTERVENTIONS:  - Identify barriers to discharge w/patient and caregiver  - Arrange for needed discharge resources and transportation as appropriate  - Identify discharge learning needs (meds, wound care, etc )  - Arrange for interpretive services to assist at discharge as needed  - Refer to Case Management Department for coordinating discharge planning if the patient needs post-hospital services based on physician/advanced practitioner order or complex needs related to functional status, cognitive ability, or social support system  Outcome: Progressing     Problem: Knowledge Deficit  Goal: Patient/family/caregiver demonstrates understanding of disease process, treatment plan, medications, and discharge instructions  Description: Complete learning assessment and assess knowledge base    Interventions:  - Provide teaching at level of understanding  - Provide teaching via preferred learning methods  Outcome: Progressing     Problem: NEUROSENSORY - ADULT  Goal: Achieves stable or improved neurological status  Description: INTERVENTIONS  - Monitor and report changes in neurological status  - Monitor vital signs such as temperature, blood pressure, glucose, and any other labs ordered   - Initiate measures to prevent increased intracranial pressure  - Monitor for seizure activity and implement precautions if appropriate      Outcome: Progressing  Goal: Remains free of injury related to seizures activity  Description: INTERVENTIONS  - Maintain airway, patient safety  and administer oxygen as ordered  - Monitor patient for seizure activity, document and report duration and description of seizure to physician/advanced practitioner  - If seizure occurs,  ensure patient safety during seizure  - Reorient patient post seizure  - Seizure pads on all 4 side rails  - Instruct patient/family to notify RN of any seizure activity including if an aura is experienced  - Instruct patient/family to call for assistance with activity based on nursing assessment  - Administer anti-seizure medications if ordered    Outcome: Progressing  Goal: Achieves maximal functionality and self care  Description: INTERVENTIONS  - Monitor swallowing and airway patency with patient fatigue and changes in neurological status  - Encourage and assist patient to increase activity and self care     - Encourage visually impaired, hearing impaired and aphasic patients to use assistive/communication devices  Outcome: Progressing     Problem: CARDIOVASCULAR - ADULT  Goal: Maintains optimal cardiac output and hemodynamic stability  Description: INTERVENTIONS:  - Monitor I/O, vital signs and rhythm  - Monitor for S/S and trends of decreased cardiac output  - Administer and titrate ordered vasoactive medications to optimize hemodynamic stability  - Assess quality of pulses, skin color and temperature  - Assess for signs of decreased coronary artery perfusion  - Instruct patient to report change in severity of symptoms  Outcome: Progressing  Goal: Absence of cardiac dysrhythmias or at baseline rhythm  Description: INTERVENTIONS:  - Continuous cardiac monitoring, vital signs, obtain 12 lead EKG if ordered  - Administer antiarrhythmic and heart rate control medications as ordered  - Monitor electrolytes and administer replacement therapy as ordered  Outcome: Progressing     Problem: RESPIRATORY - ADULT  Goal: Achieves optimal ventilation and oxygenation  Description: INTERVENTIONS:  - Assess for changes in respiratory status  - Assess for changes in mentation and behavior  - Position to facilitate oxygenation and minimize respiratory effort  - Oxygen administered by appropriate delivery if ordered  - Initiate smoking cessation education as indicated  - Encourage broncho-pulmonary hygiene including cough, deep breathe, Incentive Spirometry  - Assess the need for suctioning and aspirate as needed  - Assess and instruct to report SOB or any respiratory difficulty  - Respiratory Therapy support as indicated  Outcome: Progressing     Problem: GENITOURINARY - ADULT  Goal: Maintains or returns to baseline urinary function  Description: INTERVENTIONS:  - Assess urinary function  - Encourage oral fluids to ensure adequate hydration if ordered  - Administer IV fluids as ordered to ensure adequate hydration  - Administer ordered medications as needed  - Offer frequent toileting  - Follow urinary retention protocol if ordered  Outcome: Progressing  Goal: Absence of urinary retention  Description: INTERVENTIONS:  - Assess patient’s ability to void and empty bladder  - Monitor I/O  - Bladder scan as needed  - Discuss with physician/AP medications to alleviate retention as needed  - Discuss catheterization for long term situations as appropriate  Outcome: Progressing  Goal: Urinary catheter remains patent  Description: INTERVENTIONS:  - Assess patency of urinary catheter  - If patient has a chronic franklin, consider changing catheter if non-functioning  - Follow guidelines for intermittent irrigation of non-functioning urinary catheter  Outcome: Progressing     Problem: Prexisting or High Potential for Compromised Skin Integrity  Goal: Skin integrity is maintained or improved  Description: INTERVENTIONS:  - Identify patients at risk for skin breakdown  - Assess and monitor skin integrity  - Assess and monitor nutrition and hydration status  - Monitor labs   - Assess for incontinence   - Turn and reposition patient  - Assist with mobility/ambulation  - Relieve pressure over bony prominences  - Avoid friction and shearing  - Provide appropriate hygiene as needed including keeping skin clean and dry  - Evaluate need for skin moisturizer/barrier cream  - Collaborate with interdisciplinary team   - Patient/family teaching  - Consider wound care consult   Outcome: Progressing     Problem: MOBILITY - ADULT  Goal: Maintain or return to baseline ADL function  Description: INTERVENTIONS:  -  Assess patient's ability to carry out ADLs; assess patient's baseline for ADL function and identify physical deficits which impact ability to perform ADLs (bathing, care of mouth/teeth, toileting, grooming, dressing, etc )  - Assess/evaluate cause of self-care deficits   - Assess range of motion  - Assess patient's mobility; develop plan if impaired  - Assess patient's need for assistive devices and provide as appropriate  - Encourage maximum independence but intervene and supervise when necessary  - Involve family in performance of ADLs  - Assess for home care needs following discharge   - Consider OT consult to assist with ADL evaluation and planning for discharge  - Provide patient education as appropriate  Outcome: Progressing  Goal: Maintains/Returns to pre admission functional level  Description: INTERVENTIONS:  - Perform BMAT or MOVE assessment daily    - Set and communicate daily mobility goal to care team and patient/family/caregiver  - Collaborate with rehabilitation services on mobility goals if consulted  - Out of bed for toileting  - Record patient progress and toleration of activity level   Outcome: Progressing     Problem: Nutrition/Hydration-ADULT  Goal: Nutrient/Hydration intake appropriate for improving, restoring or maintaining nutritional needs  Description: Monitor and assess patient's nutrition/hydration status for malnutrition  Collaborate with interdisciplinary team and initiate plan and interventions as ordered  Monitor patient's weight and dietary intake as ordered or per policy  Utilize nutrition screening tool and intervene as necessary   Determine patient's food preferences and provide high-protein, high-caloric foods as appropriate       INTERVENTIONS:  - Monitor oral intake, urinary output, labs, and treatment plans  - Assess nutrition and hydration status and recommend course of action  - Evaluate amount of meals eaten  - Assist patient with eating if necessary   - Allow adequate time for meals  - Recommend/ encourage appropriate diets, oral nutritional supplements, and vitamin/mineral supplements  - Order, calculate, and assess calorie counts as needed  - Recommend, monitor, and adjust tube feedings and TPN/PPN based on assessed needs  - Assess need for intravenous fluids  - Provide specific nutrition/hydration education as appropriate  - Include patient/family/caregiver in decisions related to nutrition  Outcome: Progressing

## 2023-05-19 NOTE — PROGRESS NOTES
[facility] 45  Progress Note  Name: Maddi Reid  MRN: [id number]  Unit/Bed#: ICU 04-01 I Date of Admission: 5/16/2023   Date of Service: 5/19/2023 I Hospital Day: 3    Assessment/Plan   Bradycardia  Assessment & Plan  Now resolved post device implantation  Paroxysmal atrial fibrillation (HCC)  Assessment & Plan  Either related to current respiratory illness were underlying sick sinus syndrome  For now will load with amiodarone and also add Eliquis  I am not committing to either for the longer term however and this will ultimately be readdressed in a month or so     s/p ICD  Assessment & Plan  Polymorphic ventricular tachycardia  No further spells  ICD is backup but I believe that simply preventing bradycardia is the prime issue here  ICD follow-up is being arranged  Device working appropriately this morning  Outpatient Cardiologist: Will be Anand Dia  Subjective:   Patient seen and examined  He certainly does not recall the last few days but he appears comfortable and is not dyspneic  No chest pain  Summary comments:  Device was tracking atrial tachycardia last night  Sensitivity was changed in the programming  He remains in atrial fibrillation/flutter this morning  Will load with amiodarone and also add Eliquis  The goal will be to get out of bed later today and home when walking easily without oxygen  I will make arrangements for postdischarge follow-up  Telemetry/ECG/Cardiac testing:    TTE 5/16/2023: Normal LV and RV systolic function without valve abnormalities  Vitals: Blood pressure 152/66, pulse 102, temperature 98 8 °F (37 1 °C), resp  rate 12, height 6' (1 829 m), weight 109 kg (239 lb 6 7 oz), SpO2 90 %  ,   Orthostatic Blood Pressures    Flowsheet Row Most Recent Value   Blood Pressure 152/66 filed at 05/19/2023 1000   Patient Position - Orthostatic VS Lying filed at 05/16/2023 1900      ,   Weight (last 2 days)     Date/Time Weight 05/19/23 0600 109 (239 42)    05/18/23 0430 108 (238 76)    05/17/23 0600 109 (240 3)     Weight: cooling blanket under patient at 05/17/23 0600          Physical Exam:    General:  Normal appearance in no distress  Eyes:  Anicteric  Oral mucosa:  Moist   Neck:  No JV D  Carotid upstrokes are brisk without bruits  No masses  ICD left subclavian region  Chest:  Clear to auscultation and percussion  Cardiac:  No palpable PMI  Normal S1 and S2  No murmur gallop or rub  Abdomen:  Soft and nontender  No palpable organomegaly or aortic enlargement  Extremities:  No peripheral edema  Femoral popliteal and pedal pulses are intact  Neuro:  Grossly symmetric  Psych:  Alert and oriented x3        Medications:      Current Facility-Administered Medications:   •  acetaminophen (TYLENOL) oral suspension 975 mg, 975 mg, Oral, Q6H, Carley Newton MD, 975 mg at 05/19/23 0506  •  albumin human (FLEXBUMIN) 25 % injection **ADS Override Pull**, , , ,   •  albuterol inhalation solution 2 5 mg, 2 5 mg, Nebulization, Q4H PRN, Carley Newton MD  •  amiodarone (CORDARONE) 900 mg in dextrose 5 % 500 mL infusion, 1 mg/min, Intravenous, Continuous **FOLLOWED BY** amiodarone (CORDARONE) 900 mg in dextrose 5 % 500 mL infusion, 0 5 mg/min, Intravenous, Continuous **FOLLOWED BY** [START ON 5/20/2023] amiodarone tablet 200 mg, 200 mg, Oral, TID With Meals **FOLLOWED BY** [START ON 6/3/2023] amiodarone tablet 200 mg, 200 mg, Oral, BID With Meals **FOLLOWED BY** [START ON 6/18/2023] amiodarone tablet 200 mg, 200 mg, Oral, Daily With Breakfast, Carley Newton MD  •  amiodarone 150 mg in dextrose 5 % 100 mL IV bolus, 150 mg, Intravenous, Once, Carley Newton MD  •  apixaban (ELIQUIS) tablet 5 mg, 5 mg, Oral, BID, Carley Newton MD  •  [DISCONTINUED] cefTRIAXone (ROCEPHIN) IVPB (premix in dextrose) 1,000 mg 50 mL, 1,000 mg, Intravenous, Q24H, Stopped at 05/18/23 0921 **AND** azithromycin (ZITHROMAX) 500 mg in sodium chloride 0 9 % 250 mL IVPB, 500 mg, Intravenous, Q24H, Rob Hayes MD, Stopped at 05/19/23 9099  •  cefepime (MAXIPIME) IVPB (premix in dextrose) 2,000 mg 50 mL, 2,000 mg, Intravenous, Q12H, Rob Hayes MD, Last Rate: 100 mL/hr at 05/19/23 0830, 2,000 mg at 05/19/23 0830  •  chlorhexidine (PERIDEX) 0 12 % oral rinse 15 mL, 15 mL, Mouth/Throat, Q12H [address], Rob Hayes MD, 15 mL at 05/19/23 0830  •  enoxaparin (LOVENOX) subcutaneous injection 40 mg, 40 mg, Subcutaneous, Daily, Rob Hayes MD, 40 mg at 05/19/23 0830  •  EPINEPHrine (ADRENALIN) injection, , Intravenous, Code/Trauma/Sedation Katie Hanna CRNP, 1 mg at 05/17/23 1967  •  Famotidine (PF) (PEPCID) injection 20 mg, 20 mg, Intravenous, BID, Rob Hayes MD, 20 mg at 05/19/23 0830  •  fentanyl citrate (PF) 100 MCG/2ML 50 mcg, 50 mcg, Intravenous, Q1H PRN, Rob Hayes MD, 50 mcg at 05/18/23 1830  •  hydrALAZINE (APRESOLINE) injection 5 mg, 5 mg, Intravenous, Q6H PRN, CARMEL Leon, 5 mg at 05/19/23 0908  •  insulin lispro (HumaLOG) 100 units/mL subcutaneous injection 1-6 Units, 1-6 Units, Subcutaneous, Q6H [address], 1 Units at 05/19/23 0515 **AND** Fingerstick Glucose (POCT), , , Q6H, Rob Hayes MD  •  levothyroxine tablet 50 mcg, 50 mcg, Oral, Early Morning, Rob Hayes MD  •  lidocaine (LIDODERM) 5 % patch 1 patch, 1 patch, Topical, Daily, Rob Hayes MD  •  lidocaine (LIDODERM) 5 % patch 1 patch, 1 patch, Topical, Daily, Rob Hayes MD  •  methylPREDNISolone sodium succinate (Solu-MEDROL) injection 60 mg, 60 mg, Intravenous, Q8H [address], Rob Hayes MD, 60 mg at 05/19/23 8608  •  metroNIDAZOLE (FLAGYL) IVPB (premix) 500 mg 100 mL, 500 mg, Intravenous, Q8H, Rob Hayes MD, Stopped at 05/19/23 0606  •  ondansetron (ZOFRAN) injection 4 mg, 4 mg, Intravenous, Q6H PRN, CARMEL Leon  •  senna-docusate sodium (SENOKOT S) 8 6-50 mg per tablet 2 tablet, 2 tablet, Oral, HS, Rob Hayes MD, 2 tablet at 05/18/23 2102     Labs & Results:    Troponins:    Results from last 7 days   Lab Units 05/16/23  0200 05/15/23  2325 05/15/23  2122   HS TNI 0HR ng/L  --   --  360*   HS TNI 2HR ng/L  --  444*  --    HSTNI D2 ng/L  --  84*  --    HS TNI 4HR ng/L 392*  --   --    HSTNI D4 ng/L 32*  --   --         BNP:     CBC with diff:   Results from last 7 days   Lab Units 05/19/23 0513 05/18/23  0439   WBC Thousand/uL 7 33 7 11   HEMOGLOBIN g/dL 9 3* 10 5*   HEMATOCRIT % 28 7* 32 2*   MCV fL 95 94   PLATELETS Thousands/uL 145* 123*     TSH:     CMP:   Results from last 7 days   Lab Units 05/19/23 0513 05/18/23 2053 05/18/23 0439   POTASSIUM mmol/L 3 9 3 9 4 4   CHLORIDE mmol/L 108 107 109*   CO2 mmol/L 26 26 24   BUN mg/dL 22 18 12   CREATININE mg/dL 0 90 1 03 0 77   AST U/L 18  --  26   ALT U/L 37  --  47   EGFR ml/min/1 73sq m 85 72 90     Lipid Profile:     Coags:   Results from last 7 days   Lab Units 05/15/23  2122   INR  1 09

## 2023-05-19 NOTE — RESPIRATORY THERAPY NOTE
RT Ventilator Management Note  Cass Rene 67 y o  male MRN: [id number]  Unit/Bed#: ICU 04-01 Encounter: [id number]      Daily Screen         5/18/2023  0741 5/19/2023  0743          Patient safety screen outcome[de-identified] Passed Passed      Spont breathing trial % for 30 min: Yes Yes                Physical Exam:   Assessment Type: Assess only  General Appearance: Awake  Respiratory Pattern: Assisted, Spontaneous  Chest Assessment: Chest expansion symmetrical  Bilateral Breath Sounds: Clear  Suction: ET Tube  O2 Device: vent      Resp Comments: (P) Pt placed on SVT, cira well at this time

## 2023-05-19 NOTE — CASE MANAGEMENT
Case Management Assessment    Patient name Tammy Long  Location ICU 04/ICU  MRN [id number]  : 1950 Date 2023       Current Admission Date: 2023  Current Admission Diagnosis:Cardiac arrest [facility])   Patient Active Problem List    Diagnosis Date Noted   • s/p ICD 2023   • Torsades de pointes ([facility] [address] ) 2023   • Anemia 2023   • Cardiac arrest ([facility] [address] ) 2023   • Acute respiratory failure with hypoxia ([facility] [address] ) 2023   • Closed fracture of multiple ribs of both sides 2023   • Acute kidney injury ([facility] [address] ) 2023   • Shock ([facility] [address] ) 2023   • Aspiration pneumonia ([facility] [address] ) 2023   • Bradycardia 2023   • Elevated troponin 2023      LOS (days): 3  Geometric Mean LOS (GMLOS) (days): 5 00  Days to GMLOS:1 7     OBJECTIVE:    Risk of Unplanned Readmission Score: 16 76     Current admission status: Inpatient    Preferred Pharmacy:   [address] [place] [address] [facility] [address]  [address] [place] [address] [facility] [address]  [place] [address]  Phone: [phone] Fax: [phone]    Primary Care Provider: Eron Soler DO    Primary Insurance: Marylen Odor [facility]  Secondary Insurance:     ASSESSMENT:  Mariposa Rodriguez Proxies    There are no active Health Care Proxies on file         Advance Directives  Does patient have a [address] [place] [address] Avenue?: No  Was patient offered paperwork?: Yes (Paers provided)  Does patient currently have a Health Care decision maker?: Yes, please see Health Care Proxy section  Does patient have Advance Directives?: No  Was patient offered paperwork?: Yes (Papers provided)  Primary Contact: Desean Milton spouse    Readmission Root Cause  30 Day Readmission: No    Patient Information  Admitted from[de-identified] Home  Mental Status: Alert  During Assessment patient was accompanied by: Not accompanied during assessment  Assessment information provided by[de-identified] Patient  Primary Caregiver: Self  Support Systems: Spouse/significant other  [place] of Residence: [address] do you live in?:  [facility] entry access options   Select all that apply : Stairs  Number of steps to enter home : One Flight  Do the steps have railings?: Yes  Type of Current Residence: 2 story home  Upon entering residence, is there a bedroom on the main floor (no further steps)?: Yes  Upon entering residence, is there a bathroom on the main floor (no further steps)?: Yes  In the last 12 months, was there a time when you were not able to pay the mortgage or rent on time?: No  In the last 12 months, how many places have you lived?: 1  In the last 12 months, was there a time when you did not have a steady place to sleep or slept in a shelter (including now)?: No  Homeless/housing insecurity resource given?: N/A  Living Arrangements: Lives w/ Spouse/significant other  Is patient a ?: No    Activities of Daily Living Prior to Admission  Functional Status: Independent  Completes ADLs independently?: Yes  Ambulates independently?: Yes  Does patient use assisted devices?: No  Does patient currently own DME?: No  Does patient have a history of Outpatient Therapy (PT/OT)?: No  Does the patient have a history of Short-Term Rehab?: No  Does patient have a history of HHC?: No  Does patient currently have [facility]?: No    Patient Information Continued  Income Source: Pension/residential  Does patient have prescription coverage?: Yes  Within the past 12 months, you worried that your food would run out before you got the money to buy more : Never true  Within the past 12 months, the food you bought just didn't last and you didn't have money to get more : Never true  Food insecurity resource given?: N/A  Does patient receive dialysis treatments?: No  Does patient have a history of substance abuse?: No  Does patient have a history of Mental Health Diagnosis?: No    PHQ 2/9 Screening   Reviewed PHQ 2/9 Depression Screening Score?: No    Means of Transportation  Means of Transport to Appts[de-identified] Drives Self  In the past 12 months, has lack of transportation kept you from medical appointments or from getting medications?: No  In the past 12 months, has lack of transportation kept you from meetings, work, or from getting things needed for daily living?: No  Was application for public transport provided?: N/A

## 2023-05-19 NOTE — ASSESSMENT & PLAN NOTE
Polymorphic ventricular tachycardia  No further spells  ICD is backup but I believe that simply preventing bradycardia is the prime issue here  ICD follow-up is being arranged  Device working appropriately this morning

## 2023-05-20 ENCOUNTER — APPOINTMENT (OUTPATIENT)
Dept: RADIOLOGY | Facility: HOSPITAL | Age: 73
End: 2023-05-20

## 2023-05-20 PROBLEM — R41.0 DELIRIUM: Status: ACTIVE | Noted: 2023-05-20

## 2023-05-20 PROBLEM — R73.9 HYPERGLYCEMIA: Status: ACTIVE | Noted: 2023-05-20

## 2023-05-20 PROBLEM — Z95.810 ICD (IMPLANTABLE CARDIOVERTER-DEFIBRILLATOR) IN PLACE: Status: RESOLVED | Noted: 2023-05-19 | Resolved: 2023-05-20

## 2023-05-20 LAB
ANION GAP SERPL CALCULATED.3IONS-SCNC: 6 MMOL/L (ref 4–13)
ANION GAP SERPL CALCULATED.3IONS-SCNC: 9 MMOL/L (ref 4–13)
ATRIAL RATE: 117 BPM
BACTERIA SPT RESP CULT: ABNORMAL
BACTERIA SPT RESP CULT: ABNORMAL
BUN SERPL-MCNC: 31 MG/DL (ref 5–25)
BUN SERPL-MCNC: 31 MG/DL (ref 5–25)
CA-I BLD-SCNC: 1.18 MMOL/L (ref 1.12–1.32)
CALCIUM SERPL-MCNC: 8.8 MG/DL (ref 8.4–10.2)
CALCIUM SERPL-MCNC: 9.2 MG/DL (ref 8.4–10.2)
CHLORIDE SERPL-SCNC: 104 MMOL/L (ref 96–108)
CHLORIDE SERPL-SCNC: 104 MMOL/L (ref 96–108)
CO2 SERPL-SCNC: 26 MMOL/L (ref 21–32)
CO2 SERPL-SCNC: 29 MMOL/L (ref 21–32)
CREAT SERPL-MCNC: 0.93 MG/DL (ref 0.6–1.3)
CREAT SERPL-MCNC: 0.97 MG/DL (ref 0.6–1.3)
ERYTHROCYTE [DISTWIDTH] IN BLOOD BY AUTOMATED COUNT: 12.5 % (ref 11.6–15.1)
GFR SERPL CREATININE-BSD FRML MDRD: 77 ML/MIN/1.73SQ M
GFR SERPL CREATININE-BSD FRML MDRD: 81 ML/MIN/1.73SQ M
GLUCOSE SERPL-MCNC: 133 MG/DL (ref 65–140)
GLUCOSE SERPL-MCNC: 136 MG/DL (ref 65–140)
GLUCOSE SERPL-MCNC: 136 MG/DL (ref 65–140)
GLUCOSE SERPL-MCNC: 137 MG/DL (ref 65–140)
GLUCOSE SERPL-MCNC: 138 MG/DL (ref 65–140)
GLUCOSE SERPL-MCNC: 151 MG/DL (ref 65–140)
GLUCOSE SERPL-MCNC: 162 MG/DL (ref 65–140)
GRAM STN SPEC: ABNORMAL
HCT VFR BLD AUTO: 34.2 % (ref 36.5–49.3)
HGB BLD-MCNC: 11 G/DL (ref 12–17)
MAGNESIUM SERPL-MCNC: 2.3 MG/DL (ref 1.9–2.7)
MAGNESIUM SERPL-MCNC: 2.5 MG/DL (ref 1.9–2.7)
MCH RBC QN AUTO: 30.5 PG (ref 26.8–34.3)
MCHC RBC AUTO-ENTMCNC: 32.2 G/DL (ref 31.4–37.4)
MCV RBC AUTO: 95 FL (ref 82–98)
P JIROVECII AG SPEC QL IF: NORMAL
P JIROVECII AG SPEC QL IF: NORMAL
PHOSPHATE SERPL-MCNC: 2.5 MG/DL (ref 2.3–4.1)
PHOSPHATE SERPL-MCNC: 3.3 MG/DL (ref 2.3–4.1)
PLATELET # BLD AUTO: 155 THOUSANDS/UL (ref 149–390)
PMV BLD AUTO: 10.8 FL (ref 8.9–12.7)
POTASSIUM SERPL-SCNC: 3.9 MMOL/L (ref 3.5–5.3)
POTASSIUM SERPL-SCNC: 3.9 MMOL/L (ref 3.5–5.3)
QRS AXIS: -76 DEGREES
QRSD INTERVAL: 160 MS
QT INTERVAL: 428 MS
QTC INTERVAL: 584 MS
RBC # BLD AUTO: 3.61 MILLION/UL (ref 3.88–5.62)
SODIUM SERPL-SCNC: 139 MMOL/L (ref 135–147)
SODIUM SERPL-SCNC: 139 MMOL/L (ref 135–147)
T WAVE AXIS: 102 DEGREES
VENTRICULAR RATE: 112 BPM
WBC # BLD AUTO: 11.9 THOUSAND/UL (ref 4.31–10.16)

## 2023-05-20 RX ORDER — POLYETHYLENE GLYCOL 3350 17 G/17G
17 POWDER, FOR SOLUTION ORAL DAILY
Status: DISCONTINUED | OUTPATIENT
Start: 2023-05-20 | End: 2023-05-25 | Stop reason: HOSPADM

## 2023-05-20 RX ORDER — CARVEDILOL 3.12 MG/1
3.12 TABLET ORAL 2 TIMES DAILY WITH MEALS
Status: DISCONTINUED | OUTPATIENT
Start: 2023-05-20 | End: 2023-05-20

## 2023-05-20 RX ORDER — TRAVOPROST OPHTHALMIC SOLUTION 0.04 MG/ML
1 SOLUTION OPHTHALMIC
Status: DISCONTINUED | OUTPATIENT
Start: 2023-05-20 | End: 2023-05-23

## 2023-05-20 RX ORDER — CARVEDILOL 3.12 MG/1
6.25 TABLET ORAL 2 TIMES DAILY WITH MEALS
Status: DISCONTINUED | OUTPATIENT
Start: 2023-05-21 | End: 2023-05-22

## 2023-05-20 RX ORDER — FUROSEMIDE 10 MG/ML
20 INJECTION INTRAMUSCULAR; INTRAVENOUS ONCE
Status: DISCONTINUED | OUTPATIENT
Start: 2023-05-20 | End: 2023-05-20

## 2023-05-20 RX ORDER — TAMSULOSIN HYDROCHLORIDE 0.4 MG/1
0.4 CAPSULE ORAL
Status: DISCONTINUED | OUTPATIENT
Start: 2023-05-20 | End: 2023-05-25 | Stop reason: HOSPADM

## 2023-05-20 RX ORDER — INSULIN LISPRO 100 [IU]/ML
1-5 INJECTION, SOLUTION INTRAVENOUS; SUBCUTANEOUS
Status: DISCONTINUED | OUTPATIENT
Start: 2023-05-20 | End: 2023-05-22

## 2023-05-20 RX ORDER — FUROSEMIDE 10 MG/ML
20 INJECTION INTRAMUSCULAR; INTRAVENOUS ONCE
Status: COMPLETED | OUTPATIENT
Start: 2023-05-20 | End: 2023-05-20

## 2023-05-20 RX ORDER — PREDNISONE 20 MG/1
40 TABLET ORAL DAILY
Status: DISCONTINUED | OUTPATIENT
Start: 2023-05-21 | End: 2023-05-21

## 2023-05-20 RX ORDER — LACTULOSE 20 G/30ML
30 SOLUTION ORAL ONCE
Status: COMPLETED | OUTPATIENT
Start: 2023-05-20 | End: 2023-05-20

## 2023-05-20 RX ORDER — BRIMONIDINE TARTRATE 2 MG/ML
1 SOLUTION/ DROPS OPHTHALMIC 2 TIMES DAILY
Status: DISCONTINUED | OUTPATIENT
Start: 2023-05-20 | End: 2023-05-25 | Stop reason: HOSPADM

## 2023-05-20 RX ORDER — OLANZAPINE 10 MG/1
10 TABLET, ORALLY DISINTEGRATING ORAL
Status: DISCONTINUED | OUTPATIENT
Start: 2023-05-20 | End: 2023-05-22

## 2023-05-20 RX ADMIN — OLANZAPINE 10 MG: 10 TABLET, ORALLY DISINTEGRATING ORAL at 23:03

## 2023-05-20 RX ADMIN — LIDOCAINE 1 PATCH: 700 PATCH TOPICAL at 08:15

## 2023-05-20 RX ADMIN — TRAVOPROST 1 DROP: 0.04 SOLUTION OPHTHALMIC at 23:05

## 2023-05-20 RX ADMIN — WHITE PETROLATUM 57.7 %-MINERAL OIL 31.9 % EYE OINTMENT: at 21:02

## 2023-05-20 RX ADMIN — METHYLPREDNISOLONE SODIUM SUCCINATE 60 MG: 125 INJECTION, POWDER, FOR SOLUTION INTRAMUSCULAR; INTRAVENOUS at 08:14

## 2023-05-20 RX ADMIN — BRIMONIDINE TARTRATE 1 DROP: 2 SOLUTION/ DROPS OPHTHALMIC at 12:04

## 2023-05-20 RX ADMIN — GUAIFENESIN 600 MG: 600 TABLET ORAL at 20:44

## 2023-05-20 RX ADMIN — GUAIFENESIN 600 MG: 600 TABLET ORAL at 08:14

## 2023-05-20 RX ADMIN — CYANOCOBALAMIN TAB 500 MCG 250 MCG: 500 TAB at 08:14

## 2023-05-20 RX ADMIN — FUROSEMIDE 20 MG: 10 INJECTION, SOLUTION INTRAMUSCULAR; INTRAVENOUS at 05:35

## 2023-05-20 RX ADMIN — CHLORHEXIDINE GLUCONATE 0.12% ORAL RINSE 15 ML: 1.2 LIQUID ORAL at 08:14

## 2023-05-20 RX ADMIN — CARVEDILOL 3.12 MG: 3.12 TABLET, FILM COATED ORAL at 16:52

## 2023-05-20 RX ADMIN — HYDRALAZINE HYDROCHLORIDE 5 MG: 20 INJECTION INTRAMUSCULAR; INTRAVENOUS at 07:45

## 2023-05-20 RX ADMIN — CHLORHEXIDINE GLUCONATE 0.12% ORAL RINSE 15 ML: 1.2 LIQUID ORAL at 20:44

## 2023-05-20 RX ADMIN — METRONIDAZOLE 500 MG: 500 INJECTION, SOLUTION INTRAVENOUS at 12:12

## 2023-05-20 RX ADMIN — FUROSEMIDE 20 MG: 10 INJECTION, SOLUTION INTRAMUSCULAR; INTRAVENOUS at 20:44

## 2023-05-20 RX ADMIN — LEVALBUTEROL HYDROCHLORIDE 1.25 MG: 1.25 SOLUTION RESPIRATORY (INHALATION) at 13:14

## 2023-05-20 RX ADMIN — SENNOSIDES AND DOCUSATE SODIUM 2 TABLET: 50; 8.6 TABLET ORAL at 21:00

## 2023-05-20 RX ADMIN — CEFTRIAXONE 1000 MG: 1 INJECTION, SOLUTION INTRAVENOUS at 11:08

## 2023-05-20 RX ADMIN — BRIMONIDINE TARTRATE 1 DROP: 2 SOLUTION/ DROPS OPHTHALMIC at 20:43

## 2023-05-20 RX ADMIN — INSULIN LISPRO 1 UNITS: 100 INJECTION, SOLUTION INTRAVENOUS; SUBCUTANEOUS at 07:19

## 2023-05-20 RX ADMIN — DEXMEDETOMIDINE HYDROCHLORIDE 0.7 MCG/KG/HR: 400 INJECTION INTRAVENOUS at 03:45

## 2023-05-20 RX ADMIN — LEVALBUTEROL HYDROCHLORIDE 1.25 MG: 1.25 SOLUTION RESPIRATORY (INHALATION) at 07:13

## 2023-05-20 RX ADMIN — LEVALBUTEROL HYDROCHLORIDE 1.25 MG: 1.25 SOLUTION RESPIRATORY (INHALATION) at 20:16

## 2023-05-20 RX ADMIN — AMIODARONE HYDROCHLORIDE 200 MG: 100 TABLET ORAL at 16:52

## 2023-05-20 RX ADMIN — METRONIDAZOLE 500 MG: 500 INJECTION, SOLUTION INTRAVENOUS at 20:44

## 2023-05-20 RX ADMIN — CARVEDILOL 3.12 MG: 3.12 TABLET, FILM COATED ORAL at 08:14

## 2023-05-20 RX ADMIN — APIXABAN 5 MG: 5 TABLET, FILM COATED ORAL at 17:01

## 2023-05-20 RX ADMIN — FUROSEMIDE 20 MG: 10 INJECTION, SOLUTION INTRAMUSCULAR; INTRAVENOUS at 12:06

## 2023-05-20 RX ADMIN — ACETAMINOPHEN 975 MG: 650 SUSPENSION ORAL at 05:35

## 2023-05-20 RX ADMIN — LEVOTHYROXINE SODIUM 50 MCG: 50 TABLET ORAL at 05:35

## 2023-05-20 RX ADMIN — LACTULOSE 30 G: 20 SOLUTION ORAL at 20:44

## 2023-05-20 RX ADMIN — SODIUM CHLORIDE 200 MG: 9 INJECTION, SOLUTION INTRAVENOUS at 08:42

## 2023-05-20 RX ADMIN — APIXABAN 5 MG: 5 TABLET, FILM COATED ORAL at 08:14

## 2023-05-20 RX ADMIN — POLYETHYLENE GLYCOL 3350 17 G: 17 POWDER, FOR SOLUTION ORAL at 08:14

## 2023-05-20 RX ADMIN — TAMSULOSIN HYDROCHLORIDE 0.4 MG: 0.4 CAPSULE ORAL at 16:52

## 2023-05-20 RX ADMIN — METRONIDAZOLE 500 MG: 500 INJECTION, SOLUTION INTRAVENOUS at 05:35

## 2023-05-20 RX ADMIN — Medication 6 MG: at 21:00

## 2023-05-20 NOTE — PHYSICAL THERAPY NOTE
Physical Therapy Evaluation   Time in: 0815  Time out: 0839  Total evaluation time: 24 minutes    Patient's Name: Julissa Diaz    Admitting Diagnosis  Cardiac arrest [facility]) [I46 9]    Problem List  Patient Active Problem List   Diagnosis    Cardiac arrest [facility])    Acute respiratory failure with hypoxia ([facility] [address] )    Closed fracture of multiple ribs of both sides    Acute kidney injury ([facility] [address] )    Shock ([facility] [address] )    Aspiration pneumonia ([facility] [address] )    Bradycardia    Elevated troponin    Torsades de pointes ([facility] [address] )    Anemia    s/p ICD    Paroxysmal atrial fibrillation ([facility] [address] )    Delirium    Hyperglycemia       Past Medical History  History reviewed  No pertinent past medical history  Past Surgical History  Past Surgical History:   Procedure Laterality Date    APPENDECTOMY  2006    CARDIAC DEFIBRILLATOR PLACEMENT Left 5/18/2023    Procedure: INSERTION AUTOMATIC IMPLANTABLE CARDIOVERTER DEFIBRILLATOR (AICD); Surgeon: Carlos Owusu MD;  Location: [facility] MA[facility];  Service: Cardiology       PT performed at least 2 patient identifiers during session: Name and wristband  05/20/23 0839   PT Last Visit   PT Visit Date 05/20/23   Note Type   Note type Evaluation   Pain Assessment   Pain Assessment Tool 0-10   Pain Score No Pain   Restrictions/Precautions   Weight Bearing Precautions Per Order No   Braces or Orthoses Sling  (L UE sling due to pacemaker precautions)   Other Precautions Cognitive; Chair Alarm; Bed Alarm;Multiple lines;Telemetry;O2;Fall Risk  (pacemaker precautions, +franklin, 12L Mid flow O2 Pt does not wear at baseline)   Home Living   Type of Home House   Home Layout Two level;Performs ADLs on one level; Able to live on main level with bedroom/bathroom;Stairs to enter with rails  (FF of J[facility])   Home Equipment   (no AD PTA)   Prior Function   Level of [place] Independent with ADLs; Independent with functional mobility; Independent with IADLS   Lives With Spouse   Receives Help From Family   IADLs Independent with driving; Independent with meal prep; Independent with medication management   Vocational Retired   General   Family/Caregiver Present No   Cognition   Overall Cognitive Status Impaired   Arousal/Participation Alert   Attention Attends with cues to redirect   Orientation Level Oriented to person;Oriented to place; Disoriented to time;Disoriented to situation   Memory Decreased recall of recent events;Decreased recall of precautions   Following Commands Follows one step commands with increased time or repetition   Comments pt agreeable to PT evaluation   RLE Assessment   RLE Assessment X   Strength RLE   RLE Overall Strength 3-/5   LLE Assessment   LLE Assessment X   Strength LLE   LLE Overall Strength 3-/5   Vision-Basic Assessment   Current Vision Wears glasses all the time   Coordination   Movements are Fluid and Coordinated 0   Bed Mobility   Supine to Sit 3  Moderate assistance   Additional items Assist x 2;HOB elevated; Bedrails; Increased time required;LE management   Transfers   Sit to Stand 3  Moderate assistance   Additional items Assist x 2; Increased time required;Verbal cues   Stand to Sit 3  Moderate assistance   Additional items Assist x 2;Armrests; Increased time required   Stand pivot 3  Moderate assistance   Additional items Assist x 2; Increased time required;Verbal cues  (c HHA)   Additional Comments BP s/p functional transfer: 155/78; HR throughout mobility = 60 bpm, Spo2 with activity = 90-92%   Ambulation/Elevation   Gait pattern Not tested   Balance   Static Sitting Fair +   Dynamic Sitting Fair   Static Standing Poor +   Dynamic Standing Poor   Endurance Deficit   Endurance Deficit Yes   Activity Tolerance   Activity Tolerance Patient limited by fatigue   Medical Staff Made Aware coordination of care provided with OT Luis   Nurse Made Aware Yes, ISAI Bass verbalized pt appropriate for PT session, made aware of outcomes/recs   Assessment   Prognosis Fair   Problem List Decreased strength;Decreased endurance; Impaired balance;Decreased mobility; Impaired judgement;Decreased safety awareness;Decreased cognition;Obesity; Decreased skin integrity   Assessment Pt is 67 y o  male seen for high-complexity PT evaluation on 5/20/2023 s/p admit to [facility] on 5/16/2023 w/ Cardiac arrest ([facility] [address] )  PT was consulted to assess pt's functional mobility and d/c needs  Order placed for PT eval and tx, w/ up and OOB as tolerated order  PTA, pt reports living with spouse in 2 SH, amb s AD at baseline, (I) ADLs/IADLs  At time of eval, pt requires mod Ax2 for all functional mobility tasks including STS and SPT from bed>recliner with HHA  Upon evaluation, pt presenting with impaired functional mobility d/t decreased strength, decreased endurance, impaired balance, decreased mobility, decreased cognition, impaired judgement, decreased safety awareness, obesity c BMI of 32 56 kg/m2 and decreased skin integrity  Pertinent PMHx and current co-morbidities affecting pt's physical performance at time of assessment include: POD 2 LCW ICD/pacer placement for SSS, shock in setting of cardiac arrest, aspiration pneumonia, acute respiratory failure c hypoxia extubated 5/19, bradycardia with AICD implantation on 5/18, PIERO, anemia, hyperglycemia, delirium, AFib, multiple rib fractures  Personal factors affecting pt at time of eval include: decreased cognition, decreased initiation and engagement and limited insight into impairments  The following objective measures performed on IE also reveal limitations: AM-PAC 6-Clicks: 71/20  Pt's clinical presentation is currently unstable/unpredictable seen in pt's presentation of need for input for task focus and mobility technique, ongoing medical assessment and out of hospital cardiac arrest due to bradycardia with related bradycardia-induced ventricular tachycardia    Hospital course was complicated by aspiration pneumonia/pneumonitis leading to mixed picture shock, s/p dual-chamber ICD on "5/18  Overall, pt's rehab potential and prognosis to return to PLOF is fair as impacted by objective findings, warranting pt to receive further skilled PT interventions to address identified impairments, activity limitation(s), and participation restriction(s)  Pt to benefit from continued PT tx to address deficits as defined above and maximize level of functional independent mobility and consistency in order for pt to improve activity tolerance  From PT/mobility standpoint, recommendation at time of d/c would be post acute rehabilitation services pending progress in order to facilitate return to PLOF  Barriers to Discharge Inaccessible home environment   Goals   Patient Goals \"to see my wife\"   STG Expiration Date 05/30/23   Short Term Goal #1 In 7-10 days: Increase bilateral LE strength 1/2 grade to facilitate independent mobility, Perform all bed mobility tasks with close S to decrease caregiver burden, Perform all transfers with close S to improve independence, Ambulate > 15 ft  with least restrictive assistive device with close S w/o LOB and w/ normalized gait pattern 100% of the time, Increase all balance 1/2 grade to decrease risk for falls, Complete exercise program independently, Tolerate seated at EOB 10 minutes to facilitate functional task performance and Tolerate standing 2 minutes to facilitate functional task performance   PT Treatment Day 0   Plan   Treatment/Interventions Functional transfer training;LE strengthening/ROM; Therapeutic exercise; Endurance training;Patient/family training;Equipment eval/education; Bed mobility;Gait training;Spoke to nursing;Spoke to case management   PT Frequency 3-5x/wk   Recommendation   PT Discharge Recommendation Post acute rehabilitation services   Equipment Recommended   (TBD pending progress and ongoing pacemaker precautions/sling in place to L UE)   AM-PAC Basic Mobility Inpatient   Turning in Flat Bed Without Bedrails 2   Lying on Back to Sitting on Edge of " Flat Bed Without Bedrails 2   Moving Bed to Chair 2   Standing Up From Chair Using Arms 2   Walk in Room 1   Climb 3-5 Stairs With Railing 1   Basic Mobility Inpatient Raw Score 10   Turning Head Towards Sound 4   Follow Simple Instructions 3   Low Function Basic Mobility Raw Score  17   Low Function Basic Mobility Standardized Score  27 46   Highest Level Of Mobility   -[facility] Goal 4: Move to chair/commode   -HLM Achieved 4: Move to chair/commode   End of Consult   Patient Position at End of Consult Bedside chair;Bed/Chair alarm activated; All needs within reach       Edson Holliday, PT, DPT

## 2023-05-20 NOTE — ASSESSMENT & PLAN NOTE
· CT C/A/P on admission to [facility] revealed right 2nd-6th anterior rib fractures; left second-fifth anterior rib fractures  · Continue Lidocaine patches

## 2023-05-20 NOTE — ASSESSMENT & PLAN NOTE
· POD 1 LCW ICD/pacer placement for SSS  · Overnight with ventricular paced tachycardia with rate 120  · S/p  Interrogation with improvement  · Monitor on tele   · Continue Amio IV and transition to Oral today  · Cardiology following

## 2023-05-20 NOTE — ASSESSMENT & PLAN NOTE
· Hgb trended down to 10 9  · Baseline Hgb unclear given no prior records available   · Suspect this is 2/2 shock state 2/2 cardiac arrest vs FELIX  · Iron sat 7, folate 19 2, B12 139  · Continue Venofer x3 doses  · Trend Hgb and transfuse for Hgb < 7

## 2023-05-20 NOTE — ASSESSMENT & PLAN NOTE
· In setting of cardiac arrest w/arrhythmias as noted above as well as aspiration pneumonia   · CT C/A/P on admission to [facility] revealed significant worsening of bilateral lower lobe aspiration/pneumonia  · TTE as noted above  · COVID/FLU/RSV negative  · Blood cultures from 05/15 and 05/16 w/NGTD  · UA w/out evidence of infection  · Urine strep/legionella negative  · Sputum culture positive for 2+ Staph aureus   · BALs from 05/17 pending  · Lactic acid 5 6 on admission to [facility] (in setting of cardiac arrest) - has since cleared  · Procalcitonin 1 05 - 1 90 - 2 17- 1 63  · Is s/p aggressive IVF resuscitation  · No longer requiring vasopressor support  · Continue IV Cefepime/Flagyl   · Monitor fever and WBC curve  · Trend procalcitonin

## 2023-05-20 NOTE — ASSESSMENT & PLAN NOTE
· Intubated in field 2/2 cardiac arrest - had witnessed aspiration event  · Imaging consistent w/bilateral lower lobe pneumonia  · Sputum culture positive for 2+ Staph aureus   · Urine strep/legionella negative  · Is s/p bronch on 05/17 - BALs pending  · Extubated 5/19  · Continue w/IV diuresis as patient is +9L overall  · Aggressive pulmonary hygiene  · OOB to chair today  · IS

## 2023-05-20 NOTE — PROGRESS NOTES
[address]  Progress Note  Name: Claudette Gaw  MRN: [id number]  Unit/Bed#: ICU 04-01 I Date of Admission: 5/16/2023   Date of Service: 5/20/2023 I Hospital Day: 4    Assessment/Plan   * Cardiac arrest [facility])  Assessment & Plan  · Events on 05/16:  · Wife reports witnessing patient vomiting, choking, and, then, went unresponsive - wife immediately started CPR  · PEA arrest on EMS arrival - received 2 rounds of ACLS and achieved ROSC  · Intubated in field and transferred to [facility] ED  · Bradycardic/hypotensive on arrival to [facility] ED - required vasopressors and Atropine  · Unclear etiology: ? arrhythmia vs hypoxic event 2/2 aspiration event vs vasovagal response 2/2 choking w/underlying (chronic) bradycardia  · Episode of sustained VT after ETT suctioning requiring Amio bolus and cardioversion x1 on 05/16   · VT/[place] arrest after suctioning on 05/17 - received CPR x2 rounds, Epi x1, shock x2 - ROSC achieved w/AF RVR, then conversion to sinus bradycardia w/rates in 40s  · Is s/p temporary TVP on 05/17  · Now s/p AICD on 05/18  · TTE from 05/16 w/EF 65%; no RWMA  · Cardiology following - appreciate recommendations  · Continue Amiodarone IV and transition to PO today with titration    s/p ICD  Assessment & Plan  · POD 1 LCW ICD/pacer placement for SSS  · Overnight with ventricular paced tachycardia with rate 120  · S/p  Interrogation with improvement  · Monitor on tele   · Continue Amio IV and transition to Oral today  · Cardiology following    Shock [facility])  Assessment & Plan  · In setting of cardiac arrest w/arrhythmias as noted above as well as aspiration pneumonia   · CT C/A/P on admission to [facility] revealed significant worsening of bilateral lower lobe aspiration/pneumonia  · TTE as noted above  · COVID/FLU/RSV negative  · Blood cultures from 05/15 and 05/16 w/NGTD  · UA w/out evidence of infection  · Urine strep/legionella negative  · Sputum culture positive for 2+ Staph aureus · BALs from 05/17 pending  · Lactic acid 5 6 on admission to [facility] (in setting of cardiac arrest) - has since cleared  · Procalcitonin 1 05 - 1 90 - 2 17- 1 63  · Is s/p aggressive IVF resuscitation  · No longer requiring vasopressor support  · Continue IV Cefepime/Flagyl   · Monitor fever and WBC curve  · Trend procalcitonin    Aspiration pneumonia (HCC)  Assessment & Plan  · Please see plan as noted above    Acute respiratory failure with hypoxia ([facility] [address] )  Assessment & Plan  · Intubated in field 2/2 cardiac arrest - had witnessed aspiration event  · Imaging consistent w/bilateral lower lobe pneumonia  · Sputum culture positive for 2+ Staph aureus   · Urine strep/legionella negative  · Is s/p bronch on 05/17 - BALs pending  · Extubated 5/19  · Continue w/IV diuresis as patient is +9L overall  · Aggressive pulmonary hygiene  · OOB to chair today  · IS      Bradycardia  Assessment & Plan  · Bradycardic in 30s to 40s since admission  · Unclear etiology, though suspect this may be chronic   · Is s/p temporary TVP on 05/17 as noted above  · Now s/p AICD implantation on 05/18  · Lyme Ab negative  · [facility] Spotted Fever IgG/M pending  · TSH 5 976 w/T4 0 67, however this may be inaccurate s/p cardiac arrest/shock state  · T3 0 80  · Cardiology following - appreciate recommendations  · Continue cardiac monitoring    Elevated troponin  Assessment & Plan  · Troponin 360 - 122 - 163 - 247  · Non-MI elevation in setting of cardiac arrest, shock state  · TTE w/out RWMA  · ECG w/out ST elevations  · Continue cardiac monitoring  · Resolved    Acute kidney injury ([facility] [address] )  Assessment & Plan  · Creatinine 1 42 on admission to [facility]  · Resolved    Anemia  Assessment & Plan  · Hgb trended down to 10 9  · Baseline Hgb unclear given no prior records available   · Suspect this is 2/2 shock state 2/2 cardiac arrest vs FELIX  · Iron sat 7, folate 19 2, B12 139  · Continue Venofer x3 doses  · Trend Hgb and transfuse for Hgb < 7    Hyperglycemia  Assessment & Plan  · No HST DM  · Likely in setting of acute illness and steroids  · Continue SSI    Delirium  Assessment & Plan  · Noted after extubation  · Required Precedex overnight  · Maintain sleep hygiene   · OOB during the day    Paroxysmal atrial fibrillation (HCC)  Assessment & Plan  · Continue Amio IV, followed by Oral titration    Closed fracture of multiple ribs of both sides  Assessment & Plan  · CT C/A/P on admission to [facility] revealed right 2nd-6th anterior rib fractures; left second-fifth anterior rib fractures  · Continue Lidocaine patches             ICU Core Measures     A: Assess, Prevent, and Manage Pain · Has pain been assessed? Yes  · Need for changes to pain regimen? No   B: Both SAT/SAT  · N/A   C: Choice of Sedation · RASS Goal: 0 Alert and Calm  · Need for changes to sedation or analgesia regimen? Yes   D: Delirium · CAM-ICU: Positive   E: Early Mobility  · Plan for early mobility? Yes   F: Family Engagement · Plan for family engagement today? Yes     { I DISAPPEARING TIP I   Active Antibiotics  cefTRIAXone, Intravenous, 1,000 mg at 05/19/23 1200  metroNIDAZOLE, Intravenous, 500 mg at 05/19/23 2044    Current Micro Results I :62780}  Antibiotic Review: Awaiting culture results  Review of Invasive Devices:            Prophylaxis:  VTE VTE covered by:  apixaban, Oral, 5 mg at 05/19/23 1808       Stress Ulcer  covered byFamotidine (PF) (PEPCID) injection 20 mg [[id number]]       Subjective   HPI/24hr events: Delirious overnight requiring Precedex  20 Lasix IV this am for low UO  Review of Systems   HENT: Negative  Respiratory: Negative for cough, chest tightness and shortness of breath  Cardiovascular: Negative  Negative for chest pain and palpitations  Gastrointestinal: Negative for abdominal pain  Genitourinary: Positive for difficulty urinating  Musculoskeletal: Negative  Skin: Negative      Neurological: Negative for weakness, light-headedness and headaches  Psychiatric/Behavioral: Positive for confusion  The patient is nervous/anxious  Objective                            Vitals I/O      Most Recent Min/Max in 24hrs   Temp 97 9 °F (36 6 °C) Temp  Min: 97 2 °F (36 2 °C)  Max: 99 3 °F (37 4 °C)   Pulse 60 Pulse  Min: 60  Max: 116   Resp (!) 29 Resp  Min: 12  Max: 29   /70 BP  Min: 142/65  Max: 198/118   O2 Sat 92 % SpO2  Min: 87 %  Max: 98 %      Intake/Output Summary (Last 24 hours) at 5/20/2023 0153  Last data filed at 5/20/2023 0000  Gross per 24 hour   Intake 1377 98 ml   Output 948 ml   Net 429 98 ml         Diet Cardiovascular; Cardiac     Invasive Monitoring Physical exam    Physical Exam  Vitals and nursing note reviewed  Constitutional:       General: He is not in acute distress  Appearance: Normal appearance  HENT:      Head: Normocephalic  Mouth/Throat:      Mouth: Mucous membranes are moist       Pharynx: Oropharynx is clear  Eyes:      Pupils: Pupils are equal, round, and reactive to light  Cardiovascular:      Rate and Rhythm: Normal rate and regular rhythm  Pulses: Normal pulses  Heart sounds: Normal heart sounds  Pulmonary:      Effort: Pulmonary effort is normal       Breath sounds: Normal breath sounds  Abdominal:      General: Bowel sounds are normal    Musculoskeletal:         General: Normal range of motion  Skin:     General: Skin is warm and dry  Neurological:      Mental Status: He is disoriented  Diagnostic Studies      EKG: NSR  Imaging:  I have personally reviewed pertinent reports         Medications:  Scheduled PRN   acetaminophen, 975 mg, Q6H  amiodarone, 200 mg, TID With Meals   Followed by  Jaylin Powell ON 6/3/2023] amiodarone, 200 mg, BID With Meals   Followed by  Jaylin Sherry ON 6/18/2023] amiodarone, 200 mg, Daily With Breakfast  apixaban, 5 mg, BID  artificial tear, , HS  cefTRIAXone, 1,000 mg, Q24H  chlorhexidine, 15 mL, Q12H BAYRON  cyanocobalamin, 250 mcg, Daily  Famotidine (PF), 20 mg, BID  furosemide, 20 mg, Once  guaiFENesin, 600 mg, Q12H BAYRON  insulin lispro, 1-5 Units, TID AC  iron sucrose, 200 mg, Daily  levalbuterol, 1 25 mg, TID  levothyroxine, 50 mcg, Early Morning  lidocaine, 1 patch, Daily  lidocaine, 1 patch, Daily  melatonin, 6 mg, HS  methylPREDNISolone sodium succinate, 60 mg, Q12H BAYRON  metroNIDAZOLE, 500 mg, Q8H  senna-docusate sodium, 2 tablet, HS      albuterol, 2 5 mg, Q4H PRN  EPINEPHrine, , Code/Trauma/Sedation Med  hydrALAZINE, 5 mg, Q6H PRN  ondansetron, 4 mg, Q6H PRN       Continuous    amiodarone, 0 5 mg/min, Last Rate: 0 5 mg/min (05/19/23 1808)  dexmedetomidine, 0 1-0 7 mcg/kg/hr, Last Rate: 0 5 mcg/kg/hr (05/20/23 0121)         Labs:    CBC    Recent Labs     05/18/23 0439 05/19/23 0513   WBC 7 11 7 33   HGB 10 5* 9 3*   HCT 32 2* 28 7*   * 145*   BANDSPCT 6  --      BMP    Recent Labs     05/18/23 2053 05/19/23 0513   SODIUM 139 140   K 3 9 3 9    108   CO2 26 26   AGAP 6 6   BUN 18 22   CREATININE 1 03 0 90   CALCIUM 8 7 8 8       Coags    No recent results     Additional Electrolytes  Recent Labs     05/18/23 2053 05/19/23 0513   MG 2 2 2 5   PHOS 2 4 2 2*   CAIONIZED 1 16 1 14          Blood Gas    Recent Labs     05/18/23 0439   PHART 7 391   RSB6MVA 39 5   PO2ART 119 8   WLA7DZI 23 4   BEART -1 3   SOURCE Line, Arterial     Recent Labs     05/18/23 0439   SOURCE Line, Arterial    LFTs  Recent Labs     05/18/23 0439 05/19/23 0513   ALT 47 37   AST 26 18   ALKPHOS 49 52   ALB 2 9* 3 5   TBILI 0 47 0 69       Infectious  Recent Labs     05/18/23 0439 05/19/23 0513   PROCALCITONI 2 17* 1 63*     Glucose  Recent Labs     05/18/23 0439 05/18/23 2053 05/19/23  0513   GLUC 170* 180* 169*               Critical Care Time Delivered 30 Minutes    CARMEL Bartholomew

## 2023-05-20 NOTE — ASSESSMENT & PLAN NOTE
· Noted after extubation  · Required Precedex overnight  · Maintain sleep hygiene   · OOB during the day

## 2023-05-20 NOTE — ASSESSMENT & PLAN NOTE
· Troponin 356 - 434 - 892 - 516  · Non-MI elevation in setting of cardiac arrest, shock state  · TTE w/out RWMA  · ECG w/out ST elevations  · Continue cardiac monitoring  · Resolved

## 2023-05-20 NOTE — PLAN OF CARE
Problem: PAIN - ADULT  Goal: Verbalizes/displays adequate comfort level or baseline comfort level  Description: Interventions:  - Encourage patient to monitor pain and request assistance  - Assess pain using appropriate pain scale  - Administer analgesics based on type and severity of pain and evaluate response  - Implement non-pharmacological measures as appropriate and evaluate response  - Consider cultural and social influences on pain and pain management  - Notify physician/advanced practitioner if interventions unsuccessful or patient reports new pain  Outcome: Progressing     Problem: INFECTION - ADULT  Goal: Absence or prevention of progression during hospitalization  Description: INTERVENTIONS:  - Assess and monitor for signs and symptoms of infection  - Monitor lab/diagnostic results  - Monitor all insertion sites, i e  indwelling lines, tubes, and drains  - Monitor endotracheal if appropriate and nasal secretions for changes in amount and color  - [place] appropriate cooling/warming therapies per order  - Administer medications as ordered  - Instruct and encourage patient and family to use good hand hygiene technique  - Identify and instruct in appropriate isolation precautions for identified infection/condition  Outcome: Progressing  Goal: Absence of fever/infection during neutropenic period  Description: INTERVENTIONS:  - Monitor WBC    Outcome: Progressing     Problem: SAFETY ADULT  Goal: Patient will remain free of falls  Description: INTERVENTIONS:  - Educate patient/family on patient safety including physical limitations  - Instruct patient to call for assistance with activity   - Consult OT/PT to assist with strengthening/mobility   - Keep Call bell within reach  - Keep bed low and locked with side rails adjusted as appropriate  - Keep care items and personal belongings within reach  - Initiate and maintain comfort rounds  - Make Fall Risk Sign visible to staff  - Apply yellow socks and bracelet for high fall risk patients  - Consider moving patient to room near nurses station  Outcome: Progressing  Goal: Maintain or return to baseline ADL function  Description: INTERVENTIONS:  -  Assess patient's ability to carry out ADLs; assess patient's baseline for ADL function and identify physical deficits which impact ability to perform ADLs (bathing, care of mouth/teeth, toileting, grooming, dressing, etc )  - Assess/evaluate cause of self-care deficits   - Assess range of motion  - Assess patient's mobility; develop plan if impaired  - Assess patient's need for assistive devices and provide as appropriate  - Encourage maximum independence but intervene and supervise when necessary  - Involve family in performance of ADLs  - Assess for home care needs following discharge   - Consider OT consult to assist with ADL evaluation and planning for discharge  - Provide patient education as appropriate  Outcome: Progressing  Goal: Maintains/Returns to pre admission functional level  Description: INTERVENTIONS:  - Perform BMAT or MOVE assessment daily    - Set and communicate daily mobility goal to care team and patient/family/caregiver     - Out of bed for toileting  - Record patient progress and toleration of activity level   Outcome: Progressing     Problem: DISCHARGE PLANNING  Goal: Discharge to home or other facility with appropriate resources  Description: INTERVENTIONS:  - Identify barriers to discharge w/patient and caregiver  - Arrange for needed discharge resources and transportation as appropriate  - Identify discharge learning needs (meds, wound care, etc )  - Arrange for interpretive services to assist at discharge as needed  - Refer to Case Management Department for coordinating discharge planning if the patient needs post-hospital services based on physician/advanced practitioner order or complex needs related to functional status, cognitive ability, or social support system  Outcome: Progressing     Problem: Knowledge Deficit  Goal: Patient/family/caregiver demonstrates understanding of disease process, treatment plan, medications, and discharge instructions  Description: Complete learning assessment and assess knowledge base  Interventions:  - Provide teaching at level of understanding  - Provide teaching via preferred learning methods  Outcome: Progressing     Problem: NEUROSENSORY - ADULT  Goal: Achieves stable or improved neurological status  Description: INTERVENTIONS  - Monitor and report changes in neurological status  - Monitor vital signs such as temperature, blood pressure, glucose, and any other labs ordered   - Initiate measures to prevent increased intracranial pressure  - Monitor for seizure activity and implement precautions if appropriate      Outcome: Progressing  Goal: Remains free of injury related to seizures activity  Description: INTERVENTIONS  - Maintain airway, patient safety  and administer oxygen as ordered  - Monitor patient for seizure activity, document and report duration and description of seizure to physician/advanced practitioner  - If seizure occurs,  ensure patient safety during seizure  - Reorient patient post seizure  - Seizure pads on all 4 side rails  - Instruct patient/family to notify RN of any seizure activity including if an aura is experienced  - Instruct patient/family to call for assistance with activity based on nursing assessment  - Administer anti-seizure medications if ordered    Outcome: Progressing  Goal: Achieves maximal functionality and self care  Description: INTERVENTIONS  - Monitor swallowing and airway patency with patient fatigue and changes in neurological status  - Encourage and assist patient to increase activity and self care     - Encourage visually impaired, hearing impaired and aphasic patients to use assistive/communication devices  Outcome: Progressing     Problem: CARDIOVASCULAR - ADULT  Goal: Maintains optimal cardiac output and hemodynamic stability  Description: INTERVENTIONS:  - Monitor I/O, vital signs and rhythm  - Monitor for S/S and trends of decreased cardiac output  - Administer and titrate ordered vasoactive medications to optimize hemodynamic stability  - Assess quality of pulses, skin color and temperature  - Assess for signs of decreased coronary artery perfusion  - Instruct patient to report change in severity of symptoms  Outcome: Progressing  Goal: Absence of cardiac dysrhythmias or at baseline rhythm  Description: INTERVENTIONS:  - Continuous cardiac monitoring, vital signs, obtain 12 lead EKG if ordered  - Administer antiarrhythmic and heart rate control medications as ordered  - Monitor electrolytes and administer replacement therapy as ordered  Outcome: Progressing     Problem: RESPIRATORY - ADULT  Goal: Achieves optimal ventilation and oxygenation  Description: INTERVENTIONS:  - Assess for changes in respiratory status  - Assess for changes in mentation and behavior  - Position to facilitate oxygenation and minimize respiratory effort  - Oxygen administered by appropriate delivery if ordered  - Initiate smoking cessation education as indicated  - Encourage broncho-pulmonary hygiene including cough, deep breathe, Incentive Spirometry  - Assess the need for suctioning and aspirate as needed  - Assess and instruct to report SOB or any respiratory difficulty  - Respiratory Therapy support as indicated  Outcome: Progressing     Problem: GENITOURINARY - ADULT  Goal: Maintains or returns to baseline urinary function  Description: INTERVENTIONS:  - Assess urinary function  - Encourage oral fluids to ensure adequate hydration if ordered  - Administer IV fluids as ordered to ensure adequate hydration  - Administer ordered medications as needed  - Offer frequent toileting  - Follow urinary retention protocol if ordered  Outcome: Progressing  Goal: Absence of urinary retention  Description: INTERVENTIONS:  - Assess patient’s ability to void and empty bladder  - Monitor I/O  - Bladder scan as needed  - Discuss with physician/AP medications to alleviate retention as needed  - Discuss catheterization for long term situations as appropriate  Outcome: Progressing  Goal: Urinary catheter remains patent  Description: INTERVENTIONS:  - Assess patency of urinary catheter  - If patient has a chronic franklin, consider changing catheter if non-functioning  - Follow guidelines for intermittent irrigation of non-functioning urinary catheter  Outcome: Progressing     Problem: Prexisting or High Potential for Compromised Skin Integrity  Goal: Skin integrity is maintained or improved  Description: INTERVENTIONS:  - Identify patients at risk for skin breakdown  - Assess and monitor skin integrity  - Assess and monitor nutrition and hydration status  - Monitor labs   - Assess for incontinence   - Turn and reposition patient  - Assist with mobility/ambulation  - Relieve pressure over bony prominences  - Avoid friction and shearing  - Provide appropriate hygiene as needed including keeping skin clean and dry  - Evaluate need for skin moisturizer/barrier cream  - Collaborate with interdisciplinary team   - Patient/family teaching  - Consider wound care consult   Outcome: Progressing     Problem: MOBILITY - ADULT  Goal: Maintain or return to baseline ADL function  Description: INTERVENTIONS:  -  Assess patient's ability to carry out ADLs; assess patient's baseline for ADL function and identify physical deficits which impact ability to perform ADLs (bathing, care of mouth/teeth, toileting, grooming, dressing, etc )  - Assess/evaluate cause of self-care deficits   - Assess range of motion  - Assess patient's mobility; develop plan if impaired  - Assess patient's need for assistive devices and provide as appropriate  - Encourage maximum independence but intervene and supervise when necessary  - Involve family in performance of ADLs  - Assess for home care needs following discharge   - Consider OT consult to assist with ADL evaluation and planning for discharge  - Provide patient education as appropriate  Outcome: Progressing  Goal: Maintains/Returns to pre admission functional level  Description: INTERVENTIONS:  - Perform BMAT or MOVE assessment daily    - Set and communicate daily mobility goal to care team and patient/family/caregiver  - Collaborate with rehabilitation services on mobility goals if consulted  - Out of bed for toileting  - Record patient progress and toleration of activity level   Outcome: Progressing     Problem: Nutrition/Hydration-ADULT  Goal: Nutrient/Hydration intake appropriate for improving, restoring or maintaining nutritional needs  Description: Monitor and assess patient's nutrition/hydration status for malnutrition  Collaborate with interdisciplinary team and initiate plan and interventions as ordered  Monitor patient's weight and dietary intake as ordered or per policy  Utilize nutrition screening tool and intervene as necessary  Determine patient's food preferences and provide high-protein, high-caloric foods as appropriate       INTERVENTIONS:  - Monitor oral intake, urinary output, labs, and treatment plans  - Assess nutrition and hydration status and recommend course of action  - Evaluate amount of meals eaten  - Assist patient with eating if necessary   - Allow adequate time for meals  - Recommend/ encourage appropriate diets, oral nutritional supplements, and vitamin/mineral supplements  - Order, calculate, and assess calorie counts as needed  - Recommend, monitor, and adjust tube feedings and TPN/PPN based on assessed needs  - Assess need for intravenous fluids  - Provide specific nutrition/hydration education as appropriate  - Include patient/family/caregiver in decisions related to nutrition  Outcome: Progressing

## 2023-05-20 NOTE — PROGRESS NOTES
Cardiology Progress Note - Meghan Gabriel 67 y o  male MRN: [id number]    Unit/Bed#: ICU 04-01 Encounter: [id number]          Subjective:   Patient seen and examined  He is laying in bed comfortably  He is wanting to go to the restroom to move his bowels  He denies any chest pain  Bedside nurse reports an episode of lightheadedness with near syncope upon standing/sitting at the side of the bed  There was bradycardia noted per the report she received from overnight team   On telemetry review, patient had 2 episodes of atrial fibrillation lasting 30 minutes at most each  Otherwise, he is atrial pacing dependent  Hospital Course:   Meghan Gabriel is a 67y o  year old male with no significant medical history who presented with witnessed cardiac arrest at home and bystander CPR  PEA was noticed by EMS with return of spontaneous circulation achieved by epinephrine, followed by intubation in the field  In hospital, patient had various dysrhythmias (A-fib, A-flutter, VT requiring shock)  His course was complicated by mixed shock from cardiac arrest as well as aspiration pneumonitis/pneumonia  His labs were concerning for non-MI troponin elevation     Once stabilized, patient's underlying renal demonstrated complete heart block with VT was thought to be induced from  Transthoracic echocardiogram demonstrated preserved biventricular function with no wall motion abnormalities and no significant valve disease  Patient underwent dual-chamber ICD 5/18  Postprocedure chest x-ray demonstrated appropriate lead positions  Vitals: Blood pressure 150/70, pulse 60, temperature 97 9 °F (36 6 °C), temperature source Tympanic, resp  rate (!) 29, height 6' (1 829 m), weight 109 kg (240 lb 1 3 oz), SpO2 92 %  , Body mass index is 32 56 kg/m² ,   Orthostatic Blood Pressures    Flowsheet Row Most Recent Value   Blood Pressure 150/70 filed at 05/20/2023 0100   Patient Position - Orthostatic VS Lying filed at 05/19/2023 2000 Intake/Output Summary (Last 24 hours) at 2023 7672  Last data filed at 2023 0000  Gross per 24 hour   Intake 439 18 ml   Output 845 ml   Net -405 82 ml       Review of System:  Review of system was conducted and was negative except for as stated in the subjective course      Physical Exam:    GEN: Julissa Diaz appears tired, alert and oriented x 3, cooperative   HEENT:  Normocephalic, atraumatic, anicteric, moist mucous membranes  NECK: No JVD  HEART: Regular rhythm with sporadic ectopy, normal rate, normal S1 and S2, no murmur  LUNGS: Rhonchorous breath sounds bilaterally; respiration nonlabored on supplemental O2  ABDOMEN:  Normoactive bowel sounds, soft, no tenderness  EXTREMITIES: peripheral pulses palpable; trace to 1+ edema  NEURO: no gross focal findings; cranial nerves grossly intact   SKIN:  Dry, intact, warm to touch      Current Facility-Administered Medications:   •  albuterol inhalation solution 2 5 mg, 2 5 mg, Nebulization, Q4H PRN, Carlos Owusu MD  •  [] amiodarone (CORDARONE) 900 mg in dextrose 5 % 500 mL infusion, 1 mg/min, Intravenous, Continuous, Stopped at 23 **FOLLOWED BY** amiodarone (CORDARONE) 900 mg in dextrose 5 % 500 mL infusion, 0 5 mg/min, Intravenous, Continuous, Last Rate: 16 7 mL/hr at 23, 0 5 mg/min at 23 **FOLLOWED BY** amiodarone tablet 200 mg, 200 mg, Oral, TID With Meals **FOLLOWED BY** [START ON 6/3/2023] amiodarone tablet 200 mg, 200 mg, Oral, BID With Meals **FOLLOWED BY** [START ON 2023] amiodarone tablet 200 mg, 200 mg, Oral, Daily With Breakfast, Carlos Owusu MD  •  apixaban [place]) tablet 5 mg, 5 mg, Oral, BID, Carlos Owusu MD, 5 mg at 23  •  artificial tear (LUBRIFRESH P M ) ophthalmic ointment, , Both Eyes, HS, CARMEL Mccracken  •  cefTRIAXone (ROCEPHIN) IVPB (premix in dextrose) 1,000 mg 50 mL, 1,000 mg, Intravenous, Q24H, CARMEL Armstrong, Last Rate: 100 mL/hr at 05/19/23 1200, 1,000 mg at 05/19/23 1200  •  chlorhexidine (PERIDEX) 0 12 % oral rinse 15 mL, 15 mL, Mouth/Throat, Q12H [address], Gil Fernandes MD, 15 mL at 05/19/23 2044  •  cyanocobalamin (VITAMIN B-12) tablet 250 mcg, 250 mcg, Oral, Daily, CARMEL Johnosn, 250 mcg at 05/19/23 1159  •  EPINEPHrine (ADRENALIN) injection, , Intravenous, Code/Trauma/Sedation Med, CARMEL Manning, 1 mg at 05/17/23 8204  •  guaiFENesin (MUCINEX) 12 hr tablet 600 mg, 600 mg, Oral, Q12H [address], CARMEL Johnson, 600 mg at 05/19/23 2055  •  hydrALAZINE (APRESOLINE) injection 5 mg, 5 mg, Intravenous, Q6H PRN, CARMEL Johnson, 5 mg at 05/19/23 2111  •  insulin lispro (HumaLOG) 100 units/mL subcutaneous injection 1-5 Units, 1-5 Units, Subcutaneous, TID AC, 1 Units at 05/19/23 1706 **AND** Fingerstick Glucose (POCT), , , TID AC, CARMEL Johnson  •  iron sucrose (VENOFER) 200 mg in sodium chloride 0 9 % 100 mL IVPB, 200 mg, Intravenous, Daily, CARMEL Johnson, 200 mg at 05/19/23 1313  •  levalbuterol (XOPENEX) inhalation solution 1 25 mg, 1 25 mg, Nebulization, TID, CARMEL Johnson, 1 25 mg at 05/20/23 9808  •  levothyroxine tablet 50 mcg, 50 mcg, Oral, Early Morning, Gil Fernandes MD, 50 mcg at 05/20/23 0535  •  lidocaine (LIDODERM) 5 % patch 1 patch, 1 patch, Topical, Daily, Gil Fernandes MD  •  lidocaine (LIDODERM) 5 % patch 1 patch, 1 patch, Topical, Daily, Gil Fernandes MD  •  melatonin tablet 6 mg, 6 mg, Oral, HS, CARMEL Johnson, 6 mg at 05/19/23 2102  •  methylPREDNISolone sodium succinate (Solu-MEDROL) injection 60 mg, 60 mg, Intravenous, Q12H [address], CARMEL Johnson, 60 mg at 05/19/23 2043  •  metroNIDAZOLE (FLAGYL) IVPB (premix) 500 mg 100 mL, 500 mg, Intravenous, Q8H, Gil Fernandes MD, Last Rate: 200 mL/hr at 05/20/23 0535, 500 mg at 05/20/23 0535  •  polyethylene glycol (MIRALAX) packet 17 g, 17 g, Oral, Daily, CARMEL Rincon Asp  •  senna-docusate sodium (SENOKOT S) 8 6-50 mg per tablet 2 tablet, 2 tablet, Oral, HS, Michatorito Hong MD, 2 tablet at 05/19/23 2102    Labs & Results:  Results from last 7 days   Lab Units 05/16/23  0630 05/16/23  0200 05/15/23  2325 05/15/23  2122   HS TNI 0HR ng/L  --   --   --  360*   HS TNI 2HR ng/L  --   --  444*  --    HS TNI 4HR ng/L  --  392*  --   --    HS TNI RAND ng/L 329*  --   --   --          Results from last 7 days   Lab Units 05/20/23  0613 05/19/23  0513 05/18/23  2053   POTASSIUM mmol/L 3 9 3 9 3 9   CO2 mmol/L 26 26 26   CHLORIDE mmol/L 104 108 107   BUN mg/dL 31* 22 18   CREATININE mg/dL 0 97 0 90 1 03     Results from last 7 days   Lab Units 05/20/23  0613 05/19/23  0513 05/18/23  0439   HEMOGLOBIN g/dL 11 0* 9 3* 10 5*   HEMATOCRIT % 34 2* 28 7* 32 2*   PLATELETS Thousands/uL 155 145* 123*     Results from last 7 days   Lab Units 05/19/23  0513 05/16/23  0327   TRIGLYCERIDES mg/dL 178*  --    HDL mg/dL 35*  --    LDL CALC mg/dL 42  --    LDL DIRECT mg/dl 47  --    HEMOGLOBIN A1C %  --  5 3       Telemetry:   Personally reviewed by Mikaela Rivera MD: Atrial paced rhythm with frequent PVCs, heart rate stable at 60 bpm   x2 episode of atrial fibrillation with heart rate ranging , both lasting 30 minutes at the most around 8 PM and 9:30 PM    Imaging:         VTE Prophylaxis: Apixaban      Assessment:  Principal Problem:    Cardiac arrest [facility])  Active Problems:    Acute respiratory failure with hypoxia (HCC)    Closed fracture of multiple ribs of both sides    Acute kidney injury ([address] )    Shock ([facility] [address] )    Aspiration pneumonia (HCC)    Bradycardia    Elevated troponin    Anemia    s/p ICD    Paroxysmal atrial fibrillation ([facility] [address] )    Delirium    Hyperglycemia        67y o  year old male who presented with out of hospital cardiac arrest due to bradycardia with related bradycardia-induced ventricular tachycardia  Hospital course was complicated by aspiration pneumonia/pneumonitis leading to mixed picture shock    He is status post dual-chamber ICD on 5/18  1   Sick sinus syndrome/complete heart block w/ bradycardia-induced VT  - 5/16 TTE: LVEF 65%, no WMA, no DD, normal RV size/function, no significant valve disease  - s/p [employer] dual-chamber ICD  - TSH 5 9, Lyme Ab negative    2  Paroxysmal atrial fibrillation/flutter  - Possibly in setting of cardiac arrest + infectious proces  - [id number] 1-2 if HTN diagnosis persists, HAS-BLED 1  - Amiodarone, apixaban    3  Non-MI troponin elevation  - 444 peak  - LDL 47, A1c 5 3%    4  Elevated blood pressure readings without diagnosis of hypertension  - SBP ranging 150-189 in the last 24 hours      Plan:  1  Patient still having breakthrough atrial fibrillation on amiodarone infusion though the load is not completed  His blood pressure readings are also elevated up to the 180s  Consider initiation of carvedilol 6 25 mg twice daily to aid in both atrial fibrillation and blood pressure control  2   Telemetry was extensively reviewed by myself and demonstrated no episodes of pacemaker malfunction  His lightheadedness was likely from atrial fibrillation conversion and sudden drop in heart rate from 90 to 60 as he is pacemaker dependent otherwise  3   Cardiology to continue following along  Thank you for involving us in the care of your patient  Steph Morales MD  Cardiology      [facility]/ [facility] Everywhere records reviewed: Yes    ** Please Note: Fluency DirectDictation voice to text software may have been used in the creation of this document   **

## 2023-05-20 NOTE — PLAN OF CARE
Problem: PHYSICAL THERAPY ADULT  Goal: Performs mobility at highest level of function for planned discharge setting  See evaluation for individualized goals  Description: Treatment/Interventions: Functional transfer training, LE strengthening/ROM, Therapeutic exercise, Endurance training, Patient/family training, Equipment eval/education, Bed mobility, Gait training, Spoke to nursing, Spoke to case management  Equipment Recommended:  (TBD pending progress and ongoing pacemaker precautions/sling in place to L UE)       See flowsheet documentation for full assessment, interventions and recommendations  Note: Prognosis: Fair  Problem List: Decreased strength, Decreased endurance, Impaired balance, Decreased mobility, Impaired judgement, Decreased safety awareness, Decreased cognition, Obesity, Decreased skin integrity  Assessment: Pt is 67 y o  male seen for high-complexity PT evaluation on 5/20/2023 s/p admit to [place] [id number] on 5/16/2023 w/ Cardiac arrest ([facility] [address] )  PT was consulted to assess pt's functional mobility and d/c needs  Order placed for PT eval and tx, w/ up and OOB as tolerated order  PTA, pt reports living with spouse in 2 [place], amb s AD at baseline, (I) ADLs/IADLs  At time of eval, pt requires mod Ax2 for all functional mobility tasks including STS and SPT from bed>recliner with HHA  Upon evaluation, pt presenting with impaired functional mobility d/t decreased strength, decreased endurance, impaired balance, decreased mobility, decreased cognition, impaired judgement, decreased safety awareness, obesity c BMI of 32 56 kg/m2 and decreased skin integrity   Pertinent PMHx and current co-morbidities affecting pt's physical performance at time of assessment include: POD 2 LCW ICD/pacer placement for SSS, shock in setting of cardiac arrest, aspiration pneumonia, acute respiratory failure c hypoxia extubated 5/19, bradycardia with AICD implantation on 5/18, PIERO, anemia, hyperglycemia, delirium, AFib, multiple rib fractures  Personal factors affecting pt at time of eval include: decreased cognition, decreased initiation and engagement and limited insight into impairments  The following objective measures performed on IE also reveal limitations: AM-PAC 6-Clicks: 68/50  Pt's clinical presentation is currently unstable/unpredictable seen in pt's presentation of need for input for task focus and mobility technique, ongoing medical assessment and out of hospital cardiac arrest due to bradycardia with related bradycardia-induced ventricular tachycardia  Hospital course was complicated by aspiration pneumonia/pneumonitis leading to mixed picture shock, s/p dual-chamber ICD on 5/18  Overall, pt's rehab potential and prognosis to return to PLOF is fair as impacted by objective findings, warranting pt to receive further skilled PT interventions to address identified impairments, activity limitation(s), and participation restriction(s)  Pt to benefit from continued PT tx to address deficits as defined above and maximize level of functional independent mobility and consistency in order for pt to improve activity tolerance  From PT/mobility standpoint, recommendation at time of d/c would be post acute rehabilitation services pending progress in order to facilitate return to PLOF  Barriers to Discharge: Inaccessible home environment     PT Discharge Recommendation: Post acute rehabilitation services    See flowsheet documentation for full assessment

## 2023-05-20 NOTE — PLAN OF CARE
Problem: OCCUPATIONAL THERAPY ADULT  Goal: Performs self-care activities at highest level of function for planned discharge setting  See evaluation for individualized goals  Description: Treatment Interventions: ADL retraining, Functional transfer training, UE strengthening/ROM, Endurance training, Cognitive reorientation, Patient/family training, Equipment evaluation/education, Compensatory technique education, Energy conservation, Activityengagement    See flowsheet documentation for full assessment, interventions and recommendations  Note: Limitation: Decreased ADL status, Decreased UE ROM, Decreased UE strength, Decreased Safe judgement during ADL, Decreased cognition, Decreased endurance, Decreased self-care trans, Decreased high-level ADLs  Prognosis: Good  Assessment: Pt is a 67 y o  male seen for OT evaluation [facility] w/ Cardiac arrest ([facility] [address] )  Comorbidities affecting pt's functional performance at time of assessment include: Rib fxs, PIERO, Shock, Bradycardia, Anemia, A-fib, Delirium  Personal factors affecting pt at time of IE include:steps to enter environment, difficulty performing ADLS and limited insight into deficits  Prior to admission, pt was Mod I with ADLs  Upon evaluation: the following deficits impact occupational performance: decreased strength, decreased balance, decreased tolerance, impaired initiation, impaired memory, impaired sequencing, impaired problem solving and decreased safety awareness  Pt to benefit from continued skilled OT tx while in the hospital to address deficits as defined above and maximize level of functional independence w ADL's and functional mobility  Occupational Performance areas to address include: bathing/shower, toilet hygiene, dressing, functional mobility and clothing management  From OT standpoint, recommendation at time of d/c would be STR       OT Discharge Recommendation: Post acute rehabilitation services     [address] [place] [address], MS, OTR/L

## 2023-05-20 NOTE — ASSESSMENT & PLAN NOTE
· Events on 05/16:  · Wife reports witnessing patient vomiting, choking, and, then, went unresponsive - wife immediately started CPR  · PEA arrest on EMS arrival - received 2 rounds of ACLS and achieved ROSC  · Intubated in field and transferred to [facility] ED  · Bradycardic/hypotensive on arrival to [facility] ED - required vasopressors and Atropine  · Unclear etiology: ? arrhythmia vs hypoxic event 2/2 aspiration event vs vasovagal response 2/2 choking w/underlying (chronic) bradycardia  · Episode of sustained VT after ETT suctioning requiring Amio bolus and cardioversion x1 on 05/16   · VT/[place] arrest after suctioning on 05/17 - received CPR x2 rounds, Epi x1, shock x2 - ROSC achieved w/AF RVR, then conversion to sinus bradycardia w/rates in 40s  · Is s/p temporary TVP on 05/17  · Now s/p AICD on 05/18  · TTE from 05/16 w/EF 65%; no RWMA  · Cardiology following - appreciate recommendations  · Continue Amiodarone IV and transition to PO today with titration

## 2023-05-20 NOTE — PLAN OF CARE
Problem: PAIN - ADULT  Goal: Verbalizes/displays adequate comfort level or baseline comfort level  Description: Interventions:  - Encourage patient to monitor pain and request assistance  - Assess pain using appropriate pain scale  - Administer analgesics based on type and severity of pain and evaluate response  - Implement non-pharmacological measures as appropriate and evaluate response  - Consider cultural and social influences on pain and pain management  - Notify physician/advanced practitioner if interventions unsuccessful or patient reports new pain  Outcome: Progressing     Problem: INFECTION - ADULT  Goal: Absence or prevention of progression during hospitalization  Description: INTERVENTIONS:  - Assess and monitor for signs and symptoms of infection  - Monitor lab/diagnostic results  - Monitor all insertion sites, i e  indwelling lines, tubes, and drains  - Monitor endotracheal if appropriate and nasal secretions for changes in amount and color  - [place] appropriate cooling/warming therapies per order  - Administer medications as ordered  - Instruct and encourage patient and family to use good hand hygiene technique  - Identify and instruct in appropriate isolation precautions for identified infection/condition  Outcome: Progressing  Goal: Absence of fever/infection during neutropenic period  Description: INTERVENTIONS:  - Monitor WBC    Outcome: Completed     Problem: SAFETY ADULT  Goal: Patient will remain free of falls  Description: INTERVENTIONS:  - Educate patient/family on patient safety including physical limitations  - Instruct patient to call for assistance with activity   - Consult OT/PT to assist with strengthening/mobility   - Keep Call bell within reach  - Keep bed low and locked with side rails adjusted as appropriate  - Keep care items and personal belongings within reach  - Initiate and maintain comfort rounds  - Make Fall Risk Sign visible to staff  - Offer Toileting every 2 Hours, in advance of need  - Initiate/Maintain bed alarm  - Apply yellow socks and bracelet for high fall risk patients  - Consider moving patient to room near nurses station  Outcome: Progressing  Goal: Maintain or return to baseline ADL function  Description: INTERVENTIONS:  -  Assess patient's ability to carry out ADLs; assess patient's baseline for ADL function and identify physical deficits which impact ability to perform ADLs (bathing, care of mouth/teeth, toileting, grooming, dressing, etc )  - Assess/evaluate cause of self-care deficits   - Assess range of motion  - Assess patient's mobility; develop plan if impaired  - Assess patient's need for assistive devices and provide as appropriate  - Encourage maximum independence but intervene and supervise when necessary  - Involve family in performance of ADLs  - Assess for home care needs following discharge   - Consider OT consult to assist with ADL evaluation and planning for discharge  - Provide patient education as appropriate  Outcome: Progressing  Goal: Maintains/Returns to pre admission functional level  Description: INTERVENTIONS:  - Perform BMAT or MOVE assessment daily    - Set and communicate daily mobility goal to care team and patient/family/caregiver  - Collaborate with rehabilitation services on mobility goals if consulted  - Perform Range of Motion 3 times a day  - Reposition patient every 2 hours    - Dangle patient 3 times a day  - Stand patient 3 times a day  - Ambulate patient 3 times a day  - Out of bed for meals   - Out of bed for toileting  - Record patient progress and toleration of activity level   Outcome: Progressing     Problem: DISCHARGE PLANNING  Goal: Discharge to home or other facility with appropriate resources  Description: INTERVENTIONS:  - Identify barriers to discharge w/patient and caregiver  - Arrange for needed discharge resources and transportation as appropriate  - Identify discharge learning needs (meds, wound care, etc )  - Arrange for interpretive services to assist at discharge as needed  - Refer to Case Management Department for coordinating discharge planning if the patient needs post-hospital services based on physician/advanced practitioner order or complex needs related to functional status, cognitive ability, or social support system  Outcome: Progressing     Problem: Knowledge Deficit  Goal: Patient/family/caregiver demonstrates understanding of disease process, treatment plan, medications, and discharge instructions  Description: Complete learning assessment and assess knowledge base    Interventions:  - Provide teaching at level of understanding  - Provide teaching via preferred learning methods  Outcome: Progressing     Problem: NEUROSENSORY - ADULT  Goal: Achieves stable or improved neurological status  Description: INTERVENTIONS  - Monitor and report changes in neurological status  - Monitor vital signs such as temperature, blood pressure, glucose, and any other labs ordered   - Initiate measures to prevent increased intracranial pressure  - Monitor for seizure activity and implement precautions if appropriate      Outcome: Progressing  Goal: Remains free of injury related to seizures activity  Description: INTERVENTIONS  - Maintain airway, patient safety  and administer oxygen as ordered  - Monitor patient for seizure activity, document and report duration and description of seizure to physician/advanced practitioner  - If seizure occurs,  ensure patient safety during seizure  - Reorient patient post seizure  - Seizure pads on all 4 side rails  - Instruct patient/family to notify RN of any seizure activity including if an aura is experienced  - Instruct patient/family to call for assistance with activity based on nursing assessment  - Administer anti-seizure medications if ordered    Outcome: Progressing  Goal: Achieves maximal functionality and self care  Description: INTERVENTIONS  - Monitor swallowing and airway patency with patient fatigue and changes in neurological status  - Encourage and assist patient to increase activity and self care     - Encourage visually impaired, hearing impaired and aphasic patients to use assistive/communication devices  Outcome: Progressing     Problem: CARDIOVASCULAR - ADULT  Goal: Maintains optimal cardiac output and hemodynamic stability  Description: INTERVENTIONS:  - Monitor I/O, vital signs and rhythm  - Monitor for S/S and trends of decreased cardiac output  - Administer and titrate ordered vasoactive medications to optimize hemodynamic stability  - Assess quality of pulses, skin color and temperature  - Assess for signs of decreased coronary artery perfusion  - Instruct patient to report change in severity of symptoms  Outcome: Progressing  Goal: Absence of cardiac dysrhythmias or at baseline rhythm  Description: INTERVENTIONS:  - Continuous cardiac monitoring, vital signs, obtain 12 lead EKG if ordered  - Administer antiarrhythmic and heart rate control medications as ordered  - Monitor electrolytes and administer replacement therapy as ordered  Outcome: Progressing     Problem: RESPIRATORY - ADULT  Goal: Achieves optimal ventilation and oxygenation  Description: INTERVENTIONS:  - Assess for changes in respiratory status  - Assess for changes in mentation and behavior  - Position to facilitate oxygenation and minimize respiratory effort  - Oxygen administered by appropriate delivery if ordered  - Initiate smoking cessation education as indicated  - Encourage broncho-pulmonary hygiene including cough, deep breathe, Incentive Spirometry  - Assess the need for suctioning and aspirate as needed  - Assess and instruct to report SOB or any respiratory difficulty  - Respiratory Therapy support as indicated  Outcome: Progressing     Problem: GENITOURINARY - ADULT  Goal: Maintains or returns to baseline urinary function  Description: INTERVENTIONS:  - Assess urinary function  - Encourage oral fluids to ensure adequate hydration if ordered  - Administer IV fluids as ordered to ensure adequate hydration  - Administer ordered medications as needed  - Offer frequent toileting  - Follow urinary retention protocol if ordered  Outcome: Progressing  Goal: Absence of urinary retention  Description: INTERVENTIONS:  - Assess patient’s ability to void and empty bladder  - Monitor I/O  - Bladder scan as needed  - Discuss with physician/AP medications to alleviate retention as needed  - Discuss catheterization for long term situations as appropriate  Outcome: Progressing  Goal: Urinary catheter remains patent  Description: INTERVENTIONS:  - Assess patency of urinary catheter  - If patient has a chronic franklin, consider changing catheter if non-functioning  - Follow guidelines for intermittent irrigation of non-functioning urinary catheter  Outcome: Progressing     Problem: Prexisting or High Potential for Compromised Skin Integrity  Goal: Skin integrity is maintained or improved  Description: INTERVENTIONS:  - Identify patients at risk for skin breakdown  - Assess and monitor skin integrity  - Assess and monitor nutrition and hydration status  - Monitor labs   - Assess for incontinence   - Turn and reposition patient  - Assist with mobility/ambulation  - Relieve pressure over bony prominences  - Avoid friction and shearing  - Provide appropriate hygiene as needed including keeping skin clean and dry  - Evaluate need for skin moisturizer/barrier cream  - Collaborate with interdisciplinary team   - Patient/family teaching  - Consider wound care consult   Outcome: Progressing     Problem: MOBILITY - ADULT  Goal: Maintain or return to baseline ADL function  Description: INTERVENTIONS:  -  Assess patient's ability to carry out ADLs; assess patient's baseline for ADL function and identify physical deficits which impact ability to perform ADLs (bathing, care of mouth/teeth, toileting, grooming, dressing, etc )  - Assess/evaluate cause of self-care deficits   - Assess range of motion  - Assess patient's mobility; develop plan if impaired  - Assess patient's need for assistive devices and provide as appropriate  - Encourage maximum independence but intervene and supervise when necessary  - Involve family in performance of ADLs  - Assess for home care needs following discharge   - Consider OT consult to assist with ADL evaluation and planning for discharge  - Provide patient education as appropriate  Outcome: Progressing  Goal: Maintains/Returns to pre admission functional level  Description: INTERVENTIONS:  - Perform BMAT or MOVE assessment daily    - Set and communicate daily mobility goal to care team and patient/family/caregiver  - Collaborate with rehabilitation services on mobility goals if consulted  - Perform Range of Motion 3 times a day  - Reposition patient every 2 hours  - Dangle patient 3 times a day  - Stand patient 3 times a day  - Ambulate patient 3 times a day  - Out of bed for meals   - Out of bed for toileting  - Record patient progress and toleration of activity level   Outcome: Progressing     Problem: Nutrition/Hydration-ADULT  Goal: Nutrient/Hydration intake appropriate for improving, restoring or maintaining nutritional needs  Description: Monitor and assess patient's nutrition/hydration status for malnutrition  Collaborate with interdisciplinary team and initiate plan and interventions as ordered  Monitor patient's weight and dietary intake as ordered or per policy  Utilize nutrition screening tool and intervene as necessary  Determine patient's food preferences and provide high-protein, high-caloric foods as appropriate       INTERVENTIONS:  - Monitor oral intake, urinary output, labs, and treatment plans  - Assess nutrition and hydration status and recommend course of action  - Evaluate amount of meals eaten  - Assist patient with eating if necessary   - Allow adequate time for meals  - Recommend/ encourage appropriate diets, oral nutritional supplements, and vitamin/mineral supplements  - Order, calculate, and assess calorie counts as needed  - Recommend, monitor, and adjust tube feedings and TPN/PPN based on assessed needs  - Assess need for intravenous fluids  - Provide specific nutrition/hydration education as appropriate  - Include patient/family/caregiver in decisions related to nutrition  Outcome: Progressing

## 2023-05-20 NOTE — OCCUPATIONAL THERAPY NOTE
"Occupational Therapy Evaluation      Israel Rubio    5/20/2023    Principal Problem:    Cardiac arrest [facility])  Active Problems:    Acute respiratory failure with hypoxia ([facility] [address] )    Closed fracture of multiple ribs of both sides    Acute kidney injury ([facility] [address] )    Shock ([facility] [address] )    Aspiration pneumonia (HCC)    Bradycardia    Elevated troponin    Anemia    s/p ICD    Paroxysmal atrial fibrillation (HCC)    Delirium    Hyperglycemia      History reviewed  No pertinent past medical history  Past Surgical History:   Procedure Laterality Date    APPENDECTOMY  2006    CARDIAC DEFIBRILLATOR PLACEMENT Left 5/18/2023    Procedure: INSERTION AUTOMATIC IMPLANTABLE CARDIOVERTER DEFIBRILLATOR (AICD); Surgeon: Edna Fraire MD;  Location: [facility];  Service: Cardiology        05/20/23 0840   OT Last Visit   OT Visit Date 05/20/23   Note Type   Note type Evaluation   Pain Assessment   Pain Assessment Tool 0-10   Pain Score No Pain   Restrictions/Precautions   Weight Bearing Precautions Per Order No   Braces or Orthoses Sling  (L UE sling due to pacemaker precautions)   Other Precautions Cognitive; Chair Alarm; Bed Alarm;Multiple lines;Telemetry;O2;Fall Risk  (pacemaker precautions, +franklin, 12L Mid flow O2 Pt does not wear at baseline)   Home Living   Type of Home House   Home Layout Two level;Performs ADLs on one level; Able to live on main level with bedroom/bathroom;Stairs to enter with rails  (FF to enter home)   Home Equipment   (no AD PTA)   Prior Function   Level of [place] Independent with ADLs; Independent with functional mobility; Independent with IADLS   Lives With Spouse   Receives Help From Family   IADLs Independent with driving; Independent with meal prep; Independent with medication management   Vocational Retired  (\"forester\")   ADL   UB Bathing Assistance 3  Moderate Assistance   LB Bathing Assistance 2  Maximal Assistance   [address] 2  Maximal Assistance   LB Dressing Assistance 1  Total Assistance " Bed Mobility   Supine to Sit 3  Moderate assistance   Additional items Assist x 2;HOB elevated; Bedrails; Increased time required;Verbal cues;LE management   Additional Comments Pt remained OOB in recliner upon conclusion   Transfers   Sit to Stand 3  Moderate assistance   Additional items Assist x 2; Increased time required;Verbal cues   Stand to Sit 3  Moderate assistance   Additional items Assist x 2;Armrests; Increased time required;Verbal cues   Stand pivot 3  Moderate assistance   Additional items Assist x 2; Increased time required;Verbal cues  (HHA)   Additional Comments BP s/p functional transfer: 155/78   Balance   Static Sitting Fair +   Dynamic Sitting Fair   Static Standing Poor +   Dynamic Standing Poor   Ambulatory Poor   Activity Tolerance   Activity Tolerance Patient limited by fatigue   Nurse Made Aware RN Shelia   RUE Assessment   RUE Assessment X  (AROM WFL)   RUE Strength   RUE Overall Strength Deficits  (3+/5)   LUE Assessment   LUE Assessment X  (DNT due to pacemaker precautions)   LUE Strength   LUE Overall Strength Unable to assess;Due to  precautions   Vision-Basic Assessment   Current Vision Wears glasses all the time   Cognition   Overall Cognitive Status Impaired   Arousal/Participation Alert; Cooperative   Attention Attends with cues to redirect   Orientation Level Oriented to person;Oriented to place; Disoriented to time;Disoriented to situation   Memory Decreased recall of recent events;Decreased recall of precautions   Following Commands Follows one step commands with increased time or repetition   Assessment   Limitation Decreased ADL status; Decreased UE ROM; Decreased UE strength;Decreased Safe judgement during ADL;Decreased cognition;Decreased endurance;Decreased self-care trans;Decreased high-level ADLs   Prognosis Good   Assessment Pt is a 67 y o  male seen for OT evaluation s/p admit to [facility] on 5/16/2023 w/ Cardiac arrest ([facility] [address] )    Comorbidities affecting pt's functional performance at time of assessment include: Rib fxs, PIERO, Shock, Bradycardia, Anemia, A-fib, Delirium  Personal factors affecting pt at time of IE include:steps to enter environment, difficulty performing ADLS and limited insight into deficits  Prior to admission, pt was Mod I with ADLs  Upon evaluation: the following deficits impact occupational performance: decreased strength, decreased balance, decreased tolerance, impaired initiation, impaired memory, impaired sequencing, impaired problem solving and decreased safety awareness  Pt to benefit from continued skilled OT tx while in the hospital to address deficits as defined above and maximize level of functional independence w ADL's and functional mobility  Occupational Performance areas to address include: bathing/shower, toilet hygiene, dressing, functional mobility and clothing management  From OT standpoint, recommendation at time of d/c would be STR  Goals   Patient Goals To see his wife   Plan   Treatment Interventions ADL retraining;Functional transfer training;UE strengthening/ROM; Endurance training;Cognitive reorientation;Patient/family training;Equipment evaluation/education; Compensatory technique education; Energy conservation; Activityengagement   Goal Expiration Date 05/30/23   OT Treatment Day 0   OT Frequency 3-5x/wk   Recommendation   OT Discharge Recommendation Post acute rehabilitation services   Additional Comments  Pt seen as a co-eval with PT due to the patient's co-morbidities, clinically unstable presentation, and present impairments which are a regression from the patient's baseline  Additional Comments 2 The patient's raw score on the AM-PAC Daily Activity Inpatient Short Form is 11  A raw score of less than 19 suggests the patient may benefit from discharge to post-acute rehabilitation services  Please refer to the recommendation of the Occupational Therapist for safe discharge planning     AM-PAC Daily Activity Inpatient   Lower Body Dressing 1   Bathing 1   Toileting 1   Upper Body Dressing 2   Grooming 3   Eating 3   Daily Activity Raw Score 11   Daily Activity Standardized Score (Calc for Raw Score >=11) 29 04   AM-PAC Applied Cognition Inpatient   Following a Speech/Presentation 3   Understanding Ordinary Conversation 3   Taking Medications 1   Remembering Where Things Are Placed or Put Away 1   Remembering List of 4-5 Errands 1   Taking Care of Complicated Tasks 1   Applied Cognition Raw Score 10   Applied Cognition Standardized Score 24 98     GOALS:    Pt will achieve the following within specified time frame: STG  Pt will achieve the following goals within 5 days    *ADL transfers with Mod (A) for inc'd independence with ADLs/purposeful tasks    *Self Feeding- (S) for inc'd independence with providing self nourishment    *UB ADL with Min (A) for inc'd independence with self cares    *LB ADL with Max (A) using AE prn for inc'd independence with self cares    *Toileting with Max (A) for clothing management and hygiene for return to [facility] with personal care    *Increase static stand balance to F- and dyn stand balance to P+ for inc'd safety with standing purposeful tasks    *Increase stand tolerance x3 m for inc'd tolerance with standing purposeful tasks    *Participate in 10m UE therex to increase overall stamina/activity tolerance for purposeful tasks    *Bed mobility- Mod (A) for inc'd independence to manage own comfort and initiate EOB & OOB purposeful tasks    Pt will achieve the following within specified time frame: LTG  Pt will achieve the following goals within 10 days    *ADL transfers with Min (A) for inc'd independence with ADLs/purposeful tasks    *Self Feeding- (I) for inc'd independence with providing self nourishment    *UB ADL with (S) for inc'd independence with self cares    *LB ADL with Mod (A) using AE prn for inc'd independence with self cares    *Toileting with Mod (A) for clothing management and hygiene for return to PLOF with personal care    *Increase static stand balance to F and dyn stand balance to F- for inc'd safety with standing purposeful tasks    *Increase stand tolerance x5 m for inc'd tolerance with standing purposeful tasks    *Bed mobility- Min (A) for inc'd independence to manage own comfort and initiate EOB & OOB purposeful tasks      Luis Jonas, MS, OTR/L

## 2023-05-20 NOTE — ASSESSMENT & PLAN NOTE
· Bradycardic in 35s to 45s since admission  · Unclear etiology, though suspect this may be chronic   · Is s/p temporary TVP on 05/17 as noted above  · Now s/p AICD implantation on 05/18  · Lyme Ab negative  · [facility] Spotted Fever IgG/M pending  · TSH 5 976 w/T4 0 67, however this may be inaccurate s/p cardiac arrest/shock state  · T3 0 80  · Cardiology following - appreciate recommendations  · Continue cardiac monitoring

## 2023-05-21 PROBLEM — N17.9 ACUTE KIDNEY INJURY (HCC): Status: RESOLVED | Noted: 2023-05-16 | Resolved: 2023-05-21

## 2023-05-21 PROBLEM — I10 HTN (HYPERTENSION): Status: ACTIVE | Noted: 2023-05-21

## 2023-05-21 PROBLEM — R77.8 ELEVATED TROPONIN: Status: RESOLVED | Noted: 2023-05-16 | Resolved: 2023-05-21

## 2023-05-21 PROBLEM — R57.9 SHOCK (HCC): Status: RESOLVED | Noted: 2023-05-16 | Resolved: 2023-05-21

## 2023-05-21 PROBLEM — R79.89 ELEVATED TROPONIN: Status: RESOLVED | Noted: 2023-05-16 | Resolved: 2023-05-21

## 2023-05-21 PROBLEM — R00.1 BRADYCARDIA: Status: RESOLVED | Noted: 2023-05-16 | Resolved: 2023-05-21

## 2023-05-21 PROBLEM — E03.9 HYPOTHYROIDISM: Status: ACTIVE | Noted: 2023-05-21

## 2023-05-21 PROBLEM — K59.00 CONSTIPATION: Status: ACTIVE | Noted: 2023-05-21

## 2023-05-21 LAB
ANION GAP SERPL CALCULATED.3IONS-SCNC: 6 MMOL/L (ref 4–13)
BACTERIA BLD CULT: NORMAL
BUN SERPL-MCNC: 29 MG/DL (ref 5–25)
CALCIUM SERPL-MCNC: 9 MG/DL (ref 8.4–10.2)
CHLORIDE SERPL-SCNC: 106 MMOL/L (ref 96–108)
CO2 SERPL-SCNC: 30 MMOL/L (ref 21–32)
CREAT SERPL-MCNC: 0.94 MG/DL (ref 0.6–1.3)
ERYTHROCYTE [DISTWIDTH] IN BLOOD BY AUTOMATED COUNT: 12.5 % (ref 11.6–15.1)
GFR SERPL CREATININE-BSD FRML MDRD: 80 ML/MIN/1.73SQ M
GLUCOSE SERPL-MCNC: 106 MG/DL (ref 65–140)
GLUCOSE SERPL-MCNC: 107 MG/DL (ref 65–140)
GLUCOSE SERPL-MCNC: 110 MG/DL (ref 65–140)
GLUCOSE SERPL-MCNC: 127 MG/DL (ref 65–140)
GLUCOSE SERPL-MCNC: 146 MG/DL (ref 65–140)
HCT VFR BLD AUTO: 31.3 % (ref 36.5–49.3)
HGB BLD-MCNC: 10.1 G/DL (ref 12–17)
MAGNESIUM SERPL-MCNC: 2.4 MG/DL (ref 1.9–2.7)
MCH RBC QN AUTO: 30.2 PG (ref 26.8–34.3)
MCHC RBC AUTO-ENTMCNC: 32.3 G/DL (ref 31.4–37.4)
MCV RBC AUTO: 94 FL (ref 82–98)
PHOSPHATE SERPL-MCNC: 2.7 MG/DL (ref 2.3–4.1)
PLATELET # BLD AUTO: 147 THOUSANDS/UL (ref 149–390)
PMV BLD AUTO: 11.5 FL (ref 8.9–12.7)
POTASSIUM SERPL-SCNC: 3.6 MMOL/L (ref 3.5–5.3)
RBC # BLD AUTO: 3.34 MILLION/UL (ref 3.88–5.62)
SODIUM SERPL-SCNC: 142 MMOL/L (ref 135–147)
WBC # BLD AUTO: 8.57 THOUSAND/UL (ref 4.31–10.16)

## 2023-05-21 RX ORDER — OXYCODONE HYDROCHLORIDE 10 MG/1
10 TABLET ORAL EVERY 4 HOURS PRN
Status: DISCONTINUED | OUTPATIENT
Start: 2023-05-21 | End: 2023-05-25 | Stop reason: HOSPADM

## 2023-05-21 RX ORDER — ACETAMINOPHEN 325 MG/1
650 TABLET ORAL EVERY 6 HOURS SCHEDULED
Status: DISCONTINUED | OUTPATIENT
Start: 2023-05-21 | End: 2023-05-25 | Stop reason: HOSPADM

## 2023-05-21 RX ORDER — LACTULOSE 20 G/30ML
30 SOLUTION ORAL ONCE
Status: COMPLETED | OUTPATIENT
Start: 2023-05-21 | End: 2023-05-21

## 2023-05-21 RX ORDER — POTASSIUM CHLORIDE 20 MEQ/1
20 TABLET, EXTENDED RELEASE ORAL ONCE
Status: COMPLETED | OUTPATIENT
Start: 2023-05-21 | End: 2023-05-21

## 2023-05-21 RX ORDER — FUROSEMIDE 10 MG/ML
20 INJECTION INTRAMUSCULAR; INTRAVENOUS ONCE
Status: COMPLETED | OUTPATIENT
Start: 2023-05-21 | End: 2023-05-21

## 2023-05-21 RX ORDER — OXYCODONE HYDROCHLORIDE 5 MG/1
5 TABLET ORAL EVERY 4 HOURS PRN
Status: DISCONTINUED | OUTPATIENT
Start: 2023-05-21 | End: 2023-05-25 | Stop reason: HOSPADM

## 2023-05-21 RX ORDER — ACETAMINOPHEN 325 MG/1
650 TABLET ORAL EVERY 6 HOURS PRN
Status: DISCONTINUED | OUTPATIENT
Start: 2023-05-21 | End: 2023-05-21

## 2023-05-21 RX ADMIN — AMIODARONE HYDROCHLORIDE 200 MG: 100 TABLET ORAL at 17:12

## 2023-05-21 RX ADMIN — CARVEDILOL 6.25 MG: 3.12 TABLET, FILM COATED ORAL at 17:12

## 2023-05-21 RX ADMIN — LACTULOSE 30 G: 20 SOLUTION ORAL at 20:34

## 2023-05-21 RX ADMIN — AMIODARONE HYDROCHLORIDE 200 MG: 100 TABLET ORAL at 08:36

## 2023-05-21 RX ADMIN — BRIMONIDINE TARTRATE 1 DROP: 2 SOLUTION/ DROPS OPHTHALMIC at 08:39

## 2023-05-21 RX ADMIN — PREDNISONE 40 MG: 20 TABLET ORAL at 08:37

## 2023-05-21 RX ADMIN — LEVALBUTEROL HYDROCHLORIDE 1.25 MG: 1.25 SOLUTION RESPIRATORY (INHALATION) at 07:28

## 2023-05-21 RX ADMIN — SENNOSIDES AND DOCUSATE SODIUM 2 TABLET: 50; 8.6 TABLET ORAL at 21:21

## 2023-05-21 RX ADMIN — LIDOCAINE 1 PATCH: 700 PATCH TOPICAL at 08:38

## 2023-05-21 RX ADMIN — METRONIDAZOLE 500 MG: 500 INJECTION, SOLUTION INTRAVENOUS at 13:09

## 2023-05-21 RX ADMIN — CARVEDILOL 6.25 MG: 3.12 TABLET, FILM COATED ORAL at 08:37

## 2023-05-21 RX ADMIN — TAMSULOSIN HYDROCHLORIDE 0.4 MG: 0.4 CAPSULE ORAL at 17:12

## 2023-05-21 RX ADMIN — HYDRALAZINE HYDROCHLORIDE 5 MG: 20 INJECTION INTRAMUSCULAR; INTRAVENOUS at 21:16

## 2023-05-21 RX ADMIN — POLYETHYLENE GLYCOL 3350 17 G: 17 POWDER, FOR SOLUTION ORAL at 08:36

## 2023-05-21 RX ADMIN — FUROSEMIDE 20 MG: 10 INJECTION, SOLUTION INTRAMUSCULAR; INTRAVENOUS at 12:26

## 2023-05-21 RX ADMIN — Medication 6 MG: at 21:21

## 2023-05-21 RX ADMIN — LEVOTHYROXINE SODIUM 50 MCG: 50 TABLET ORAL at 06:17

## 2023-05-21 RX ADMIN — WHITE PETROLATUM 57.7 %-MINERAL OIL 31.9 % EYE OINTMENT: at 21:21

## 2023-05-21 RX ADMIN — OLANZAPINE 10 MG: 10 TABLET, ORALLY DISINTEGRATING ORAL at 21:20

## 2023-05-21 RX ADMIN — GUAIFENESIN 600 MG: 600 TABLET ORAL at 08:36

## 2023-05-21 RX ADMIN — ALBUTEROL SULFATE 2.5 MG: 2.5 SOLUTION RESPIRATORY (INHALATION) at 00:53

## 2023-05-21 RX ADMIN — CYANOCOBALAMIN TAB 500 MCG 250 MCG: 500 TAB at 08:37

## 2023-05-21 RX ADMIN — ACETAMINOPHEN 650 MG: 325 TABLET ORAL at 17:12

## 2023-05-21 RX ADMIN — OXYCODONE HYDROCHLORIDE 5 MG: 5 TABLET ORAL at 01:08

## 2023-05-21 RX ADMIN — LEVALBUTEROL HYDROCHLORIDE 1.25 MG: 1.25 SOLUTION RESPIRATORY (INHALATION) at 13:43

## 2023-05-21 RX ADMIN — TRAVOPROST 1 DROP: 0.04 SOLUTION OPHTHALMIC at 21:33

## 2023-05-21 RX ADMIN — POTASSIUM CHLORIDE 20 MEQ: 1500 TABLET, EXTENDED RELEASE ORAL at 08:36

## 2023-05-21 RX ADMIN — GUAIFENESIN 600 MG: 600 TABLET ORAL at 21:22

## 2023-05-21 RX ADMIN — ACETAMINOPHEN 650 MG: 325 TABLET ORAL at 12:26

## 2023-05-21 RX ADMIN — AMIODARONE HYDROCHLORIDE 200 MG: 100 TABLET ORAL at 12:26

## 2023-05-21 RX ADMIN — APIXABAN 5 MG: 5 TABLET, FILM COATED ORAL at 08:36

## 2023-05-21 RX ADMIN — SODIUM CHLORIDE 200 MG: 9 INJECTION, SOLUTION INTRAVENOUS at 10:00

## 2023-05-21 RX ADMIN — METRONIDAZOLE 500 MG: 500 INJECTION, SOLUTION INTRAVENOUS at 05:00

## 2023-05-21 RX ADMIN — CEFTRIAXONE 1000 MG: 1 INJECTION, SOLUTION INTRAVENOUS at 13:00

## 2023-05-21 RX ADMIN — BRIMONIDINE TARTRATE 1 DROP: 2 SOLUTION/ DROPS OPHTHALMIC at 21:21

## 2023-05-21 RX ADMIN — METRONIDAZOLE 500 MG: 500 INJECTION, SOLUTION INTRAVENOUS at 20:46

## 2023-05-21 RX ADMIN — LEVALBUTEROL HYDROCHLORIDE 1.25 MG: 1.25 SOLUTION RESPIRATORY (INHALATION) at 22:54

## 2023-05-21 RX ADMIN — CHLORHEXIDINE GLUCONATE 0.12% ORAL RINSE 15 ML: 1.2 LIQUID ORAL at 08:38

## 2023-05-21 RX ADMIN — APIXABAN 5 MG: 5 TABLET, FILM COATED ORAL at 17:12

## 2023-05-21 NOTE — ASSESSMENT & PLAN NOTE
· CT C/A/P on admission to [facility] revealed right 2nd-6th anterior rib fractures; left second-fifth anterior rib fractures  · Continue Lidocaine patches

## 2023-05-21 NOTE — ASSESSMENT & PLAN NOTE
· Intubated in field 2/2 cardiac arrest - had witnessed aspiration event  · Imaging consistent w/bilateral lower lobe pneumonia  · Sputum culture positive for 2+ Staph aureus   · Urine strep/legionella negative  · bronch 05/17   · Extubated 5/19  · CXR 5/20 notable for pulmonary edema  · Currently requiring midflow NC  · Continue Prednisone  · Continue scheduled Xopanex & Mucinex   PRN Albuterol  · Continue PRN Lasix  · Aggressive pulmonary hygiene  · Continue Rocephin/Flagyl  · OOB to chair   · IS

## 2023-05-21 NOTE — NURSING NOTE
"Pt calling out(\"hello\"); Upon entering room R FA IV noted to be leaking  IV d/c'd and direct pressure held with gauze  Pt expressed concerns regarding IV;l reassured that IVs can leak from site and that it was not completely unexpected  Pt became argumentative/aggressive(\"I don't want to die\", You're lying\", \"I need to get out of here\"); attempts to reorient/reassure pt only escalated pt agitation, Assured bleeding from IV site was stopped and applied bandage and told pt I would get his nurse, then left room to inform Jennifer(assigned nurse)    "

## 2023-05-21 NOTE — PROGRESS NOTES
[facility] 45  Progress Note  Name: Gabby Reid  MRN: [id number]  Unit/Bed#: ICU 04-01 I Date of Admission: 5/16/2023   Date of Service: 5/21/2023 I Hospital Day: 5    Assessment/Plan   * Acute respiratory failure with hypoxia [facility])  Assessment & Plan  · Intubated in field 2/2 cardiac arrest - had witnessed aspiration event  · Imaging consistent w/bilateral lower lobe pneumonia  · Sputum culture positive for 2+ Staph aureus   · Urine strep/legionella negative  · bronch 05/17   · Extubated 5/19  · CXR 5/20 notable for pulmonary edema  · Currently requiring midflow NC  · Continue Prednisone  · Continue scheduled Xopanex & Mucinex   PRN Albuterol  · Continue PRN Lasix  · Aggressive pulmonary hygiene  · Continue Rocephin/Flagyl  · OOB to chair   · IS      Aspiration pneumonia (HCC)  Assessment & Plan  · 2/2 Cardiac event  · Continue Rocephin/flagyl  · Titrate Midflow NC as tolerated  · IS  · OOB      Delirium  Assessment & Plan  · OOB during the day  · Maintain sleep/wake cycle  · Melatonin    HTN (hypertension)  Assessment & Plan  · Continue Coreg BID  · PRN Hydralazine      Closed fracture of multiple ribs of both sides  Assessment & Plan  · CT C/A/P on admission to [facility] revealed right 2nd-6th anterior rib fractures; left second-fifth anterior rib fractures  · Continue Lidocaine patches    Constipation  Assessment & Plan  · Continue bowel regimen  · Gave Lactulose x1  · Consider Relistor     Cardiac arrest [facility])  Assessment & Plan  · 5/16: Witnessed arrest at home, wife preformed CPR  · PEA arrest on EMS arrival - received 2 rounds of ACLS and achieved ROSC  · Intubated in field and transferred to [facility] ED  · Bradycardic/hypotensive on arrival to [facility] ED - required vasopressors and Atropine  · Episode of sustained VT after ETT suctioning requiring Amio bolus and cardioversion x1 on 05/16  · VT/[place] Point[place] arrest after suctioning on 05/17 - received CPR x2 rounds, Epi x1, shock x2 - ROSC achieved w/AF RVR, then conversion to sinus bradycardia w/rates in 40s  · AICD placed 05/18  · TTE from 05/16 w/EF 65%; no RWMA  · Cardiology following - appreciate recommendation  · Continue Amiodarone oral  Hypothyroidism  Assessment & Plan  · Continue synthroid    Anemia  Assessment & Plan  · Continue Venofer   · Trend Hgb and transfuse for Hgb < 7    Paroxysmal atrial fibrillation (HCC)  Assessment & Plan  · Continue Amio PO   · Continue BID Eloquis    Hyperglycemia  Assessment & Plan  · No HST DM  · Likely in setting of acute illness and steroids  · Continue SSI             ICU Core Measures     A: Assess, Prevent, and Manage Pain · Has pain been assessed? Yes  · Need for changes to pain regimen? Yes   B: Both SAT/SAT  · N/A   C: Choice of Sedation · RASS Goal: 0 Alert and Calm  · Need for changes to sedation or analgesia regimen? Yes   D: Delirium · CAM-ICU: Positive   E: Early Mobility  · Plan for early mobility? Yes   F: Family Engagement · Plan for family engagement today? Yes       Antibiotic Review: Patient on appropriate coverage based on culture data  Review of Invasive Devices: [place] Plan: Continue for accurate I/O monitoring for 48 hours        Prophylaxis:  VTE VTE covered by:  apixaban, Oral, 5 mg at 05/20/23 1701       Stress Ulcer  not ordered       Subjective   HPI/24hr events: Ongoing delirium  Zyprexa x1  PRN pain meds added  Lactulose x1      Review of Systems   Constitutional: Negative  HENT: Negative  Respiratory: Positive for cough  Negative for chest tightness and shortness of breath  Cardiovascular: Negative  Negative for chest pain  Gastrointestinal: Positive for constipation  Negative for abdominal pain  Genitourinary: Positive for difficulty urinating  Musculoskeletal: Negative  Skin: Positive for wound  Neurological: Positive for weakness  Psychiatric/Behavioral: Positive for confusion                Objective                            Vitals I/O Most Recent Min/Max in 24hrs   Temp 99 7 °F (37 6 °C) Temp  Min: 98 7 °F (37 1 °C)  Max: 100 °F (37 8 °C)   Pulse 60 Pulse  Min: 60  Max: 91   Resp (!) 26 Resp  Min: 16  Max: 29   /74 BP  Min: 141/65  Max: 198/91   O2 Sat 92 % SpO2  Min: 88 %  Max: 94 %      Intake/Output Summary (Last 24 hours) at 5/21/2023 0052  Last data filed at 5/20/2023 2200  Gross per 24 hour   Intake 1589 66 ml   Output 3875 ml   Net -2285 34 ml         Diet Cardiovascular; Cardiac     Invasive Monitoring Physical exam    Physical Exam  Vitals and nursing note reviewed  HENT:      Head: Normocephalic  Mouth/Throat:      Mouth: Mucous membranes are moist       Pharynx: Oropharynx is clear  Eyes:      Pupils: Pupils are equal, round, and reactive to light  Cardiovascular:      Rate and Rhythm: Normal rate and regular rhythm  Pulses: Normal pulses  Heart sounds: Normal heart sounds  Pulmonary:      Effort: Pulmonary effort is normal       Breath sounds: Normal breath sounds  Abdominal:      General: Bowel sounds are normal  There is distension  Tenderness: There is no abdominal tenderness  Musculoskeletal:         General: Normal range of motion  Skin:     General: Skin is warm and dry  Capillary Refill: Capillary refill takes less than 2 seconds  Neurological:      Mental Status: He is disoriented  Diagnostic Studies      EKG: Paced  Imaging:  I have personally reviewed pertinent reports         Medications:  Scheduled PRN   amiodarone, 200 mg, TID With Meals   Followed by  Alvaro Evans ON 6/3/2023] amiodarone, 200 mg, BID With Meals   Followed by  Alvaro Evans ON 6/18/2023] amiodarone, 200 mg, Daily With Breakfast  apixaban, 5 mg, BID  artificial tear, , HS  brimonidine tartrate, 1 drop, BID  carvedilol, 6 25 mg, BID With Meals  cefTRIAXone, 1,000 mg, Q24H  chlorhexidine, 15 mL, Q12H BAYRON  cyanocobalamin, 250 mcg, Daily  guaiFENesin, 600 mg, Q12H BAYRON  insulin lispro, 1-5 Units, TID AC  insulin lispro, 1-5 Units, HS  iron sucrose, 200 mg, Daily  levalbuterol, 1 25 mg, TID  levothyroxine, 50 mcg, Early Morning  lidocaine, 1 patch, Daily  lidocaine, 1 patch, Daily  melatonin, 6 mg, HS  metroNIDAZOLE, 500 mg, Q8H  OLANZapine, 10 mg, HS  polyethylene glycol, 17 g, Daily  predniSONE, 40 mg, Daily  senna-docusate sodium, 2 tablet, HS  tamsulosin, 0 4 mg, Daily With Dinner  travoprost, 1 drop, HS      acetaminophen, 650 mg, Q6H PRN  albuterol, 2 5 mg, Q4H PRN  EPINEPHrine, , Code/Trauma/Sedation Med  hydrALAZINE, 5 mg, Q6H PRN  oxyCODONE, 5 mg, Q4H PRN   Or  oxyCODONE, 10 mg, Q4H PRN       Continuous          Labs:    CBC    Recent Labs     05/19/23 0513 05/20/23 0613   WBC 7 33 11 90*   HGB 9 3* 11 0*   HCT 28 7* 34 2*   * 155     BMP    Recent Labs     05/20/23 0613 05/20/23 2012   SODIUM 139 139   K 3 9 3 9    104   CO2 26 29   AGAP 9 6   BUN 31* 31*   CREATININE 0 97 0 93   CALCIUM 9 2 8 8       Coags    No recent results     Additional Electrolytes  Recent Labs     05/19/23 0513 05/20/23 0613 05/20/23 2012   MG 2 5 2 5 2 3   PHOS 2 2* 3 3 2 5   CAIONIZED 1 14  --  1 18          Blood Gas    No recent results  No recent results LFTs  Recent Labs     05/19/23  0513   ALT 37   AST 18   ALKPHOS 52   ALB 3 5   TBILI 0 69       Infectious  Recent Labs     05/19/23 0513   PROCALCITONI 1 63*     Glucose  Recent Labs     05/19/23 0513 05/20/23 0613 05/20/23 2012   GLUC 169* 162* 137               Critical Care Time Delivered     Velora Boxer, CRNP

## 2023-05-21 NOTE — ASSESSMENT & PLAN NOTE
· Bradycardic in 35s to 45s since admission  · Unclear etiology, though suspect this may be chronic   · Is s/p temporary TVP on 05/17 as noted above  · Now s/p AICD implantation on 05/18  · Lyme Ab negative  · [facility] Spotted Fever IgG/M pending  · TSH 5 976 w/T4 0 67, however this may be inaccurate s/p cardiac arrest/shock state  · T3 0 80  · Cardiology following - appreciate recommendations  · Continue cardiac monitoring

## 2023-05-21 NOTE — PLAN OF CARE
Problem: PAIN - ADULT  Goal: Verbalizes/displays adequate comfort level or baseline comfort level  Description: Interventions:  - Encourage patient to monitor pain and request assistance  - Assess pain using appropriate pain scale  - Administer analgesics based on type and severity of pain and evaluate response  - Implement non-pharmacological measures as appropriate and evaluate response  - Consider cultural and social influences on pain and pain management  - Notify physician/advanced practitioner if interventions unsuccessful or patient reports new pain  Outcome: Progressing     Problem: INFECTION - ADULT  Goal: Absence or prevention of progression during hospitalization  Description: INTERVENTIONS:  - Assess and monitor for signs and symptoms of infection  - Monitor lab/diagnostic results  - Monitor all insertion sites, i e  indwelling lines, tubes, and drains  - Monitor endotracheal if appropriate and nasal secretions for changes in amount and color  - [place] appropriate cooling/warming therapies per order  - Administer medications as ordered  - Instruct and encourage patient and family to use good hand hygiene technique  - Identify and instruct in appropriate isolation precautions for identified infection/condition  Outcome: Progressing     Problem: SAFETY ADULT  Goal: Patient will remain free of falls  Description: INTERVENTIONS:  - Educate patient/family on patient safety including physical limitations  - Instruct patient to call for assistance with activity   - Consult OT/PT to assist with strengthening/mobility   - Keep Call bell within reach  - Keep bed low and locked with side rails adjusted as appropriate  - Keep care items and personal belongings within reach  - Initiate and maintain comfort rounds  - Make Fall Risk Sign visible to staff  - Offer Toileting every 2 Hours, in advance of need  - Initiate/Maintain bed alarm  - Apply yellow socks and bracelet for high fall risk patients  - Consider moving patient to room near nurses station  Outcome: Progressing  Goal: Maintain or return to baseline ADL function  Description: INTERVENTIONS:  -  Assess patient's ability to carry out ADLs; assess patient's baseline for ADL function and identify physical deficits which impact ability to perform ADLs (bathing, care of mouth/teeth, toileting, grooming, dressing, etc )  - Assess/evaluate cause of self-care deficits   - Assess range of motion  - Assess patient's mobility; develop plan if impaired  - Assess patient's need for assistive devices and provide as appropriate  - Encourage maximum independence but intervene and supervise when necessary  - Involve family in performance of ADLs  - Assess for home care needs following discharge   - Consider OT consult to assist with ADL evaluation and planning for discharge  - Provide patient education as appropriate  Outcome: Progressing  Goal: Maintains/Returns to pre admission functional level  Description: INTERVENTIONS:  - Perform BMAT or MOVE assessment daily    - Set and communicate daily mobility goal to care team and patient/family/caregiver  - Collaborate with rehabilitation services on mobility goals if consulted  - Perform Range of Motion 3 times a day  - Reposition patient every 2 hours    - Dangle patient 3 times a day  - Stand patient 3 times a day  - Ambulate patient 3 times a day  - Out of bed for meals   - Out of bed for toileting  - Record patient progress and toleration of activity level   Outcome: Progressing     Problem: DISCHARGE PLANNING  Goal: Discharge to home or other facility with appropriate resources  Description: INTERVENTIONS:  - Identify barriers to discharge w/patient and caregiver  - Arrange for needed discharge resources and transportation as appropriate  - Identify discharge learning needs (meds, wound care, etc )  - Arrange for interpretive services to assist at discharge as needed  - Refer to Case Management Department for coordinating discharge planning if the patient needs post-hospital services based on physician/advanced practitioner order or complex needs related to functional status, cognitive ability, or social support system  Outcome: Progressing     Problem: Knowledge Deficit  Goal: Patient/family/caregiver demonstrates understanding of disease process, treatment plan, medications, and discharge instructions  Description: Complete learning assessment and assess knowledge base    Interventions:  - Provide teaching at level of understanding  - Provide teaching via preferred learning methods  Outcome: Progressing     Problem: NEUROSENSORY - ADULT  Goal: Achieves stable or improved neurological status  Description: INTERVENTIONS  - Monitor and report changes in neurological status  - Monitor vital signs such as temperature, blood pressure, glucose, and any other labs ordered   - Initiate measures to prevent increased intracranial pressure  - Monitor for seizure activity and implement precautions if appropriate      Outcome: Progressing  Goal: Remains free of injury related to seizures activity  Description: INTERVENTIONS  - Maintain airway, patient safety  and administer oxygen as ordered  - Monitor patient for seizure activity, document and report duration and description of seizure to physician/advanced practitioner  - If seizure occurs,  ensure patient safety during seizure  - Reorient patient post seizure  - Seizure pads on all 4 side rails  - Instruct patient/family to notify RN of any seizure activity including if an aura is experienced  - Instruct patient/family to call for assistance with activity based on nursing assessment  - Administer anti-seizure medications if ordered    Outcome: Progressing  Goal: Achieves maximal functionality and self care  Description: INTERVENTIONS  - Monitor swallowing and airway patency with patient fatigue and changes in neurological status  - Encourage and assist patient to increase activity and self care    - Encourage visually impaired, hearing impaired and aphasic patients to use assistive/communication devices  Outcome: Progressing     Problem: CARDIOVASCULAR - ADULT  Goal: Maintains optimal cardiac output and hemodynamic stability  Description: INTERVENTIONS:  - Monitor I/O, vital signs and rhythm  - Monitor for S/S and trends of decreased cardiac output  - Administer and titrate ordered vasoactive medications to optimize hemodynamic stability  - Assess quality of pulses, skin color and temperature  - Assess for signs of decreased coronary artery perfusion  - Instruct patient to report change in severity of symptoms  Outcome: Progressing  Goal: Absence of cardiac dysrhythmias or at baseline rhythm  Description: INTERVENTIONS:  - Continuous cardiac monitoring, vital signs, obtain 12 lead EKG if ordered  - Administer antiarrhythmic and heart rate control medications as ordered  - Monitor electrolytes and administer replacement therapy as ordered  Outcome: Progressing     Problem: RESPIRATORY - ADULT  Goal: Achieves optimal ventilation and oxygenation  Description: INTERVENTIONS:  - Assess for changes in respiratory status  - Assess for changes in mentation and behavior  - Position to facilitate oxygenation and minimize respiratory effort  - Oxygen administered by appropriate delivery if ordered  - Initiate smoking cessation education as indicated  - Encourage broncho-pulmonary hygiene including cough, deep breathe, Incentive Spirometry  - Assess the need for suctioning and aspirate as needed  - Assess and instruct to report SOB or any respiratory difficulty  - Respiratory Therapy support as indicated  Outcome: Progressing     Problem: GENITOURINARY - ADULT  Goal: Maintains or returns to baseline urinary function  Description: INTERVENTIONS:  - Assess urinary function  - Encourage oral fluids to ensure adequate hydration if ordered  - Administer IV fluids as ordered to ensure adequate hydration  - Administer ordered medications as needed  - Offer frequent toileting  - Follow urinary retention protocol if ordered  Outcome: Progressing  Goal: Absence of urinary retention  Description: INTERVENTIONS:  - Assess patient’s ability to void and empty bladder  - Monitor I/O  - Bladder scan as needed  - Discuss with physician/AP medications to alleviate retention as needed  - Discuss catheterization for long term situations as appropriate  Outcome: Progressing  Goal: Urinary catheter remains patent  Description: INTERVENTIONS:  - Assess patency of urinary catheter  - If patient has a chronic franklin, consider changing catheter if non-functioning  - Follow guidelines for intermittent irrigation of non-functioning urinary catheter  Outcome: Progressing     Problem: Prexisting or High Potential for Compromised Skin Integrity  Goal: Skin integrity is maintained or improved  Description: INTERVENTIONS:  - Identify patients at risk for skin breakdown  - Assess and monitor skin integrity  - Assess and monitor nutrition and hydration status  - Monitor labs   - Assess for incontinence   - Turn and reposition patient  - Assist with mobility/ambulation  - Relieve pressure over bony prominences  - Avoid friction and shearing  - Provide appropriate hygiene as needed including keeping skin clean and dry  - Evaluate need for skin moisturizer/barrier cream  - Collaborate with interdisciplinary team   - Patient/family teaching  - Consider wound care consult   Outcome: Progressing     Problem: MOBILITY - ADULT  Goal: Maintain or return to baseline ADL function  Description: INTERVENTIONS:  -  Assess patient's ability to carry out ADLs; assess patient's baseline for ADL function and identify physical deficits which impact ability to perform ADLs (bathing, care of mouth/teeth, toileting, grooming, dressing, etc )  - Assess/evaluate cause of self-care deficits   - Assess range of motion  - Assess patient's mobility; develop plan if impaired  - Assess patient's need for assistive devices and provide as appropriate  - Encourage maximum independence but intervene and supervise when necessary  - Involve family in performance of ADLs  - Assess for home care needs following discharge   - Consider OT consult to assist with ADL evaluation and planning for discharge  - Provide patient education as appropriate  Outcome: Progressing  Goal: Maintains/Returns to pre admission functional level  Description: INTERVENTIONS:  - Perform BMAT or MOVE assessment daily    - Set and communicate daily mobility goal to care team and patient/family/caregiver  - Collaborate with rehabilitation services on mobility goals if consulted  - Perform Range of Motion 3 times a day  - Reposition patient every 2 hours  - Dangle patient 3 times a day  - Stand patient 3 times a day  - Ambulate patient 3 times a day  - Out of bed for meals   - Out of bed for toileting  - Record patient progress and toleration of activity level   Outcome: Progressing     Problem: Nutrition/Hydration-ADULT  Goal: Nutrient/Hydration intake appropriate for improving, restoring or maintaining nutritional needs  Description: Monitor and assess patient's nutrition/hydration status for malnutrition  Collaborate with interdisciplinary team and initiate plan and interventions as ordered  Monitor patient's weight and dietary intake as ordered or per policy  Utilize nutrition screening tool and intervene as necessary  Determine patient's food preferences and provide high-protein, high-caloric foods as appropriate       INTERVENTIONS:  - Monitor oral intake, urinary output, labs, and treatment plans  - Assess nutrition and hydration status and recommend course of action  - Evaluate amount of meals eaten  - Assist patient with eating if necessary   - Allow adequate time for meals  - Recommend/ encourage appropriate diets, oral nutritional supplements, and vitamin/mineral supplements  - Order, calculate, and assess calorie counts as needed  - Recommend, monitor, and adjust tube feedings and TPN/PPN based on assessed needs  - Assess need for intravenous fluids  - Provide specific nutrition/hydration education as appropriate  - Include patient/family/caregiver in decisions related to nutrition  Outcome: Progressing

## 2023-05-21 NOTE — ASSESSMENT & PLAN NOTE
· S/P cardiac arrest and aspiration pneumonia   · CT C/A/P on admission to [facility] revealed significant worsening of bilateral lower lobe aspiration/pneumonia  · Blood cultures from 05/15 and 05/16 w/NGTD  · UA w/out evidence of infection  · Urine strep/legionella negative  · Sputum culture positive for 2+ Staph aureus   · BALs from 05/17 pending  · Lactic acid 5 6 on admission to [facility], repeat lactic cleared  · Is s/p aggressive IVF resuscitation  · Continue IV Rocephen/Flagyl   · Monitor fever and WBC curve  · Trend procalcitonin

## 2023-05-21 NOTE — PLAN OF CARE
Problem: PAIN - ADULT  Goal: Verbalizes/displays adequate comfort level or baseline comfort level  Description: Interventions:  - Encourage patient to monitor pain and request assistance  - Assess pain using appropriate pain scale  - Administer analgesics based on type and severity of pain and evaluate response  - Implement non-pharmacological measures as appropriate and evaluate response  - Consider cultural and social influences on pain and pain management  - Notify physician/advanced practitioner if interventions unsuccessful or patient reports new pain  Outcome: Progressing     Problem: INFECTION - ADULT  Goal: Absence or prevention of progression during hospitalization  Description: INTERVENTIONS:  - Assess and monitor for signs and symptoms of infection  - Monitor lab/diagnostic results  - Monitor all insertion sites, i e  indwelling lines, tubes, and drains  - Monitor endotracheal if appropriate and nasal secretions for changes in amount and color  - [place] appropriate cooling/warming therapies per order  - Administer medications as ordered  - Instruct and encourage patient and family to use good hand hygiene technique  - Identify and instruct in appropriate isolation precautions for identified infection/condition  Outcome: Progressing     Problem: SAFETY ADULT  Goal: Patient will remain free of falls  Description: INTERVENTIONS:  - Educate patient/family on patient safety including physical limitations  - Instruct patient to call for assistance with activity   - Consult OT/PT to assist with strengthening/mobility   - Keep Call bell within reach  - Keep bed low and locked with side rails adjusted as appropriate  - Keep care items and personal belongings within reach  - Initiate and maintain comfort rounds  - Make Fall Risk Sign visible to staff  - Offer Toileting every 2 Hours, in advance of need  - Initiate/Maintain bed alarm  - Apply yellow socks and bracelet for high fall risk patients  - Consider moving patient to room near nurses station  Outcome: Progressing  Goal: Maintain or return to baseline ADL function  Description: INTERVENTIONS:  -  Assess patient's ability to carry out ADLs; assess patient's baseline for ADL function and identify physical deficits which impact ability to perform ADLs (bathing, care of mouth/teeth, toileting, grooming, dressing, etc )  - Assess/evaluate cause of self-care deficits   - Assess range of motion  - Assess patient's mobility; develop plan if impaired  - Assess patient's need for assistive devices and provide as appropriate  - Encourage maximum independence but intervene and supervise when necessary  - Involve family in performance of ADLs  - Assess for home care needs following discharge   - Consider OT consult to assist with ADL evaluation and planning for discharge  - Provide patient education as appropriate  Outcome: Progressing  Goal: Maintains/Returns to pre admission functional level  Description: INTERVENTIONS:  - Perform BMAT or MOVE assessment daily    - Set and communicate daily mobility goal to care team and patient/family/caregiver  - Collaborate with rehabilitation services on mobility goals if consulted  - Perform Range of Motion 3 times a day  - Reposition patient every 2 hours    - Dangle patient 3 times a day  - Stand patient 3 times a day  - Ambulate patient 3 times a day  - Out of bed for meals   - Out of bed for toileting  - Record patient progress and toleration of activity level   Outcome: Progressing     Problem: DISCHARGE PLANNING  Goal: Discharge to home or other facility with appropriate resources  Description: INTERVENTIONS:  - Identify barriers to discharge w/patient and caregiver  - Arrange for needed discharge resources and transportation as appropriate  - Identify discharge learning needs (meds, wound care, etc )  - Arrange for interpretive services to assist at discharge as needed  - Refer to Case Management Department for coordinating discharge planning if the patient needs post-hospital services based on physician/advanced practitioner order or complex needs related to functional status, cognitive ability, or social support system  Outcome: Progressing     Problem: Knowledge Deficit  Goal: Patient/family/caregiver demonstrates understanding of disease process, treatment plan, medications, and discharge instructions  Description: Complete learning assessment and assess knowledge base    Interventions:  - Provide teaching at level of understanding  - Provide teaching via preferred learning methods  Outcome: Progressing     Problem: NEUROSENSORY - ADULT  Goal: Achieves stable or improved neurological status  Description: INTERVENTIONS  - Monitor and report changes in neurological status  - Monitor vital signs such as temperature, blood pressure, glucose, and any other labs ordered   - Initiate measures to prevent increased intracranial pressure  - Monitor for seizure activity and implement precautions if appropriate      Outcome: Progressing  Goal: Remains free of injury related to seizures activity  Description: INTERVENTIONS  - Maintain airway, patient safety  and administer oxygen as ordered  - Monitor patient for seizure activity, document and report duration and description of seizure to physician/advanced practitioner  - If seizure occurs,  ensure patient safety during seizure  - Reorient patient post seizure  - Seizure pads on all 4 side rails  - Instruct patient/family to notify RN of any seizure activity including if an aura is experienced  - Instruct patient/family to call for assistance with activity based on nursing assessment  - Administer anti-seizure medications if ordered    Outcome: Progressing  Goal: Achieves maximal functionality and self care  Description: INTERVENTIONS  - Monitor swallowing and airway patency with patient fatigue and changes in neurological status  - Encourage and assist patient to increase activity and self care    - Encourage visually impaired, hearing impaired and aphasic patients to use assistive/communication devices  Outcome: Progressing     Problem: CARDIOVASCULAR - ADULT  Goal: Maintains optimal cardiac output and hemodynamic stability  Description: INTERVENTIONS:  - Monitor I/O, vital signs and rhythm  - Monitor for S/S and trends of decreased cardiac output  - Administer and titrate ordered vasoactive medications to optimize hemodynamic stability  - Assess quality of pulses, skin color and temperature  - Assess for signs of decreased coronary artery perfusion  - Instruct patient to report change in severity of symptoms  Outcome: Progressing  Goal: Absence of cardiac dysrhythmias or at baseline rhythm  Description: INTERVENTIONS:  - Continuous cardiac monitoring, vital signs, obtain 12 lead EKG if ordered  - Administer antiarrhythmic and heart rate control medications as ordered  - Monitor electrolytes and administer replacement therapy as ordered  Outcome: Progressing     Problem: RESPIRATORY - ADULT  Goal: Achieves optimal ventilation and oxygenation  Description: INTERVENTIONS:  - Assess for changes in respiratory status  - Assess for changes in mentation and behavior  - Position to facilitate oxygenation and minimize respiratory effort  - Oxygen administered by appropriate delivery if ordered  - Initiate smoking cessation education as indicated  - Encourage broncho-pulmonary hygiene including cough, deep breathe, Incentive Spirometry  - Assess the need for suctioning and aspirate as needed  - Assess and instruct to report SOB or any respiratory difficulty  - Respiratory Therapy support as indicated  Outcome: Progressing     Problem: GENITOURINARY - ADULT  Goal: Maintains or returns to baseline urinary function  Description: INTERVENTIONS:  - Assess urinary function  - Encourage oral fluids to ensure adequate hydration if ordered  - Administer IV fluids as ordered to ensure adequate hydration  - Administer ordered medications as needed  - Offer frequent toileting  - Follow urinary retention protocol if ordered  Outcome: Progressing  Goal: Absence of urinary retention  Description: INTERVENTIONS:  - Assess patient’s ability to void and empty bladder  - Monitor I/O  - Bladder scan as needed  - Discuss with physician/AP medications to alleviate retention as needed  - Discuss catheterization for long term situations as appropriate  Outcome: Progressing  Goal: Urinary catheter remains patent  Description: INTERVENTIONS:  - Assess patency of urinary catheter  - If patient has a chronic franklin, consider changing catheter if non-functioning  - Follow guidelines for intermittent irrigation of non-functioning urinary catheter  Outcome: Progressing     Problem: Prexisting or High Potential for Compromised Skin Integrity  Goal: Skin integrity is maintained or improved  Description: INTERVENTIONS:  - Identify patients at risk for skin breakdown  - Assess and monitor skin integrity  - Assess and monitor nutrition and hydration status  - Monitor labs   - Assess for incontinence   - Turn and reposition patient  - Assist with mobility/ambulation  - Relieve pressure over bony prominences  - Avoid friction and shearing  - Provide appropriate hygiene as needed including keeping skin clean and dry  - Evaluate need for skin moisturizer/barrier cream  - Collaborate with interdisciplinary team   - Patient/family teaching  - Consider wound care consult   Outcome: Progressing     Problem: MOBILITY - ADULT  Goal: Maintain or return to baseline ADL function  Description: INTERVENTIONS:  -  Assess patient's ability to carry out ADLs; assess patient's baseline for ADL function and identify physical deficits which impact ability to perform ADLs (bathing, care of mouth/teeth, toileting, grooming, dressing, etc )  - Assess/evaluate cause of self-care deficits   - Assess range of motion  - Assess patient's mobility; develop plan if impaired  - Assess patient's need for assistive devices and provide as appropriate  - Encourage maximum independence but intervene and supervise when necessary  - Involve family in performance of ADLs  - Assess for home care needs following discharge   - Consider OT consult to assist with ADL evaluation and planning for discharge  - Provide patient education as appropriate  Outcome: Progressing  Goal: Maintains/Returns to pre admission functional level  Description: INTERVENTIONS:  - Perform BMAT or MOVE assessment daily    - Set and communicate daily mobility goal to care team and patient/family/caregiver  - Collaborate with rehabilitation services on mobility goals if consulted  - Perform Range of Motion 3 times a day  - Reposition patient every 2 hours  - Dangle patient 3 times a day  - Stand patient 3 times a day  - Ambulate patient 3 times a day  - Out of bed for meals   - Out of bed for toileting  - Record patient progress and toleration of activity level   Outcome: Progressing     Problem: Nutrition/Hydration-ADULT  Goal: Nutrient/Hydration intake appropriate for improving, restoring or maintaining nutritional needs  Description: Monitor and assess patient's nutrition/hydration status for malnutrition  Collaborate with interdisciplinary team and initiate plan and interventions as ordered  Monitor patient's weight and dietary intake as ordered or per policy  Utilize nutrition screening tool and intervene as necessary  Determine patient's food preferences and provide high-protein, high-caloric foods as appropriate       INTERVENTIONS:  - Monitor oral intake, urinary output, labs, and treatment plans  - Assess nutrition and hydration status and recommend course of action  - Evaluate amount of meals eaten  - Assist patient with eating if necessary   - Allow adequate time for meals  - Recommend/ encourage appropriate diets, oral nutritional supplements, and vitamin/mineral supplements  - Order, calculate, and assess calorie counts as needed  - Recommend, monitor, and adjust tube feedings and TPN/PPN based on assessed needs  - Assess need for intravenous fluids  - Provide specific nutrition/hydration education as appropriate  - Include patient/family/caregiver in decisions related to nutrition  Outcome: Progressing

## 2023-05-21 NOTE — ASSESSMENT & PLAN NOTE
· 5/16: Witnessed arrest at home, wife preformed CPR  · PEA arrest on EMS arrival - received 2 rounds of ACLS and achieved ROSC  · Intubated in field and transferred to [facility] ED  · Bradycardic/hypotensive on arrival to [facility] ED - required vasopressors and Atropine  · Episode of sustained VT after ETT suctioning requiring Amio bolus and cardioversion x1 on 05/16  · VT/[place] de Pointes arrest after suctioning on 05/17 - received CPR x2 rounds, Epi x1, shock x2 - ROSC achieved w/AF RVR, then conversion to sinus bradycardia w/rates in 40s  · AICD placed 05/18  · TTE from 05/16 w/EF 65%; no RWMA  · Cardiology following - appreciate recommendation  · Continue Amiodarone oral

## 2023-05-21 NOTE — PHYSICAL THERAPY NOTE
"Physical Therapy Treatment Note       05/21/23 1256   PT Last Visit   PT Visit Date 05/21/23   Note Type   Note Type Treatment   Pain Assessment   Pain Assessment Tool 0-10   Pain Score No Pain   Restrictions/Precautions   Weight Bearing Precautions Per Order No   Braces or Orthoses   (none at this time)   Other Precautions Chair Alarm;Multiple lines;Telemetry;O2;Fall Risk  (10 L O2 [address])   General   Chart Reviewed Yes   Response to Previous Treatment Patient unable to report, no changes reported from family or staff   Family/Caregiver Present Yes  (pt's family present at start of session, stepped out to visiting room during PT session)   Cognition   Arousal/Participation Alert; Responsive; Cooperative   Attention Attends with cues to redirect   Orientation Level Oriented to person;Oriented to place   Memory Decreased recall of precautions;Decreased recall of recent events   Following Commands Follows one step commands without difficulty   Comments pt agreeable to PT session   Subjective   Subjective \"I didn't think I was this weak\"   Bed Mobility   Supine to Sit Unable to assess  (pt received OOB in recliner upon arrival)   Additional Comments HR remained 60bpm at rest and during upright activity, Spo2 noted to drop ~88% at lowest  recovered to > 90% within 30 seconds of deep breathing technqiue   Transfers   Sit to Stand 4  Minimal assistance   Additional items Assist x 2; Increased time required;Verbal cues  (x 6 total attempts throughout session)   Stand to Sit 4  Minimal assistance   Additional items Assist x 2;Armrests; Verbal cues   Additional Comments maximal VCs for hand placement throughout transfers & with positional changes  Pt requires max VCs for maintaining neutral LE positioning with static standing   Ambulation/Elevation   Gait pattern Poor UE support;R Knee Bassem;L Knee Bassem;Narrow NICOLETTE; Forward Flexion; Step to;Excessively slow   Gait Assistance 4  Minimal assist   Additional items Assist x 2;Verbal " cues;Tactile cues   Assistive Device Rolling walker   Distance 5 ft forward/backward   Balance   Static Sitting Good   Dynamic Sitting Fair   Static Standing Fair -   Dynamic Standing Poor +   Ambulatory Poor +   Endurance Deficit   Endurance Deficit Yes   Activity Tolerance   Activity Tolerance Patient limited by fatigue  (frequent rest periods provided throughout session)   Medical Staff Made Aware ICU provider [address] [place] [address] Yes, RN Camille Pearl verbalized pt appropriate for PT session, made aware of outcomes/recs   Exercises   Marching Standing;20 reps  (c B UE support on RW)   Neuro re-ed standing c B UE support on RW - toe taps forward/backward x 20 reps B LEs   Balance training  static standing attempt x 1 min with B UE support and CGA/min Ax2   Assessment   Prognosis Good   Problem List Decreased strength;Decreased endurance; Impaired balance;Decreased mobility; Impaired judgement;Decreased safety awareness;Decreased cognition;Obesity; Decreased skin integrity   Assessment Pt seen for PT treatment session this date, consisting of ther act focused on functional transfer training, ther ex focused on strengthening and gt training on level surfaces to improve pt safety in household environment  Since previous session, pt has made good progress in terms of improved activity tolerance to OOB activity, requiring overall less physical assistance with activity  Pertinent barriers during this session include fatigue, requiring frequent rest periods for recovery  Current goals and POC remain appropriate, pt continues to have rehab potential and is making good progress towards STGs  Pt prognosis for achieving goals is good, pending pt progress with hospitalization/medical status improvements, and indicated by motivation, recent history of independence with functional skills/High PLOF, supportive family/caregivers, recent onset and initiation level  Pt limited d/t medical instability   PT recommends post acute "rehabilitation services upon discharge  Pt continues to be functioning below baseline level, and remains limited 2* factors listed above  PT will continue to see pt during current hospitalization in order to address the deficits listed above and provide interventions consistent w/ POC in effort to achieve STGs  Barriers to Discharge Inaccessible home environment   Goals   Patient Goals \"to get stronger\"   STG Expiration Date 05/30/23   Short Term Goal #1 goals remain appropriate   PT Treatment Day 1   Plan   Treatment/Interventions LE strengthening/ROM; Functional transfer training; Therapeutic exercise; Endurance training;Patient/family training;Equipment eval/education; Bed mobility;Gait training;Spoke to nursing   Progress Slow progress, decreased activity tolerance   PT Frequency 4-6x/wk   Recommendation   PT Discharge Recommendation Post acute rehabilitation services   Equipment Recommended Walker  (continue RW use)   Additional Comments Pt's raw score on the [facility] Basic Mobility inpatient short form is 12, standardized score is 32 23  Patients at this level are likely to benefit from DC to [address], however, please refer to therapist recommendation for safe DC planning  [facility] Basic Mobility Inpatient   Turning in Flat Bed Without Bedrails 3   Lying on Back to Sitting on Edge of Flat Bed Without Bedrails 2   Moving Bed to Chair 2   Standing Up From Chair Using Arms 2   Walk in Room 2   Climb 3-5 Stairs With Railing 1   Basic Mobility Inpatient Raw Score 12   Basic Mobility Standardized Score 32 23   Highest Level Of Mobility   [facility] Goal 4: Move to chair/commode   [facility] Achieved 5: Stand (1 or more minutes)   Education   Education Provided Mobility training;Home exercise program;Assistive device   Patient Demonstrates verbal understanding;Reinforcement needed   End of Consult   Patient Position at End of Consult Bedside chair;Bed/Chair alarm activated; All needs within reach       Ayan Sepulveda, PT, DPT    Time " of PT treatment session: 5518-4833 (total treatment time = 39 minutes)

## 2023-05-21 NOTE — NURSING NOTE
"Notified by fellow RN that pt noted to be agitated, and IV had to be removed d/t leakage  Upon entering the room pt noted to be putting his feet out of the bed and agitated stating, \"He just took this out and ran out of here! I'm bleeding  I'm getting out of here  \" Site assessed and noted to be clean and dry  Attempted to reassure pt explaining the IV and why it was needed and the importance of him continuing with receiving care in the hospital which was effective at this time  Dressed in new gown and given new blankets  PT continually noted to be delirious/forgetful since extubation  Critical care aware and new orders received  Spoke to wife on the phone and gave her an update on the situation and all concerns were addressed  Daughter also noted to be visiting at this time and updated  He is currently in better spirits in bed at this time  Will continue to monitor    "

## 2023-05-21 NOTE — PLAN OF CARE
Problem: PHYSICAL THERAPY ADULT  Goal: Performs mobility at highest level of function for planned discharge setting  See evaluation for individualized goals  Description: Treatment/Interventions: Functional transfer training, LE strengthening/ROM, Therapeutic exercise, Endurance training, Patient/family training, Equipment eval/education, Bed mobility, Gait training, Spoke to nursing, Spoke to case management  Equipment Recommended:  (TBD pending progress and ongoing pacemaker precautions/sling in place to L UE)       See flowsheet documentation for full assessment, interventions and recommendations  Outcome: Progressing  Note: Prognosis: Good  Problem List: Decreased strength, Decreased endurance, Impaired balance, Decreased mobility, Impaired judgement, Decreased safety awareness, Decreased cognition, Obesity, Decreased skin integrity  Assessment: Pt seen for PT treatment session this date, consisting of ther act focused on functional transfer training, ther ex focused on strengthening and gt training on level surfaces to improve pt safety in household environment  Since previous session, pt has made good progress in terms of improved activity tolerance to OOB activity, requiring overall less physical assistance with activity  Pertinent barriers during this session include fatigue, requiring frequent rest periods for recovery  Current goals and POC remain appropriate, pt continues to have rehab potential and is making good progress towards STGs  Pt prognosis for achieving goals is good, pending pt progress with hospitalization/medical status improvements, and indicated by motivation, recent history of independence with functional skills/High PLOF, supportive family/caregivers, recent onset and initiation level  Pt limited d/t medical instability  PT recommends post acute rehabilitation services upon discharge  Pt continues to be functioning below baseline level, and remains limited 2* factors listed above   PT will continue to see pt during current hospitalization in order to address the deficits listed above and provide interventions consistent w/ POC in effort to achieve STGs  Barriers to Discharge: Inaccessible home environment     PT Discharge Recommendation: Post acute rehabilitation services    See flowsheet documentation for full assessment

## 2023-05-21 NOTE — PROGRESS NOTES
Cardiology Progress Note - Shavon Levine 67 y o  male MRN: [id number]    Unit/Bed#: ICU 04-01 Encounter: [id number]          Subjective:   Patient seen and examined  He reports no complaints  No further episodes of atrial fibrillation noted on telemetry overnight  He is inquiring about when he may ride his lawnmower  There is some evidence of forgetfulness as his inquiry is repetitive  He is awaiting to be moved out of bed to chair  Hospital Course:   Shavon Levine is a 67y o  year old male with no significant medical history who presented with witnessed cardiac arrest at home and bystander CPR  PEA was noticed by EMS with return of spontaneous circulation achieved by epinephrine, followed by intubation in the field  In hospital, patient had various dysrhythmias (A-fib, A-flutter, VT requiring shock)  His course was complicated by mixed shock from cardiac arrest as well as aspiration pneumonitis/pneumonia  His labs were concerning for non-MI troponin elevation     Once stabilized, patient's underlying renal demonstrated complete heart block with VT was thought to be induced from  Transthoracic echocardiogram demonstrated preserved biventricular function with no wall motion abnormalities and no significant valve disease  Patient underwent dual-chamber ICD 5/18  Postprocedure chest x-ray demonstrated appropriate lead positions  Vitals: Blood pressure 135/62, pulse 60, temperature 100 °F (37 8 °C), resp   rate (!) 26, height 6' (1 829 m), weight 107 kg (236 lb 5 3 oz), SpO2 (!) 88 % , Body mass index is 32 05 kg/m² ,   Orthostatic Blood Pressures    Flowsheet Row Most Recent Value   Blood Pressure 135/62 filed at 05/21/2023 0800   Patient Position - Orthostatic VS Lying filed at 05/21/2023 0400            Intake/Output Summary (Last 24 hours) at 5/21/2023 0855  Last data filed at 5/21/2023 0830  Gross per 24 hour   Intake 1328 47 ml   Output 4150 ml   Net -2821 53 ml       Review of System:  Review of system was conducted and was negative except for as stated in the subjective course      Physical Exam:    GEN: Deonte Billingsley appears comfortable, alert and oriented x 3, cooperative   HEENT:  Normocephalic, atraumatic, anicteric, moist mucous membranes  NECK: No JVD  HEART: Regular rhythm, normal rate, normal S1 and S2, no murmur  LUNGS: Mildly rhonchorous breath sounds in the bases bilaterally; respiration nonlabored on mid flow NC  ABDOMEN:  Normoactive bowel sounds, soft, no tenderness  EXTREMITIES: peripheral pulses palpable; trace to 1+ dependent edema  NEURO: no gross focal findings; cranial nerves grossly intact   SKIN:  Dry, intact, warm to touch      Current Facility-Administered Medications:   •  acetaminophen (TYLENOL) tablet 650 mg, 650 mg, Oral, Q6H PRN, CARMEL Roblero  •  albuterol inhalation solution 2 5 mg, 2 5 mg, Nebulization, Q4H PRN, Josseline Ball MD, 2 5 mg at 23 0053  •  [] amiodarone (CORDARONE) 900 mg in dextrose 5 % 500 mL infusion, 1 mg/min, Intravenous, Continuous, Stopped at 23 1808 **FOLLOWED BY** [] amiodarone (CORDARONE) 900 mg in dextrose 5 % 500 mL infusion, 0 5 mg/min, Intravenous, Continuous, Stopped at 23 1206 **FOLLOWED BY** amiodarone tablet 200 mg, 200 mg, Oral, TID With Meals, 200 mg at 23 0836 **FOLLOWED BY** [START ON 6/3/2023] amiodarone tablet 200 mg, 200 mg, Oral, BID With Meals **FOLLOWED BY** [START ON 2023] amiodarone tablet 200 mg, 200 mg, Oral, Daily With Breakfast, Josseline Ball MD  •  apixaban Doylene Rust) tablet 5 mg, 5 mg, Oral, BID, Josseline Ball MD, 5 mg at 23 3308  •  artificial tear (LUBRIFRESH P M ) ophthalmic ointment, , Both Eyes, HS, CARMEL Roblero, Given at 23 2102  •  brimonidine tartrate 0 2 % ophthalmic solution 1 drop, 1 drop, Both Eyes, BID, Chandra Single, CRNP, 1 drop at 23 1840  •  carvedilol (COREG) tablet 6 25 mg, 6 25 mg, Oral, BID With Meals, Chandra Single, CARMEL, 6 25 mg at 05/21/23 4080  •  cefTRIAXone (ROCEPHIN) IVPB (premix in dextrose) 1,000 mg 50 mL, 1,000 mg, Intravenous, Q24H, CARMEL Thomas, Stopped at 05/20/23 1211  •  chlorhexidine (PERIDEX) 0 12 % oral rinse 15 mL, 15 mL, Mouth/Throat, Q12H [facility] [place] [facility], Sai Ch MD, 15 mL at 05/21/23 4894  •  cyanocobalamin (VITAMIN B-12) tablet 250 mcg, 250 mcg, Oral, Daily, CARMEL Thomas, 250 mcg at 05/21/23 3707  •  EPINEPHrine (ADRENALIN) injection, , Intravenous, Code/Trauma/Sedation [facility], CARMEL Manning, 1 mg at 05/17/23 7319  •  guaiFENesin (MUCINEX) 12 hr tablet 600 mg, 600 mg, Oral, Q12H [facility] [place], CARMEL Thomas, 600 mg at 05/21/23 9817  •  hydrALAZINE (APRESOLINE) injection 5 mg, 5 mg, Intravenous, Q6H PRN, CARMEL Thomas, 5 mg at 05/20/23 0745  •  insulin lispro (HumaLOG) 100 units/mL subcutaneous injection 1-5 Units, 1-5 Units, Subcutaneous, TID AC, 1 Units at 05/20/23 0719 **AND** Fingerstick Glucose (POCT), , , TID AC, CARMEL Thomas  •  insulin lispro (HumaLOG) 100 units/mL subcutaneous injection 1-5 Units, 1-5 Units, Subcutaneous, HS, CARMEL Mccracken  •  iron sucrose (VENOFER) 200 mg in sodium chloride 0 9 % 100 mL IVPB, 200 mg, Intravenous, Daily, CARMEL Thomas, Stopped at 05/20/23 1000  •  levalbuterol (XOPENEX) inhalation solution 1 25 mg, 1 25 mg, Nebulization, TID, CARMEL Thomas, 1 25 mg at 05/21/23 6387  •  levothyroxine tablet 50 mcg, 50 mcg, Oral, Early Morning, Sia Ch MD, 50 mcg at 05/21/23 0617  •  lidocaine (LIDODERM) 5 % patch 1 patch, 1 patch, Topical, Daily, Sai Ch MD, 1 patch at 05/21/23 9751  •  lidocaine (LIDODERM) 5 % patch 1 patch, 1 patch, Topical, Daily, Sai Ch MD, 1 patch at 05/21/23 5106  •  melatonin tablet 6 mg, 6 mg, Oral, HS, CARMEL Thomas, 6 mg at 05/20/23 2100  •  metroNIDAZOLE (FLAGYL) IVPB (premix) 500 mg 100 mL, 500 mg, Intravenous, Q8H, CARMEL Thomas, Last Rate: 200 mL/hr at 05/21/23 0500, 500 mg at 05/21/23 0500  •  OLANZapine (ZyPREXA ZYDIS) dispersible tablet 10 mg, 10 mg, Oral, HS, CARMEL Bernstein, 10 mg at 05/20/23 2303  •  oxyCODONE (ROXICODONE) IR tablet 5 mg, 5 mg, Oral, Q4H PRN, 5 mg at 05/21/23 0108 **OR** oxyCODONE (ROXICODONE) immediate release tablet 10 mg, 10 mg, Oral, Q4H PRN, CARMEL Avalos  •  polyethylene glycol (MIRALAX) packet 17 g, 17 g, Oral, Daily, CARMEL Bernstein, 17 g at 05/21/23 1235  •  predniSONE tablet 40 mg, 40 mg, Oral, Daily, CARMEL Blanc, 40 mg at 05/21/23 0124  •  senna-docusate sodium (SENOKOT S) 8 6-50 mg per tablet 2 tablet, 2 tablet, Oral, HS, Koby Lovell MD, 2 tablet at 05/20/23 2100  •  tamsulosin (FLOMAX) capsule 0 4 mg, 0 4 mg, Oral, Daily With CARMEL Nick, 0 4 mg at 05/20/23 1652  •  travoprost (TRAVATAN-Z) 0 004 % ophthalmic solution 1 drop, 1 drop, Both Eyes, HS, CARMEL Blanc, 1 drop at 05/20/23 2305    Labs & Results:  Results from last 7 days   Lab Units 05/16/23  0630 05/16/23  0200 05/15/23  2325 05/15/23  2122   HS TNI 0HR ng/L  --   --   --  360*   HS TNI 2HR ng/L  --   --  444*  --    HS TNI 4HR ng/L  --  392*  --   --    HS TNI RAND ng/L 329*  --   --   --          Results from last 7 days   Lab Units 05/21/23  0623 05/20/23 2012 05/20/23  0613   POTASSIUM mmol/L 3 6 3 9 3 9   CO2 mmol/L 30 29 26   CHLORIDE mmol/L 106 104 104   BUN mg/dL 29* 31* 31*   CREATININE mg/dL 0 94 0 93 0 97     Results from last 7 days   Lab Units 05/21/23  0623 05/20/23  0613 05/19/23  0513   HEMOGLOBIN g/dL 10 1* 11 0* 9 3*   HEMATOCRIT % 31 3* 34 2* 28 7*   PLATELETS Thousands/uL 147* 155 145*     Results from last 7 days   Lab Units 05/19/23  0513 05/16/23  0327   TRIGLYCERIDES mg/dL 178*  --    HDL mg/dL 35*  --    LDL CALC mg/dL 42  --    LDL DIRECT mg/dl 47  --    HEMOGLOBIN A1C %  --  5 3       Telemetry:   Personally reviewed by Alisson Moss MD: Atrial paced rhythm with heart rate of 60 bpm, no recurrence of atrial fibrillation or ventricular ectopy appreciated    Imaging: No new imaging to be reviewed      VTE Prophylaxis: Apixaban      Assessment:  Principal Problem:    Acute respiratory failure with hypoxia (HCC)  Active Problems:    Cardiac arrest (HCC)    Closed fracture of multiple ribs of both sides    Aspiration pneumonia (HCC)    Anemia    Paroxysmal atrial fibrillation (HCC)    Delirium    Hyperglycemia    HTN (hypertension)    Constipation    Hypothyroidism        67y o  year old male who presented with out of hospital cardiac arrest due to bradycardia with related bradycardia-induced ventricular tachycardia  Hospital course was complicated by aspiration pneumonia/pneumonitis leading to mixed picture shock  He is status post dual-chamber ICD on 5/18  1   Sick sinus syndrome/complete heart block w/ bradycardia-induced VT  - 5/16 TTE: LVEF 65%, no WMA, no DD, normal RV size/function, no significant valve disease  - s/p [employer] dual-chamber ICD  - TSH 5 9, Lyme Ab negative    2  Paroxysmal atrial fibrillation/flutter  - Possibly in setting of cardiac arrest + infectious proces  - [id number] 1-2 if HTN diagnosis persists, HAS-BLED 1  - Amiodarone (LFTs & TSH appropriate), apixaban, carvedilol 6 25 mg    3  Non-MI troponin elevation  - 444 peak  - LDL 47, A1c 5 3%    4  Elevated blood pressure readings without diagnosis of hypertension  - SBP ranging 150-189 in the last 24 hours    5  Iatrogenic volume overload  - Insetting of resuscitation  - Furosemide 60 mg total in the last 24 hours, net -3 5 L      Plan:  1  Continue current regiment of amiodarone and carvedilol  Patient's blood pressure readings have improved  2   He remains slightly volume overloaded, consider additional dose of furosemide to aid in oxygen supplementation titration      3   If plan for sustained amiodarone therapy as an outpatient, consider obtaining baseline pulmonary function test       Thank you for involving us in the care of your patient  Thierry Cesar MD  Cardiology      [facility]/ Allscripts/Care Everywhere records reviewed: Yes    ** Please Note: Fluency DirectDictation voice to text software may have been used in the creation of this document   **

## 2023-05-22 PROBLEM — R73.9 HYPERGLYCEMIA: Status: RESOLVED | Noted: 2023-05-20 | Resolved: 2023-05-22

## 2023-05-22 LAB
ANION GAP SERPL CALCULATED.3IONS-SCNC: 7 MMOL/L (ref 4–13)
ATRIAL RATE: 576 BPM
ATRIAL RATE: 62 BPM
BUN SERPL-MCNC: 23 MG/DL (ref 5–25)
CALCIUM SERPL-MCNC: 9 MG/DL (ref 8.4–10.2)
CHLORIDE SERPL-SCNC: 106 MMOL/L (ref 96–108)
CO2 SERPL-SCNC: 30 MMOL/L (ref 21–32)
CREAT SERPL-MCNC: 0.91 MG/DL (ref 0.6–1.3)
ERYTHROCYTE [DISTWIDTH] IN BLOOD BY AUTOMATED COUNT: 12.4 % (ref 11.6–15.1)
FUNGUS SPEC CULT: NORMAL
FUNGUS SPEC CULT: NORMAL
GFR SERPL CREATININE-BSD FRML MDRD: 83 ML/MIN/1.73SQ M
GLUCOSE SERPL-MCNC: 115 MG/DL (ref 65–140)
HCT VFR BLD AUTO: 35 % (ref 36.5–49.3)
HGB BLD-MCNC: 11.6 G/DL (ref 12–17)
MCH RBC QN AUTO: 30.9 PG (ref 26.8–34.3)
MCHC RBC AUTO-ENTMCNC: 33.1 G/DL (ref 31.4–37.4)
MCV RBC AUTO: 93 FL (ref 82–98)
P AXIS: 31 DEGREES
P AXIS: 39 DEGREES
PLATELET # BLD AUTO: 159 THOUSANDS/UL (ref 149–390)
PMV BLD AUTO: 11 FL (ref 8.9–12.7)
POTASSIUM SERPL-SCNC: 3.5 MMOL/L (ref 3.5–5.3)
PR INTERVAL: 200 MS
PR INTERVAL: 206 MS
QRS AXIS: -4 DEGREES
QRS AXIS: 0 DEGREES
QRSD INTERVAL: 104 MS
QRSD INTERVAL: 106 MS
QT INTERVAL: 456 MS
QT INTERVAL: 490 MS
QTC INTERVAL: 456 MS
QTC INTERVAL: 497 MS
R RICKETTSI IGM SER QL IA: 0.26 INDEX (ref 0–0.89)
RBC # BLD AUTO: 3.76 MILLION/UL (ref 3.88–5.62)
SODIUM SERPL-SCNC: 143 MMOL/L (ref 135–147)
T WAVE AXIS: 139 DEGREES
T WAVE AXIS: 169 DEGREES
VENTRICULAR RATE: 60 BPM
VENTRICULAR RATE: 62 BPM
WBC # BLD AUTO: 9.47 THOUSAND/UL (ref 4.31–10.16)

## 2023-05-22 RX ORDER — POTASSIUM CHLORIDE 20 MEQ/1
40 TABLET, EXTENDED RELEASE ORAL ONCE
Status: COMPLETED | OUTPATIENT
Start: 2023-05-22 | End: 2023-05-22

## 2023-05-22 RX ORDER — LACTULOSE 20 G/30ML
20 SOLUTION ORAL 2 TIMES DAILY
Status: DISCONTINUED | OUTPATIENT
Start: 2023-05-22 | End: 2023-05-25 | Stop reason: HOSPADM

## 2023-05-22 RX ORDER — HALOPERIDOL 5 MG/ML
5 INJECTION INTRAMUSCULAR ONCE
Status: COMPLETED | OUTPATIENT
Start: 2023-05-22 | End: 2023-05-22

## 2023-05-22 RX ORDER — QUETIAPINE FUMARATE 25 MG/1
25 TABLET, FILM COATED ORAL
Status: DISCONTINUED | OUTPATIENT
Start: 2023-05-22 | End: 2023-05-25 | Stop reason: HOSPADM

## 2023-05-22 RX ORDER — CARVEDILOL 12.5 MG/1
12.5 TABLET ORAL 2 TIMES DAILY WITH MEALS
Status: DISCONTINUED | OUTPATIENT
Start: 2023-05-22 | End: 2023-05-25 | Stop reason: HOSPADM

## 2023-05-22 RX ORDER — FUROSEMIDE 10 MG/ML
20 INJECTION INTRAMUSCULAR; INTRAVENOUS ONCE
Status: COMPLETED | OUTPATIENT
Start: 2023-05-22 | End: 2023-05-22

## 2023-05-22 RX ADMIN — LIDOCAINE 1 PATCH: 700 PATCH TOPICAL at 09:06

## 2023-05-22 RX ADMIN — POLYETHYLENE GLYCOL 3350 17 G: 17 POWDER, FOR SOLUTION ORAL at 09:11

## 2023-05-22 RX ADMIN — OXYCODONE HYDROCHLORIDE 10 MG: 10 TABLET ORAL at 00:01

## 2023-05-22 RX ADMIN — QUETIAPINE FUMARATE 25 MG: 25 TABLET ORAL at 21:46

## 2023-05-22 RX ADMIN — LEVALBUTEROL HYDROCHLORIDE 1.25 MG: 1.25 SOLUTION RESPIRATORY (INHALATION) at 07:27

## 2023-05-22 RX ADMIN — CARVEDILOL 12.5 MG: 12.5 TABLET, FILM COATED ORAL at 17:01

## 2023-05-22 RX ADMIN — LEVALBUTEROL HYDROCHLORIDE 1.25 MG: 1.25 SOLUTION RESPIRATORY (INHALATION) at 13:25

## 2023-05-22 RX ADMIN — POTASSIUM CHLORIDE 40 MEQ: 1500 TABLET, EXTENDED RELEASE ORAL at 08:55

## 2023-05-22 RX ADMIN — ACETAMINOPHEN 650 MG: 325 TABLET ORAL at 05:08

## 2023-05-22 RX ADMIN — LEVOTHYROXINE SODIUM 50 MCG: 50 TABLET ORAL at 05:08

## 2023-05-22 RX ADMIN — LEVALBUTEROL HYDROCHLORIDE 1.25 MG: 1.25 SOLUTION RESPIRATORY (INHALATION) at 20:19

## 2023-05-22 RX ADMIN — METRONIDAZOLE 500 MG: 500 INJECTION, SOLUTION INTRAVENOUS at 05:00

## 2023-05-22 RX ADMIN — AMIODARONE HYDROCHLORIDE 200 MG: 100 TABLET ORAL at 17:01

## 2023-05-22 RX ADMIN — TAMSULOSIN HYDROCHLORIDE 0.4 MG: 0.4 CAPSULE ORAL at 17:02

## 2023-05-22 RX ADMIN — ACETAMINOPHEN 650 MG: 325 TABLET ORAL at 13:29

## 2023-05-22 RX ADMIN — POTASSIUM CHLORIDE 40 MEQ: 1500 TABLET, EXTENDED RELEASE ORAL at 10:39

## 2023-05-22 RX ADMIN — ACETAMINOPHEN 650 MG: 325 TABLET ORAL at 18:51

## 2023-05-22 RX ADMIN — APIXABAN 5 MG: 5 TABLET, FILM COATED ORAL at 18:51

## 2023-05-22 RX ADMIN — BRIMONIDINE TARTRATE 1 DROP: 2 SOLUTION/ DROPS OPHTHALMIC at 09:10

## 2023-05-22 RX ADMIN — APIXABAN 5 MG: 5 TABLET, FILM COATED ORAL at 08:59

## 2023-05-22 RX ADMIN — GUAIFENESIN 600 MG: 600 TABLET ORAL at 21:45

## 2023-05-22 RX ADMIN — FUROSEMIDE 20 MG: 10 INJECTION, SOLUTION INTRAMUSCULAR; INTRAVENOUS at 10:39

## 2023-05-22 RX ADMIN — OXYCODONE HYDROCHLORIDE 10 MG: 10 TABLET ORAL at 21:46

## 2023-05-22 RX ADMIN — TRAVOPROST 1 DROP: 0.04 SOLUTION OPHTHALMIC at 21:40

## 2023-05-22 RX ADMIN — WHITE PETROLATUM 57.7 %-MINERAL OIL 31.9 % EYE OINTMENT 1 APPLICATION.: at 21:40

## 2023-05-22 RX ADMIN — CYANOCOBALAMIN TAB 500 MCG 250 MCG: 500 TAB at 08:57

## 2023-05-22 RX ADMIN — HALOPERIDOL LACTATE 5 MG: 5 INJECTION, SOLUTION INTRAMUSCULAR at 00:29

## 2023-05-22 RX ADMIN — AMIODARONE HYDROCHLORIDE 200 MG: 100 TABLET ORAL at 13:30

## 2023-05-22 RX ADMIN — METRONIDAZOLE 500 MG: 500 INJECTION, SOLUTION INTRAVENOUS at 13:31

## 2023-05-22 RX ADMIN — METRONIDAZOLE 500 MG: 500 INJECTION, SOLUTION INTRAVENOUS at 21:00

## 2023-05-22 RX ADMIN — AMIODARONE HYDROCHLORIDE 200 MG: 100 TABLET ORAL at 08:55

## 2023-05-22 RX ADMIN — LACTULOSE 20 G: 20 SOLUTION ORAL at 09:00

## 2023-05-22 RX ADMIN — BRIMONIDINE TARTRATE 1 DROP: 2 SOLUTION/ DROPS OPHTHALMIC at 21:40

## 2023-05-22 RX ADMIN — GUAIFENESIN 600 MG: 600 TABLET ORAL at 08:58

## 2023-05-22 RX ADMIN — CEFTRIAXONE 1000 MG: 1 INJECTION, SOLUTION INTRAVENOUS at 13:35

## 2023-05-22 RX ADMIN — Medication 6 MG: at 21:45

## 2023-05-22 RX ADMIN — CHLORHEXIDINE GLUCONATE 0.12% ORAL RINSE 15 ML: 1.2 LIQUID ORAL at 08:59

## 2023-05-22 RX ADMIN — CHLORHEXIDINE GLUCONATE 0.12% ORAL RINSE 15 ML: 1.2 LIQUID ORAL at 21:49

## 2023-05-22 RX ADMIN — CARVEDILOL 6.25 MG: 3.12 TABLET, FILM COATED ORAL at 08:56

## 2023-05-22 NOTE — ASSESSMENT & PLAN NOTE
· 5/16: Witnessed arrest at home, wife preformed CPR  · PEA arrest on EMS arrival - received 2 rounds of ACLS and achieved ROSC  · Intubated in field and transferred to [facility] ED  · Bradycardic/hypotensive on arrival to [facility] ED - required vasopressors and Atropine  · Episode of sustained VT after ETT suctioning requiring Amio bolus and cardioversion x1 on 05/16  · VT/Torsades de Pointes arrest after suctioning on 05/17 - received CPR x2 rounds, Epi x1, shock x2 - ROSC achieved w/AF RVR, then conversion to sinus bradycardia w/rates in 40s  · AICD placed 05/18  · TTE from 05/16 w/EF 65%; no RWMA  · Cardiology following - appreciate recommendation  · Continue Amiodarone oral

## 2023-05-22 NOTE — ASSESSMENT & PLAN NOTE
· Intubated in field 2/2 cardiac arrest - had witnessed aspiration event  · Imaging consistent w/bilateral lower lobe pneumonia  · Sputum culture positive for 2+ Staph aureus   · Urine strep/legionella negative  · bronch 05/17   · Extubated 5/19  · CXR 5/20 notable for pulmonary edema  · O2 weaned to 6L NC  · Continue scheduled Xopanex & Mucinex   PRN Albuterol  · Aggressive pulmonary hygiene  · Continue Rocephin/Flagyl  · OOB to chair   · IS

## 2023-05-22 NOTE — ASSESSMENT & PLAN NOTE
This appeared to be initiated after a pause associated with a PVC      Ventricular ectopy resolved following temp pacer, now with ICD in place

## 2023-05-22 NOTE — ASSESSMENT & PLAN NOTE
· CT C/A/P on admission to [facility] revealed right 2nd-6th anterior rib fractures; left second-fifth anterior rib fractures  · Continue Lidocaine patches  · Continue PRN pain regimen

## 2023-05-22 NOTE — ASSESSMENT & PLAN NOTE
· Bradycardic in 35s to 45s since admission  · Unclear etiology, though suspect this may be chronic   · Is s/p temporary TVP on 05/17 as noted above  · Now s/p AICD implantation on 05/18  · Lyme Ab negative  · [facility] Spotted Fever IgG/M pending  · TSH 5 976 w/T4 0 67, however this may be inaccurate s/p cardiac arrest/shock state  · T3 0 80  · Cardiology following - appreciate recommendations  · Continue cardiac monitoring

## 2023-05-22 NOTE — QUICK NOTE
The patient was admitted to critical care service on 5/16/2023 post PEA arrest, acute hypoxic respiratory failure, shock state, PIERO, aspiration pneumonia, and acute encephalopathy  The patient is s/p ICD placement 5/18/23  The patient was seen/examined by critical care team and deemed medically stable for transfer to med/surg  Please see today's progress note from critical care for current treatment  The patient will be seen by SLIM on Tuesday 5/23/2023

## 2023-05-22 NOTE — PROGRESS NOTES
[facility]  Progress Note  Name: Olga Burroughs  MRN: [id number]  Unit/Bed#: ICU 04-01 I Date of Admission: 5/16/2023   Date of Service: 5/22/2023 I Hospital Day: 6    Assessment/Plan   * Acute respiratory failure with hypoxia [facility])  Assessment & Plan  · Intubated in field 2/2 cardiac arrest - had witnessed aspiration event  · Imaging consistent w/bilateral lower lobe pneumonia  · Sputum culture positive for 2+ Staph aureus   · Urine strep/legionella negative  · bronch 05/17   · Extubated 5/19  · CXR 5/20 notable for pulmonary edema  · O2 weaned to 6L NC  · Continue scheduled Xopanex & Mucinex   PRN Albuterol  · Aggressive pulmonary hygiene  · Continue Rocephin/Flagyl  · OOB to chair   · IS      Aspiration pneumonia (HCC)  Assessment & Plan  · 2/2 Cardiac event  · Continue Rocephin/flagyl  · Titrate O2 goal >88%  · IS  · OOB    Delirium  Assessment & Plan  · OOB during the day  · Maintain sleep/wake cycle  · Melatonin  · Zyprexa HS    HTN (hypertension)  Assessment & Plan  · Continue Coreg BID  · PRN Hydralazine    Bradycardia-resolved as of 5/21/2023  Assessment & Plan  · Bradycardic in 30s to 45s since admission  · Unclear etiology, though suspect this may be chronic   · Is s/p temporary TVP on 05/17 as noted above  · Now s/p AICD implantation on 05/18  · Lyme Ab negative  · [facility] Spotted Fever IgG/M pending  · TSH 5 976 w/T4 0 67, however this may be inaccurate s/p cardiac arrest/shock state  · T3 0 80  · Cardiology following - appreciate recommendations  · Continue cardiac monitoring    Closed fracture of multiple ribs of both sides  Assessment & Plan  · CT C/A/P on admission to [facility] revealed right 2nd-6th anterior rib fractures; left second-fifth anterior rib fractures  · Continue Lidocaine patches  · Continue PRN pain regimen    Constipation  Assessment & Plan  · Continue bowel regimen  · Lactulose BID  · Consider Relistor     Cardiac arrest [facility])  Assessment & Plan  · 5/16: Witnessed arrest at home, wife preformed CPR  · PEA arrest on EMS arrival - received 2 rounds of ACLS and achieved ROSC  · Intubated in field and transferred to [facility] ED  · Bradycardic/hypotensive on arrival to [facility] ED - required vasopressors and Atropine  · Episode of sustained VT after ETT suctioning requiring Amio bolus and cardioversion x1 on 05/16  · VT/Torsades de Pointes arrest after suctioning on 05/17 - received CPR x2 rounds, Epi x1, shock x2 - ROSC achieved w/AF RVR, then conversion to sinus bradycardia w/rates in 40s  · AICD placed 05/18  · TTE from 05/16 w/EF 65%; no RWMA  · Cardiology following - appreciate recommendation  · Continue Amiodarone oral    Hypothyroidism  Assessment & Plan  · Continue synthroid    Anemia  Assessment & Plan    · Trend Hgb and transfuse for Hgb < 7    Paroxysmal atrial fibrillation (HCC)  Assessment & Plan  · Continue Amio PO   · Continue BID Eloquis    Hyperglycemia-resolved as of 5/22/2023  Assessment & Plan  · No HST DM  · Likely in setting of acute illness and steroids  · Continue SSI             ICU Core Measures     A: Assess, Prevent, and Manage Pain · Has pain been assessed? Yes  · Need for changes to pain regimen? No   B: Both SAT/SAT  · N/A   C: Choice of Sedation · RASS Goal: 0 Alert and Calm or N/A patient not on sedation  · Need for changes to sedation or analgesia regimen? NA   D: Delirium · CAM-ICU: Positive   E: Early Mobility  · Plan for early mobility? Yes   F: Family Engagement · Plan for family engagement today? Yes       Antibiotic Review: Patient on appropriate coverage based on culture data  Review of Invasive Devices: Franklin Plan: Franklin to be removed  Order has been placed        Prophylaxis:  VTE VTE covered by:  apixaban, Oral, 5 mg at 05/21/23 1712       Stress Ulcer  not ordered       Subjective   HPI/24hr events:   · Haldol for agitation  · Lactulose added twice daily  · D/c franklin      Review of Systems   Constitutional: Negative      HENT: Negative  Respiratory: Positive for cough  Negative for shortness of breath  Cardiovascular: Negative  Gastrointestinal: Positive for abdominal distention  Negative for abdominal pain  Genitourinary: Negative  Musculoskeletal: Negative  Skin: Positive for wound  Neurological: Positive for weakness  Psychiatric/Behavioral: Positive for agitation  The patient is nervous/anxious and is hyperactive  Objective                            Vitals I/O      Most Recent Min/Max in 24hrs   Temp 98 6 °F (37 °C) Temp  Min: 98 4 °F (36 9 °C)  Max: 100 4 °F (38 °C)   Pulse 60 Pulse  Min: 60  Max: 60   Resp (!) 30 Resp  Min: 17  Max: 31   BP (!) 188/89 BP  Min: 96/48  Max: 203/99   O2 Sat 93 % SpO2  Min: 87 %  Max: 96 %      Intake/Output Summary (Last 24 hours) at 5/22/2023 0318  Last data filed at 5/22/2023 0000  Gross per 24 hour   Intake 1690 ml   Output 3600 ml   Net -1910 ml         Diet Cardiovascular; Cardiac     Invasive Monitoring Physical exam    Physical Exam  Constitutional:       General: He is not in acute distress  HENT:      Head: Normocephalic  Mouth/Throat:      Mouth: Mucous membranes are moist       Pharynx: Oropharynx is clear  Eyes:      Pupils: Pupils are equal, round, and reactive to light  Cardiovascular:      Rate and Rhythm: Normal rate and regular rhythm  Pulses: Normal pulses  Heart sounds: Normal heart sounds  Pulmonary:      Effort: Pulmonary effort is normal       Breath sounds: Normal breath sounds  Abdominal:      General: There is distension  Palpations: Abdomen is soft  Skin:     General: Skin is warm and dry  Capillary Refill: Capillary refill takes less than 2 seconds  Neurological:      Mental Status: He is alert  He is disoriented  Diagnostic Studies      EKG: Paced  Imaging:  I have personally reviewed pertinent reports         Medications:  Scheduled PRN   acetaminophen, 650 mg, Q6H [address]  amiodarone, 200 mg, TID With Meals   Followed by  Loel Maxin ON 6/3/2023] amiodarone, 200 mg, BID With Meals   Followed by  Loel Maxin ON 6/18/2023] amiodarone, 200 mg, Daily With Breakfast  apixaban, 5 mg, BID  artificial tear, , HS  brimonidine tartrate, 1 drop, BID  carvedilol, 6 25 mg, BID With Meals  cefTRIAXone, 1,000 mg, Q24H  chlorhexidine, 15 mL, Q12H BAYRON  cyanocobalamin, 250 mcg, Daily  guaiFENesin, 600 mg, Q12H [address]  lactulose, 20 g, BID  levalbuterol, 1 25 mg, TID  levothyroxine, 50 mcg, Early Morning  lidocaine, 1 patch, Daily  lidocaine, 1 patch, Daily  melatonin, 6 mg, HS  metroNIDAZOLE, 500 mg, Q8H  OLANZapine, 10 mg, HS  polyethylene glycol, 17 g, Daily  senna-docusate sodium, 2 tablet, HS  tamsulosin, 0 4 mg, Daily With Dinner  travoprost, 1 drop, HS      albuterol, 2 5 mg, Q4H PRN  EPINEPHrine, , Code/Trauma/Sedation Med  hydrALAZINE, 5 mg, Q6H PRN  oxyCODONE, 5 mg, Q4H PRN   Or  oxyCODONE, 10 mg, Q4H PRN       Continuous          Labs:    CBC    Recent Labs     05/20/23  0613 05/21/23  0623   WBC 11 90* 8 57   HGB 11 0* 10 1*   HCT 34 2* 31 3*    147*     BMP    Recent Labs     05/20/23 2012 05/21/23  0623   SODIUM 139 142   K 3 9 3 6    106   CO2 29 30   AGAP 6 6   BUN 31* 29*   CREATININE 0 93 0 94   CALCIUM 8 8 9 0       Coags    No recent results     Additional Electrolytes  Recent Labs     05/20/23 2012 05/21/23  0623   MG 2 3 2 4   PHOS 2 5 2 7   CAIONIZED 1 18  --           Blood Gas    No recent results  No recent results LFTs  No recent results    Infectious  No recent results  Glucose  Recent Labs     05/20/23  0613 05/20/23 2012 05/21/23  0623   GLUC 162* 137 110               Critical Care Time Delivered 10 Minutes    CARMEL Gonsalez

## 2023-05-22 NOTE — NURSING NOTE
O2 MF changed to O2 5L NC this AM ,tolerated well,Oxygen saturation between 89-93%  In no acute resp distress

## 2023-05-22 NOTE — ASSESSMENT & PLAN NOTE
In retrospect, bradycardia related to torsades was seen while in the hospital  No recurrence s/p pacer/ICD implant

## 2023-05-22 NOTE — PLAN OF CARE
Problem: INFECTION - ADULT  Goal: Absence or prevention of progression during hospitalization  Description: INTERVENTIONS:  - Assess and monitor for signs and symptoms of infection  - Monitor lab/diagnostic results  - Monitor all insertion sites, i e  indwelling lines, tubes, and drains  - Monitor endotracheal if appropriate and nasal secretions for changes in amount and color  - [place] appropriate cooling/warming therapies per order  - Administer medications as ordered  - Instruct and encourage patient and family to use good hand hygiene technique  - Identify and instruct in appropriate isolation precautions for identified infection/condition  Outcome: Progressing     Problem: PAIN - ADULT  Goal: Verbalizes/displays adequate comfort level or baseline comfort level  Description: Interventions:  - Encourage patient to monitor pain and request assistance  - Assess pain using appropriate pain scale  - Administer analgesics based on type and severity of pain and evaluate response  - Implement non-pharmacological measures as appropriate and evaluate response  - Consider cultural and social influences on pain and pain management  - Notify physician/advanced practitioner if interventions unsuccessful or patient reports new pain  Outcome: Progressing     Problem: CARDIOVASCULAR - ADULT  Goal: Maintains optimal cardiac output and hemodynamic stability  Description: INTERVENTIONS:  - Monitor I/O, vital signs and rhythm  - Monitor for S/S and trends of decreased cardiac output  - Administer and titrate ordered vasoactive medications to optimize hemodynamic stability  - Assess quality of pulses, skin color and temperature  - Assess for signs of decreased coronary artery perfusion  - Instruct patient to report change in severity of symptoms  Outcome: Progressing     Problem: CARDIOVASCULAR - ADULT  Goal: Absence of cardiac dysrhythmias or at baseline rhythm  Description: INTERVENTIONS:  - Continuous cardiac monitoring, vital signs, obtain 12 lead EKG if ordered  - Administer antiarrhythmic and heart rate control medications as ordered  - Monitor electrolytes and administer replacement therapy as ordered  Outcome: Progressing     Problem: RESPIRATORY - ADULT  Goal: Achieves optimal ventilation and oxygenation  Description: INTERVENTIONS:  - Assess for changes in respiratory status  - Assess for changes in mentation and behavior  - Position to facilitate oxygenation and minimize respiratory effort  - Oxygen administered by appropriate delivery if ordered  - Initiate smoking cessation education as indicated  - Encourage broncho-pulmonary hygiene including cough, deep breathe, Incentive Spirometry  - Assess the need for suctioning and aspirate as needed  - Assess and instruct to report SOB or any respiratory difficulty  - Respiratory Therapy support as indicated  Outcome: Progressing     Problem: GENITOURINARY - ADULT  Goal: Maintains or returns to baseline urinary function  Description: INTERVENTIONS:  - Assess urinary function  - Encourage oral fluids to ensure adequate hydration if ordered  - Administer IV fluids as ordered to ensure adequate hydration  - Administer ordered medications as needed  - Offer frequent toileting  - Follow urinary retention protocol if ordered  Outcome: Progressing     Problem: GENITOURINARY - ADULT  Goal: Absence of urinary retention  Description: INTERVENTIONS:  - Assess patient’s ability to void and empty bladder  - Monitor I/O  - Bladder scan as needed  - Discuss with physician/AP medications to alleviate retention as needed  - Discuss catheterization for long term situations as appropriate  Outcome: Progressing     Problem: GENITOURINARY - ADULT  Goal: Urinary catheter remains patent  Description: INTERVENTIONS:  - Assess patency of urinary catheter  - If patient has a chronic franklin, consider changing catheter if non-functioning  - Follow guidelines for intermittent irrigation of non-functioning urinary catheter  Outcome: Progressing

## 2023-05-22 NOTE — PLAN OF CARE
Problem: PAIN - ADULT  Goal: Verbalizes/displays adequate comfort level or baseline comfort level  Description: Interventions:  - Encourage patient to monitor pain and request assistance  - Assess pain using appropriate pain scale  - Administer analgesics based on type and severity of pain and evaluate response  - Implement non-pharmacological measures as appropriate and evaluate response  - Consider cultural and social influences on pain and pain management  - Notify physician/advanced practitioner if interventions unsuccessful or patient reports new pain  Outcome: Progressing     Problem: INFECTION - ADULT  Goal: Absence or prevention of progression during hospitalization  Description: INTERVENTIONS:  - Assess and monitor for signs and symptoms of infection  - Monitor lab/diagnostic results  - Monitor all insertion sites, i e  indwelling lines, tubes, and drains  - Monitor endotracheal if appropriate and nasal secretions for changes in amount and color  - [place] appropriate cooling/warming therapies per order  - Administer medications as ordered  - Instruct and encourage patient and family to use good hand hygiene technique  - Identify and instruct in appropriate isolation precautions for identified infection/condition  Outcome: Progressing     Problem: SAFETY ADULT  Goal: Patient will remain free of falls  Description: INTERVENTIONS:  - Educate patient/family on patient safety including physical limitations  - Instruct patient to call for assistance with activity   - Consult OT/PT to assist with strengthening/mobility   - Keep Call bell within reach  - Keep bed low and locked with side rails adjusted as appropriate  - Keep care items and personal belongings within reach  - Initiate and maintain comfort rounds  - Make Fall Risk Sign visible to staff  - Offer Toileting every 2 Hours, in advance of need  - Initiate/Maintain bed alarm  - Apply yellow socks and bracelet for high fall risk patients  - Consider moving patient to room near nurses station  Outcome: Progressing     Problem: DISCHARGE PLANNING  Goal: Discharge to home or other facility with appropriate resources  Description: INTERVENTIONS:  - Identify barriers to discharge w/patient and caregiver  - Arrange for needed discharge resources and transportation as appropriate  - Identify discharge learning needs (meds, wound care, etc )  - Arrange for interpretive services to assist at discharge as needed  - Refer to Case Management Department for coordinating discharge planning if the patient needs post-hospital services based on physician/advanced practitioner order or complex needs related to functional status, cognitive ability, or social support system  Outcome: Progressing     Problem: NEUROSENSORY - ADULT  Goal: Achieves stable or improved neurological status  Description: INTERVENTIONS  - Monitor and report changes in neurological status  - Monitor vital signs such as temperature, blood pressure, glucose, and any other labs ordered   - Initiate measures to prevent increased intracranial pressure  - Monitor for seizure activity and implement precautions if appropriate      Outcome: Progressing     Problem: CARDIOVASCULAR - ADULT  Goal: Maintains optimal cardiac output and hemodynamic stability  Description: INTERVENTIONS:  - Monitor I/O, vital signs and rhythm  - Monitor for S/S and trends of decreased cardiac output  - Administer and titrate ordered vasoactive medications to optimize hemodynamic stability  - Assess quality of pulses, skin color and temperature  - Assess for signs of decreased coronary artery perfusion  - Instruct patient to report change in severity of symptoms  Outcome: Progressing     Problem: CARDIOVASCULAR - ADULT  Goal: Absence of cardiac dysrhythmias or at baseline rhythm  Description: INTERVENTIONS:  - Continuous cardiac monitoring, vital signs, obtain 12 lead EKG if ordered  - Administer antiarrhythmic and heart rate control medications as ordered  - Monitor electrolytes and administer replacement therapy as ordered  Outcome: Progressing     Problem: RESPIRATORY - ADULT  Goal: Achieves optimal ventilation and oxygenation  Description: INTERVENTIONS:  - Assess for changes in respiratory status  - Assess for changes in mentation and behavior  - Position to facilitate oxygenation and minimize respiratory effort  - Oxygen administered by appropriate delivery if ordered  - Initiate smoking cessation education as indicated  - Encourage broncho-pulmonary hygiene including cough, deep breathe, Incentive Spirometry  - Assess the need for suctioning and aspirate as needed  - Assess and instruct to report SOB or any respiratory difficulty  - Respiratory Therapy support as indicated  Outcome: Progressing     Problem: GENITOURINARY - ADULT  Goal: Absence of urinary retention  Description: INTERVENTIONS:  - Assess patient’s ability to void and empty bladder  - Monitor I/O  - Bladder scan as needed  - Discuss with physician/AP medications to alleviate retention as needed  - Discuss catheterization for long term situations as appropriate  Outcome: Progressing     Problem: Prexisting or High Potential for Compromised Skin Integrity  Goal: Skin integrity is maintained or improved  Description: INTERVENTIONS:  - Identify patients at risk for skin breakdown  - Assess and monitor skin integrity  - Assess and monitor nutrition and hydration status  - Monitor labs   - Assess for incontinence   - Turn and reposition patient  - Assist with mobility/ambulation  - Relieve pressure over bony prominences  - Avoid friction and shearing  - Provide appropriate hygiene as needed including keeping skin clean and dry  - Evaluate need for skin moisturizer/barrier cream  - Collaborate with interdisciplinary team   - Patient/family teaching  - Consider wound care consult   Outcome: Progressing     Problem: Nutrition/Hydration-ADULT  Goal: Nutrient/Hydration intake appropriate for improving, restoring or maintaining nutritional needs  Description: Monitor and assess patient's nutrition/hydration status for malnutrition  Collaborate with interdisciplinary team and initiate plan and interventions as ordered  Monitor patient's weight and dietary intake as ordered or per policy  Utilize nutrition screening tool and intervene as necessary  Determine patient's food preferences and provide high-protein, high-caloric foods as appropriate       INTERVENTIONS:  - Monitor oral intake, urinary output, labs, and treatment plans  - Assess nutrition and hydration status and recommend course of action  - Evaluate amount of meals eaten  - Assist patient with eating if necessary   - Allow adequate time for meals  - Recommend/ encourage appropriate diets, oral nutritional supplements, and vitamin/mineral supplements  - Order, calculate, and assess calorie counts as needed  - Recommend, monitor, and adjust tube feedings and TPN/PPN based on assessed needs  - Assess need for intravenous fluids  - Provide specific nutrition/hydration education as appropriate  - Include patient/family/caregiver in decisions related to nutrition  Outcome: Progressing

## 2023-05-22 NOTE — CASE MANAGEMENT
Case Management Discharge Planning Note    Patient name Terri Coop  Location ICU 04/ICU 04- MRN [id number]  : 1950 Date 2023       Current Admission Date: 2023  Current Admission Diagnosis:Acute respiratory failure with hypoxia [facility])   Patient Active Problem List    Diagnosis Date Noted   • HTN (hypertension) 2023   • Constipation 2023   • Hypothyroidism 2023   • Delirium 2023   • Paroxysmal atrial fibrillation ([address] ) 2023   • Torsades de pointes ([facility] [address] ) 2023   • Anemia 2023   • Cardiac arrest ([facility] [address] ) 2023   • Acute respiratory failure with hypoxia ([facility] [address] ) 2023   • Closed fracture of multiple ribs of both sides 2023   • Aspiration pneumonia ([facility] [address] ) 2023      LOS (days): 6  Geometric Mean LOS (GMLOS) (days): 6 00  Days to GMLOS:-0 5     OBJECTIVE:  Risk of Unplanned Readmission Score: 19 74     Current admission status: Inpatient   Preferred Pharmacy:   [address] [place] [address] [address]  [address] [place] [address] [address]  [facility] [address]  Phone: [phone] Fax: [phone]    Primary Care Provider: Bria Parish DO    Primary Insurance: Shoaib Wiggins [facility] REP  Secondary Insurance:     DISCHARGE DETAILS:  Pt downgraded to medical   Spoke to the pt, wife and dtr at the bedside about STR  Family agreeable to a blanket referral being made in [address] for ARU and STR  Referrals made in [address]  Notified therapy need updated notes

## 2023-05-22 NOTE — PROGRESS NOTES
[address]  Progress Note  Name: Bob Johnson I  MRN: [id number]  Unit/Bed#: ICU 04-01 I Date of Admission: 5/16/2023   Date of Service: 5/22/2023 I Hospital Day: 6    Assessment/Plan   HTN (hypertension)  Assessment & Plan  Blood pressures still somewhat elevated  Increase Coreg to 12 5 mg bid    Paroxysmal atrial fibrillation (HCC)  Assessment & Plan  Continue amiodarone and Eliquis   Will reassess long term need of both medications in the outpatient setting once recovered    Torsades de pointes [facility])  Assessment & Plan  This appeared to be initiated after a pause associated with a PVC  Ventricular ectopy resolved following temp pacer, now with ICD in place      Aspiration pneumonia ([address] )  Assessment & Plan  O2 saturations improving while weaning supplemental O2  Antibiotics per CC    Cardiac arrest [facility])  Assessment & Plan  In retrospect, bradycardia related to torsades was seen while in the hospital  No recurrence s/p pacer/ICD implant  Outpatient Cardiologist: Will be Dr Nilda Hale    Subjective:   Patient seen and examined  No significant events overnight  OOB in chair, feeling well this AM   Offers no cardiac complaints    Summary comments:  Elke Gamino is slightly volume overloaded and still has a positive fluid balance  Will give one time dose of IV lasix and reassess need for further diuretics  Will monitor renal function and electrolytes  Increase Coreg for optimal BP control  Continue amiodarone and Eliquis  Telemetry/ECG/Cardiac testing:   Echo 5/16/2023   EF 65%  Mild TR    Vitals: Blood pressure 141/79, pulse 60, temperature (!) 97 3 °F (36 3 °C), temperature source Tympanic, resp   rate (!) 32, height 6' (1 829 m), weight 105 kg (231 lb 7 7 oz), SpO2 93 % ,   Orthostatic Blood Pressures    Flowsheet Row Most Recent Value   Blood Pressure 141/79 filed at 05/22/2023 0800   Patient Position - Orthostatic VS Sitting filed at 05/22/2023 0600      ,   Weight (last 2 days)     Date/Time Weight    23 0538 105 (231 48)    23 0600 107 (236 33)     Weight: reweighed x2 at 23 0600    23 0600 109 (240 08)          Physical Exam:    General:  Normal appearance in no distress  Eyes:  Anicteric  Oral mucosa:  Moist   Neck:  No JVD  Carotid upstrokes are brisk without bruits  No masses  Chest:  Slightly coarse breath sounds  L SC incision healing well without signs of infection  Cardiac:  No palpable PMI  Normal S1 and S2  No murmur gallop or rub  Abdomen:  Soft and nontender  No palpable organomegaly or aortic enlargement  Extremities:  +1 bilat LE edema  Neuro:  Grossly symmetric  Psych:  Alert and oriented x3        Medications:      Current Facility-Administered Medications:   •  acetaminophen (TYLENOL) tablet 650 mg, 650 mg, Oral, Q6H [facility], CARMEL Camargo, 650 mg at 23 9617  •  albuterol inhalation solution 2 5 mg, 2 5 mg, Nebulization, Q4H PRN, Marlene Márquez MD, 2 5 mg at 23 0053  •  [] amiodarone (CORDARONE) 900 mg in dextrose 5 % 500 mL infusion, 1 mg/min, Intravenous, Continuous, Stopped at 23 1808 **FOLLOWED BY** [] amiodarone (CORDARONE) 900 mg in dextrose 5 % 500 mL infusion, 0 5 mg/min, Intravenous, Continuous, Stopped at 23 1206 **FOLLOWED BY** amiodarone tablet 200 mg, 200 mg, Oral, TID With Meals, 200 mg at 23 0855 **FOLLOWED BY** [START ON 6/3/2023] amiodarone tablet 200 mg, 200 mg, Oral, BID With Meals **FOLLOWED BY** [START ON 2023] amiodarone tablet 200 mg, 200 mg, Oral, Daily With Breakfast, Marlene Márquez MD  •  apixaban Arun Hannah) tablet 5 mg, 5 mg, Oral, BID, Marlene Márquez MD, 5 mg at 23 0761  •  artificial tear (LUBRIFRESH P M ) ophthalmic ointment, , Both Eyes, HS, CARMEL Betts, Given at 23  •  brimonidine tartrate 0 2 % ophthalmic solution 1 drop, 1 drop, Both Eyes, BID, CARMEL Camargo, 1 drop at 23 0910  •  carvedilol (COREG) tablet 12 5 mg, 12 5 mg, Oral, BID With Meals, CARMEL Johnson  •  cefTRIAXone (ROCEPHIN) IVPB (premix in dextrose) 1,000 mg 50 mL, 1,000 mg, Intravenous, Q24H, CARMEL Johnson, Stopped at 05/21/23 1401  •  chlorhexidine (PERIDEX) 0 12 % oral rinse 15 mL, 15 mL, Mouth/Throat, Q12H [address], Gil Fernandes MD, 15 mL at 05/22/23 0859  •  cyanocobalamin (VITAMIN B-12) tablet 250 mcg, 250 mcg, Oral, Daily, CARMEL Johnson, 250 mcg at 05/22/23 6923  •  EPINEPHrine (ADRENALIN) injection, , Intravenous, Code/Trauma/Sedation Med, CARMEL Manning, 1 mg at 05/17/23 2425  •  guaiFENesin (MUCINEX) 12 hr tablet 600 mg, 600 mg, Oral, Q12H [address], CARMEL Johnson, 600 mg at 05/22/23 4186  •  hydrALAZINE (APRESOLINE) injection 5 mg, 5 mg, Intravenous, Q6H PRN, CARMEL Johnson, 5 mg at 05/21/23 2116  •  lactulose oral solution 20 g, 20 g, Oral, BID, Catalina Mehta, CARMEL, 20 g at 05/22/23 0900  •  levalbuterol (XOPENEX) inhalation solution 1 25 mg, 1 25 mg, Nebulization, TID, CARMEL Johnson, 1 25 mg at 05/22/23 9730  •  levothyroxine tablet 50 mcg, 50 mcg, Oral, Early Morning, Gil Fernandes MD, 50 mcg at 05/22/23 2435  •  lidocaine (LIDODERM) 5 % patch 1 patch, 1 patch, Topical, Daily, Gil Fernandes MD, 1 patch at 05/22/23 0082  •  lidocaine (LIDODERM) 5 % patch 1 patch, 1 patch, Topical, Daily, Gil Fernandes MD, 1 patch at 05/22/23 3892  •  melatonin tablet 6 mg, 6 mg, Oral, HS, CARMEL Johnson, 6 mg at 05/21/23 2121  •  metroNIDAZOLE (FLAGYL) IVPB (premix) 500 mg 100 mL, 500 mg, Intravenous, Q8H, CARMEL Johnson, Last Rate: 200 mL/hr at 05/22/23 0500, 500 mg at 05/22/23 0500  •  oxyCODONE (ROXICODONE) IR tablet 5 mg, 5 mg, Oral, Q4H PRN, 5 mg at 05/21/23 0108 **OR** oxyCODONE (ROXICODONE) immediate release tablet 10 mg, 10 mg, Oral, Q4H PRN, CARMEL Rincon Asp, 10 mg at 05/22/23 0001  •  polyethylene glycol (MIRALAX) packet 17 g, 17 g, Oral, Daily, CARMEL Mccracken, 17 g at 05/22/23 0911  •  senna-docusate sodium (SENOKOT S) 8 6-50 mg per tablet 2 tablet, 2 tablet, Oral, HS, Vinnie Aguero MD, 2 tablet at 05/21/23 2121  •  tamsulosin (FLOMAX) capsule 0 4 mg, 0 4 mg, Oral, Daily With Dinner, Paul Peabody, CRNP, 0 4 mg at 05/21/23 1712  •  travoprost (TRAVATAN-Z) 0 004 % ophthalmic solution 1 drop, 1 drop, Both Eyes, HS, Paul Peabody, CRNP, 1 drop at 05/21/23 2133     Labs & Results:    Troponins:    Results from last 7 days   Lab Units 05/16/23  0200 05/15/23  2325 05/15/23  2122   HS TNI 0HR ng/L  --   --  360*   HS TNI 2HR ng/L  --  444*  --    HSTNI D2 ng/L  --  84*  --    HS TNI 4HR ng/L 392*  --   --    HSTNI D4 ng/L 32*  --   --         BNP:     CBC with diff:   Results from last 7 days   Lab Units 05/22/23  0538 05/21/23  0623   WBC Thousand/uL 9 47 8 57   HEMOGLOBIN g/dL 11 6* 10 1*   HEMATOCRIT % 35 0* 31 3*   MCV fL 93 94   PLATELETS Thousands/uL 159 147*     TSH:     CMP:   Results from last 7 days   Lab Units 05/22/23  0538 05/21/23  0623 05/20/23  0613 05/19/23  0513 05/18/23 2053 05/18/23  0439   POTASSIUM mmol/L 3 5 3 6   < > 3 9   < > 4 4   CHLORIDE mmol/L 106 106   < > 108   < > 109*   CO2 mmol/L 30 30   < > 26   < > 24   BUN mg/dL 23 29*   < > 22   < > 12   CREATININE mg/dL 0 91 0 94   < > 0 90   < > 0 77   AST U/L  --   --   --  18  --  26   ALT U/L  --   --   --  37  --  47   EGFR ml/min/1 73sq m 83 80   < > 85   < > 90    < > = values in this interval not displayed       Lipid Profile:   Results from last 7 days   Lab Units 05/19/23  0513   TRIGLYCERIDES mg/dL 178*   HDL mg/dL 35*     Coags:   Results from last 7 days   Lab Units 05/15/23  2122   INR  1 09

## 2023-05-22 NOTE — ASSESSMENT & PLAN NOTE
Continue amiodarone and Eliquis   Will reassess long term need of both medications in the outpatient setting once recovered

## 2023-05-23 PROBLEM — Z86.74 HISTORY OF CARDIAC ARREST: Status: ACTIVE | Noted: 2023-05-16

## 2023-05-23 LAB
ANION GAP SERPL CALCULATED.3IONS-SCNC: 7 MMOL/L (ref 4–13)
BUN SERPL-MCNC: 21 MG/DL (ref 5–25)
CALCIUM SERPL-MCNC: 8.4 MG/DL (ref 8.4–10.2)
CHLORIDE SERPL-SCNC: 108 MMOL/L (ref 96–108)
CO2 SERPL-SCNC: 27 MMOL/L (ref 21–32)
CREAT SERPL-MCNC: 0.8 MG/DL (ref 0.6–1.3)
ERYTHROCYTE [DISTWIDTH] IN BLOOD BY AUTOMATED COUNT: 12.7 % (ref 11.6–15.1)
GFR SERPL CREATININE-BSD FRML MDRD: 89 ML/MIN/1.73SQ M
GLUCOSE SERPL-MCNC: 118 MG/DL (ref 65–140)
HCT VFR BLD AUTO: 31.8 % (ref 36.5–49.3)
HGB BLD-MCNC: 10.3 G/DL (ref 12–17)
MCH RBC QN AUTO: 30.8 PG (ref 26.8–34.3)
MCHC RBC AUTO-ENTMCNC: 32.4 G/DL (ref 31.4–37.4)
MCV RBC AUTO: 95 FL (ref 82–98)
MYCOBACTERIUM SPEC CULT: NORMAL
MYCOBACTERIUM SPEC CULT: NORMAL
PLATELET # BLD AUTO: 158 THOUSANDS/UL (ref 149–390)
PMV BLD AUTO: 10.8 FL (ref 8.9–12.7)
POTASSIUM SERPL-SCNC: 3.5 MMOL/L (ref 3.5–5.3)
RBC # BLD AUTO: 3.34 MILLION/UL (ref 3.88–5.62)
RHODAMINE-AURAMINE STN SPEC: NORMAL
RHODAMINE-AURAMINE STN SPEC: NORMAL
SODIUM SERPL-SCNC: 142 MMOL/L (ref 135–147)
WBC # BLD AUTO: 9.52 THOUSAND/UL (ref 4.31–10.16)

## 2023-05-23 RX ORDER — TRAVOPROST OPHTHALMIC SOLUTION 0.04 MG/ML
1 SOLUTION OPHTHALMIC
Status: DISCONTINUED | OUTPATIENT
Start: 2023-05-23 | End: 2023-05-25 | Stop reason: HOSPADM

## 2023-05-23 RX ORDER — TRAVOPROST OPHTHALMIC SOLUTION 0.04 MG/ML
1 SOLUTION OPHTHALMIC DAILY
Status: DISCONTINUED | OUTPATIENT
Start: 2023-05-24 | End: 2023-05-23

## 2023-05-23 RX ADMIN — LEVALBUTEROL HYDROCHLORIDE 1.25 MG: 1.25 SOLUTION RESPIRATORY (INHALATION) at 07:18

## 2023-05-23 RX ADMIN — SENNOSIDES AND DOCUSATE SODIUM 2 TABLET: 50; 8.6 TABLET ORAL at 21:55

## 2023-05-23 RX ADMIN — TRAVOPROST 1 DROP: 0.04 SOLUTION OPHTHALMIC at 21:56

## 2023-05-23 RX ADMIN — LEVOTHYROXINE SODIUM 50 MCG: 50 TABLET ORAL at 05:53

## 2023-05-23 RX ADMIN — ACETAMINOPHEN 650 MG: 325 TABLET ORAL at 17:30

## 2023-05-23 RX ADMIN — APIXABAN 5 MG: 5 TABLET, FILM COATED ORAL at 08:59

## 2023-05-23 RX ADMIN — CARVEDILOL 12.5 MG: 12.5 TABLET, FILM COATED ORAL at 16:52

## 2023-05-23 RX ADMIN — GUAIFENESIN 600 MG: 600 TABLET ORAL at 08:59

## 2023-05-23 RX ADMIN — ACETAMINOPHEN 650 MG: 325 TABLET ORAL at 05:53

## 2023-05-23 RX ADMIN — BRIMONIDINE TARTRATE 1 DROP: 2 SOLUTION/ DROPS OPHTHALMIC at 20:39

## 2023-05-23 RX ADMIN — BRIMONIDINE TARTRATE 1 DROP: 2 SOLUTION/ DROPS OPHTHALMIC at 09:29

## 2023-05-23 RX ADMIN — GUAIFENESIN 600 MG: 600 TABLET ORAL at 20:39

## 2023-05-23 RX ADMIN — AMIODARONE HYDROCHLORIDE 200 MG: 100 TABLET ORAL at 16:52

## 2023-05-23 RX ADMIN — CHLORHEXIDINE GLUCONATE 0.12% ORAL RINSE 15 ML: 1.2 LIQUID ORAL at 08:58

## 2023-05-23 RX ADMIN — AMIODARONE HYDROCHLORIDE 200 MG: 100 TABLET ORAL at 07:35

## 2023-05-23 RX ADMIN — CHLORHEXIDINE GLUCONATE 0.12% ORAL RINSE 15 ML: 1.2 LIQUID ORAL at 20:39

## 2023-05-23 RX ADMIN — TAMSULOSIN HYDROCHLORIDE 0.4 MG: 0.4 CAPSULE ORAL at 16:52

## 2023-05-23 RX ADMIN — WHITE PETROLATUM 57.7 %-MINERAL OIL 31.9 % EYE OINTMENT: at 21:56

## 2023-05-23 RX ADMIN — ACETAMINOPHEN 650 MG: 325 TABLET ORAL at 12:05

## 2023-05-23 RX ADMIN — QUETIAPINE FUMARATE 25 MG: 25 TABLET ORAL at 21:55

## 2023-05-23 RX ADMIN — APIXABAN 5 MG: 5 TABLET, FILM COATED ORAL at 17:30

## 2023-05-23 RX ADMIN — ACETAMINOPHEN 650 MG: 325 TABLET ORAL at 23:23

## 2023-05-23 RX ADMIN — Medication 6 MG: at 21:55

## 2023-05-23 RX ADMIN — OXYCODONE HYDROCHLORIDE 10 MG: 10 TABLET ORAL at 03:22

## 2023-05-23 RX ADMIN — AMIODARONE HYDROCHLORIDE 200 MG: 100 TABLET ORAL at 12:05

## 2023-05-23 RX ADMIN — OXYCODONE HYDROCHLORIDE 10 MG: 10 TABLET ORAL at 22:51

## 2023-05-23 RX ADMIN — CYANOCOBALAMIN TAB 500 MCG 250 MCG: 500 TAB at 08:59

## 2023-05-23 RX ADMIN — CARVEDILOL 12.5 MG: 12.5 TABLET, FILM COATED ORAL at 07:35

## 2023-05-23 NOTE — ASSESSMENT & PLAN NOTE
· Status post cardiac arrest/pulmonary edema/possible aspiration pneumonia  · He had required up to 12 L of oxygen and is now using 2 L nasal cannula  · S/p IV antibiotics and IV diuresis  · Monitor oxygen requirements and wean as tolerated  · Monitor respiratory status closely  · Vital signs q4h

## 2023-05-23 NOTE — PROGRESS NOTES
[facility] [address]  Progress Note  Name: Terri Solano I  MRN: [id number]  Unit/Bed#: -01 I Date of Admission: 5/16/2023   Date of Service: 5/23/2023 I Hospital Day: 7    Assessment/Plan      * Acute respiratory failure with hypoxia [facility])  Assessment & Plan  · Status post cardiac arrest/pulmonary edema/possible aspiration pneumonia  · He had required up to 12 L of oxygen and is now using 2 L nasal cannula  · S/p IV antibiotics and IV diuresis  · Monitor oxygen requirements and wean as tolerated  · Monitor respiratory status closely  · Vital signs q4h    History of cardiac arrest  Assessment & Plan  · Status postcardiac arrest 5/15/2023  Received bystander CPR  Received 2 epinephrine per EMS  Had 8-second run of V  Tach, but no shocks delivered  · Thought to be due to bradycardia/torsades and patient is status post dual ICD    Paroxysmal atrial fibrillation (HCC)  Assessment & Plan  · ICD adjusted due to oversensing of atrial fibrillation  · Patient loaded with amiodarone and started on apixaban-continue  · Continue to monitor heart rhythm    Hypothyroidism  Assessment & Plan  · Continue levothyroxine    Essential hypertension  Assessment & Plan  · BP reviewed  · Continue carvedilol 12 5 mg twice daily  · Monitor blood pressure    Aspiration pneumonia (HCC)  Assessment & Plan  · Suspected aspiration pneumonia, status post 5 days of ceftriaxone IV and 7 days of Flagyl    Closed fracture of multiple ribs of both sides  Assessment & Plan  · CT chest abdomen pelvis reveals evidence for right 2nd to 6th anterior rib fractures and left second to fifth anterior rib fractures  · Pain control as needed  · Incentive spirometry  · Serial assessments  · Monitor labs and vital signs         VTE Pharmacologic Prophylaxis: VTE Score: 5 High Risk (Score >/= 5) - Pharmacological DVT Prophylaxis Ordered: apixaban (Eliquis)  Sequential Compression Devices Ordered      Patient Centered Rounds: I performed bedside rounds with nursing staff today  Education and Discussions with Family / Patient: Updated  (wife) at bedside  Total Time Spent on Date of Encounter in care of patient: 55 minutes This time was spent on one or more of the following: performing physical exam; counseling and coordination of care; obtaining or reviewing history; documenting in the medical record; reviewing/ordering tests, medications or procedures; communicating with other healthcare professionals and discussing with patient's family/caregivers  Current Length of Stay: 7 day(s)  Current Patient Status: Inpatient   Certification Statement: The patient will continue to require additional inpatient hospital stay due to acute respiratory failure  Discharge Plan: Anticipate discharge in 48 hrs to rehab facility  Code Status: Level 1 - Full Code    Subjective:   Patient seen and examined and has no complaints offered  Objective:     Vitals:   Temp (24hrs), Av 7 °F (36 5 °C), Min:96 9 °F (36 1 °C), Max:98 6 °F (37 °C)    Temp:  [96 9 °F (36 1 °C)-98 6 °F (37 °C)] 98 6 °F (37 °C)  HR:  [57-60] 57  Resp:  [14-40] 14  BP: (122-176)/(59-87) 168/84  SpO2:  [91 %-97 %] 95 %  Body mass index is 31 99 kg/m²  Input and Output Summary (last 24 hours): Intake/Output Summary (Last 24 hours) at 2023 1906  Last data filed at 2023 0854  Gross per 24 hour   Intake 180 ml   Output 400 ml   Net -220 ml       Physical Exam:   Physical Exam  Vitals and nursing note reviewed  Constitutional:       General: He is not in acute distress  HENT:      Head: Normocephalic and atraumatic  Mouth/Throat:      Mouth: Mucous membranes are moist       Pharynx: Oropharynx is clear  Eyes:      Pupils: Pupils are equal, round, and reactive to light  Cardiovascular:      Rate and Rhythm: Normal rate and regular rhythm  Pulses: Normal pulses  Pulmonary:      Effort: Pulmonary effort is normal  No respiratory distress  Breath sounds: Decreased breath sounds present  No wheezing, rhonchi or rales  Comments: Nasal cannula 2 L  Abdominal:      General: Bowel sounds are normal       Palpations: Abdomen is soft  Tenderness: There is no abdominal tenderness  Musculoskeletal:      Cervical back: Neck supple  Right lower leg: No edema  Left lower leg: No edema  Skin:     General: Skin is warm and dry  Capillary Refill: Capillary refill takes less than 2 seconds  Comments: ICD incision well-healing without redness, tenderness, or drainage   Neurological:      General: No focal deficit present  Mental Status: He is alert and oriented to person, place, and time  Additional Data:     Labs:  Results from last 7 days   Lab Units 05/23/23  0552 05/20/23  0613 05/19/23  0513 05/18/23  0439   WBC Thousand/uL 9 52   < > 7 33 7 11   HEMOGLOBIN g/dL 10 3*   < > 9 3* 10 5*   HEMATOCRIT % 31 8*   < > 28 7* 32 2*   PLATELETS Thousands/uL 158   < > 145* 123*   BANDS PCT %  --   --   --  6   NEUTROS PCT %  --   --  88*  --    LYMPHS PCT %  --   --  6*  --    LYMPHO PCT %  --   --   --  5*   MONOS PCT %  --   --  6  --    MONO PCT %  --   --   --  5   EOS PCT %  --   --  0 0    < > = values in this interval not displayed  Results from last 7 days   Lab Units 05/23/23 0552 05/20/23 0613 05/19/23  0513   SODIUM mmol/L 142   < > 140   POTASSIUM mmol/L 3 5   < > 3 9   CHLORIDE mmol/L 108   < > 108   CO2 mmol/L 27   < > 26   BUN mg/dL 21   < > 22   CREATININE mg/dL 0 80   < > 0 90   ANION GAP mmol/L 7   < > 6   CALCIUM mg/dL 8 4   < > 8 8   ALBUMIN g/dL  --   --  3 5   TOTAL BILIRUBIN mg/dL  --   --  0 69   ALK PHOS U/L  --   --  52   ALT U/L  --   --  37   AST U/L  --   --  18   GLUCOSE RANDOM mg/dL 118   < > 169*    < > = values in this interval not displayed           Results from last 7 days   Lab Units 05/21/23  2128 05/21/23  1711 05/21/23  1111 05/21/23  0708 05/20/23  2057 05/20/23  2390 05/20/23  1104 05/20/23  0718 05/19/23  2107 05/19/23  1618 05/19/23  1135 05/19/23  0512   POC GLUCOSE mg/dl 146* 127 106 107 136 136 138 151* 133 161* 158* 166*         Results from last 7 days   Lab Units 05/19/23  0513 05/18/23  0439 05/17/23  0555   PROCALCITONIN ng/ml 1 63* 2 17* 1 90*       Lines/Drains:  Invasive Devices     Peripheral Intravenous Line  Duration           Peripheral IV 05/18/23 Left;Ventral (anterior) Forearm 5 days    Peripheral IV 05/19/23 Right;Upper;Ventral (anterior) Arm 4 days                  Telemetry:  Telemetry Orders (From admission, onward)             24 Hour Telemetry Monitoring  Continuous x 24 Hours (Telem)        Question:  Reason for 24 Hour Telemetry  Answer:  Syncope suspected to be cardiac in origin                          Imaging: Reviewed radiology reports from this admission including: chest xray and chest CT scan    Recent Cultures (last 7 days):   Results from last 7 days   Lab Units 05/17/23 2217   LEGIONELLA URINARY ANTIGEN  Negative       Last 24 Hours Medication List:   Current Facility-Administered Medications   Medication Dose Route Frequency Provider Last Rate   • acetaminophen  650 mg Oral Q6H [facility] Primus JESSA SloanNP     • albuterol  2 5 mg Nebulization Q4H PRN Primus CARMEL Sloan     • amiodarone  200 mg Oral TID With Meals Primus CARMEL Sloan      Followed by   • [START ON 6/3/2023] amiodarone  200 mg Oral BID With Meals Primus CARMEL Sloan      Followed by   • [START ON 6/18/2023] amiodarone  200 mg Oral Daily With Breakfast Primus CARMEL Sloan     • apixaban  5 mg Oral BID Primus JESSA SloanNP     • artificial tear   Both Eyes HS Primus JESSA SloanNP     • brimonidine tartrate  1 drop Both Eyes BID Primus CARMEL Sloan     • carvedilol  12 5 mg Oral BID With Meals Primus CARMEL Sloan     • chlorhexidine  15 mL Mouth/Throat Q12H [facility] Primus JESSA SloanNP     • cyanocobalamin  250 mcg Oral Daily Primus JESSA SloanNP     • EPINEPHrine   Intravenous Code/Trauma/Sedation CARMEL Hargrove     • guaiFENesin  600 mg Oral Q12H [facility] CARMEL Leon     • hydrALAZINE  5 mg Intravenous Q6H PRN CARMEL Leon     • lactulose  20 g Oral BID CARMEL Leon     • levothyroxine  50 mcg Oral Early Morning CARMEL Leon     • lidocaine  1 patch Topical Daily CARMEL Leon     • lidocaine  1 patch Topical Daily CARMEL Leon     • melatonin  6 mg Oral HS CARMEL Leon     • oxyCODONE  5 mg Oral Q4H PRN CARMEL Leon      Or   • oxyCODONE  10 mg Oral Q4H PRN CARMEL Leon     • polyethylene glycol  17 g Oral Daily CARMEL Leon     • QUEtiapine  25 mg Oral HS CARMEL Leon     • senna-docusate sodium  2 tablet Oral HS CARMEL Leon     • tamsulosin  0 4 mg Oral Daily With Dinner CARMEL Leon     • travoprost  1 drop Both Eyes HS CARMEL Barbour          Today, Patient Was Seen By: CARMEL Barbour    **Please Note: This note may have been constructed using a voice recognition system  **

## 2023-05-23 NOTE — PLAN OF CARE
Problem: PHYSICAL THERAPY ADULT  Goal: Performs mobility at highest level of function for planned discharge setting  See evaluation for individualized goals  Description: Treatment/Interventions: Functional transfer training, LE strengthening/ROM, Therapeutic exercise, Endurance training, Patient/family training, Equipment eval/education, Bed mobility, Gait training, Spoke to nursing, Spoke to case management  Equipment Recommended:  (TBD pending progress and ongoing pacemaker precautions/sling in place to L UE)       See flowsheet documentation for full assessment, interventions and recommendations  Outcome: Progressing  Note: Prognosis: Good  Problem List: Decreased strength, Decreased endurance, Impaired balance, Decreased mobility, Decreased coordination, Decreased cognition, Impaired judgement, Decreased safety awareness, Decreased skin integrity  Assessment: Pt seen for PT treatment session this date with interventions consisting of seated TE, transfer training, gait training with chair follow provided, neuromuscular re-education of movement performed to improve dynamic sitting balance and dynamic standing balance, and education provided as needed for safety and direction to improve functional mobility, safety awareness, and activity tolerance  Pt agreeable to PT treatment session upon arrival, pt found sitting OOB in recliner   At end of session, pt left sitting OOB in recliner with chair alarm applied, BLE elevated , and with all needs in reach  In comparison to previous session, pt with improvements in ambulation distance and amount of assistance required for mobility   Continue to recommend STR at time of d/c in order to maximize pt's functional independence and safety w/ mobility  Pt continues to be functioning below baseline level  PT will continue to see pt while here in order to address the deficits listed above and provide interventions consistent w/ POC in effort to achieve STGs    Barriers to Discharge: Inaccessible home environment     PT Discharge Recommendation: Post acute rehabilitation services    See flowsheet documentation for full assessment

## 2023-05-23 NOTE — RESPIRATORY THERAPY NOTE
RT Protocol Note  Santosh Remy 67 y o  male MRN: [id number]  Unit/Bed#: -01 Encounter: [id number]    Assessment    Principal Problem:    Acute respiratory failure with hypoxia [facility])  Active Problems:    Cardiac arrest ([facility] [address] )    Closed fracture of multiple ribs of both sides    Aspiration pneumonia (HCC)    Anemia    Paroxysmal atrial fibrillation (HCC)    Delirium    HTN (hypertension)    Constipation    Hypothyroidism      Home Pulmonary Medications:  none       History reviewed  No pertinent past medical history  Social History     Socioeconomic History   • Marital status: /Civil Union     Spouse name: None   • Number of children: None   • Years of education: None   • Highest education level: None   Occupational History   • None   Tobacco Use   • Smoking status: Never   • Smokeless tobacco: Never   Substance and Sexual Activity   • Alcohol use: Yes     Comment: Rare   • Drug use: Never   • Sexual activity: None   Other Topics Concern   • None   Social History Narrative   • None     Social Determinants of Health     Financial Resource Strain: Not on file   Food Insecurity: No Food Insecurity   • Worried About Running Out of Food in the Last Year: Never true   • Ran Out of Food in the Last Year: Never true   Transportation Needs: No Transportation Needs   • Lack of Transportation (Medical): No   • Lack of Transportation (Non-Medical):  No   Physical Activity: Not on file   Stress: Not on file   Social Connections: Not on file   Intimate Partner Violence: Not on file   Housing Stability: Low Risk    • Unable to Pay for Housing in the Last Year: No   • Number of Places Lived in the Last Year: 1   • Unstable Housing in the Last Year: No       Subjective         Objective    Physical Exam:   Assessment Type: Assess only  General Appearance: Alert, Awake  Respiratory Pattern: Normal  Chest Assessment: Chest expansion symmetrical  Bilateral Breath Sounds: Clear  Cough: Non-productive, Strong    Vitals:  Blood pressure (!) 176/87, pulse 60, temperature 97 9 °F (36 6 °C), temperature source Oral, resp  rate 16, height 6' (1 829 m), weight 107 kg (235 lb 14 3 oz), SpO2 95 %  Results from last 7 days   Lab Units 05/18/23  0439   PH ART  7 391   PCO2 ART mm Hg 39 5   PO2 ART mm Hg 119 8   HCO3 ART mmol/L 23 4   BASE EXC ART mmol/L -1 3   O2 CONTENT ART mL/dL 15 4*   O2 HGB, ARTERIAL % 96 7   ABG SOURCE  Line, Arterial       Imaging and other studies: I have personally reviewed pertinent reports  O2 Device: nc     Plan    Respiratory Plan: No distress/Pulmonary history        Resp Comments: Post treatment evaluation     Patient transferred to Medical floor  Patient has no previous Pulmonary hx  Patient does not smoke  Will change treatments to PRN  Patient to continue with IS    Patient weaned to 2L NC

## 2023-05-23 NOTE — ASSESSMENT & PLAN NOTE
· Status postcardiac arrest 5/15/2023  Received bystander CPR  Received 2 epinephrine per EMS  Had 8-second run of V   Tach, but no shocks delivered  · Thought to be due to bradycardia/torsades and patient is status post dual ICD

## 2023-05-23 NOTE — ASSESSMENT & PLAN NOTE
-Continue to uptitrate medical therapy as patient tolerates  -Continue carvedilol 12 5 mg twice daily  -Would recommend IV furosemide 40 mg today with potassium supplementation to replete to goal potassium 4 0 and would monitor magnesium levels with repletion to goal magnesium 2 0

## 2023-05-23 NOTE — ASSESSMENT & PLAN NOTE
-This appeared to be initiated after a pause associated with a PVC  -Improved now with paced rhythm on telemetry  -Continue medical therapy at this time

## 2023-05-23 NOTE — PHYSICAL THERAPY NOTE
"   PHYSICAL THERAPY TREATMENT NOTE  NAME:  Cass Rene  DATE: 05/23/23    Length Of Stay: 7  Performed at least 2 patient identifiers during session: Name and ID bracelet    TREATMENT FLOWSHEET:    05/23/23 0900   PT Last Visit   PT Visit Date 05/23/23   Note Type   Note Type Treatment   Pain Assessment   Pain Assessment Tool 0-10   Pain Score No Pain   Restrictions/Precautions   Weight Bearing Precautions Per Order No   Other Precautions Chair Alarm; Bed Alarm;O2;Fall Risk  (4LO2)   General   Chart Reviewed Yes   Response to Previous Treatment Patient with no complaints from previous session  Family/Caregiver Present No   Cognition   Overall Cognitive Status Impaired   Arousal/Participation Alert; Cooperative   Attention Attends with cues to redirect   Orientation Level Oriented X4   Memory Decreased recall of precautions;Decreased recall of recent events   Following Commands Follows one step commands without difficulty   Subjective   Subjective \"I gotta get moving, I have grass to mow  \"   Bed Mobility   Additional Comments Pt  found sitting OOB in recliner upon entering room  Transfers   Sit to Stand 4  Minimal assistance   Additional items Assist x 1; Armrests; Increased time required;Verbal cues  (RW)   Stand to Sit 4  Minimal assistance   Additional items Assist x 1; Armrests; Increased time required;Verbal cues   Stand pivot 4  Minimal assistance   Additional items Assist x 1; Armrests; Increased time required;Verbal cues  (RW)   Additional Comments VC's for proper hand placement provided   Ambulation/Elevation   Gait pattern Poor UE support;Decreased foot clearance; Improper Weight shift;Narrow NICOLETTE; Forward Flexion; Short stride; Excessively slow;Decreased heel strike;Decreased toe off;Step through pattern   Gait Assistance 4  Minimal assist   Additional items Assist x 1;Verbal cues   Assistive Device Rolling walker   Distance 80 ft x2   Ambulation/Elevation Additional Comments Pt  ambulated with RW and min Ax1 " with 1 L lateral LOB requiring min Ax1 to recover  Pt  denied any symptoms of dizziness or SOB  Balance   Static Sitting Good   Dynamic Sitting Fair +   Static Standing Fair   Dynamic Standing Fair -   Ambulatory Poor +   Endurance Deficit   Endurance Deficit Yes   Activity Tolerance   Activity Tolerance Patient limited by fatigue   Medical Staff Made Aware RODRIGUEZ aware and present for safety and due to medical complexity   Nurse Made Aware RN aware   Exercises   Quad Sets Sitting;AROM; Bilateral  (x 30 reps)   Heelslides Sitting;AROM; Bilateral  (x 30 reps)   Glute Sets Sitting;AROM; Bilateral  (x 30 reps)   Hip Flexion Sitting;AROM; Bilateral  (x 30 reps)   Hip Abduction Sitting;AROM; Bilateral  (x 30 reps)   Hip Adduction Sitting;AROM; Bilateral  (x 30 reps)   Knee AROM [employer]; Bilateral  (x 30 reps)   Ankle Pumps Sitting;AROM; Bilateral  (x 30 reps)   Marching Sitting;AROM; Bilateral  (x 30 reps)   Neuro re-ed Dynamic sitting balance activities performed while sitting at edge of recliner unsupported including multidirectional weight shifting, reaching, and scooting with close S  Dynamic standing balance activities performed supported by RW and min Ax1 including multidirectional weight shifting, reaching, and B directional changes performed   Assessment   Prognosis Good   Problem List Decreased strength;Decreased endurance; Impaired balance;Decreased mobility; Decreased coordination;Decreased cognition; Impaired judgement;Decreased safety awareness;Decreased skin integrity   Goals   Patient Goals to get better and go home   PT Treatment Day 2   Plan   Treatment/Interventions Functional transfer training;LE strengthening/ROM; Elevations; Therapeutic exercise; Endurance training;Patient/family training;Equipment eval/education; Bed mobility;Gait training; Compensatory technique education;Spoke to nursing;OT;Spoke to advanced practitioner;Spoke to case management   Progress Progressing toward goals   PT Frequency 4-6x/wk   Recommendation   PT Discharge Recommendation Post acute rehabilitation services   AM-PAC Basic Mobility Inpatient   Turning in Flat Bed Without Bedrails 3   Lying on Back to Sitting on Edge of Flat Bed Without Bedrails 2   Moving Bed to Chair 3   Standing Up From Chair Using Arms 3   Walk in Room 3   Climb 3-5 Stairs With Railing 1   Basic Mobility Inpatient Raw Score 15   Basic Mobility Standardized Score 36 97   Turning Head Towards Sound 4   Follow Simple Instructions 4   Low Function Basic Mobility Raw Score  23   Low Function Basic Mobility Standardized Score  37 22   Highest Level Of Mobility   -HLM Goal 4: Move to chair/commode   JH-HLM Achieved 7: Walk 25 feet or more       The patient's AM-PAC Basic Mobility Inpatient Short Form Raw Score is 15  A raw score less than 16 suggests the patient may benefit from discharge to post-acute rehabilitation services  Please also refer to the recommendation of the Physical Therapist for safe discharge planning  Pt seen for PT treatment session this date with interventions consisting of seated TE, transfer training, gait training with chair follow provided, neuromuscular re-education of movement performed to improve dynamic sitting balance and dynamic standing balance, and education provided as needed for safety and direction to improve functional mobility, safety awareness, and activity tolerance  Pt agreeable to PT treatment session upon arrival, pt found sitting OOB in recliner   At end of session, pt left sitting OOB in recliner with chair alarm applied, BLE elevated , and with all needs in reach  In comparison to previous session, pt with improvements in ambulation distance and amount of assistance required for mobility   Continue to recommend STR at time of d/c in order to maximize pt's functional independence and safety w/ mobility  Pt continues to be functioning below baseline level   PT will continue to see pt while here in order to address the deficits listed above and provide interventions consistent w/ POC in effort to achieve STGs      Sam Welch [place], [place]

## 2023-05-23 NOTE — NURSING NOTE
Noted to have periods of disorientation  this AM,needs frequent reorientation  Attempted to get OOB multiple times; Reminded to use callbell but pt is non compliant due to mental status  Maintained on fall precaution for safety

## 2023-05-23 NOTE — PLAN OF CARE
Problem: OCCUPATIONAL THERAPY ADULT  Goal: Performs self-care activities at highest level of function for planned discharge setting  See evaluation for individualized goals  Description: Treatment Interventions: ADL retraining, Functional transfer training, UE strengthening/ROM, Endurance training, Cognitive reorientation, Patient/family training, Equipment evaluation/education, Compensatory technique education, Energy conservation, Activityengagement    See flowsheet documentation for full assessment, interventions and recommendations  Outcome: Progressing  Note: Limitation: Decreased ADL status, Decreased UE ROM, Decreased UE strength, Decreased Safe judgement during ADL, Decreased cognition, Decreased endurance, Decreased self-care trans, Decreased high-level ADLs  Prognosis: Good  Assessment: Patient participated in Skilled OT session this date with interventions consisting of functional transfer training, ADL re training with the use of correct body mechnaics, Energy Conservation techniques, therapeutic exercise to: increase functional use of BUEs, increase BUE muscle strength ,  therapeutic activities to: increase activity tolerance and increase dynamic sit/ stand balance during functional activity    Patient agreeable to OT treatment session, upon arrival patient was found seated OOB to Recliner, alert, responsive  and in no apparent distress  Patient simulates ability to complete UB self care with set up and S  Patient then transfers sit to stand and ambulates 80 feet x 2 with Min A x 1 and chair follow for safety  Patient had one minor LOB during mobility in which he required SBA to correct  Patient then performs UB TE as detailed in flow sheet  Session ended with patient seated OOB to chair, all needs met, and call bell within reach  In comparison to previous session, patient with improvements in self care ADL performance, UB strength, endurance, and functional transfers/mobility   Patient requiring verbal cues for safety, verbal cues for correct technique, verbal cues for pacing thru activity steps and frequent rest periods  Patient performance  demonstrated good carryover of learned techniques and strategies to facilitate safety during functional tasks  Patient continues to be functioning below baseline level, occupational performance remains limited secondary to factors listed above and increased risk for falls and injury  From OT standpoint, recommendation at time of d/c would be Short Term Rehab  Patient to benefit from continued Occupational Therapy treatment while in the hospital to address deficits as defined above and maximize level of functional independence with ADLs and functional mobility in order to return to OF       OT Discharge Recommendation: Post acute rehabilitation services

## 2023-05-23 NOTE — OCCUPATIONAL THERAPY NOTE
05/23/23 1010   OT Last Visit   OT Visit Date 05/23/23   Note Type   Note Type Treatment for insurance authorization   Pain Assessment   Pain Assessment Tool 0-10   Pain Score No Pain   Restrictions/Precautions   Weight Bearing Precautions Per Order No   Other Precautions Chair Alarm; Bed Alarm;O2;Fall Risk  (4L O2)   ADL   UB Bathing Assistance 5  Supervision/Setup   UB Bathing Deficit Setup;Verbal cueing;Supervision/safety; Increased time to complete   UB Dressing Assistance 5  Supervision/Setup   UB Dressing Deficit Setup;Verbal cueing;Supervision/safety; Increased time to complete   Transfers   Sit to Stand 4  Minimal assistance   Additional items Assist x 1; Armrests; Increased time required;Verbal cues   Stand to Sit 4  Minimal assistance   Additional items Assist x 1; Armrests; Increased time required;Verbal cues   Stand pivot 4  Minimal assistance   Additional items Assist x 1; Increased time required;Verbal cues   Additional Comments VCs for hand placement  Functional Mobility   Functional Mobility 4  Minimal assistance   Additional Comments Pt ambulates 80 feet x 2 with Min A x 1 and chair follow for safety; 1 minor LOB during mobility  Additional items Rolling walker   Therapeutic Excerise-Strength   UE Strength Yes   Right Upper Extremity- Strength   R Shoulder Flexion; Extension;Horizontal ABduction  (horiz  add, scapular pro/ret)   R Elbow Elbow flexion;Elbow extension   R Wrist Wrist flexion;Wrist extension  (wrist rotation)   R Position Seated   Equipment Dumbbell   R Weight/Reps/Sets 2# weight x 30 reps (15 reps x 2)   Left Upper Extremity-Strength   L Weights/Reps/Sets TE same as R UE above   Cognition   Overall Cognitive Status Impaired   Arousal/Participation Alert; Responsive; Cooperative   Attention Attends with cues to redirect   Orientation Level Oriented X4   Memory Decreased recall of recent events;Decreased recall of precautions   Following Commands Follows one step commands with increased time or repetition   Comments PT agreeable to OT treatment,   Activity Tolerance   Activity Tolerance Patient tolerated treatment well   Assessment   Assessment Patient participated in Skilled OT session this date with interventions consisting of functional transfer training, ADL re training with the use of correct body mechnaics, Energy Conservation techniques, therapeutic exercise to: increase functional use of BUEs, increase BUE muscle strength ,  therapeutic activities to: increase activity tolerance and increase dynamic sit/ stand balance during functional activity    Patient agreeable to OT treatment session, upon arrival patient was found seated OOB to Recliner, alert, responsive  and in no apparent distress  Patient simulates ability to complete UB self care with set up and S  Patient then transfers sit to stand and ambulates 80 feet x 2 with Min A x 1 and chair follow for safety  Patient had one minor LOB during mobility in which he required SBA to correct  Patient then performs UB TE as detailed in flow sheet  Session ended with patient seated OOB to chair, all needs met, and call bell within reach  In comparison to previous session, patient with improvements in self care ADL performance, UB strength, endurance, and functional transfers/mobility  Patient requiring verbal cues for safety, verbal cues for correct technique, verbal cues for pacing thru activity steps and frequent rest periods  Patient performance  demonstrated good carryover of learned techniques and strategies to facilitate safety during functional tasks  Patient continues to be functioning below baseline level, occupational performance remains limited secondary to factors listed above and increased risk for falls and injury  From OT standpoint, recommendation at time of d/c would be Short Term Rehab    Patient to benefit from continued Occupational Therapy treatment while in the hospital to address deficits as defined above and maximize level of functional independence with ADLs and functional mobility in order to return to PLOF  Plan   Treatment Interventions Functional transfer training;UE strengthening/ROM; Energy conservation   Goal Expiration Date 05/30/23   OT Treatment Day 1   OT Frequency 3-5x/wk   Recommendation   OT Discharge Recommendation Post acute rehabilitation services   Additional Comments 2 The patient's raw score on the AM-PAC Daily Activity Inpatient Short Form is 15  A raw score of less than 19 suggests the patient may benefit from discharge to post-acute rehabilitation services  Please refer to the recommendation of the Occupational Therapist for safe discharge planning     AM-PAC Daily Activity Inpatient   Lower Body Dressing 2   Bathing 2   Toileting 2   Upper Body Dressing 3   Grooming 3   Eating 3   Daily Activity Raw Score 15   Daily Activity Standardized Score (Calc for Raw Score >=11) 34 69   AM-PAC Applied Cognition Inpatient   Following a Speech/Presentation 3   Understanding Ordinary Conversation 3   Taking Medications 1   Remembering Where Things Are Placed or Put Away 1   Remembering List of 4-5 Errands 1   Taking Care of Complicated Tasks 1   Applied Cognition Raw Score 10   Applied Cognition Standardized Score 24 98       Marie Diggs

## 2023-05-23 NOTE — CASE MANAGEMENT
Case Management Discharge Planning Note    Patient name Jake Khanna  Location [address] 213/-27 MRN [id number]  : 1950 Date 2023       Current Admission Date: 2023  Current Admission Diagnosis:Acute respiratory failure with hypoxia [facility])   Patient Active Problem List    Diagnosis Date Noted   • HTN (hypertension) 2023   • Constipation 2023   • Hypothyroidism 2023   • Delirium 2023   • Paroxysmal atrial fibrillation ([address] ) 2023   • Torsades de pointes ([address] ) 2023   • Anemia 2023   • Cardiac arrest ([address] ) 2023   • Acute respiratory failure with hypoxia ([facility] ) 2023   • Closed fracture of multiple ribs of both sides 2023   • Aspiration pneumonia ([facility] ) 2023      LOS (days): 7  Geometric Mean LOS (GMLOS) (days): 6 00  Days to GMLOS:-1 5     OBJECTIVE:  Risk of Unplanned Readmission Score: 19 62     Current admission status: Inpatient   Preferred Pharmacy:   [address] [address]  [address] [place] [address] [address]  [place] [address]  Phone: [phone] Fax: [phone]    Primary Care Provider: Kyara Jones DO    Primary Insurance: Nelda Small W [facility] REP  Secondary Insurance:     DISCHARGE DETAILS:  Provided the choice list to the family  Family chose [facility]  TC to [place], spoke to Romero Kaiser  As per admissions they do not have a male bed now  [facility] states they will follow up in the am to see if a bed will be available  Will need information for the insurance authorization  Will reserve in [address] when notified if the bed is available

## 2023-05-23 NOTE — ASSESSMENT & PLAN NOTE
· CT chest abdomen pelvis reveals evidence for right 2nd to 6th anterior rib fractures and left second to fifth anterior rib fractures  · Pain control as needed  · Incentive spirometry  · Serial assessments  · Monitor labs and vital signs

## 2023-05-23 NOTE — PROGRESS NOTES
[address]  Progress Note  Name: Santosh Remy I  MRN: [id number]  Unit/Bed#: -01 I Date of Admission: 5/16/2023   Date of Service: 5/23/2023 I Hospital Day: 7    Assessment/Plan   HTN (hypertension)  Assessment & Plan  -Continue to uptitrate medical therapy as patient tolerates  -Continue carvedilol 12 5 mg twice daily  -Would recommend IV furosemide 40 mg today with potassium supplementation to replete to goal potassium 4 0 and would monitor magnesium levels with repletion to goal magnesium 2 0        Paroxysmal atrial fibrillation (HCC)  Assessment & Plan  -Continue amiodarone and Eliquis  -Continue to monitor patient clinically at this time      Torsades de pointes [facility])  Assessment & Plan  -This appeared to be initiated after a pause associated with a PVC  -Improved now with paced rhythm on telemetry  -Continue medical therapy at this time  Aspiration pneumonia ([facility] )  Assessment & Plan  -Continue to wean nasal cannula oxygen as tolerated  -Plan of care per primary team        Cardiac arrest [facility])  Assessment & Plan  -Thought related to bradycardia related torsades  -Now s/p pacer/ICD implant  -Continues to be atrially paced on telemetry      * Acute respiratory failure with hypoxia ([facility] [address] [facility] )  Assessment & Plan  - Continues to require nasal cannula oxygen  -Would recommend IV furosemide 40 mg today and repletion of potassium to goal 4 0 to assist with down titrating oxygen therapy  Outpatient Cardiologist: Dr Sangeetha Gomez      Subjective:   Patient seen and examined  Per patient denies any chest pain apart from some mild with deep inspiration due to rib fractures  He denies any loss of consciousness, shortness of breath, abdominal pain, nausea or vomiting states overall he is feeling better      Summary comments:  -Patient does appear somewhat volume overloaded on physical exam with mild JVD and bilateral lower extremity edema would give additional IV furosemide 40 mg today with potassium supplementation   -Continue current medical therapy with carvedilol, amiodarone and Eliquis   -Continue to monitor patient clinically at this time  Telemetry/ECG/Cardiac testing:   Atrially paced rhythm on telemetry    Vitals: Blood pressure 122/59, pulse 60, temperature 97 7 °F (36 5 °C), resp  rate 18, height 6' (1 829 m), weight 107 kg (235 lb 14 3 oz), SpO2 96 % ,   Orthostatic Blood Pressures    Flowsheet Row Most Recent Value   Blood Pressure 122/59 filed at 05/23/2023 0913   Patient Position - Orthostatic VS Sitting filed at 05/22/2023 1942      ,   Weight (last 2 days)     Date/Time Weight    05/23/23 0557 107 (235 89)    05/22/23 0538 105 (231 48)    05/21/23 0600 107 (236 33)     Weight: reweighed x2 at 05/21/23 0600          Physical Exam:  Physical Exam  Vitals reviewed  Constitutional:       General: He is not in acute distress  Appearance: He is not diaphoretic  HENT:      Head: Normocephalic and atraumatic  Comments: Nasal cannula oxygen in place  Eyes:      General:         Right eye: No discharge  Left eye: No discharge  Neck:      Comments: Trachea midline, mild JVD present  Cardiovascular:      Rate and Rhythm: Normal rate and regular rhythm  Heart sounds: No friction rub  Pulmonary:      Effort: Pulmonary effort is normal  No respiratory distress  Comments: Decreased breath sounds bilaterally  Chest:      Chest wall: Tenderness (slight ) present  Abdominal:      General: Bowel sounds are normal       Palpations: Abdomen is soft  Tenderness: There is no abdominal tenderness  There is no rebound  Musculoskeletal:      Right lower leg: Edema (1+) present  Left lower leg: Edema (1+) present  Skin:     General: Skin is warm and dry  Neurological:      Mental Status: He is alert  Comments: Awake, alert, oriented x3, able to move extremities bilaterally, able to answer questions appropriately     Psychiatric: Mood and Affect: Mood normal          Behavior: Behavior normal        Medications:      Current Facility-Administered Medications:   •  acetaminophen (TYLENOL) tablet 650 mg, 650 mg, Oral, Q6H [facility], Stephie Drain, CRNP, 650 mg at 23 4296  •  albuterol inhalation solution 2 5 mg, 2 5 mg, Nebulization, Q4H PRN, Stephie Drain, CRNP, 2 5 mg at 23 0053  •  [] amiodarone (CORDARONE) 900 mg in dextrose 5 % 500 mL infusion, 1 mg/min, Intravenous, Continuous, Stopped at 23 1808 **FOLLOWED BY** [] amiodarone (CORDARONE) 900 mg in dextrose 5 % 500 mL infusion, 0 5 mg/min, Intravenous, Continuous, Stopped at 23 1206 **FOLLOWED BY** amiodarone tablet 200 mg, 200 mg, Oral, TID With Meals, 200 mg at 23 0735 **FOLLOWED BY** [START ON 6/3/2023] amiodarone tablet 200 mg, 200 mg, Oral, BID With Meals **FOLLOWED BY** [START ON 2023] amiodarone tablet 200 mg, 200 mg, Oral, Daily With Breakfast, Stephie Drain, CRNP  •  apixaban (ELIQUIS) tablet 5 mg, 5 mg, Oral, BID, Stephie Drain, CRNP, 5 mg at 23 8919  •  artificial tear (LUBRIFRESH P M ) ophthalmic ointment, , Both Eyes, HS, Stephie Drain, CRNP, 1 application   at 23 2140  •  brimonidine tartrate 0 2 % ophthalmic solution 1 drop, 1 drop, Both Eyes, BID, Stephie Drain, CRNP, 1 drop at 23 2094  •  carvedilol (COREG) tablet 12 5 mg, 12 5 mg, Oral, BID With Meals, Stephie Drain, CRNP, 12 5 mg at 23 0735  •  chlorhexidine (PERIDEX) 0 12 % oral rinse 15 mL, 15 mL, Mouth/Throat, Q12H [facility], Stephie Drain, CRNP, 15 mL at 23 7312  •  cyanocobalamin (VITAMIN B-12) tablet 250 mcg, 250 mcg, Oral, Daily, Stephie Drain, CRNP, 250 mcg at 23 0814  •  EPINEPHrine (ADRENALIN) injection, , Intravenous, Code/Trauma/Sedation MedKatie, CARMEL, 1 mg at 23 0742  •  guaiFENesin (MUCINEX) 12 hr tablet 600 mg, 600 mg, Oral, Q12H [facility], Stephie Everett, CRNP, 600 mg at 23 0859  •  hydrALAZINE (APRESOLINE) injection 5 mg, 5 mg, Intravenous, Q6H PRN, Anahikameron Saini, CRNP, 5 mg at 05/21/23 2116  •  lactulose oral solution 20 g, 20 g, Oral, BID, Anahi Saini, CRNP, 20 g at 05/22/23 0900  •  levalbuterol (XOPENEX) inhalation solution 1 25 mg, 1 25 mg, Nebulization, TID, Anahi Saini, CRNP, 1 25 mg at 05/23/23 1984  •  levothyroxine tablet 50 mcg, 50 mcg, Oral, Early Morning, Anahi Saini, CRNP, 50 mcg at 05/23/23 0553  •  lidocaine (LIDODERM) 5 % patch 1 patch, 1 patch, Topical, Daily, Anahi Saini, CRNP, 1 patch at 05/22/23 1984  •  lidocaine (LIDODERM) 5 % patch 1 patch, 1 patch, Topical, Daily, Anahi Saini, CRNP, 1 patch at 05/22/23 7714  •  melatonin tablet 6 mg, 6 mg, Oral, HS, Anahikameron Saini, CRNP, 6 mg at 05/22/23 2145  •  oxyCODONE (ROXICODONE) IR tablet 5 mg, 5 mg, Oral, Q4H PRN, 5 mg at 05/21/23 0108 **OR** oxyCODONE (ROXICODONE) immediate release tablet 10 mg, 10 mg, Oral, Q4H PRN, Anahikameron Saini, CRNP, 10 mg at 05/23/23 5504  •  polyethylene glycol (MIRALAX) packet 17 g, 17 g, Oral, Daily, Anahikameron Saini, CRNP, 17 g at 05/22/23 8802  •  QUEtiapine (SEROquel) tablet 25 mg, 25 mg, Oral, HS, Anahikameron Saini, CRNP, 25 mg at 05/22/23 2146  •  senna-docusate sodium (SENOKOT S) 8 6-50 mg per tablet 2 tablet, 2 tablet, Oral, HS, Anahikameron Saini, CRNP, 2 tablet at 05/21/23 2121  •  tamsulosin (FLOMAX) capsule 0 4 mg, 0 4 mg, Oral, Daily With Dinner, Anahi Saini, CRNP, 0 4 mg at 05/22/23 1702  •  travoprost (TRAVATAN-Z) 0 004 % ophthalmic solution 1 drop, 1 drop, Both Eyes, HS, Anahi Saini, CARMEL, 1 drop at 05/22/23 2140     Labs & Results:    Troponins:         BNP:     CBC with diff:   Results from last 7 days   Lab Units 05/23/23  0552 05/22/23  0538   WBC Thousand/uL 9 52 9 47   HEMOGLOBIN g/dL 10 3* 11 6*   HEMATOCRIT % 31 8* 35 0*   MCV fL 95 93   PLATELETS Thousands/uL 158 159     TSH:     CMP:   Results from last 7 days   Lab Units 05/23/23  0552 05/22/23  0538 05/20/23  0613 05/19/23  0513 05/18/23 2053 05/18/23  0439   POTASSIUM mmol/L 3 5 3 5   < > 3 9   < > 4 4   CHLORIDE mmol/L 108 106   < > 108   < > 109*   CO2 mmol/L 27 30   < > 26   < > 24   BUN mg/dL 21 23   < > 22   < > 12   CREATININE mg/dL 0 80 0 91   < > 0 90   < > 0 77   AST U/L  --   --   --  18  --  26   ALT U/L  --   --   --  37  --  47   EGFR ml/min/1 73sq m 89 83   < > 85   < > 90    < > = values in this interval not displayed       Lipid Profile:   Results from last 7 days   Lab Units 05/19/23  0513   TRIGLYCERIDES mg/dL 178*   HDL mg/dL 35*     Coags:

## 2023-05-23 NOTE — PLAN OF CARE
Problem: INFECTION - ADULT  Goal: Absence or prevention of progression during hospitalization  Description: INTERVENTIONS:  - Assess and monitor for signs and symptoms of infection  - Monitor lab/diagnostic results  - Monitor all insertion sites, i e  indwelling lines, tubes, and drains  - Monitor endotracheal if appropriate and nasal secretions for changes in amount and color  - [place] appropriate cooling/warming therapies per order  - Administer medications as ordered  - Instruct and encourage patient and family to use good hand hygiene technique  - Identify and instruct in appropriate isolation precautions for identified infection/condition  Outcome: Progressing     Problem: RESPIRATORY - ADULT  Goal: Achieves optimal ventilation and oxygenation  Description: INTERVENTIONS:  - Assess for changes in respiratory status  - Assess for changes in mentation and behavior  - Position to facilitate oxygenation and minimize respiratory effort  - Oxygen administered by appropriate delivery if ordered  - Initiate smoking cessation education as indicated  - Encourage broncho-pulmonary hygiene including cough, deep breathe, Incentive Spirometry  - Assess the need for suctioning and aspirate as needed  - Assess and instruct to report SOB or any respiratory difficulty  - Respiratory Therapy support as indicated  Outcome: Progressing     Problem: GENITOURINARY - ADULT  Goal: Maintains or returns to baseline urinary function  Description: INTERVENTIONS:  - Assess urinary function  - Encourage oral fluids to ensure adequate hydration if ordered  - Administer IV fluids as ordered to ensure adequate hydration  - Administer ordered medications as needed  - Offer frequent toileting  - Follow urinary retention protocol if ordered  Outcome: Progressing     Problem: GENITOURINARY - ADULT  Goal: Absence of urinary retention  Description: INTERVENTIONS:  - Assess patient’s ability to void and empty bladder  - Monitor I/O  - Bladder scan as needed  - Discuss with physician/AP medications to alleviate retention as needed  - Discuss catheterization for long term situations as appropriate  Outcome: Progressing

## 2023-05-23 NOTE — ASSESSMENT & PLAN NOTE
· ICD adjusted due to oversensing of atrial fibrillation  · Patient loaded with amiodarone and started on apixaban-continue  · Continue to monitor heart rhythm

## 2023-05-23 NOTE — ASSESSMENT & PLAN NOTE
- Continues to require nasal cannula oxygen  -Would recommend IV furosemide 40 mg today and repletion of potassium to goal 4 0 to assist with down titrating oxygen therapy

## 2023-05-23 NOTE — ASSESSMENT & PLAN NOTE
-Thought related to bradycardia related torsades  -Now s/p pacer/ICD implant  -Continues to be atrially paced on telemetry

## 2023-05-23 NOTE — CASE MANAGEMENT
Case Management Discharge Planning Note    Patient name Luigi Buckley  Location [address] 213/-88 MRN [id number]  : 1950 Date 2023       Current Admission Date: 2023  Current Admission Diagnosis:Acute respiratory failure with hypoxia [facility])   Patient Active Problem List    Diagnosis Date Noted   • HTN (hypertension) 2023   • Constipation 2023   • Hypothyroidism 2023   • Delirium 2023   • Paroxysmal atrial fibrillation ([address] ) 2023   • Torsades de pointes ([address] ) 2023   • Anemia 2023   • Cardiac arrest ([address] ) 2023   • Acute respiratory failure with hypoxia ([facility] ) 2023   • Closed fracture of multiple ribs of both sides 2023   • Aspiration pneumonia ([facility] ) 2023      LOS (days): 7  Geometric Mean LOS (GMLOS) (days): 6 00  Days to GMLOS:-1 5     OBJECTIVE:  Risk of Unplanned Readmission Score: 19 62     Current admission status: Inpatient   Preferred Pharmacy:   [address] [place] [address] [address]  [address] [place] [address] [address]  [place] [address]  Phone: [phone] Fax: [phone]    Primary Care Provider: Joe Palm DO    Primary Insurance: Maude Servin [facility]  Secondary Insurance:     DISCHARGE DETAILS:    Discharge planning discussed with[de-identified] Spoke to the pt, daughter and wife at the bedside  Freedom of Choice: Yes  Comments - Freedom of Choice: Pt does not want to go to a STR  Would like [address] services  Family in agreement with send a blanket referral to [address] for [address]  CM contacted family/caregiver?: Yes  Were Treatment Team discharge recommendations reviewed with patient/caregiver?: Yes  Did patient/caregiver verbalize understanding of patient care needs?: Yes  Were patient/caregiver advised of the risks associated with not following Treatment Team discharge recommendations?: Yes    Contacts  Patient Contacts: Terry Carvajal  Relationship to Patient[de-identified] Family  Contact Method:  In Person  Reason/Outcome: Discharge [address]         Is the patient interested in [address] at discharge?: Yes  [address] requested[de-identified] Nursing, Occupational Therapy, Physical [address] Name[de-identified] Other  [address] Provider[de-identified] PCP  Home Health Services Needed[de-identified] Strengthening/Theraputic Exercises to Improve Function, Oxygen Via Nasal Cannula, Gait/ADL Training, Evaluate Functional Status and Safety  Oxygen LPM Ordered (if applicable based on home health services needed):: 2 LPM  Homebound Criteria Met[de-identified] Uses an Assist Device (i e  cane, walker, etc), Requires the Assistance of Another Person for Safe Ambulation or to Leave the Home  Supporting Clincal Findings[de-identified] Requires Oxygen, Dyspnea with Exertion, Fatigues Easliy in [employer], Limited Endurance    DME Referral Provided  Referral made for DME?: No    Would you like to participate in our [address] service program?  : No - Declined    Treatment Team Recommendation: Home with [facility]  Discharge Destination Plan[de-identified] Home with [address] has now declined STR, would like [facility]  A refrerral was made in [address] for same  Will need a desat study prior to DC if still needing oxygen

## 2023-05-24 ENCOUNTER — HOME HEALTH ADMISSION (OUTPATIENT)
Dept: HOME HEALTH SERVICES | Facility: HOME HEALTHCARE | Age: 73
End: 2023-05-24
Payer: COMMERCIAL

## 2023-05-24 RX ORDER — GUAIFENESIN 600 MG/1
600 TABLET, EXTENDED RELEASE ORAL EVERY 12 HOURS SCHEDULED
Qty: 60 TABLET | Refills: 0 | Status: SHIPPED | OUTPATIENT
Start: 2023-05-24 | End: 2023-06-23

## 2023-05-24 RX ORDER — AMIODARONE HYDROCHLORIDE 200 MG/1
200 TABLET ORAL 2 TIMES DAILY WITH MEALS
Qty: 28 TABLET | Refills: 0 | Status: SHIPPED | OUTPATIENT
Start: 2023-06-03 | End: 2023-06-17

## 2023-05-24 RX ORDER — ALBUTEROL SULFATE 2.5 MG/3ML
2.5 SOLUTION RESPIRATORY (INHALATION) EVERY 4 HOURS PRN
Qty: 75 ML | Refills: 0 | Status: SHIPPED | OUTPATIENT
Start: 2023-05-24 | End: 2023-06-23

## 2023-05-24 RX ORDER — CARVEDILOL 12.5 MG/1
12.5 TABLET ORAL 2 TIMES DAILY WITH MEALS
Qty: 60 TABLET | Refills: 0 | Status: SHIPPED | OUTPATIENT
Start: 2023-05-24 | End: 2023-06-01 | Stop reason: SDUPTHER

## 2023-05-24 RX ORDER — FUROSEMIDE 10 MG/ML
20 INJECTION INTRAMUSCULAR; INTRAVENOUS ONCE
Status: COMPLETED | OUTPATIENT
Start: 2023-05-24 | End: 2023-05-24

## 2023-05-24 RX ORDER — QUETIAPINE FUMARATE 25 MG/1
25 TABLET, FILM COATED ORAL
Qty: 30 TABLET | Refills: 0 | Status: SHIPPED | OUTPATIENT
Start: 2023-05-24 | End: 2023-05-24

## 2023-05-24 RX ORDER — AMIODARONE HYDROCHLORIDE 200 MG/1
200 TABLET ORAL
Qty: 30 TABLET | Refills: 0 | Status: SHIPPED | OUTPATIENT
Start: 2023-05-24 | End: 2023-06-03

## 2023-05-24 RX ORDER — LEVOTHYROXINE SODIUM 0.05 MG/1
50 TABLET ORAL
Qty: 30 TABLET | Refills: 0 | Status: SHIPPED | OUTPATIENT
Start: 2023-05-25

## 2023-05-24 RX ORDER — AMOXICILLIN 250 MG
2 CAPSULE ORAL
Qty: 60 TABLET | Refills: 0 | Status: SHIPPED | OUTPATIENT
Start: 2023-05-24 | End: 2023-06-23

## 2023-05-24 RX ORDER — POTASSIUM CHLORIDE 20 MEQ/1
20 TABLET, EXTENDED RELEASE ORAL ONCE
Status: COMPLETED | OUTPATIENT
Start: 2023-05-24 | End: 2023-05-24

## 2023-05-24 RX ORDER — OXYCODONE HYDROCHLORIDE 5 MG/1
5 TABLET ORAL EVERY 4 HOURS PRN
Qty: 20 TABLET | Refills: 0 | Status: SHIPPED | OUTPATIENT
Start: 2023-05-24 | End: 2023-06-03

## 2023-05-24 RX ORDER — TAMSULOSIN HYDROCHLORIDE 0.4 MG/1
0.4 CAPSULE ORAL
Qty: 30 CAPSULE | Refills: 0 | Status: SHIPPED | OUTPATIENT
Start: 2023-05-24 | End: 2023-06-23

## 2023-05-24 RX ORDER — AMIODARONE HYDROCHLORIDE 200 MG/1
200 TABLET ORAL
Qty: 14 TABLET | Refills: 0 | Status: SHIPPED | OUTPATIENT
Start: 2023-06-18 | End: 2023-07-02

## 2023-05-24 RX ADMIN — GUAIFENESIN 600 MG: 600 TABLET ORAL at 09:38

## 2023-05-24 RX ADMIN — Medication 6 MG: at 21:54

## 2023-05-24 RX ADMIN — AMIODARONE HYDROCHLORIDE 200 MG: 100 TABLET ORAL at 17:24

## 2023-05-24 RX ADMIN — CARVEDILOL 12.5 MG: 12.5 TABLET, FILM COATED ORAL at 07:43

## 2023-05-24 RX ADMIN — AMIODARONE HYDROCHLORIDE 200 MG: 100 TABLET ORAL at 12:42

## 2023-05-24 RX ADMIN — CHLORHEXIDINE GLUCONATE 0.12% ORAL RINSE 15 ML: 1.2 LIQUID ORAL at 20:36

## 2023-05-24 RX ADMIN — POLYETHYLENE GLYCOL 3350 17 G: 17 POWDER, FOR SOLUTION ORAL at 09:38

## 2023-05-24 RX ADMIN — ACETAMINOPHEN 650 MG: 325 TABLET ORAL at 23:05

## 2023-05-24 RX ADMIN — BRIMONIDINE TARTRATE 1 DROP: 2 SOLUTION/ DROPS OPHTHALMIC at 20:36

## 2023-05-24 RX ADMIN — TAMSULOSIN HYDROCHLORIDE 0.4 MG: 0.4 CAPSULE ORAL at 17:23

## 2023-05-24 RX ADMIN — ACETAMINOPHEN 650 MG: 325 TABLET ORAL at 12:42

## 2023-05-24 RX ADMIN — QUETIAPINE FUMARATE 25 MG: 25 TABLET ORAL at 21:55

## 2023-05-24 RX ADMIN — AMIODARONE HYDROCHLORIDE 200 MG: 100 TABLET ORAL at 07:43

## 2023-05-24 RX ADMIN — BRIMONIDINE TARTRATE 1 DROP: 2 SOLUTION/ DROPS OPHTHALMIC at 09:38

## 2023-05-24 RX ADMIN — POTASSIUM CHLORIDE 20 MEQ: 1500 TABLET, EXTENDED RELEASE ORAL at 10:49

## 2023-05-24 RX ADMIN — LEVOTHYROXINE SODIUM 50 MCG: 50 TABLET ORAL at 05:02

## 2023-05-24 RX ADMIN — SENNOSIDES AND DOCUSATE SODIUM 2 TABLET: 50; 8.6 TABLET ORAL at 21:54

## 2023-05-24 RX ADMIN — TRAVOPROST 1 DROP: 0.04 SOLUTION OPHTHALMIC at 21:55

## 2023-05-24 RX ADMIN — CYANOCOBALAMIN TAB 500 MCG 250 MCG: 500 TAB at 09:37

## 2023-05-24 RX ADMIN — CHLORHEXIDINE GLUCONATE 0.12% ORAL RINSE 15 ML: 1.2 LIQUID ORAL at 09:39

## 2023-05-24 RX ADMIN — LACTULOSE 20 G: 20 SOLUTION ORAL at 09:38

## 2023-05-24 RX ADMIN — APIXABAN 5 MG: 5 TABLET, FILM COATED ORAL at 09:38

## 2023-05-24 RX ADMIN — ACETAMINOPHEN 650 MG: 325 TABLET ORAL at 17:24

## 2023-05-24 RX ADMIN — ACETAMINOPHEN 650 MG: 325 TABLET ORAL at 05:02

## 2023-05-24 RX ADMIN — OXYCODONE HYDROCHLORIDE 10 MG: 10 TABLET ORAL at 05:02

## 2023-05-24 RX ADMIN — ALBUTEROL SULFATE 2.5 MG: 2.5 SOLUTION RESPIRATORY (INHALATION) at 13:44

## 2023-05-24 RX ADMIN — GUAIFENESIN 600 MG: 600 TABLET ORAL at 20:36

## 2023-05-24 RX ADMIN — OXYCODONE HYDROCHLORIDE 5 MG: 5 TABLET ORAL at 19:31

## 2023-05-24 RX ADMIN — WHITE PETROLATUM 57.7 %-MINERAL OIL 31.9 % EYE OINTMENT: at 21:55

## 2023-05-24 RX ADMIN — APIXABAN 5 MG: 5 TABLET, FILM COATED ORAL at 17:24

## 2023-05-24 RX ADMIN — CARVEDILOL 12.5 MG: 12.5 TABLET, FILM COATED ORAL at 17:23

## 2023-05-24 RX ADMIN — FUROSEMIDE 20 MG: 10 INJECTION, SOLUTION INTRAMUSCULAR; INTRAVENOUS at 10:47

## 2023-05-24 NOTE — OCCUPATIONAL THERAPY NOTE
05/24/23 1154   OT Last Visit   OT Visit Date 05/24/23   Note Type   Note Type Treatment   Pain Assessment   Pain Assessment Tool 0-10   Pain Score 1   Pain Location/Orientation Orientation: Right;Location: Rib Cage   Patient's Stated Pain Goal No pain   Hospital Pain Intervention(s) Medication (See MAR); Repositioned; Ambulation/increased activity; Emotional support   Restrictions/Precautions   Weight Bearing Precautions Per Order No   Other Precautions Chair Alarm; Bed Alarm;O2;Fall Risk   Transfers   Sit to Stand 5  Supervision   Additional items Assist x 1; Armrests; Increased time required;Verbal cues   Stand to Sit 5  Supervision   Additional items Assist x 1; Armrests; Increased time required;Verbal cues   Stand pivot   (CGA)   Additional items Assist x 1; Increased time required;Verbal cues   Functional Mobility   Functional Mobility   (CGA)   Additional Comments Patient ambulates 80 feet x 2 with CGA x 1  Additional items Rolling walker   Therapeutic Excerise-Strength   UE Strength Yes   Right Upper Extremity- Strength   R Shoulder Flexion; Extension;Horizontal ABduction  (horiz  add, scapular pro/ret)   R Elbow Elbow flexion;Elbow extension   R Wrist   (wrist rotation)   R Weight/Reps/Sets 2# weight x 30 reps   Left Upper Extremity-Strength   L Shoulder Horizontal ABduction  (horiz  add, scap pro/ret)   L Elbow Elbow flexion;Elbow extension   L Wrist   (wrist rotation)   L Weights/Reps/Sets 2# x 30 reps   Cognition   Overall Cognitive Status Impaired   Arousal/Participation Alert; Responsive; Cooperative   Attention Attends with cues to redirect   Orientation Level Oriented X4   Memory Decreased recall of recent events;Decreased recall of precautions   Following Commands Follows one step commands with increased time or repetition   Comments PT agreeable to OT treatment     Activity Tolerance   Activity Tolerance Patient tolerated treatment well   Assessment   Assessment Patient participated in Skilled OT session this date with interventions consisting of functional transfer training, Energy Conservation techniques and therapeutic exercise to: increase functional use of BUEs, increase BUE muscle strength    Patient agreeable to OT treatment session, upon arrival patient was found seated OOB to Recliner, alert, responsive  and in no apparent distress  Patient tolerates UB TE using 2# weight as detailed in flow sheet  Patient then transfers sit to stand with S x 1 and ambulates 80 feet x 2 with CGA x1  Session ended with patient seated OOB to recliner, all needs met, and call bell within reach  In comparison to previous session, patient with improvements in UB strength, endurance, and functional transfers  Patient requiring verbal cues for safety, verbal cues for correct technique, verbal cues for pacing thru activity steps and frequent rest periods  Patient performance  demonstrated good carryover of learned techniques and strategies to facilitate safety during functional tasks  Patient continues to be functioning below baseline level, occupational performance remains limited secondary to factors listed above and increased risk for falls and injury  From OT standpoint, recommendation at time of d/c would be Short Term Rehab  Patient to benefit from continued Occupational Therapy treatment while in the hospital to address deficits as defined above and maximize level of functional independence with ADLs and functional mobility in order to return to [facility]  Plan   Treatment Interventions Functional transfer training;UE strengthening/ROM; Energy conservation   Goal Expiration Date 05/30/23   OT Treatment Day 2   OT Frequency 3-5x/wk   Recommendation   OT Discharge Recommendation Post acute rehabilitation services   Additional Comments 2 The patient's raw score on the AM-PAC Daily Activity Inpatient Short Form is 18   A raw score of less than 19 suggests the patient may benefit from discharge to post-acute rehabilitation services  Please refer to the recommendation of the Occupational Therapist for safe discharge planning     AM-PAC Daily Activity Inpatient   Lower Body Dressing 2   Bathing 2   Toileting 2   Upper Body Dressing 4   Grooming 4   Eating 4   Daily Activity Raw Score 18   Daily Activity Standardized Score (Calc for Raw Score >=11) 38 66   AM-PAC Applied Cognition Inpatient   Following a Speech/Presentation 3   Understanding Ordinary Conversation 3   Taking Medications 1   Remembering Where Things Are Placed or Put Away 1   Remembering List of 4-5 Errands 1   Taking Care of Complicated Tasks 1   Applied Cognition Raw Score 10   Applied Cognition Standardized Score 24 98       Jairo Diggs

## 2023-05-24 NOTE — PROGRESS NOTES
[address]  Progress Note  Name: Rodrigo Edouard  MRN: [id number]  Unit/Bed#: -01 I Date of Admission: 5/16/2023   Date of Service: 5/24/2023 I Hospital Day: 8    Assessment/Plan      * Acute respiratory failure with hypoxia [facility])  Assessment & Plan  · Status post cardiac arrest/pulmonary edema/possible aspiration pneumonia  · He was intubated and after extubation required up to 12 L of oxygen  He is currently saturating well on room air  · S/p IV antibiotics and IV diuresis  · Monitor oxygen requirements and wean as tolerated  · Monitor respiratory status closely      History of cardiac arrest  Assessment & Plan  · Status postcardiac arrest 5/15/2023  Received bystander CPR  Received 2 epinephrine per EMS  Had 8-second run of V  Tach, but no shocks delivered  · Thought to be due to bradycardia/torsades and patient is status post dual ICD    Paroxysmal atrial fibrillation (HCC)  Assessment & Plan  · ICD adjusted due to oversensing of atrial fibrillation  · Patient loaded with amiodarone and started on apixaban-continue  · Continue to monitor heart rhythm    Hypothyroidism  Assessment & Plan  · Continue levothyroxine    Essential hypertension  Assessment & Plan  · BP reviewed  · Continue carvedilol 12 5 mg twice daily  · Monitor blood pressure    Aspiration pneumonia (HCC)  Assessment & Plan  · Suspected aspiration pneumonia, status post 5 days of ceftriaxone IV and 7 days of Flagyl    Closed fracture of multiple ribs of both sides  Assessment & Plan  · CT chest abdomen pelvis reveals evidence for right 2nd to 6th anterior rib fractures and left second to fifth anterior rib fractures  · Pain control as needed  · Incentive spirometry  · Serial assessments  · Monitor labs and vital signs         VTE Pharmacologic Prophylaxis: VTE Score: 5 High Risk (Score >/= 5) - Pharmacological DVT Prophylaxis Ordered: apixaban (Eliquis)  Sequential Compression Devices Ordered      Patient Centered Rounds: I performed bedside rounds with nursing staff today  Discussions with Specialists or Other Care Team Provider: Cardiology    Education and Discussions with Family / Patient: Updated  (wife and daughter) at bedside  Total Time Spent on Date of Encounter in care of patient: 65 minutes This time was spent on one or more of the following: performing physical exam; counseling and coordination of care; obtaining or reviewing history; documenting in the medical record; reviewing/ordering tests, medications or procedures; communicating with other healthcare professionals and discussing with patient's family/caregivers  Current Length of Stay: 8 day(s)  Current Patient Status: Inpatient   Certification Statement: The patient will continue to require additional inpatient hospital stay due to IV diuresis x 1 per cardiology  Discharge Plan: Anticipate discharge tomorrow to home with home services  Code Status: Level 1 - Full Code    Subjective:   Patient seen and examined  He says he feels great  He has been walking around his room with a walker  He is asking to take a shower  Denies chest pain, dizziness, or shortness of breath  Objective:     Vitals:   Temp (24hrs), Av 6 °F (37 °C), Min:97 9 °F (36 6 °C), Max:99 1 °F (37 3 °C)    Temp:  [97 9 °F (36 6 °C)-99 1 °F (37 3 °C)] 98 6 °F (37 °C)  HR:  [58-61] 60  Resp:  [18-20] 18  BP: (130-172)/(68-88) 171/88  SpO2:  [91 %-98 %] 96 %  Body mass index is 32 59 kg/m²  Input and Output Summary (last 24 hours):   No intake or output data in the 24 hours ending 23 7145    Physical Exam:   Physical Exam  Vitals and nursing note reviewed  HENT:      Head: Normocephalic and atraumatic  Mouth/Throat:      Pharynx: Oropharynx is clear  Eyes:      Pupils: Pupils are equal, round, and reactive to light  Cardiovascular:      Rate and Rhythm: Normal rate     Pulmonary:      Effort: Pulmonary effort is normal  No respiratory distress  Breath sounds: Normal breath sounds  No wheezing, rhonchi or rales  Abdominal:      General: Bowel sounds are normal       Palpations: Abdomen is soft  Tenderness: There is no abdominal tenderness  Musculoskeletal:      Cervical back: Neck supple  Right lower leg: Edema present  Left lower leg: Edema present  Skin:     General: Skin is warm and dry  Capillary Refill: Capillary refill takes less than 2 seconds  Neurological:      General: No focal deficit present  Mental Status: He is alert and oriented to person, place, and time  Additional Data:     Labs:  Results from last 7 days   Lab Units 05/23/23  0552 05/20/23  0613 05/19/23 0513 05/18/23  0439   BANDS PCT %  --   --   --  6   EOS PCT %  --   --  0 0   HEMATOCRIT % 31 8*   < > 28 7* 32 2*   HEMOGLOBIN g/dL 10 3*   < > 9 3* 10 5*   LYMPHS PCT %  --   --  6*  --    LYMPHO PCT %  --   --   --  5*   MONOS PCT %  --   --  6  --    MONO PCT %  --   --   --  5   NEUTROS PCT %  --   --  88*  --    PLATELETS Thousands/uL 158   < > 145* 123*   WBC Thousand/uL 9 52   < > 7 33 7 11    < > = values in this interval not displayed  Results from last 7 days   Lab Units 05/23/23 0552 05/20/23 0613 05/19/23  0513   ANION GAP mmol/L 7   < > 6   ALBUMIN g/dL  --   --  3 5   ALK PHOS U/L  --   --  52   ALT U/L  --   --  37   AST U/L  --   --  18   BUN mg/dL 21   < > 22   CALCIUM mg/dL 8 4   < > 8 8   CHLORIDE mmol/L 108   < > 108   CO2 mmol/L 27   < > 26   CREATININE mg/dL 0 80   < > 0 90   GLUCOSE RANDOM mg/dL 118   < > 169*   POTASSIUM mmol/L 3 5   < > 3 9   SODIUM mmol/L 142   < > 140   TOTAL BILIRUBIN mg/dL  --   --  0 69    < > = values in this interval not displayed           Results from last 7 days   Lab Units 05/21/23  2128 05/21/23  1711 05/21/23  1111 05/21/23  0708 05/20/23  2057 05/20/23  1646 05/20/23  1104 05/20/23  0718 05/19/23  2107 05/19/23  1618 05/19/23  1135 05/19/23  0512   POC GLUCOSE mg/dl 146* 127 106 107 136 136 138 151* 133 161* 158* 166*         Results from last 7 days   Lab Units 05/19/23  0513 05/18/23  0439   PROCALCITONIN ng/ml 1 63* 2 17*       Lines/Drains:  Invasive Devices     Peripheral Intravenous Line  Duration           Peripheral IV 05/24/23 Distal;Left;Ventral (anterior) Forearm <1 day                Recent Cultures (last 7 days):   Results from last 7 days   Lab Units 05/17/23  2217   LEGIONELLA URINARY ANTIGEN  Negative       Last 24 Hours Medication List:   Current Facility-Administered Medications   Medication Dose Route Frequency Provider Last Rate   • acetaminophen  650 mg Oral Q6H [facility] Rebmargaritaa Shannon, CRNP     • albuterol  2 5 mg Nebulization Q4H PRN Rebmargaritaa JESSA OrtegaNP     • amiodarone  200 mg Oral TID With Meals CARMEL Guadarrama      Followed by   • [START ON 6/3/2023] amiodarone  200 mg Oral BID With Meals CARMEL Guadarrama      Followed by   • [START ON 6/18/2023] amiodarone  200 mg Oral Daily With Breakfast Christena JESSA OrtegaNP     • apixaban  5 mg Oral BID Rebmargaritaa Shannon, CRNP     • artificial tear   Both Eyes HS Rebmargaritaa Shannon, CRNP     • brimonidine tartrate  1 drop Both Eyes BID Christena Shannon CRNP     • carvedilol  12 5 mg Oral BID With Meals Christena JESSA OrtegaNP     • chlorhexidine  15 mL Mouth/Throat Q12H [facility] Christena Shannon, CRNP     • cyanocobalamin  250 mcg Oral Daily Rebmargaritaa Shannon CRNP     • EPINEPHrine   Intravenous Code/Trauma/Sedation Med CARMEL Christensen     • guaiFENesin  600 mg Oral Q12H [facility] Rebmargaritaa Shannon, CRNP     • hydrALAZINE  5 mg Intravenous Q6H PRN Rebbeca Shannon, CRNP     • lactulose  20 g Oral BID Rebmargaritaa Shannon, CRNP     • levothyroxine  50 mcg Oral Early Morning Rebbeca Shannon, CRNP     • lidocaine  1 patch Topical Daily Rebbeca Knock, CRNP     • lidocaine  1 patch Topical Daily Rebbeca Knock, CRNP     • melatonin  6 mg Oral HS Rebmargaritaa Shannon, CRNP     • oxyCODONE  5 mg Oral Q4H PRN Rebbeca Shannon, CARMEL      Or   • oxyCODONE  10 mg Oral Q4H PRN CARMEL Guadarrama     • polyethylene glycol  17 g Oral Daily CARMEL Mckeon     • QUEtiapine  25 mg Oral HS CARMEL Mckeon     • senna-docusate sodium  2 tablet Oral HS CARMEL Mckeon     • tamsulosin  0 4 mg Oral Daily With CARMEL Lees     • travoprost  1 drop Both Eyes HS CARMEL Maya          Today, Patient Was Seen By: CARMEL Maya    **Please Note: This note may have been constructed using a voice recognition system  **

## 2023-05-24 NOTE — PROGRESS NOTES
[address]  Progress Note  Name: Olga Burroughs  MRN: [id number]  Unit/Bed#: -01 I Date of Admission: 5/16/2023   Date of Service: 5/24/2023 I Hospital Day: 8    Assessment/Plan   Essential hypertension  Assessment & Plan  -Continue carvedilol 12 5 mg twice daily  Monitor with additional IV diuretics      Paroxysmal atrial fibrillation (HCC)  Assessment & Plan  -Continue amiodarone and Eliquis        Torsades de pointes ([facility] )  Assessment & Plan  -This appeared to be initiated after a pause associated with a PVC  -Improved now with paced rhythm on telemetry      [facility]  Assessment & Plan  -Continue to wean nasal cannula oxygen as tolerated  -Plan of care per primary team        History of cardiac arrest  Assessment & Plan  -Thought related to bradycardia related torsades  -Now s/p pacer/ICD implant        * Acute respiratory failure with hypoxia ([facility] )  Assessment & Plan  - Continues to require nasal cannula oxygen  Lasix recommended yesterday, but not ordered  Will order lasix today and reassess  Attempt to wean supplemental O2             Outpatient Cardiologist: will establish with Dr John Cleary:   Patient seen and examined  No significant events overnight  Mental status improved; offers no complaints    Summary comments:  Luz Rinne is doing well  He is still requiring supplemental oxygen and has a positive fluid balance  IV Lasix was recommended yesterday, however was not ordered  Will order IV Lasix /supplemental Ktoday  Attempt to wean supplemental oxygen  Suspect that he will be stable for DC to rehab in the next day or two  Telemetry/ECG/Cardiac testing:   Echo 5/16/2023   EF 65%  Mild TR      Vitals: Blood pressure (!) 172/81, pulse 58, temperature 98 6 °F (37 °C), temperature source Oral, resp   rate 18, height 6' (1 829 m), weight 109 kg (240 lb 4 8 oz), SpO2 93 % ,   Orthostatic Blood Pressures    Flowsheet Row Most Recent Value   Blood Pressure 172/81 filed at 2023 4171   Patient Position - Orthostatic VS Lying filed at 2023 0742      ,   Weight (last 2 days)     Date/Time Weight    23 0614 109 (240 3)    23 0557 107 (235 89)    23 0538 105 (231 48)          Physical Exam:    General:  Normal appearance in no distress  Eyes:  Anicteric  Oral mucosa:  Moist   Neck:  No JVD  Carotid upstrokes are brisk without bruits  No masses  Chest:  Clear to auscultation, L SC incision is healing well without signs of infection  Cardiac:  No palpable PMI  Normal S1 and S2  No murmur gallop or rub  Abdomen:  Soft and nontender  No palpable organomegaly or aortic enlargement  Extremities:  +1 bilat LE edema  Neuro:  Grossly symmetric  Psych:  Alert and oriented x3        Medications:      Current Facility-Administered Medications:   •  acetaminophen (TYLENOL) tablet 650 mg, 650 mg, Oral, Q6H [facility], CARMEL Camargo, 650 mg at 23 0502  •  albuterol inhalation solution 2 5 mg, 2 5 mg, Nebulization, Q4H PRN, CARMEL Camargo, 2 5 mg at 23 0053  •  [] amiodarone (CORDARONE) 900 mg in dextrose 5 % 500 mL infusion, 1 mg/min, Intravenous, Continuous, Stopped at 23 1808 **FOLLOWED BY** [] amiodarone (CORDARONE) 900 mg in dextrose 5 % 500 mL infusion, 0 5 mg/min, Intravenous, Continuous, Stopped at 23 1206 **FOLLOWED BY** amiodarone tablet 200 mg, 200 mg, Oral, TID With Meals, 200 mg at 23 0743 **FOLLOWED BY** [START ON 6/3/2023] amiodarone tablet 200 mg, 200 mg, Oral, BID With Meals **FOLLOWED BY** [START ON 2023] amiodarone tablet 200 mg, 200 mg, Oral, Daily With Breakfast, CARMEL Camargo  •  apixaban (ELIQUIS) tablet 5 mg, 5 mg, Oral, BID, CARMEL Camargo, 5 mg at 23 7514  •  artificial tear (LUBRIFRESH P M ) ophthalmic ointment, , Both Eyes, HS, CARMEL Camargo, Given at 23 6058  •  brimonidine tartrate 0 2 % ophthalmic solution 1 drop, 1 drop, Both Eyes, BID, CARMEL Gregorio, 1 drop at 05/24/23 3865  •  carvedilol (COREG) tablet 12 5 mg, 12 5 mg, Oral, BID With Meals, CARMEL Gregorio, 12 5 mg at 05/24/23 0743  •  chlorhexidine (PERIDEX) 0 12 % oral rinse 15 mL, 15 mL, Mouth/Throat, Q12H [facility], CARMEL Gregorio, 15 mL at 05/24/23 77  •  cyanocobalamin (VITAMIN B-12) tablet 250 mcg, 250 mcg, Oral, Daily, CARMEL Gregorio, 250 mcg at 05/24/23 1699  •  EPINEPHrine (ADRENALIN) injection, , Intravenous, Code/Trauma/Sedation Katie Hanna CRNP, 1 mg at 05/17/23 5299  •  guaiFENesin (MUCINEX) 12 hr tablet 600 mg, 600 mg, Oral, Q12H [facility], CARMEL Gregorio, 600 mg at 05/24/23 3679  •  hydrALAZINE (APRESOLINE) injection 5 mg, 5 mg, Intravenous, Q6H PRN, CARMEL Gregorio, 5 mg at 05/21/23 2116  •  lactulose oral solution 20 g, 20 g, Oral, BID, CARMEL Gregorio, 20 g at 05/24/23 8676  •  levothyroxine tablet 50 mcg, 50 mcg, Oral, Early Morning, CARMEL Gregorio, 50 mcg at 05/24/23 0502  •  lidocaine (LIDODERM) 5 % patch 1 patch, 1 patch, Topical, Daily, CARMEL Gregorio, 1 patch at 05/22/23 1532  •  lidocaine (LIDODERM) 5 % patch 1 patch, 1 patch, Topical, Daily, CARMEL Gregorio, 1 patch at 05/22/23 9241  •  melatonin tablet 6 mg, 6 mg, Oral, HS, CARMEL Gregorio, 6 mg at 05/23/23 2155  •  oxyCODONE (ROXICODONE) IR tablet 5 mg, 5 mg, Oral, Q4H PRN, 5 mg at 05/21/23 0108 **OR** oxyCODONE (ROXICODONE) immediate release tablet 10 mg, 10 mg, Oral, Q4H PRN, CARMEL Gregorio, 10 mg at 05/24/23 0502  •  polyethylene glycol (MIRALAX) packet 17 g, 17 g, Oral, Daily, CARMEL Gregorio, 17 g at 05/24/23 3699  •  QUEtiapine (SEROquel) tablet 25 mg, 25 mg, Oral, HS, CARMEL Gregorio, 25 mg at 05/23/23 2155  •  senna-docusate sodium (SENOKOT S) 8 6-50 mg per tablet 2 tablet, 2 tablet, Oral, HS, CARMEL Gregorio, 2 tablet at 05/23/23 2155  •  tamsulosin (FLOMAX) capsule 0 4 mg, 0 4 mg, Oral, Daily With Dinner, CARMEL Gregorio, 0 4 mg at 05/23/23 1652  •  travoprost (TRAVATAN-Z) 0 004 % ophthalmic solution 1 drop, 1 drop, Both Eyes, HS, Ozell Eis, CRNP, 1 drop at 05/23/23 2156     Labs & Results:    Troponins:         BNP:     CBC with diff:   Results from last 7 days   Lab Units 05/23/23  0552 05/22/23  0538   HEMATOCRIT % 31 8* 35 0*   HEMOGLOBIN g/dL 10 3* 11 6*   MCV fL 95 93   PLATELETS Thousands/uL 158 159   WBC Thousand/uL 9 52 9 47     TSH:     CMP:   Results from last 7 days   Lab Units 05/23/23  0552 05/22/23  0538 05/20/23  0613 05/19/23  0513 05/18/23 2053 05/18/23  0439   ALT U/L  --   --   --  37  --  47   AST U/L  --   --   --  18  --  26   BUN mg/dL 21 23   < > 22   < > 12   CHLORIDE mmol/L 108 106   < > 108   < > 109*   CO2 mmol/L 27 30   < > 26   < > 24   CREATININE mg/dL 0 80 0 91   < > 0 90   < > 0 77   EGFR ml/min/1 73sq m 89 83   < > 85   < > 90   POTASSIUM mmol/L 3 5 3 5   < > 3 9   < > 4 4    < > = values in this interval not displayed       Lipid Profile:   Results from last 7 days   Lab Units 05/19/23  0513   HDL mg/dL 35*   TRIGLYCERIDES mg/dL 178*     Coags:

## 2023-05-24 NOTE — CASE MANAGEMENT
Case Management Discharge Planning Note    Patient name Simran King  Location [address] [id number] MRN [id number]  : 1950 Date 2023       Current Admission Date: 2023  Current Admission Diagnosis:Acute respiratory failure with hypoxia [facility])   Patient Active Problem List    Diagnosis Date Noted   • Essential hypertension 2023   • Constipation 2023   • Hypothyroidism 2023   • Delirium 2023   • Paroxysmal atrial fibrillation ([address] ) 2023   • Torsades de pointes ([facility] ) 2023   • Anemia 2023   • History of cardiac arrest 2023   • Acute respiratory failure with hypoxia ([facility] ) 2023   • Closed fracture of multiple ribs of both sides 2023   • Aspiration pneumonia ([facility] ) 2023      LOS (days): 8  Geometric Mean LOS (GMLOS) (days): 6 00  Days to GMLOS:-2 4     OBJECTIVE:  Risk of Unplanned Readmission Score: 18 04         Current admission status: Inpatient   Preferred Pharmacy:   [facility] [address] [place] [address] [address]  [place] [address]  Phone: [phone] Fax: [phone]    Primary Care Provider: James Chen DO    Primary Insurance: Fredi Garcia [facility]  Secondary Insurance:     DISCHARGE DETAILS:       Freedom of Choice: Yes                   Contacts  Patient Contacts: Magnolia Xiao  Relationship to Patient[de-identified] Family  Contact Method:  In Person  Reason/Outcome: Discharge  [employer] requested[de-identified] Nursing, Occupational Therapy, Physical [address] Name[de-identified] [facility] Provider[de-identified] PCP  Home Health Services Needed[de-identified] Strengthening/Theraputic Exercises to Improve Function, Evaluate Functional Status and Safety, Gait/ADL Training  Homebound Criteria Met[de-identified] Uses an Assist Device (i e  cane, walker, etc), Requires the Assistance of Another Person for Safe Ambulation or to Leave the Home  Supporting Clincal Findings[de-identified] Limited Endurance, Fatigues Easliy in [employer]    DME Referral Provided  Referral made for DME?: No        Treatment Team Recommendation: Home with [facility]  Discharge Destination Plan[de-identified] Home with Drea at Discharge : Family (Spouse)                 Additional Comments: CM showed pt and spouse Gloria Pizarro list  They chose [facility]  They were reserved via [employer]  Pt did not qualify for home oxygen as per repsiratory  Pt for likely discharge home today per Nikki MOSQUEDA  Pt and spouse in agreement  Spouse will transport

## 2023-05-24 NOTE — ASSESSMENT & PLAN NOTE
- Continues to require nasal cannula oxygen  Lasix recommended yesterday, but not ordered  Will order lasix today and reassess    Attempt to wean supplemental O2

## 2023-05-24 NOTE — PLAN OF CARE
Problem: OCCUPATIONAL THERAPY ADULT  Goal: Performs self-care activities at highest level of function for planned discharge setting  See evaluation for individualized goals  Description: Treatment Interventions: ADL retraining, Functional transfer training, UE strengthening/ROM, Endurance training, Cognitive reorientation, Patient/family training, Equipment evaluation/education, Compensatory technique education, Energy conservation, Activityengagement    See flowsheet documentation for full assessment, interventions and recommendations  Outcome: Progressing  Note: Limitation: Decreased ADL status, Decreased UE ROM, Decreased UE strength, Decreased Safe judgement during ADL, Decreased cognition, Decreased endurance, Decreased self-care trans, Decreased high-level ADLs  Prognosis: Good  Assessment: Patient participated in Skilled OT session this date with interventions consisting of functional transfer training, Energy Conservation techniques and therapeutic exercise to: increase functional use of BUEs, increase BUE muscle strength    Patient agreeable to OT treatment session, upon arrival patient was found seated OOB to Recliner, alert, responsive  and in no apparent distress  Patient tolerates UB TE using 2# weight as detailed in flow sheet  Patient then transfers sit to stand with S x 1 and ambulates 80 feet x 2 with CGA x1  Session ended with patient seated OOB to recliner, all needs met, and call bell within reach  In comparison to previous session, patient with improvements in UB strength, endurance, and functional transfers  Patient requiring verbal cues for safety, verbal cues for correct technique, verbal cues for pacing thru activity steps and frequent rest periods  Patient performance  demonstrated good carryover of learned techniques and strategies to facilitate safety during functional tasks   Patient continues to be functioning below baseline level, occupational performance remains limited secondary to factors listed above and increased risk for falls and injury  From OT standpoint, recommendation at time of d/c would be Short Term Rehab  Patient to benefit from continued Occupational Therapy treatment while in the hospital to address deficits as defined above and maximize level of functional independence with ADLs and functional mobility in order to return to PLOF       OT Discharge Recommendation: Post acute rehabilitation services

## 2023-05-24 NOTE — ASSESSMENT & PLAN NOTE
-This appeared to be initiated after a pause associated with a PVC      -Improved now with paced rhythm on telemetry

## 2023-05-24 NOTE — ASSESSMENT & PLAN NOTE
· Status post cardiac arrest/pulmonary edema/possible aspiration pneumonia  · He was intubated and after extubation required up to 12 L of oxygen   He is currently saturating well on room air  · S/p IV antibiotics and IV diuresis  · Monitor oxygen requirements and wean as tolerated  · Monitor respiratory status closely

## 2023-05-24 NOTE — DISCHARGE SUMMARY
Gemma 45  Discharge- Tammy Seeds 1950, 67 y o  male MRN: [id number]  Unit/Bed#: -01 Encounter: [id number]  Primary Care Provider: Eron Soler DO   Date and time admitted to hospital: 5/16/2023  3:04 AM    * Acute respiratory failure with hypoxia [facility])  Assessment & Plan  · Status post cardiac arrest/pulmonary edema/possible aspiration pneumonia  · He was intubated and after extubation required up to 12 L of oxygen  · S/p IV antibiotics and IV diuresis   · He is currently saturating well on room air      History of cardiac arrest  Assessment & Plan  · Status postcardiac arrest 5/15/2023  Received bystander CPR  Received 2 epinephrine per EMS  Had 8-second run of V   Tach, but no shocks delivered  · Thought to be due to bradycardia/torsades and patient is status post dual ICD    Paroxysmal atrial fibrillation (HCC)  Assessment & Plan  · ICD adjusted due to oversensing of atrial fibrillation  · Patient loaded with amiodarone and started on apixaban-continue  · Continue to monitor heart rhythm    Hypothyroidism  Assessment & Plan  · Continue levothyroxine    Essential hypertension  Assessment & Plan  · BP reviewed  · Continue carvedilol 12 5 mg twice daily  · Monitor blood pressure    Aspiration pneumonia (HCC)  Assessment & Plan  · Suspected aspiration pneumonia, status post 5 days of ceftriaxone IV and 7 days of Flagyl    Closed fracture of multiple ribs of both sides  Assessment & Plan  · CT chest abdomen pelvis reveals evidence for right 2nd to 6th anterior rib fractures and left second to fifth anterior rib fractures  · Pain control as needed  · Incentive spirometry      Medical Problems     Resolved Problems  Date Reviewed: 5/25/2023   None       Discharging Physician / Practitioner: CARMEL Shelby  PCP: Eron Soler DO  Admission Date:   Admission Orders (From admission, onward)     Ordered        05/16/23 0304  Inpatient Admission  Once Discharge Date: 05/25/23    Consultations During Hospital Stay:  · Cardiology    Procedures Performed:   · Transvenous pacing 5/17/2023  · Dual ICD placement with fluoroscopy 5/18/2023    Significant Findings / Test Results:   · Chest x-ray 5/16/2023 ET tube 4 5 cm above the ruben  Bilateral aspiration better seen on CT  · Chest x-ray 5/16/2023: Mild pulmonary vascular congestion  · CT head 5/16/2023: No acute intercranial CT abnormality  No significant interval change  · CT chest abdomen pelvis without contrast 5/16/2023: Significant worsening of bilateral lower lobe aspiration/pneumonia  NG tube with tip and sideport in the stomach noting that Cytopoint is near the GE junction  · Chest x-ray 5/17/2023: Moderate bibasilar opacity which could be due to atelectasis and/or pneumonia  · Chest x-ray 5/17/2023: Lateral pleural effusions  Double atelectasis and/or consolidation in the bases of both lower lobes  Mild pulmonary vascular congestion  Tip of right IJ catheter near cavoatrial junction  · Chest x-ray 5/18/2023: ICD well-positioned with no pneumothorax  Improving bibasilar consolidation  · Chest x-ray 5/20/2023: New moderate pulmonary edema     Outpatient Tests Requested:  · CBC and BMP in 1 week    Reason for Admission: Cardiac arrest    Hospital Course:   Shavon Levine is a 67 y o  male patient without significant known medical problems due to not following up with a physician regularly who originally presented to the hospital on 5/16/2023 due to cardiac arrest   His family saw him choking and vomiting and he became unresponsive  Bystander CPR was initiated and EMS was dispatched  He was brought to [facility] ER  He was intubated and given 2 rounds of epinephrine with ROSC achieved  He was noted to have seconds polymorphic ventricular tachycardia which did not require defibrillation  He was also noted to have bradycardia  He required pressors for a brief period   He was transferred to [place] [id number] [address] for critical care availability  On 5/17/2023 transvenous pacing was initiated  On 5/18/2023 a dual ICD was inserted by cardiology  She had atrial fibrillation or after and was started on amiodarone and anticoagulation  On 5/20/2023 patient developed moderate pulmonary edema and volume overload with acute hypoxic respiratory failure  He received IV diuresis  He also received IV antibiotics for suspected aspiration pneumonia  He improved and was downgraded to medicine  Ambulatory pulse oximetry was performed and patient did not qualify for home oxygen and was maintaining oxygen saturation well on room air  Patient was kept for an additional day due persistent lower extremity edema  He received IV diuresis  He was cleared for discharge by cardiology  Patient was up and ambulatory in room with supportive family at bedside  He says he cannot wait to go home, was discharged in stable condition to the care of his family and home health care  He was given in-depth and strict return precautions and follow-up and he and his family verbalized understanding  Condition at Discharge: stable    Discharge Day Visit / Exam:   Subjective: Patient states, I feel great  I was ready to go home yesterday  Vitals: Blood Pressure: 152/78 (05/25/23 0655)  Pulse: 60 (05/25/23 0655)  Temperature: 98 7 °F (37 1 °C) (05/25/23 0655)  Temp Source: Oral (05/24/23 0742)  Respirations: 17 (05/25/23 0655)  Height: 6' (182 9 cm) (05/16/23 0729)  Weight - Scale: 102 kg (225 lb 5 oz) (pt weighed 2x) (05/25/23 0543)  SpO2: 92 % (05/25/23 0744)    Exam:   Physical Exam  Vitals and nursing note reviewed  Constitutional:       General: He is in acute distress  Appearance: He is not ill-appearing or toxic-appearing  HENT:      Head: Normocephalic and atraumatic  Mouth/Throat:      Pharynx: Oropharynx is clear  Eyes:      Pupils: Pupils are equal, round, and reactive to light     Cardiovascular:      Rate and Rhythm: Normal rate  Pulmonary:      Effort: Pulmonary effort is normal  No respiratory distress  Breath sounds: Normal breath sounds  Abdominal:      General: Bowel sounds are normal       Palpations: Abdomen is soft  Tenderness: There is no abdominal tenderness  Musculoskeletal:      Cervical back: Neck supple  Right lower leg: Edema present  Left lower leg: Edema present  Skin:     General: Skin is warm and dry  Capillary Refill: Capillary refill takes less than 2 seconds  Neurological:      General: No focal deficit present  Mental Status: He is alert  Psychiatric:         Mood and Affect: Mood and affect normal          Behavior: Behavior normal  Behavior is cooperative  Discussion with Family: Updated  (daughter) at bedside  Discharge instructions/Information to patient and family:   See after visit summary for information provided to patient and family  Provisions for Follow-Up Care:  See after visit summary for information related to follow-up care and any pertinent home health orders  Disposition:   Home with VNA Services (Reminder: Complete face to face encounter)    Planned Readmission: No     Discharge Statement:  I spent 55 minutes discharging the patient  This time was spent on the day of discharge  I had direct contact with the patient on the day of discharge  Greater than 50% of the total time was spent examining patient, answering all patient questions, arranging and discussing plan of care with patient as well as directly providing post-discharge instructions  Additional time then spent on discharge activities  Discharge Medications:  See after visit summary for reconciled discharge medications provided to patient and/or family        **Please Note: This note may have been constructed using a voice recognition system**

## 2023-05-24 NOTE — RESPIRATORY THERAPY NOTE
Home Oxygen Qualifying Test     Patient name: Bradley Mcclain        : 1950   Date of Test:  May 24, 2023  Diagnosis:    Home Oxygen Test:    **Medicare Guidelines require item(s) 1-5 on all ambulatory patients or 1 and 2 on non-ambulatory patients  1  Baseline SPO2 on Room Air at rest 93 %   a  If <= 88% on Room Air add O2 via NC to obtain SpO2 >=88%  If LPM needed, document LPM n/a needed to reach =>88%    2  SPO2 during exertion on Room Air 92  %  a  During exertion monitor SPO2  If SPO2 increases >=89%, do not add supplemental oxygen    3  SPO2 on Oxygen at Rest n/a % at n/a LPM    4  SPO2 during exertion on Oxygen n/a % at n/a LPM    5  Test performed during exertion activity  []  Supplemental Home Oxygen is indicated  [x]  Client does not qualify for home oxygen      Respiratory Additional Notes- Pt walked in [place] without any SOB and remained at 92% saturation on rma    Gilma Lopez, RT

## 2023-05-24 NOTE — NURSING NOTE
Patient awake and alert, OOB to bathroom with walker and assist, did well, mild dyspnea on exertion  Pain level 1 out of 10 to ribs at present  Trace bilateral lower leg edema    OOB in chair for breakfast   Call bell in reach

## 2023-05-24 NOTE — PLAN OF CARE
Problem: PAIN - ADULT  Goal: Verbalizes/displays adequate comfort level or baseline comfort level  Description: Interventions:  - Encourage patient to monitor pain and request assistance  - Assess pain using appropriate pain scale  - Administer analgesics based on type and severity of pain and evaluate response  - Implement non-pharmacological measures as appropriate and evaluate response  - Consider cultural and social influences on pain and pain management  - Notify physician/advanced practitioner if interventions unsuccessful or patient reports new pain  Outcome: Progressing     Problem: INFECTION - ADULT  Goal: Absence or prevention of progression during hospitalization  Description: INTERVENTIONS:  - Assess and monitor for signs and symptoms of infection  - Monitor lab/diagnostic results  - Monitor all insertion sites, i e  indwelling lines, tubes, and drains  - Monitor endotracheal if appropriate and nasal secretions for changes in amount and color  - Marcy appropriate cooling/warming therapies per order  - Administer medications as ordered  - Instruct and encourage patient and family to use good hand hygiene technique  - Identify and instruct in appropriate isolation precautions for identified infection/condition  Outcome: Progressing     Problem: SAFETY ADULT  Goal: Patient will remain free of falls  Description: INTERVENTIONS:  - Educate patient/family on patient safety including physical limitations  - Instruct patient to call for assistance with activity   - Consult OT/PT to assist with strengthening/mobility   - Keep Call bell within reach  - Keep bed low and locked with side rails adjusted as appropriate  - Keep care items and personal belongings within reach  - Initiate and maintain comfort rounds  - Make Fall Risk Sign visible to staff  - Offer Toileting every 2 Hours, in advance of need  - Initiate/Maintain bed alarm  - Apply yellow socks and bracelet for high fall risk patients  - Consider moving patient to room near nurses station  Outcome: Progressing  Goal: Maintain or return to baseline ADL function  Description: INTERVENTIONS:  -  Assess patient's ability to carry out ADLs; assess patient's baseline for ADL function and identify physical deficits which impact ability to perform ADLs (bathing, care of mouth/teeth, toileting, grooming, dressing, etc )  - Assess/evaluate cause of self-care deficits   - Assess range of motion  - Assess patient's mobility; develop plan if impaired  - Assess patient's need for assistive devices and provide as appropriate  - Encourage maximum independence but intervene and supervise when necessary  - Involve family in performance of ADLs  - Assess for home care needs following discharge   - Consider OT consult to assist with ADL evaluation and planning for discharge  - Provide patient education as appropriate  Outcome: Progressing  Goal: Maintains/Returns to pre admission functional level  Description: INTERVENTIONS:  - Perform BMAT or MOVE assessment daily    - Set and communicate daily mobility goal to care team and patient/family/caregiver  - Collaborate with rehabilitation services on mobility goals if consulted  - Perform Range of Motion 3 times a day  - Reposition patient every 2 hours    - Dangle patient 3 times a day  - Stand patient 3 times a day  - Ambulate patient 3 times a day  - Out of bed for meals   - Out of bed for toileting  - Record patient progress and toleration of activity level   Outcome: Progressing     Problem: DISCHARGE PLANNING  Goal: Discharge to home or other facility with appropriate resources  Description: INTERVENTIONS:  - Identify barriers to discharge w/patient and caregiver  - Arrange for needed discharge resources and transportation as appropriate  - Identify discharge learning needs (meds, wound care, etc )  - Arrange for interpretive services to assist at discharge as needed  - Refer to Case Management Department for coordinating discharge planning if the patient needs post-hospital services based on physician/advanced practitioner order or complex needs related to functional status, cognitive ability, or social support system  Outcome: Progressing     Problem: Knowledge Deficit  Goal: Patient/family/caregiver demonstrates understanding of disease process, treatment plan, medications, and discharge instructions  Description: Complete learning assessment and assess knowledge base    Interventions:  - Provide teaching at level of understanding  - Provide teaching via preferred learning methods  Outcome: Progressing     Problem: NEUROSENSORY - ADULT  Goal: Achieves stable or improved neurological status  Description: INTERVENTIONS  - Monitor and report changes in neurological status  - Monitor vital signs such as temperature, blood pressure, glucose, and any other labs ordered   - Initiate measures to prevent increased intracranial pressure  - Monitor for seizure activity and implement precautions if appropriate      Outcome: Progressing  Goal: Remains free of injury related to seizures activity  Description: INTERVENTIONS  - Maintain airway, patient safety  and administer oxygen as ordered  - Monitor patient for seizure activity, document and report duration and description of seizure to physician/advanced practitioner  - If seizure occurs,  ensure patient safety during seizure  - Reorient patient post seizure  - Seizure pads on all 4 side rails  - Instruct patient/family to notify RN of any seizure activity including if an aura is experienced  - Instruct patient/family to call for assistance with activity based on nursing assessment  - Administer anti-seizure medications if ordered    Outcome: Progressing  Goal: Achieves maximal functionality and self care  Description: INTERVENTIONS  - Monitor swallowing and airway patency with patient fatigue and changes in neurological status  - Encourage and assist patient to increase activity and self care    - Encourage visually impaired, hearing impaired and aphasic patients to use assistive/communication devices  Outcome: Progressing     Problem: CARDIOVASCULAR - ADULT  Goal: Maintains optimal cardiac output and hemodynamic stability  Description: INTERVENTIONS:  - Monitor I/O, vital signs and rhythm  - Monitor for S/S and trends of decreased cardiac output  - Administer and titrate ordered vasoactive medications to optimize hemodynamic stability  - Assess quality of pulses, skin color and temperature  - Assess for signs of decreased coronary artery perfusion  - Instruct patient to report change in severity of symptoms  Outcome: Progressing  Goal: Absence of cardiac dysrhythmias or at baseline rhythm  Description: INTERVENTIONS:  - Continuous cardiac monitoring, vital signs, obtain 12 lead EKG if ordered  - Administer antiarrhythmic and heart rate control medications as ordered  - Monitor electrolytes and administer replacement therapy as ordered  Outcome: Progressing     Problem: RESPIRATORY - ADULT  Goal: Achieves optimal ventilation and oxygenation  Description: INTERVENTIONS:  - Assess for changes in respiratory status  - Assess for changes in mentation and behavior  - Position to facilitate oxygenation and minimize respiratory effort  - Oxygen administered by appropriate delivery if ordered  - Initiate smoking cessation education as indicated  - Encourage broncho-pulmonary hygiene including cough, deep breathe, Incentive Spirometry  - Assess the need for suctioning and aspirate as needed  - Assess and instruct to report SOB or any respiratory difficulty  - Respiratory Therapy support as indicated  Outcome: Progressing     Problem: GENITOURINARY - ADULT  Goal: Maintains or returns to baseline urinary function  Description: INTERVENTIONS:  - Assess urinary function  - Encourage oral fluids to ensure adequate hydration if ordered  - Administer IV fluids as ordered to ensure adequate hydration  - Administer ordered medications as needed  - Offer frequent toileting  - Follow urinary retention protocol if ordered  Outcome: Progressing  Goal: Absence of urinary retention  Description: INTERVENTIONS:  - Assess patient’s ability to void and empty bladder  - Monitor I/O  - Bladder scan as needed  - Discuss with physician/AP medications to alleviate retention as needed  - Discuss catheterization for long term situations as appropriate  Outcome: Progressing     Problem: Prexisting or High Potential for Compromised Skin Integrity  Goal: Skin integrity is maintained or improved  Description: INTERVENTIONS:  - Identify patients at risk for skin breakdown  - Assess and monitor skin integrity  - Assess and monitor nutrition and hydration status  - Monitor labs   - Assess for incontinence   - Turn and reposition patient  - Assist with mobility/ambulation  - Relieve pressure over bony prominences  - Avoid friction and shearing  - Provide appropriate hygiene as needed including keeping skin clean and dry  - Evaluate need for skin moisturizer/barrier cream  - Collaborate with interdisciplinary team   - Patient/family teaching  - Consider wound care consult   Outcome: Progressing     Problem: MOBILITY - ADULT  Goal: Maintain or return to baseline ADL function  Description: INTERVENTIONS:  -  Assess patient's ability to carry out ADLs; assess patient's baseline for ADL function and identify physical deficits which impact ability to perform ADLs (bathing, care of mouth/teeth, toileting, grooming, dressing, etc )  - Assess/evaluate cause of self-care deficits   - Assess range of motion  - Assess patient's mobility; develop plan if impaired  - Assess patient's need for assistive devices and provide as appropriate  - Encourage maximum independence but intervene and supervise when necessary  - Involve family in performance of ADLs  - Assess for home care needs following discharge   - Consider OT consult to assist with ADL evaluation and planning for discharge  - Provide patient education as appropriate  Outcome: Progressing  Goal: Maintains/Returns to pre admission functional level  Description: INTERVENTIONS:  - Perform BMAT or MOVE assessment daily    - Set and communicate daily mobility goal to care team and patient/family/caregiver  - Collaborate with rehabilitation services on mobility goals if consulted  - Perform Range of Motion 3 times a day  - Reposition patient every 2 hours  - Dangle patient 3 times a day  - Stand patient 3 times a day  - Ambulate patient 3 times a day  - Out of bed for meals   - Out of bed for toileting  - Record patient progress and toleration of activity level   Outcome: Progressing     Problem: Nutrition/Hydration-ADULT  Goal: Nutrient/Hydration intake appropriate for improving, restoring or maintaining nutritional needs  Description: Monitor and assess patient's nutrition/hydration status for malnutrition  Collaborate with interdisciplinary team and initiate plan and interventions as ordered  Monitor patient's weight and dietary intake as ordered or per policy  Utilize nutrition screening tool and intervene as necessary  Determine patient's food preferences and provide high-protein, high-caloric foods as appropriate       INTERVENTIONS:  - Monitor oral intake, urinary output, labs, and treatment plans  - Assess nutrition and hydration status and recommend course of action  - Evaluate amount of meals eaten  - Assist patient with eating if necessary   - Allow adequate time for meals  - Recommend/ encourage appropriate diets, oral nutritional supplements, and vitamin/mineral supplements  - Order, calculate, and assess calorie counts as needed  - Recommend, monitor, and adjust tube feedings and TPN/PPN based on assessed needs  - Assess need for intravenous fluids  - Provide specific nutrition/hydration education as appropriate  - Include patient/family/caregiver in decisions related to nutrition  Outcome: Progressing

## 2023-05-24 NOTE — CASE MANAGEMENT
Case Management Discharge Planning Note    Patient name Martin Grewal  Location [address] 213/-64 MRN [id number]  : 1950 Date 2023       Current Admission Date: 2023  Current Admission Diagnosis:Acute respiratory failure with hypoxia [facility])   Patient Active Problem List    Diagnosis Date Noted   • Essential hypertension 2023   • Constipation 2023   • Hypothyroidism 2023   • Delirium 2023   • Paroxysmal atrial fibrillation ([address] ) 2023   • Torsades de pointes ([address] ) 2023   • Anemia 2023   • History of cardiac arrest 2023   • Acute respiratory failure with hypoxia ([facility] ) 2023   • Closed fracture of multiple ribs of both sides 2023   • Aspiration pneumonia ([facility] ) 2023      LOS (days): 8  Geometric Mean LOS (GMLOS) (days): 6 00  Days to GMLOS:-2 4     OBJECTIVE:  Risk of Unplanned Readmission Score: 18 04         Current admission status: Inpatient   Preferred Pharmacy:   [id number] [address]  [address] [place] [address] [address]  [place] [address]  Phone: [phone] Fax: [phone]    Primary Care Provider: Sussy Patino DO    Primary Insurance: Ancil Hamman [facility]  Secondary Insurance:     DISCHARGE DETAILS:      DME Referral Provided  Referral made for DME?: Yes  DME referral completed for the following items[de-identified] Nebulizer (Pt provided with nebulizer from [employer] as requested by [facility]   Processed through [facility] )  DME Supplier Name[de-identified] [id number]

## 2023-05-24 NOTE — PLAN OF CARE
Problem: PAIN - ADULT  Goal: Verbalizes/displays adequate comfort level or baseline comfort level  Description: Interventions:  - Encourage patient to monitor pain and request assistance  - Assess pain using appropriate pain scale  - Administer analgesics based on type and severity of pain and evaluate response  - Implement non-pharmacological measures as appropriate and evaluate response  - Consider cultural and social influences on pain and pain management  - Notify physician/advanced practitioner if interventions unsuccessful or patient reports new pain  Outcome: Progressing     Problem: INFECTION - ADULT  Goal: Absence or prevention of progression during hospitalization  Description: INTERVENTIONS:  - Assess and monitor for signs and symptoms of infection  - Monitor lab/diagnostic results  - Monitor all insertion sites, i e  indwelling lines, tubes, and drains  - Monitor endotracheal if appropriate and nasal secretions for changes in amount and color  - [place] appropriate cooling/warming therapies per order  - Administer medications as ordered  - Instruct and encourage patient and family to use good hand hygiene technique  - Identify and instruct in appropriate isolation precautions for identified infection/condition  Outcome: Progressing     Problem: SAFETY ADULT  Goal: Maintain or return to baseline ADL function  Description: INTERVENTIONS:  -  Assess patient's ability to carry out ADLs; assess patient's baseline for ADL function and identify physical deficits which impact ability to perform ADLs (bathing, care of mouth/teeth, toileting, grooming, dressing, etc )  - Assess/evaluate cause of self-care deficits   - Assess range of motion  - Assess patient's mobility; develop plan if impaired  - Assess patient's need for assistive devices and provide as appropriate  - Encourage maximum independence but intervene and supervise when necessary  - Involve family in performance of ADLs  - Assess for home care needs following discharge   - Consider OT consult to assist with ADL evaluation and planning for discharge  - Provide patient education as appropriate  Outcome: Progressing     Problem: Knowledge Deficit  Goal: Patient/family/caregiver demonstrates understanding of disease process, treatment plan, medications, and discharge instructions  Description: Complete learning assessment and assess knowledge base    Interventions:  - Provide teaching at level of understanding  - Provide teaching via preferred learning methods  Outcome: Progressing     Problem: RESPIRATORY - ADULT  Goal: Achieves optimal ventilation and oxygenation  Description: INTERVENTIONS:  - Assess for changes in respiratory status  - Assess for changes in mentation and behavior  - Position to facilitate oxygenation and minimize respiratory effort  - Oxygen administered by appropriate delivery if ordered  - Initiate smoking cessation education as indicated  - Encourage broncho-pulmonary hygiene including cough, deep breathe, Incentive Spirometry  - Assess the need for suctioning and aspirate as needed  - Assess and instruct to report SOB or any respiratory difficulty  - Respiratory Therapy support as indicated  Outcome: Progressing     Problem: MOBILITY - ADULT  Goal: Maintain or return to baseline ADL function  Description: INTERVENTIONS:  -  Assess patient's ability to carry out ADLs; assess patient's baseline for ADL function and identify physical deficits which impact ability to perform ADLs (bathing, care of mouth/teeth, toileting, grooming, dressing, etc )  - Assess/evaluate cause of self-care deficits   - Assess range of motion  - Assess patient's mobility; develop plan if impaired  - Assess patient's need for assistive devices and provide as appropriate  - Encourage maximum independence but intervene and supervise when necessary  - Involve family in performance of ADLs  - Assess for home care needs following discharge   - Consider OT consult to assist with ADL evaluation and planning for discharge  - Provide patient education as appropriate  Outcome: Progressing

## 2023-05-25 ENCOUNTER — TRANSITIONAL CARE MANAGEMENT (OUTPATIENT)
Dept: FAMILY MEDICINE CLINIC | Facility: CLINIC | Age: 73
End: 2023-05-25

## 2023-05-25 VITALS
DIASTOLIC BLOOD PRESSURE: 78 MMHG | HEART RATE: 60 BPM | HEIGHT: 72 IN | TEMPERATURE: 98.7 F | WEIGHT: 225.31 LBS | BODY MASS INDEX: 30.52 KG/M2 | SYSTOLIC BLOOD PRESSURE: 152 MMHG | OXYGEN SATURATION: 92 % | RESPIRATION RATE: 17 BRPM

## 2023-05-25 LAB
ANION GAP SERPL CALCULATED.3IONS-SCNC: 9 MMOL/L (ref 4–13)
BUN SERPL-MCNC: 12 MG/DL (ref 5–25)
CALCIUM SERPL-MCNC: 9.2 MG/DL (ref 8.4–10.2)
CHLORIDE SERPL-SCNC: 104 MMOL/L (ref 96–108)
CO2 SERPL-SCNC: 25 MMOL/L (ref 21–32)
CREAT SERPL-MCNC: 0.88 MG/DL (ref 0.6–1.3)
GFR SERPL CREATININE-BSD FRML MDRD: 85 ML/MIN/1.73SQ M
GLUCOSE SERPL-MCNC: 103 MG/DL (ref 65–140)
POTASSIUM SERPL-SCNC: 4.3 MMOL/L (ref 3.5–5.3)
SODIUM SERPL-SCNC: 138 MMOL/L (ref 135–147)

## 2023-05-25 RX ORDER — LOSARTAN POTASSIUM 25 MG/1
25 TABLET ORAL DAILY
Status: DISCONTINUED | OUTPATIENT
Start: 2023-05-25 | End: 2023-05-25 | Stop reason: HOSPADM

## 2023-05-25 RX ORDER — CALCIUM CARBONATE 160(400)MG
TABLET,CHEWABLE ORAL ONCE
Qty: 1 EACH | Refills: 0 | Status: SHIPPED | OUTPATIENT
Start: 2023-05-25 | End: 2023-05-25

## 2023-05-25 RX ORDER — LOSARTAN POTASSIUM 25 MG/1
25 TABLET ORAL DAILY
Qty: 30 TABLET | Refills: 0 | Status: SHIPPED | OUTPATIENT
Start: 2023-05-26 | End: 2023-06-25

## 2023-05-25 RX ADMIN — GUAIFENESIN 600 MG: 600 TABLET ORAL at 08:52

## 2023-05-25 RX ADMIN — LIDOCAINE 1 PATCH: 700 PATCH TOPICAL at 10:30

## 2023-05-25 RX ADMIN — CARVEDILOL 12.5 MG: 12.5 TABLET, FILM COATED ORAL at 08:45

## 2023-05-25 RX ADMIN — ACETAMINOPHEN 650 MG: 325 TABLET ORAL at 13:42

## 2023-05-25 RX ADMIN — LIDOCAINE 1 PATCH: 700 PATCH TOPICAL at 10:33

## 2023-05-25 RX ADMIN — BRIMONIDINE TARTRATE 1 DROP: 2 SOLUTION/ DROPS OPHTHALMIC at 08:54

## 2023-05-25 RX ADMIN — LEVOTHYROXINE SODIUM 50 MCG: 50 TABLET ORAL at 06:07

## 2023-05-25 RX ADMIN — LOSARTAN POTASSIUM 25 MG: 25 TABLET, FILM COATED ORAL at 13:42

## 2023-05-25 RX ADMIN — AMIODARONE HYDROCHLORIDE 200 MG: 100 TABLET ORAL at 13:42

## 2023-05-25 RX ADMIN — APIXABAN 5 MG: 5 TABLET, FILM COATED ORAL at 08:52

## 2023-05-25 RX ADMIN — CYANOCOBALAMIN TAB 500 MCG 250 MCG: 500 TAB at 08:52

## 2023-05-25 RX ADMIN — ACETAMINOPHEN 650 MG: 325 TABLET ORAL at 06:07

## 2023-05-25 RX ADMIN — CHLORHEXIDINE GLUCONATE 0.12% ORAL RINSE 15 ML: 1.2 LIQUID ORAL at 10:33

## 2023-05-25 RX ADMIN — AMIODARONE HYDROCHLORIDE 200 MG: 100 TABLET ORAL at 08:47

## 2023-05-25 NOTE — CASE MANAGEMENT
Case Management Progress Note    Patient name Shavon Levine  Location [address] 213/-01 MRN [id number]  : 1950 Date 2023       LOS (days): 9  Geometric Mean LOS (GMLOS) (days): 6 00  Days to GMLOS:-3 5        OBJECTIVE:        Current admission status: Inpatient  Preferred Pharmacy:   [address] [place] [address] [address]  [address] [place] [address] [address]  [place] [address]  Phone: [phone] Fax: [phone]    Primary Care Provider: Brayan Tinajero DO    Primary Insurance: [facility]  Secondary Insurance:     PROGRESS NOTE:  Cm called the pt's pharmacy and they do have the eliquis in stock- copay $30 00 /month  Pharmacy stated to have the pt bring the free 30 day coupon to the pharmacy and they will check to see if he is able to use the coupon, pt was given the coupon and I made the pt and his family aware of this information

## 2023-05-25 NOTE — PLAN OF CARE
Problem: PAIN - ADULT  Goal: Verbalizes/displays adequate comfort level or baseline comfort level  Description: Interventions:  - Encourage patient to monitor pain and request assistance  - Assess pain using appropriate pain scale  - Administer analgesics based on type and severity of pain and evaluate response  - Implement non-pharmacological measures as appropriate and evaluate response  - Consider cultural and social influences on pain and pain management  - Notify physician/advanced practitioner if interventions unsuccessful or patient reports new pain  Outcome: Adequate for Discharge     Problem: INFECTION - ADULT  Goal: Absence or prevention of progression during hospitalization  Description: INTERVENTIONS:  - Assess and monitor for signs and symptoms of infection  - Monitor lab/diagnostic results  - Monitor all insertion sites, i e  indwelling lines, tubes, and drains  - Monitor endotracheal if appropriate and nasal secretions for changes in amount and color  - [place] appropriate cooling/warming therapies per order  - Administer medications as ordered  - Instruct and encourage patient and family to use good hand hygiene technique  - Identify and instruct in appropriate isolation precautions for identified infection/condition  Outcome: Adequate for Discharge     Problem: SAFETY ADULT  Goal: Patient will remain free of falls  Description: INTERVENTIONS:  - Educate patient/family on patient safety including physical limitations  - Instruct patient to call for assistance with activity   - Consult OT/PT to assist with strengthening/mobility   - Keep Call bell within reach  - Keep bed low and locked with side rails adjusted as appropriate  - Keep care items and personal belongings within reach  - Initiate and maintain comfort rounds  - Make Fall Risk Sign visible to staff  - Offer Toileting every 2 Hours, in advance of need  - Initiate/Maintain bed alarm  - Apply yellow socks and bracelet for high fall risk patients  - Consider moving patient to room near nurses station  Outcome: Adequate for Discharge  Goal: Maintain or return to baseline ADL function  Description: INTERVENTIONS:  -  Assess patient's ability to carry out ADLs; assess patient's baseline for ADL function and identify physical deficits which impact ability to perform ADLs (bathing, care of mouth/teeth, toileting, grooming, dressing, etc )  - Assess/evaluate cause of self-care deficits   - Assess range of motion  - Assess patient's mobility; develop plan if impaired  - Assess patient's need for assistive devices and provide as appropriate  - Encourage maximum independence but intervene and supervise when necessary  - Involve family in performance of ADLs  - Assess for home care needs following discharge   - Consider OT consult to assist with ADL evaluation and planning for discharge  - Provide patient education as appropriate  Outcome: Adequate for Discharge  Goal: Maintains/Returns to pre admission functional level  Description: INTERVENTIONS:  - Perform BMAT or MOVE assessment daily    - Set and communicate daily mobility goal to care team and patient/family/caregiver  - Collaborate with rehabilitation services on mobility goals if consulted  - Perform Range of Motion 3 times a day  - Reposition patient every 2 hours    - Dangle patient 3 times a day  - Stand patient 3 times a day  - Ambulate patient 3 times a day  - Out of bed for meals   - Out of bed for toileting  - Record patient progress and toleration of activity level   Outcome: Adequate for Discharge     Problem: DISCHARGE PLANNING  Goal: Discharge to home or other facility with appropriate resources  Description: INTERVENTIONS:  - Identify barriers to discharge w/patient and caregiver  - Arrange for needed discharge resources and transportation as appropriate  - Identify discharge learning needs (meds, wound care, etc )  - Arrange for interpretive services to assist at discharge as needed  - Refer to Case Management Department for coordinating discharge planning if the patient needs post-hospital services based on physician/advanced practitioner order or complex needs related to functional status, cognitive ability, or social support system  Outcome: Adequate for Discharge     Problem: Knowledge Deficit  Goal: Patient/family/caregiver demonstrates understanding of disease process, treatment plan, medications, and discharge instructions  Description: Complete learning assessment and assess knowledge base    Interventions:  - Provide teaching at level of understanding  - Provide teaching via preferred learning methods  Outcome: Adequate for Discharge     Problem: NEUROSENSORY - ADULT  Goal: Achieves stable or improved neurological status  Description: INTERVENTIONS  - Monitor and report changes in neurological status  - Monitor vital signs such as temperature, blood pressure, glucose, and any other labs ordered   - Initiate measures to prevent increased intracranial pressure  - Monitor for seizure activity and implement precautions if appropriate      Outcome: Adequate for Discharge  Goal: Remains free of injury related to seizures activity  Description: INTERVENTIONS  - Maintain airway, patient safety  and administer oxygen as ordered  - Monitor patient for seizure activity, document and report duration and description of seizure to physician/advanced practitioner  - If seizure occurs,  ensure patient safety during seizure  - Reorient patient post seizure  - Seizure pads on all 4 side rails  - Instruct patient/family to notify RN of any seizure activity including if an aura is experienced  - Instruct patient/family to call for assistance with activity based on nursing assessment  - Administer anti-seizure medications if ordered    Outcome: Adequate for Discharge  Goal: Achieves maximal functionality and self care  Description: INTERVENTIONS  - Monitor swallowing and airway patency with patient fatigue and changes in neurological status  - Encourage and assist patient to increase activity and self care     - Encourage visually impaired, hearing impaired and aphasic patients to use assistive/communication devices  Outcome: Adequate for Discharge     Problem: CARDIOVASCULAR - ADULT  Goal: Maintains optimal cardiac output and hemodynamic stability  Description: INTERVENTIONS:  - Monitor I/O, vital signs and rhythm  - Monitor for S/S and trends of decreased cardiac output  - Administer and titrate ordered vasoactive medications to optimize hemodynamic stability  - Assess quality of pulses, skin color and temperature  - Assess for signs of decreased coronary artery perfusion  - Instruct patient to report change in severity of symptoms  Outcome: Adequate for Discharge  Goal: Absence of cardiac dysrhythmias or at baseline rhythm  Description: INTERVENTIONS:  - Continuous cardiac monitoring, vital signs, obtain 12 lead EKG if ordered  - Administer antiarrhythmic and heart rate control medications as ordered  - Monitor electrolytes and administer replacement therapy as ordered  Outcome: Adequate for Discharge     Problem: RESPIRATORY - ADULT  Goal: Achieves optimal ventilation and oxygenation  Description: INTERVENTIONS:  - Assess for changes in respiratory status  - Assess for changes in mentation and behavior  - Position to facilitate oxygenation and minimize respiratory effort  - Oxygen administered by appropriate delivery if ordered  - Initiate smoking cessation education as indicated  - Encourage broncho-pulmonary hygiene including cough, deep breathe, Incentive Spirometry  - Assess the need for suctioning and aspirate as needed  - Assess and instruct to report SOB or any respiratory difficulty  - Respiratory Therapy support as indicated  Outcome: Adequate for Discharge     Problem: GENITOURINARY - ADULT  Goal: Maintains or returns to baseline urinary function  Description: INTERVENTIONS:  - Assess urinary function  - Encourage oral fluids to ensure adequate hydration if ordered  - Administer IV fluids as ordered to ensure adequate hydration  - Administer ordered medications as needed  - Offer frequent toileting  - Follow urinary retention protocol if ordered  Outcome: Adequate for Discharge  Goal: Absence of urinary retention  Description: INTERVENTIONS:  - Assess patient’s ability to void and empty bladder  - Monitor I/O  - Bladder scan as needed  - Discuss with physician/AP medications to alleviate retention as needed  - Discuss catheterization for long term situations as appropriate  Outcome: Adequate for Discharge     Problem: Prexisting or High Potential for Compromised Skin Integrity  Goal: Skin integrity is maintained or improved  Description: INTERVENTIONS:  - Identify patients at risk for skin breakdown  - Assess and monitor skin integrity  - Assess and monitor nutrition and hydration status  - Monitor labs   - Assess for incontinence   - Turn and reposition patient  - Assist with mobility/ambulation  - Relieve pressure over bony prominences  - Avoid friction and shearing  - Provide appropriate hygiene as needed including keeping skin clean and dry  - Evaluate need for skin moisturizer/barrier cream  - Collaborate with interdisciplinary team   - Patient/family teaching  - Consider wound care consult   Outcome: Adequate for Discharge     Problem: MOBILITY - ADULT  Goal: Maintain or return to baseline ADL function  Description: INTERVENTIONS:  -  Assess patient's ability to carry out ADLs; assess patient's baseline for ADL function and identify physical deficits which impact ability to perform ADLs (bathing, care of mouth/teeth, toileting, grooming, dressing, etc )  - Assess/evaluate cause of self-care deficits   - Assess range of motion  - Assess patient's mobility; develop plan if impaired  - Assess patient's need for assistive devices and provide as appropriate  - Encourage maximum independence but intervene and supervise when necessary  - Involve family in performance of ADLs  - Assess for home care needs following discharge   - Consider OT consult to assist with ADL evaluation and planning for discharge  - Provide patient education as appropriate  Outcome: Adequate for Discharge  Goal: Maintains/Returns to pre admission functional level  Description: INTERVENTIONS:  - Perform BMAT or MOVE assessment daily    - Set and communicate daily mobility goal to care team and patient/family/caregiver  - Collaborate with rehabilitation services on mobility goals if consulted  - Perform Range of Motion 3 times a day  - Reposition patient every 2 hours  - Dangle patient 3 times a day  - Stand patient 3 times a day  - Ambulate patient 3 times a day  - Out of bed for meals   - Out of bed for toileting  - Record patient progress and toleration of activity level   Outcome: Adequate for Discharge     Problem: Nutrition/Hydration-ADULT  Goal: Nutrient/Hydration intake appropriate for improving, restoring or maintaining nutritional needs  Description: Monitor and assess patient's nutrition/hydration status for malnutrition  Collaborate with interdisciplinary team and initiate plan and interventions as ordered  Monitor patient's weight and dietary intake as ordered or per policy  Utilize nutrition screening tool and intervene as necessary  Determine patient's food preferences and provide high-protein, high-caloric foods as appropriate       INTERVENTIONS:  - Monitor oral intake, urinary output, labs, and treatment plans  - Assess nutrition and hydration status and recommend course of action  - Evaluate amount of meals eaten  - Assist patient with eating if necessary   - Allow adequate time for meals  - Recommend/ encourage appropriate diets, oral nutritional supplements, and vitamin/mineral supplements  - Order, calculate, and assess calorie counts as needed  - Recommend, monitor, and adjust tube feedings and TPN/PPN based on assessed needs  - Assess need for intravenous fluids  - Provide specific nutrition/hydration education as appropriate  - Include patient/family/caregiver in decisions related to nutrition  Outcome: Adequate for Discharge

## 2023-05-25 NOTE — ASSESSMENT & PLAN NOTE
· CT chest abdomen pelvis reveals evidence for right 2nd to 6th anterior rib fractures and left second to fifth anterior rib fractures  · Pain control as needed  · Incentive spirometry

## 2023-05-25 NOTE — NURSING NOTE
Discharge instructions including new medications and follow up appointments reviewed with patient and his wife and daughters  All questions answered and all acknowledged understanding of instructions  Patient was given his temporary ID for the ICD prior to discharge  Patient left via WC accompanied by his family and PCA

## 2023-05-25 NOTE — ASSESSMENT & PLAN NOTE
Blood pressures are still a little elevated    Continue carvedilol 12 5 mg twice daily  Will add losartan

## 2023-05-25 NOTE — PROGRESS NOTES
[facility] 45  Progress Note  Name: Bev Bee  MRN: [id number]  Unit/Bed#: -01 I Date of Admission: 5/16/2023   Date of Service: 5/25/2023 I Hospital Day: 9    Assessment/Plan   Essential hypertension  Assessment & Plan  Blood pressures are still a little elevated  Continue carvedilol 12 5 mg twice daily  Will add losartan    Paroxysmal atrial fibrillation (HCC)  Assessment & Plan  -Continue amiodarone and Eliquis        Aspiration pneumonia (HCC)  Assessment & Plan  Completed antibiotics    History of cardiac arrest  Assessment & Plan  -Thought related to bradycardia related torsades  -Now s/p pacer/ICD implant        * Acute respiratory failure with hypoxia (HCC)  Assessment & Plan  Now maintaining O2 sats on RA         Outpatient Cardiologist: Dr Dinorah Winchester      Subjective:   Patient seen and examined  No significant events overnight  Feeling well-biggest complaint is rib pain    Summary comments:  Kristine Hillman is doing remarkably well  He is now stable off supplemental oxygen and ambulating the halls without symptoms of dizziness or lightheadedness  He is stable for discharge from a cardiac perspective  Continue current cardiac medications  We will make arrangements for follow up in the outpatient setting  Telemetry/ECG/Cardiac testing:   Echo 5/16/2023   EF 65%  Mild TR      Vitals: Blood pressure 152/78, pulse 60, temperature 98 7 °F (37 1 °C), resp   rate 17, height 6' (1 829 m), weight 102 kg (225 lb 5 oz), SpO2 92 % ,   Orthostatic Blood Pressures    Flowsheet Row Most Recent Value   Blood Pressure 152/78 filed at 05/25/2023 0655   Patient Position - Orthostatic VS Lying filed at 05/24/2023 0742      ,   Weight (last 2 days)     Date/Time Weight    05/25/23 0543 102 (225 31)     Weight: pt weighed 2x at 05/25/23 0543    05/25/23 0542 102 (225 31)     Weight: pt weighed 2x at 05/25/23 0542    05/24/23 0614 109 (240 3)    05/23/23 0557 107 (235 89)          Physical Exam:    General:  Normal appearance in no distress  Eyes:  Anicteric  Oral mucosa:  Moist   Neck:  No JVD  Carotid upstrokes are brisk without bruits  No masses  Chest:  Clear to auscultation, L SC incision healing well without signs of infection  Cardiac:  No palpable PMI  Normal S1 and S2  No murmur gallop or rub  Abdomen:  Soft and nontender  No palpable organomegaly or aortic enlargement  Extremities:  Trace-+1 bilat LE edema  Neuro:  Grossly symmetric  Psych:  Alert and oriented x3        Medications:      Current Facility-Administered Medications:   •  acetaminophen (TYLENOL) tablet 650 mg, 650 mg, Oral, Q6H [facility], CARMEL Faye, 650 mg at 23 6464  •  albuterol inhalation solution 2 5 mg, 2 5 mg, Nebulization, Q4H PRN, CARMEL Faye, 2 5 mg at 23 1344  •  [] amiodarone (CORDARONE) 900 mg in dextrose 5 % 500 mL infusion, 1 mg/min, Intravenous, Continuous, Stopped at 23 1808 **FOLLOWED BY** [] amiodarone (CORDARONE) 900 mg in dextrose 5 % 500 mL infusion, 0 5 mg/min, Intravenous, Continuous, Stopped at 23 1206 **FOLLOWED BY** amiodarone tablet 200 mg, 200 mg, Oral, TID With Meals, 200 mg at 23 0847 **FOLLOWED BY** [START ON 6/3/2023] amiodarone tablet 200 mg, 200 mg, Oral, BID With Meals **FOLLOWED BY** [START ON 2023] amiodarone tablet 200 mg, 200 mg, Oral, Daily With Breakfast, CARMEL Faye  •  apixaban (ELIQUIS) tablet 5 mg, 5 mg, Oral, BID, CARMEL Faye, 5 mg at 23 7464  •  artificial tear (LUBRIFRESH P M ) ophthalmic ointment, , Both Eyes, HS, CARMEL Faye, Given at 23 4763  •  brimonidine tartrate 0 2 % ophthalmic solution 1 drop, 1 drop, Both Eyes, BID, CARMEL Faye, 1 drop at 23 9860  •  carvedilol (COREG) tablet 12 5 mg, 12 5 mg, Oral, BID With Meals, CARMEL Faye, 12 5 mg at 23 0820  •  chlorhexidine (PERIDEX) 0 12 % oral rinse 15 mL, 15 mL, Mouth/Throat, Q12H [facility], CARMEL Faye, 15 mL at 05/24/23 2036  •  cyanocobalamin (VITAMIN B-12) tablet 250 mcg, 250 mcg, Oral, Daily, Stas Pel, CRNP, 250 mcg at 05/25/23 6535  •  EPINEPHrine (ADRENALIN) injection, , Intravenous, Code/Trauma/Sedation Katie Hanna, CRNP, 1 mg at 05/17/23 4932  •  guaiFENesin (MUCINEX) 12 hr tablet 600 mg, 600 mg, Oral, Q12H [address], Stas Pel, CRNP, 600 mg at 05/25/23 0678  •  hydrALAZINE (APRESOLINE) injection 5 mg, 5 mg, Intravenous, Q6H PRN, Stas Pel, CRNP, 5 mg at 05/21/23 2116  •  lactulose oral solution 20 g, 20 g, Oral, BID, Stas Pel, CRNP, 20 g at 05/24/23 9013  •  levothyroxine tablet 50 mcg, 50 mcg, Oral, Early Morning, Stas Pel, CRNP, 50 mcg at 05/25/23 0607  •  lidocaine (LIDODERM) 5 % patch 1 patch, 1 patch, Topical, Daily, Stas Pel, CRNP, 1 patch at 05/22/23 2124  •  lidocaine (LIDODERM) 5 % patch 1 patch, 1 patch, Topical, Daily, Stas Pel, CRNP, 1 patch at 05/22/23 8539  •  melatonin tablet 6 mg, 6 mg, Oral, HS, Stas Pel, CRNP, 6 mg at 05/24/23 2154  •  oxyCODONE (ROXICODONE) IR tablet 5 mg, 5 mg, Oral, Q4H PRN, 5 mg at 05/24/23 1931 **OR** oxyCODONE (ROXICODONE) immediate release tablet 10 mg, 10 mg, Oral, Q4H PRN, Stas Pel, CRNP, 10 mg at 05/24/23 0502  •  polyethylene glycol (MIRALAX) packet 17 g, 17 g, Oral, Daily, Stas Pel, CRNP, 17 g at 05/24/23 8649  •  QUEtiapine (SEROquel) tablet 25 mg, 25 mg, Oral, HS, Stas Pel, CRNP, 25 mg at 05/24/23 2155  •  senna-docusate sodium (SENOKOT S) 8 6-50 mg per tablet 2 tablet, 2 tablet, Oral, HS, Stas Pel, CRNP, 2 tablet at 05/24/23 2154  •  tamsulosin (FLOMAX) capsule 0 4 mg, 0 4 mg, Oral, Daily With Dinner, Stas Pel, CRNP, 0 4 mg at 05/24/23 1723  •  travoprost (TRAVATAN-Z) 0 004 % ophthalmic solution 1 drop, 1 drop, Both Eyes, HS, Alexia Aragon, CRNP, 1 drop at 05/24/23 2155     Labs & Results:    Troponins:         BNP:     CBC with diff:   Results from last 7 days   Lab Units 05/23/23  0552 05/22/23  9854 HEMATOCRIT % 31 8* 35 0*   HEMOGLOBIN g/dL 10 3* 11 6*   MCV fL 95 93   PLATELETS Thousands/uL 158 159   WBC Thousand/uL 9 52 9 47     TSH:     CMP:   Results from last 7 days   Lab Units 05/25/23  0509 05/23/23  0552 05/20/23  0613 05/19/23  0513   ALT U/L  --   --   --  37   AST U/L  --   --   --  18   BUN mg/dL 12 21   < > 22   CHLORIDE mmol/L 104 108   < > 108   CO2 mmol/L 25 27   < > 26   CREATININE mg/dL 0 88 0 80   < > 0 90   EGFR ml/min/1 73sq m 85 89   < > 85   POTASSIUM mmol/L 4 3 3 5   < > 3 9    < > = values in this interval not displayed       Lipid Profile:   Results from last 7 days   Lab Units 05/19/23  0513   HDL mg/dL 35*   TRIGLYCERIDES mg/dL 178*     Coags:

## 2023-05-25 NOTE — PLAN OF CARE
Problem: PAIN - ADULT  Goal: Verbalizes/displays adequate comfort level or baseline comfort level  Description: Interventions:  - Encourage patient to monitor pain and request assistance  - Assess pain using appropriate pain scale  - Administer analgesics based on type and severity of pain and evaluate response  - Implement non-pharmacological measures as appropriate and evaluate response  - Consider cultural and social influences on pain and pain management  - Notify physician/advanced practitioner if interventions unsuccessful or patient reports new pain  Outcome: Progressing     Problem: INFECTION - ADULT  Goal: Absence or prevention of progression during hospitalization  Description: INTERVENTIONS:  - Assess and monitor for signs and symptoms of infection  - Monitor lab/diagnostic results  - Monitor all insertion sites, i e  indwelling lines, tubes, and drains  - Monitor endotracheal if appropriate and nasal secretions for changes in amount and color  - Baldwin appropriate cooling/warming therapies per order  - Administer medications as ordered  - Instruct and encourage patient and family to use good hand hygiene technique  - Identify and instruct in appropriate isolation precautions for identified infection/condition  Outcome: Progressing     Problem: SAFETY ADULT  Goal: Patient will remain free of falls  Description: INTERVENTIONS:  - Educate patient/family on patient safety including physical limitations  - Instruct patient to call for assistance with activity   - Consult OT/PT to assist with strengthening/mobility   - Keep Call bell within reach  - Keep bed low and locked with side rails adjusted as appropriate  - Keep care items and personal belongings within reach  - Initiate and maintain comfort rounds  - Make Fall Risk Sign visible to staff  - Offer Toileting every 2 Hours, in advance of need  - Initiate/Maintain bed alarm  - Apply yellow socks and bracelet for high fall risk patients  - Consider moving patient to room near nurses station  Outcome: Progressing  Goal: Maintain or return to baseline ADL function  Description: INTERVENTIONS:  -  Assess patient's ability to carry out ADLs; assess patient's baseline for ADL function and identify physical deficits which impact ability to perform ADLs (bathing, care of mouth/teeth, toileting, grooming, dressing, etc )  - Assess/evaluate cause of self-care deficits   - Assess range of motion  - Assess patient's mobility; develop plan if impaired  - Assess patient's need for assistive devices and provide as appropriate  - Encourage maximum independence but intervene and supervise when necessary  - Involve family in performance of ADLs  - Assess for home care needs following discharge   - Consider OT consult to assist with ADL evaluation and planning for discharge  - Provide patient education as appropriate  Outcome: Progressing  Goal: Maintains/Returns to pre admission functional level  Description: INTERVENTIONS:  - Perform BMAT or MOVE assessment daily    - Set and communicate daily mobility goal to care team and patient/family/caregiver  - Collaborate with rehabilitation services on mobility goals if consulted  - Perform Range of Motion 3 times a day  - Reposition patient every 2 hours    - Dangle patient 3 times a day  - Stand patient 3 times a day  - Ambulate patient 3 times a day  - Out of bed for meals   - Out of bed for toileting  - Record patient progress and toleration of activity level   Outcome: Progressing     Problem: DISCHARGE PLANNING  Goal: Discharge to home or other facility with appropriate resources  Description: INTERVENTIONS:  - Identify barriers to discharge w/patient and caregiver  - Arrange for needed discharge resources and transportation as appropriate  - Identify discharge learning needs (meds, wound care, etc )  - Arrange for interpretive services to assist at discharge as needed  - Refer to Case Management Department for coordinating discharge planning if the patient needs post-hospital services based on physician/advanced practitioner order or complex needs related to functional status, cognitive ability, or social support system  Outcome: Progressing     Problem: Knowledge Deficit  Goal: Patient/family/caregiver demonstrates understanding of disease process, treatment plan, medications, and discharge instructions  Description: Complete learning assessment and assess knowledge base    Interventions:  - Provide teaching at level of understanding  - Provide teaching via preferred learning methods  Outcome: Progressing     Problem: NEUROSENSORY - ADULT  Goal: Achieves stable or improved neurological status  Description: INTERVENTIONS  - Monitor and report changes in neurological status  - Monitor vital signs such as temperature, blood pressure, glucose, and any other labs ordered   - Initiate measures to prevent increased intracranial pressure  - Monitor for seizure activity and implement precautions if appropriate      Outcome: Progressing  Goal: Remains free of injury related to seizures activity  Description: INTERVENTIONS  - Maintain airway, patient safety  and administer oxygen as ordered  - Monitor patient for seizure activity, document and report duration and description of seizure to physician/advanced practitioner  - If seizure occurs,  ensure patient safety during seizure  - Reorient patient post seizure  - Seizure pads on all 4 side rails  - Instruct patient/family to notify RN of any seizure activity including if an aura is experienced  - Instruct patient/family to call for assistance with activity based on nursing assessment  - Administer anti-seizure medications if ordered    Outcome: Progressing  Goal: Achieves maximal functionality and self care  Description: INTERVENTIONS  - Monitor swallowing and airway patency with patient fatigue and changes in neurological status  - Encourage and assist patient to increase activity and self care    - Encourage visually impaired, hearing impaired and aphasic patients to use assistive/communication devices  Outcome: Progressing     Problem: CARDIOVASCULAR - ADULT  Goal: Maintains optimal cardiac output and hemodynamic stability  Description: INTERVENTIONS:  - Monitor I/O, vital signs and rhythm  - Monitor for S/S and trends of decreased cardiac output  - Administer and titrate ordered vasoactive medications to optimize hemodynamic stability  - Assess quality of pulses, skin color and temperature  - Assess for signs of decreased coronary artery perfusion  - Instruct patient to report change in severity of symptoms  Outcome: Progressing  Goal: Absence of cardiac dysrhythmias or at baseline rhythm  Description: INTERVENTIONS:  - Continuous cardiac monitoring, vital signs, obtain 12 lead EKG if ordered  - Administer antiarrhythmic and heart rate control medications as ordered  - Monitor electrolytes and administer replacement therapy as ordered  Outcome: Progressing     Problem: RESPIRATORY - ADULT  Goal: Achieves optimal ventilation and oxygenation  Description: INTERVENTIONS:  - Assess for changes in respiratory status  - Assess for changes in mentation and behavior  - Position to facilitate oxygenation and minimize respiratory effort  - Oxygen administered by appropriate delivery if ordered  - Initiate smoking cessation education as indicated  - Encourage broncho-pulmonary hygiene including cough, deep breathe, Incentive Spirometry  - Assess the need for suctioning and aspirate as needed  - Assess and instruct to report SOB or any respiratory difficulty  - Respiratory Therapy support as indicated  Outcome: Progressing     Problem: GENITOURINARY - ADULT  Goal: Maintains or returns to baseline urinary function  Description: INTERVENTIONS:  - Assess urinary function  - Encourage oral fluids to ensure adequate hydration if ordered  - Administer IV fluids as ordered to ensure adequate hydration  - Administer ordered medications as needed  - Offer frequent toileting  - Follow urinary retention protocol if ordered  Outcome: Progressing  Goal: Absence of urinary retention  Description: INTERVENTIONS:  - Assess patient’s ability to void and empty bladder  - Monitor I/O  - Bladder scan as needed  - Discuss with physician/AP medications to alleviate retention as needed  - Discuss catheterization for long term situations as appropriate  Outcome: Progressing     Problem: Prexisting or High Potential for Compromised Skin Integrity  Goal: Skin integrity is maintained or improved  Description: INTERVENTIONS:  - Identify patients at risk for skin breakdown  - Assess and monitor skin integrity  - Assess and monitor nutrition and hydration status  - Monitor labs   - Assess for incontinence   - Turn and reposition patient  - Assist with mobility/ambulation  - Relieve pressure over bony prominences  - Avoid friction and shearing  - Provide appropriate hygiene as needed including keeping skin clean and dry  - Evaluate need for skin moisturizer/barrier cream  - Collaborate with interdisciplinary team   - Patient/family teaching  - Consider wound care consult   Outcome: Progressing     Problem: MOBILITY - ADULT  Goal: Maintain or return to baseline ADL function  Description: INTERVENTIONS:  -  Assess patient's ability to carry out ADLs; assess patient's baseline for ADL function and identify physical deficits which impact ability to perform ADLs (bathing, care of mouth/teeth, toileting, grooming, dressing, etc )  - Assess/evaluate cause of self-care deficits   - Assess range of motion  - Assess patient's mobility; develop plan if impaired  - Assess patient's need for assistive devices and provide as appropriate  - Encourage maximum independence but intervene and supervise when necessary  - Involve family in performance of ADLs  - Assess for home care needs following discharge   - Consider OT consult to assist with ADL evaluation and planning for discharge  - Provide patient education as appropriate  Outcome: Progressing  Goal: Maintains/Returns to pre admission functional level  Description: INTERVENTIONS:  - Perform BMAT or MOVE assessment daily    - Set and communicate daily mobility goal to care team and patient/family/caregiver  - Collaborate with rehabilitation services on mobility goals if consulted  - Perform Range of Motion 3 times a day  - Reposition patient every 2 hours  - Dangle patient 3 times a day  - Stand patient 3 times a day  - Ambulate patient 3 times a day  - Out of bed for meals   - Out of bed for toileting  - Record patient progress and toleration of activity level   Outcome: Progressing     Problem: Nutrition/Hydration-ADULT  Goal: Nutrient/Hydration intake appropriate for improving, restoring or maintaining nutritional needs  Description: Monitor and assess patient's nutrition/hydration status for malnutrition  Collaborate with interdisciplinary team and initiate plan and interventions as ordered  Monitor patient's weight and dietary intake as ordered or per policy  Utilize nutrition screening tool and intervene as necessary  Determine patient's food preferences and provide high-protein, high-caloric foods as appropriate       INTERVENTIONS:  - Monitor oral intake, urinary output, labs, and treatment plans  - Assess nutrition and hydration status and recommend course of action  - Evaluate amount of meals eaten  - Assist patient with eating if necessary   - Allow adequate time for meals  - Recommend/ encourage appropriate diets, oral nutritional supplements, and vitamin/mineral supplements  - Order, calculate, and assess calorie counts as needed  - Recommend, monitor, and adjust tube feedings and TPN/PPN based on assessed needs  - Assess need for intravenous fluids  - Provide specific nutrition/hydration education as appropriate  - Include patient/family/caregiver in decisions related to nutrition  Outcome: Progressing

## 2023-05-25 NOTE — PHYSICAL THERAPY NOTE
"Physical Therapy Treatment Note       05/25/23 0739   PT Last Visit   PT Visit Date 05/25/23   Note Type   Note Type Treatment   Pain Assessment   Pain Assessment Tool 0-10   Pain Score No Pain   Restrictions/Precautions   Weight Bearing Precautions Per Order No   Other Precautions Impulsive; Fall Risk   General   Chart Reviewed Yes   Response to Previous Treatment Patient with no complaints from previous session  Family/Caregiver Present No   Cognition   Arousal/Participation Alert; Responsive; Cooperative   Attention Within functional limits   Orientation Level Oriented X4   Memory Decreased recall of recent events;Decreased recall of precautions   Following Commands Follows one step commands without difficulty   Comments pt agreeable to PT session   Subjective   Subjective \"I can go for a walk\"   Bed Mobility   Supine to Sit 5  Supervision   Additional items Assist x 1;HOB elevated; Increased time required   Transfers   Sit to Stand 5  Supervision   Additional items Assist x 1; Armrests; Verbal cues  (VCs for hand placement and pt favors placement of UEs onto RW for initiation of standing)   Stand to Sit 5  Supervision   Additional items Assist x 1; Armrests; Verbal cues   Toilet transfer 5  Supervision   Additional items Assist x 1;Standard toilet; Increased time required   Additional Comments SpO2 throughout activity = 91-94% (1 instance noted to be 89% on masimo, but not great waveform noted)   Ambulation/Elevation   Gait pattern Decreased foot clearance; Short stride; Foward flexed   Gait Assistance 5  Supervision   Additional items Assist x 1   Assistive Device Rolling walker   Distance 90 ft, 26 ft x 2   Balance   Static Sitting Normal   Dynamic Sitting Good   Static Standing Fair   Dynamic Standing Fair -   Ambulatory Fair -   Endurance Deficit   Endurance Deficit Yes   Activity Tolerance   Activity Tolerance Patient limited by fatigue   Medical Staff Made Aware BILLY Garcia Yes, RN made aware of " "outcomes/recs   Exercises   Knee AROM Long Arc Quad Sitting;20 reps;AROM; Right;Left   Ankle Pumps Sitting;20 reps;AROM; Right;Left   Marching Sitting;20 reps;AROM; Right;Left   Assessment   Prognosis Good   Problem List Decreased strength;Decreased endurance; Impaired balance;Decreased mobility; Decreased coordination;Decreased cognition; Impaired judgement;Decreased safety awareness;Decreased skin integrity   Assessment Pt seen for PT treatment session this date, consisting of ther act focused on bed mobility & functional transfer from multiple surfaces, ther ex focused on strengthening and gt training on level surfaces to improve pt safety in household environment  Since previous session, pt has made good progress in terms of ongoing improvement in OOB activity tolerance and ability to tolerate household mobility c RW at [facility] level  Pertinent barriers during this session include fatigue and SOB  Current goals and POC remain appropriate, pt continues to have rehab potential and is making good progress towards STGs  Pt prognosis for achieving goals is good, pending pt progress with hospitalization/medical status improvements, and indicated by self-expression (thoughts, feelings, needs), motivation, recent history of independence with functional skills/High PLOF, supportive family/caregivers and learning potential  Pt limited d/t medical instability  PT recommends home with home health rehabilitation upon discharge  Pt continues to be functioning below baseline level, and remains limited 2* factors listed above  PT will continue to see pt during current hospitalization in order to address the deficits listed above and provide interventions consistent w/ POC in effort to achieve STGs     Barriers to Discharge Inaccessible home environment   Goals   Patient Goals \"to return home\"   STG Expiration Date 05/30/23   Short Term Goal #1 goals remain appropriate   PT Treatment Day 3   Plan   Treatment/Interventions Functional " transfer training;LE strengthening/ROM; Therapeutic exercise; Endurance training;Patient/family training;Equipment eval/education; Bed mobility;Gait training;Spoke to nursing;Spoke to case management   Progress Progressing toward goals   PT Frequency 3-5x/wk   Recommendation   PT Discharge Recommendation Home with home health rehabilitation   Equipment Recommended   (continue RW use)   Additional Comments Pt's raw score on the [facility] Basic Mobility inpatient short form is 19, standardized score is 42 48  Patients at this level are likely to benefit from DC to home with home care, however, please refer to therapist recommendation for safe DC planning  [facility] Basic Mobility Inpatient   Turning in Flat Bed Without Bedrails 4   Lying on Back to Sitting on Edge of Flat Bed Without Bedrails 4   Moving Bed to Chair 3   Standing Up From Chair Using Arms 3   Walk in Room 3   Climb 3-5 Stairs With Railing 2   Basic Mobility Inpatient Raw Score 19   Basic Mobility Standardized Score 42 48   Highest Level Of Mobility   JH-HLM Goal 6: Walk 10 steps or more   JH-HLM Achieved 7: Walk 25 feet or more   Education   Education Provided Mobility training;Home exercise program;Assistive device   Patient Demonstrates acceptance/verbal understanding;Explanation/teachback used; Reinforcement needed   End of Consult   Patient Position at End of Consult Bedside chair;Bed/Chair alarm activated; All needs within reach       Gauri Hinds, PT, DPT    Time of PT treatment session: 7292-8429 (total treatment time = 39 minutes)

## 2023-05-25 NOTE — PLAN OF CARE
Problem: PHYSICAL THERAPY ADULT  Goal: Performs mobility at highest level of function for planned discharge setting  See evaluation for individualized goals  Description: Treatment/Interventions: Functional transfer training, LE strengthening/ROM, Therapeutic exercise, Endurance training, Patient/family training, Equipment eval/education, Bed mobility, Gait training, Spoke to nursing, Spoke to case management  Equipment Recommended:  (TBD pending progress and ongoing pacemaker precautions/sling in place to L UE)       See flowsheet documentation for full assessment, interventions and recommendations  Outcome: Progressing  Note: Prognosis: Good  Problem List: Decreased strength, Decreased endurance, Impaired balance, Decreased mobility, Decreased coordination, Decreased cognition, Impaired judgement, Decreased safety awareness, Decreased skin integrity  Assessment: Pt seen for PT treatment session this date, consisting of ther act focused on bed mobility & functional transfer from multiple surfaces, ther ex focused on strengthening and gt training on level surfaces to improve pt safety in household environment  Since previous session, pt has made good progress in terms of ongoing improvement in OOB activity tolerance and ability to tolerate household mobility c RW at [facility] level  Pertinent barriers during this session include fatigue and SOB  Current goals and POC remain appropriate, pt continues to have rehab potential and is making good progress towards STGs  Pt prognosis for achieving goals is good, pending pt progress with hospitalization/medical status improvements, and indicated by self-expression (thoughts, feelings, needs), motivation, recent history of independence with functional skills/High PLOF, supportive family/caregivers and learning potential  Pt limited d/t medical instability  PT recommends home with home health rehabilitation upon discharge   Pt continues to be functioning below baseline level, and remains limited 2* factors listed above  PT will continue to see pt during current hospitalization in order to address the deficits listed above and provide interventions consistent w/ POC in effort to achieve STGs  Barriers to Discharge: Inaccessible home environment     PT Discharge Recommendation: Home with home health rehabilitation    See flowsheet documentation for full assessment

## 2023-05-25 NOTE — ASSESSMENT & PLAN NOTE
· Status post cardiac arrest/pulmonary edema/possible aspiration pneumonia  · He was intubated and after extubation required up to 12 L of oxygen  · S/p IV antibiotics and IV diuresis   · He is currently saturating well on room air

## 2023-05-26 ENCOUNTER — HOME CARE VISIT (OUTPATIENT)
Dept: HOME HEALTH SERVICES | Facility: HOME HEALTHCARE | Age: 73
End: 2023-05-26

## 2023-05-26 VITALS
SYSTOLIC BLOOD PRESSURE: 138 MMHG | TEMPERATURE: 98.3 F | DIASTOLIC BLOOD PRESSURE: 68 MMHG | WEIGHT: 216.2 LBS | HEIGHT: 70 IN | BODY MASS INDEX: 30.95 KG/M2 | OXYGEN SATURATION: 99 % | RESPIRATION RATE: 18 BRPM | HEART RATE: 62 BPM

## 2023-05-30 ENCOUNTER — OFFICE VISIT (OUTPATIENT)
Dept: FAMILY MEDICINE CLINIC | Facility: CLINIC | Age: 73
End: 2023-05-30

## 2023-05-30 VITALS
TEMPERATURE: 98.6 F | OXYGEN SATURATION: 97 % | WEIGHT: 208 LBS | SYSTOLIC BLOOD PRESSURE: 168 MMHG | HEIGHT: 72 IN | DIASTOLIC BLOOD PRESSURE: 90 MMHG | HEART RATE: 60 BPM | BODY MASS INDEX: 28.17 KG/M2

## 2023-05-30 DIAGNOSIS — G47.33 OBSTRUCTIVE SLEEP APNEA HYPOPNEA, SEVERE: ICD-10-CM

## 2023-05-30 DIAGNOSIS — I10 ESSENTIAL HYPERTENSION: ICD-10-CM

## 2023-05-30 DIAGNOSIS — J69.0 ASPIRATION PNEUMONIA OF BOTH LUNGS, UNSPECIFIED ASPIRATION PNEUMONIA TYPE, UNSPECIFIED PART OF LUNG (HCC): ICD-10-CM

## 2023-05-30 DIAGNOSIS — I48.0 PAROXYSMAL ATRIAL FIBRILLATION (HCC): ICD-10-CM

## 2023-05-30 DIAGNOSIS — Z86.74 HISTORY OF CARDIAC ARREST: ICD-10-CM

## 2023-05-30 DIAGNOSIS — Z76.89 ENCOUNTER FOR SUPPORT AND COORDINATION OF TRANSITION OF CARE: Primary | ICD-10-CM

## 2023-05-30 DIAGNOSIS — S22.43XD MULTIPLE CLOSED FRACTURES OF RIBS OF BOTH SIDES WITH ROUTINE HEALING, SUBSEQUENT ENCOUNTER: ICD-10-CM

## 2023-05-30 LAB
FUNGUS SPEC CULT: NORMAL
FUNGUS SPEC CULT: NORMAL

## 2023-05-30 NOTE — PROGRESS NOTES
Assessment/Plan: Patient will be seen in 6 weeks he will follow-up with Dr Ziyad Carrera in about a month I have ordered magnesium level BMP within a week we x-rayed his chest and his low back at his request for the low back and we will get him set up for a sleep study ASAP     Diagnoses and all orders for this visit:    Encounter for support and coordination of transition of care    Multiple closed fractures of ribs of both sides with routine healing, subsequent encounter    History of cardiac arrest    Paroxysmal atrial fibrillation ([facility] [address] )    Essential hypertension  -     Basic metabolic panel; Future  -     Magnesium; Future    Aspiration pneumonia of both lungs, unspecified aspiration pneumonia type, unspecified part of lung ([address] )  -     XR chest pa & lateral; Future  -     XR spine lumbar complete w bending minimum 6 views; Future    Obstructive sleep apnea hypopnea, severe  -     Ambulatory Referral to Sleep Medicine; Future         Subjective:     Patient ID: Pal Brown is a 67 y o  male  Mr  Diple Olp here he was a cardiac arrhythmia with cardiac arrest CPR was administered it was a witnessed arrest he received 2 doses of epinephrine and was fibrillated in the field and possibly a second time in the emergency room he was intubated ventilated apparently had a aspiration pneumonia possibly as a result of CPR because he had no preceding respiratory infection patient now has a pacemaker defibrillator his cardiologist is Dr Benson Bettencourt he has not yet been set up in device clinic nor has he seen PT OT but honestly he probably does not need any of those he we talked at length about diet what he is allowed to do at the present time and avoiding nonsteroid anti-inflammatories or aspirin because of his Eliquis      Review of Systems   Constitutional: Negative for activity change, appetite change, diaphoresis, fatigue and fever  HENT: Negative  Eyes: Negative  Respiratory: Positive for cough   Negative for apnea, chest tightness, shortness of breath and wheezing  Cardiovascular: Positive for chest pain and palpitations  Negative for leg swelling  Gastrointestinal: Negative for abdominal distention, abdominal pain, anal bleeding, constipation, diarrhea, nausea and vomiting  Endocrine: Negative for cold intolerance, heat intolerance, polydipsia, polyphagia and polyuria  Genitourinary: Negative for difficulty urinating, dysuria, flank pain, hematuria and urgency  Musculoskeletal: Negative for arthralgias, back pain, gait problem, joint swelling and myalgias  Skin: Negative for color change, rash and wound  Allergic/Immunologic: Negative for environmental allergies, food allergies and immunocompromised state  Neurological: Negative for dizziness, seizures, syncope, speech difficulty, numbness and headaches  Hematological: Negative for adenopathy  Does not bruise/bleed easily  Psychiatric/Behavioral: Negative for agitation, behavioral problems, hallucinations, sleep disturbance and suicidal ideas  The patient is nervous/anxious  Objective:     Physical Exam  Constitutional:       General: He is not in acute distress  Appearance: He is well-developed  He is not diaphoretic  HENT:      Head: Normocephalic  Right Ear: External ear normal       Left Ear: External ear normal       Nose: Nose normal    Eyes:      General: No scleral icterus  Right eye: No discharge  Left eye: No discharge  Conjunctiva/sclera: Conjunctivae normal       Pupils: Pupils are equal, round, and reactive to light  Neck:      Thyroid: No thyromegaly  Trachea: No tracheal deviation  Cardiovascular:      Rate and Rhythm: Normal rate and regular rhythm  Heart sounds: Normal heart sounds  No murmur heard  No friction rub  No gallop  Pulmonary:      Effort: Pulmonary effort is normal  No respiratory distress  Breath sounds: Normal breath sounds  No wheezing     Abdominal: General: Bowel sounds are normal       Palpations: Abdomen is soft  There is no mass  Tenderness: There is no abdominal tenderness  There is no guarding  Musculoskeletal:         General: No deformity  Cervical back: Normal range of motion  Lymphadenopathy:      Cervical: No cervical adenopathy  Skin:     General: Skin is warm and dry  Findings: No erythema or rash  Neurological:      Mental Status: He is alert and oriented to person, place, and time  Cranial Nerves: No cranial nerve deficit  Psychiatric:         Thought Content: Thought content normal            Vitals:    05/30/23 1426   BP: 168/90   BP Location: Right arm   Patient Position: Sitting   Cuff Size: Standard   Pulse: 60   Temp: 98 6 °F (37 °C)   TempSrc: Temporal   SpO2: 97%   Weight: 94 3 kg (208 lb)   Height: 6' (1 829 m)       The following portions of the patient's history were reviewed and updated as appropriate: He  has no past medical history on file  He   Patient Active Problem List    Diagnosis Date Noted   • Essential hypertension 05/21/2023   • Constipation 05/21/2023   • Hypothyroidism 05/21/2023   • Delirium 05/20/2023   • Paroxysmal atrial fibrillation ([address] ) 05/19/2023   • Torsades de pointes ([address] ) 05/17/2023   • Anemia 05/17/2023   • History of cardiac arrest 05/16/2023   • Acute respiratory failure with hypoxia ([address] ) 05/16/2023   • Closed fracture of multiple ribs of both sides 05/16/2023   • Aspiration pneumonia ([address] ) 05/16/2023     He  has a past surgical history that includes Appendectomy (2006) and Cardiac defibrillator placement (Left, 5/18/2023)  His family history includes Colon cancer in his brother and daughter; Heart disease in his father  He  reports that he has never smoked  He has never used smokeless tobacco  He reports current alcohol use  He reports that he does not use drugs    Current Outpatient Medications   Medication Sig Dispense Refill   • albuterol (2 5 mg/3 mL) 0 083 % nebulizer solution Take 3 mL (2 5 mg total) by nebulization every 4 (four) hours as needed for wheezing or shortness of breath 75 mL 0   • ALPHAGAN P 0 1 %      • amiodarone 200 mg tablet Take 1 tablet (200 mg total) by mouth 3 (three) times a day with meals for 30 doses 30 tablet 0   • apixaban (ELIQUIS) 5 mg Take 1 tablet (5 mg total) by mouth 2 (two) times a day 60 tablet 0   • carvedilol (COREG) 12 5 mg tablet Take 1 tablet (12 5 mg total) by mouth 2 (two) times a day with meals 60 tablet 0   • cyanocobalamin (VITAMIN B-12) 250 MCG tablet Take 1 tablet (250 mcg total) by mouth daily Do not start before May 25, 2023  30 tablet 0   • guaiFENesin (MUCINEX) 600 mg 12 hr tablet Take 1 tablet (600 mg total) by mouth every 12 (twelve) hours 60 tablet 0   • levothyroxine 50 mcg tablet Take 1 tablet (50 mcg total) by mouth daily in the early morning Do not start before May 25, 2023  30 tablet 0   • losartan (COZAAR) 25 mg tablet Take 1 tablet (25 mg total) by mouth daily Do not start before May 26, 2023  30 tablet 0   • oxyCODONE (ROXICODONE) 5 immediate release tablet Take 1 tablet (5 mg total) by mouth every 4 (four) hours as needed for moderate pain for up to 10 days Max Daily Amount: 30 mg 20 tablet 0   • senna-docusate sodium (SENOKOT S) 8 6-50 mg per tablet Take 2 tablets by mouth daily at bedtime 60 tablet 0   • tamsulosin (FLOMAX) 0 4 mg Take 1 capsule (0 4 mg total) by mouth daily with dinner 30 capsule 0   • travoprost (TRAVATAN-Z) 0 004 % ophthalmic solution      • [START ON 6/3/2023] amiodarone 200 mg tablet Take 1 tablet (200 mg total) by mouth 2 (two) times a day with meals for 28 doses Do not start before Yaritza 3, 2023  (Patient not taking: Reported on 5/30/2023 Do not start before Yaritza 3, 2023 ) 28 tablet 0   • [START ON 6/18/2023] amiodarone 200 mg tablet Take 1 tablet (200 mg total) by mouth daily with breakfast for 14 doses Do not start before June 18, 2023   (Patient not taking: Reported on 5/30/2023 Do not start before June 18, 2023 ) 14 tablet 0     No current facility-administered medications for this visit  Current Outpatient Medications on File Prior to Visit   Medication Sig   • albuterol (2 5 mg/3 mL) 0 083 % nebulizer solution Take 3 mL (2 5 mg total) by nebulization every 4 (four) hours as needed for wheezing or shortness of breath   • ALPHAGAN P 0 1 %    • amiodarone 200 mg tablet Take 1 tablet (200 mg total) by mouth 3 (three) times a day with meals for 30 doses   • apixaban (ELIQUIS) 5 mg Take 1 tablet (5 mg total) by mouth 2 (two) times a day   • carvedilol (COREG) 12 5 mg tablet Take 1 tablet (12 5 mg total) by mouth 2 (two) times a day with meals   • cyanocobalamin (VITAMIN B-12) 250 MCG tablet Take 1 tablet (250 mcg total) by mouth daily Do not start before May 25, 2023  • guaiFENesin (MUCINEX) 600 mg 12 hr tablet Take 1 tablet (600 mg total) by mouth every 12 (twelve) hours   • levothyroxine 50 mcg tablet Take 1 tablet (50 mcg total) by mouth daily in the early morning Do not start before May 25, 2023  • losartan (COZAAR) 25 mg tablet Take 1 tablet (25 mg total) by mouth daily Do not start before May 26, 2023  • oxyCODONE (ROXICODONE) 5 immediate release tablet Take 1 tablet (5 mg total) by mouth every 4 (four) hours as needed for moderate pain for up to 10 days Max Daily Amount: 30 mg   • senna-docusate sodium (SENOKOT S) 8 6-50 mg per tablet Take 2 tablets by mouth daily at bedtime   • tamsulosin (FLOMAX) 0 4 mg Take 1 capsule (0 4 mg total) by mouth daily with dinner   • travoprost (TRAVATAN-Z) 0 004 % ophthalmic solution    • [START ON 6/3/2023] amiodarone 200 mg tablet Take 1 tablet (200 mg total) by mouth 2 (two) times a day with meals for 28 doses Do not start before Yaritza 3, 2023   (Patient not taking: Reported on 5/30/2023 Do not start before Yaritza 3, 2023 )   • [START ON 6/18/2023] amiodarone 200 mg tablet Take 1 tablet (200 mg total) by mouth daily with breakfast for 14 doses Do not start before June 18, 2023  (Patient not taking: Reported on 5/30/2023 Do not start before June 18, 2023 )     No current facility-administered medications on file prior to visit  He has No Known Allergies       Transitional Care Management Review:  Celestine Bryant is a 67 y o  male here for TCM follow up  During the TCM phone call patient stated:    TCM Call     Date and time call was made  5/25/2023  3:01 PM    Hospital care reviewed  Records reviewed; Lab studies pending  CBC, CMP    Patient was hospitialized at  [address]    Date of Admission  05/16/23    Date of discharge  05/25/23    Diagnosis  acute respiratory distress w hypoxia, cardiac arrest    Disposition  Home; Home health services    Were the patients medications reviewed and updated  No    Current Symptoms  None      TCM Call     Post hospital issues  None    Should patient be enrolled in anticoag monitoring?   No    Scheduled for follow up?  --  TCM 5/30    Patients specialists  Cardiologist    Did you obtain your prescribed medications  Yes    Do you need help managing your prescriptions or medications  No    Is transportation to your appointment needed  No    I have advised the patient to call PCP with any new or worsening symptoms  LIGIA PAZ-PATIENT Rebecca 197, DO

## 2023-05-30 NOTE — CASE COMMUNICATION
[facility] has admitted your patient to [address] [place] [address] service with the following disciplines:      SN, PT and OT  This report is informational only, no responses is needed  Primary focus of home health care Cardiac  Patient stated goals of care to get back to normal  Anticipated visit pattern and next visit date 1w1 2w2 1w3 next visit 5/31  See medication list   Significant clinical findings weakness and gait dysfunction  Potential ba rriers to goal achievement pain    Thank you for allowing us to participate in the care of your patient        Bita Vidal RN

## 2023-05-31 ENCOUNTER — HOME CARE VISIT (OUTPATIENT)
Dept: HOME HEALTH SERVICES | Facility: HOME HEALTHCARE | Age: 73
End: 2023-05-31

## 2023-05-31 VITALS
OXYGEN SATURATION: 98 % | RESPIRATION RATE: 18 BRPM | HEART RATE: 76 BPM | BODY MASS INDEX: 27.94 KG/M2 | WEIGHT: 206 LBS | DIASTOLIC BLOOD PRESSURE: 78 MMHG | TEMPERATURE: 98.2 F | SYSTOLIC BLOOD PRESSURE: 138 MMHG

## 2023-05-31 VITALS — SYSTOLIC BLOOD PRESSURE: 128 MMHG | DIASTOLIC BLOOD PRESSURE: 60 MMHG | HEART RATE: 60 BPM | OXYGEN SATURATION: 95 %

## 2023-05-31 LAB
MYCOBACTERIUM SPEC CULT: NORMAL
MYCOBACTERIUM SPEC CULT: NORMAL
RHODAMINE-AURAMINE STN SPEC: NORMAL
RHODAMINE-AURAMINE STN SPEC: NORMAL

## 2023-06-01 ENCOUNTER — LAB (OUTPATIENT)
Dept: LAB | Facility: MEDICAL CENTER | Age: 73
End: 2023-06-01

## 2023-06-01 ENCOUNTER — HOSPITAL ENCOUNTER (OUTPATIENT)
Dept: RADIOLOGY | Facility: HOSPITAL | Age: 73
Discharge: HOME | End: 2023-06-01

## 2023-06-01 ENCOUNTER — TELEPHONE (OUTPATIENT)
Dept: CARDIOLOGY CLINIC | Facility: CLINIC | Age: 73
End: 2023-06-01

## 2023-06-01 DIAGNOSIS — I48.0 PAROXYSMAL ATRIAL FIBRILLATION (HCC): ICD-10-CM

## 2023-06-01 DIAGNOSIS — I46.9 CARDIAC ARREST (HCC): ICD-10-CM

## 2023-06-01 DIAGNOSIS — I10 ESSENTIAL HYPERTENSION: ICD-10-CM

## 2023-06-01 LAB
ALBUMIN SERPL BCP-MCNC: 3.5 G/DL (ref 3.5–5)
ALP SERPL-CCNC: 170 U/L (ref 46–116)
ALT SERPL W P-5'-P-CCNC: 44 U/L (ref 12–78)
ANION GAP SERPL CALCULATED.3IONS-SCNC: 3 MMOL/L (ref 4–13)
AST SERPL W P-5'-P-CCNC: 21 U/L (ref 5–45)
BASOPHILS # BLD AUTO: 0.03 THOUSANDS/ÂΜL (ref 0–0.1)
BASOPHILS NFR BLD AUTO: 0 % (ref 0–1)
BILIRUB SERPL-MCNC: 0.59 MG/DL (ref 0.2–1)
BUN SERPL-MCNC: 12 MG/DL (ref 5–25)
CALCIUM SERPL-MCNC: 9.4 MG/DL (ref 8.3–10.1)
CHLORIDE SERPL-SCNC: 110 MMOL/L (ref 96–108)
CO2 SERPL-SCNC: 23 MMOL/L (ref 21–32)
CREAT SERPL-MCNC: 0.94 MG/DL (ref 0.6–1.3)
EOSINOPHIL # BLD AUTO: 0.04 THOUSAND/ÂΜL (ref 0–0.61)
EOSINOPHIL NFR BLD AUTO: 1 % (ref 0–6)
ERYTHROCYTE [DISTWIDTH] IN BLOOD BY AUTOMATED COUNT: 13.3 % (ref 11.6–15.1)
GFR SERPL CREATININE-BSD FRML MDRD: 80 ML/MIN/1.73SQ M
GLUCOSE P FAST SERPL-MCNC: 98 MG/DL (ref 65–99)
HCT VFR BLD AUTO: 35.3 % (ref 36.5–49.3)
HGB BLD-MCNC: 11.6 G/DL (ref 12–17)
IMM GRANULOCYTES # BLD AUTO: 0.04 THOUSAND/UL (ref 0–0.2)
IMM GRANULOCYTES NFR BLD AUTO: 1 % (ref 0–2)
LYMPHOCYTES # BLD AUTO: 1.3 THOUSANDS/ÂΜL (ref 0.6–4.47)
LYMPHOCYTES NFR BLD AUTO: 19 % (ref 14–44)
MAGNESIUM SERPL-MCNC: 2.6 MG/DL (ref 1.6–2.6)
MCH RBC QN AUTO: 30.4 PG (ref 26.8–34.3)
MCHC RBC AUTO-ENTMCNC: 32.9 G/DL (ref 31.4–37.4)
MCV RBC AUTO: 93 FL (ref 82–98)
MONOCYTES # BLD AUTO: 0.64 THOUSAND/ÂΜL (ref 0.17–1.22)
MONOCYTES NFR BLD AUTO: 10 % (ref 4–12)
NEUTROPHILS # BLD AUTO: 4.71 THOUSANDS/ÂΜL (ref 1.85–7.62)
NEUTS SEG NFR BLD AUTO: 69 % (ref 43–75)
NRBC BLD AUTO-RTO: 0 /100 WBCS
PLATELET # BLD AUTO: 248 THOUSANDS/UL (ref 149–390)
PMV BLD AUTO: 11.3 FL (ref 8.9–12.7)
POTASSIUM SERPL-SCNC: 4 MMOL/L (ref 3.5–5.3)
PROT SERPL-MCNC: 6.8 G/DL (ref 6.4–8.4)
RBC # BLD AUTO: 3.81 MILLION/UL (ref 3.88–5.62)
SODIUM SERPL-SCNC: 136 MMOL/L (ref 135–147)
WBC # BLD AUTO: 6.76 THOUSAND/UL (ref 4.31–10.16)

## 2023-06-01 RX ORDER — CARVEDILOL 12.5 MG/1
12.5 TABLET ORAL 2 TIMES DAILY WITH MEALS
Qty: 60 TABLET | Refills: 5 | Status: SHIPPED | OUTPATIENT
Start: 2023-06-01

## 2023-06-01 NOTE — CASE COMMUNICATION
PT ariel on 5/31/23  PT POC for 1wk3- balance/gait/transfer training, edu, fall prevention       Erendira Maki PT

## 2023-06-02 ENCOUNTER — HOME CARE VISIT (OUTPATIENT)
Dept: HOME HEALTH SERVICES | Facility: HOME HEALTHCARE | Age: 73
End: 2023-06-02
Payer: COMMERCIAL

## 2023-06-02 PROCEDURE — G0299 HHS/HOSPICE OF RN EA 15 MIN: HCPCS

## 2023-06-02 NOTE — RESULT ENCOUNTER NOTE
Please call the patient regarding his abnormal result    Elysium level is good blood sugar and kidney function are good slight elevation of alkaline phosphatase probably from the fractured ribs we should recheck that in about 3 to 4 months

## 2023-06-02 NOTE — UTILIZATION REVIEW
NOTIFICATION OF ADMISSION DISCHARGE   This is a Notification of Discharge from [address]  Please be advised that this patient has been discharge from our facility  Below you will find the admission and discharge date and time including the patient’s disposition  UTILIZATION REVIEW CONTACT:  Thao Torres  Utilization   Network Utilization Review Department  Phone: [phone] carefully listen to the prompts  All voicemails are confidential   Email: [email or web address]     ADMISSION INFORMATION  PRESENTATION DATE: 5/16/2023  3:04 AM  OBERVATION ADMISSION DATE:   INPATIENT ADMISSION DATE: 5/16/23  3:04 AM   DISCHARGE DATE: 5/25/2023  2:21 PM   DISPOSITION:Home with [address] INFORMATION:  Send all requests for admission clinical reviews, approved or denied determinations and any other requests to dedicated fax number below belonging to the campus where the patient is receiving treatment   List of dedicated fax numbers:  [address] DENIALS (Administrative/Medical Necessity) [phone]   [address] (Maternity/NICU/Pediatrics) [phone]   [facility] [phone]   [place] [address] [phone]   [facility] [phone]   [address] [facility] [phone]   [address] [place] [address] [phone]   [address] [place] [address] 119 [phone]   [facility]  [phone]   [address] [facility] [phone]   [address] [facility] [phone]

## 2023-06-04 VITALS
HEART RATE: 62 BPM | WEIGHT: 207 LBS | RESPIRATION RATE: 18 BRPM | OXYGEN SATURATION: 98 % | DIASTOLIC BLOOD PRESSURE: 80 MMHG | BODY MASS INDEX: 28.07 KG/M2 | SYSTOLIC BLOOD PRESSURE: 150 MMHG | TEMPERATURE: 98.2 F

## 2023-06-05 LAB
FUNGUS SPEC CULT: NORMAL
FUNGUS SPEC CULT: NORMAL

## 2023-06-06 ENCOUNTER — HOME CARE VISIT (OUTPATIENT)
Dept: HOME HEALTH SERVICES | Facility: HOME HEALTHCARE | Age: 73
End: 2023-06-06
Payer: COMMERCIAL

## 2023-06-06 VITALS — SYSTOLIC BLOOD PRESSURE: 110 MMHG | HEART RATE: 60 BPM | OXYGEN SATURATION: 96 % | DIASTOLIC BLOOD PRESSURE: 64 MMHG

## 2023-06-06 PROCEDURE — G0151 HHCP-SERV OF PT,EA 15 MIN: HCPCS

## 2023-06-06 PROCEDURE — G0299 HHS/HOSPICE OF RN EA 15 MIN: HCPCS

## 2023-06-06 NOTE — CASE COMMUNICATION
D/c home phys ther on 6/6/23 due to goals met/no further skilled need  Pt taking tylenol PRN but not in med list- please add if ok to take  Discussed possible cardiac rehab; Cardiology please assess & provide order for such as appropriate       Thank you, Wes Lim PT

## 2023-06-07 VITALS
SYSTOLIC BLOOD PRESSURE: 138 MMHG | DIASTOLIC BLOOD PRESSURE: 78 MMHG | TEMPERATURE: 98.1 F | BODY MASS INDEX: 26.99 KG/M2 | WEIGHT: 199 LBS | OXYGEN SATURATION: 96 % | HEART RATE: 66 BPM | RESPIRATION RATE: 18 BRPM

## 2023-06-09 ENCOUNTER — HOME CARE VISIT (OUTPATIENT)
Dept: HOME HEALTH SERVICES | Facility: HOME HEALTHCARE | Age: 73
End: 2023-06-09
Payer: COMMERCIAL

## 2023-06-09 PROCEDURE — G0299 HHS/HOSPICE OF RN EA 15 MIN: HCPCS

## 2023-06-12 LAB
FUNGUS SPEC CULT: NORMAL
FUNGUS SPEC CULT: NORMAL

## 2023-06-13 VITALS
SYSTOLIC BLOOD PRESSURE: 144 MMHG | DIASTOLIC BLOOD PRESSURE: 84 MMHG | HEART RATE: 68 BPM | OXYGEN SATURATION: 98 % | TEMPERATURE: 98.3 F | RESPIRATION RATE: 18 BRPM

## 2023-06-16 ENCOUNTER — HOME CARE VISIT (OUTPATIENT)
Dept: HOME HEALTH SERVICES | Facility: HOME HEALTHCARE | Age: 73
End: 2023-06-16
Payer: COMMERCIAL

## 2023-06-16 PROCEDURE — G0299 HHS/HOSPICE OF RN EA 15 MIN: HCPCS

## 2023-06-19 VITALS
TEMPERATURE: 98.1 F | OXYGEN SATURATION: 98 % | BODY MASS INDEX: 27.4 KG/M2 | RESPIRATION RATE: 18 BRPM | DIASTOLIC BLOOD PRESSURE: 72 MMHG | HEART RATE: 60 BPM | SYSTOLIC BLOOD PRESSURE: 122 MMHG | WEIGHT: 202 LBS

## 2023-06-19 LAB
FUNGUS SPEC CULT: NORMAL
FUNGUS SPEC CULT: NORMAL

## 2023-06-20 ENCOUNTER — OFFICE VISIT (OUTPATIENT)
Dept: CARDIOLOGY CLINIC | Facility: CLINIC | Age: 73
End: 2023-06-20
Payer: COMMERCIAL

## 2023-06-20 VITALS
SYSTOLIC BLOOD PRESSURE: 150 MMHG | DIASTOLIC BLOOD PRESSURE: 80 MMHG | HEIGHT: 72 IN | BODY MASS INDEX: 27.22 KG/M2 | WEIGHT: 201 LBS | HEART RATE: 60 BPM

## 2023-06-20 DIAGNOSIS — I48.0 PAROXYSMAL ATRIAL FIBRILLATION (HCC): ICD-10-CM

## 2023-06-20 DIAGNOSIS — I47.21 TORSADES DE POINTES (HCC): ICD-10-CM

## 2023-06-20 DIAGNOSIS — I10 ESSENTIAL HYPERTENSION: ICD-10-CM

## 2023-06-20 DIAGNOSIS — I46.9 CARDIAC ARREST (HCC): Primary | ICD-10-CM

## 2023-06-20 DIAGNOSIS — I47.20 VENTRICULAR TACHYCARDIA (HCC): ICD-10-CM

## 2023-06-20 PROCEDURE — 99214 OFFICE O/P EST MOD 30 MIN: CPT | Performed by: NURSE PRACTITIONER

## 2023-06-20 RX ORDER — AMIODARONE HYDROCHLORIDE 200 MG/1
200 TABLET ORAL
Qty: 30 TABLET | Refills: 11 | Status: SHIPPED | OUTPATIENT
Start: 2023-06-20

## 2023-06-20 RX ORDER — LOSARTAN POTASSIUM 25 MG/1
25 TABLET ORAL DAILY
Qty: 30 TABLET | Refills: 11 | Status: SHIPPED | OUTPATIENT
Start: 2023-06-20 | End: 2023-07-20

## 2023-06-20 NOTE — ASSESSMENT & PLAN NOTE
Blood pressure slightly elevated today  Continue to monitor on losartan 25 mg daily, Coreg 12 5 mg twice daily    If blood pressures remain elevated, consider increasing losartan

## 2023-06-20 NOTE — PROGRESS NOTES
Patient ID: Julianna Hunt is a 67 y o  male  Plan:      [facility]  Now with ICD in place    Paroxysmal atrial fibrillation (HCC)  Continue amiodarone 200 mg daily  Eliquis 5 mg twice daily for stroke risk reduction    Essential hypertension  Blood pressure slightly elevated today  Continue to monitor on losartan 25 mg daily, Coreg 12 5 mg twice daily  If blood pressures remain elevated, consider increasing losartan    Cardiac arrest (HCC)  Felt to be related to bradycardia from torsades, 5/16/2023  Now status post ICD       Follow up Plan/Summary Comments:  Alanna Persaud is doing remarkably well following his recent hospitalization  He is scheduled for follow-up in our device clinic in early July  I have made a referral to cardiac rehab to allow him to increase his activity in a monitored setting  He is agreeable and looking forward to getting out  He will continue routine surveillance of his device in our clinic  Office follow-up to be arranged in 3 to 4 months  They know to call sooner if needed  HPI: I the pleasure of seeing Alanna Persaud in the office today for a hospital follow-up visit  Alanna Persaud was admitted to [facility] 5/16/2023 after an out of hospital cardiac arrest   It was felt to be due to bradycardia/torsades and he underwent ICD implant  Since his discharge from the hospital, he has been doing well  He denies any chest discomfort or shortness of breath  He continues to note improvement in his right sided rib discomfort, however it is still present  He has been active and walking at home without any episodes of dizziness, lightheadedness, syncope  He denies any shocks from his ICD  He denies chest pain or shortness of breath  He is anxious to resume driving and cutting the grass  I told him that he should wait until his device check before resuming these activities        Review of Systems   10  point ROS  was otherwise non pertinent or negative except as per HPI or as below  Gait: Normal      Most recent or relevant cardiac/vascular testing:    Echo 5/16/2023   EF 65%  Mild TR    Objective:     /80 (BP Location: Right arm, Patient Position: Sitting)   Pulse 60   Ht 6' (1 829 m)   Wt 91 2 kg (201 lb)   BMI 27 26 kg/m²     PHYSICAL EXAM:    General:  Normal appearance, no acute distress  Eyes:  Anicteric  Oral mucosa:  Moist   Neck:  No JVD  Carotid upstrokes are brisk without bruits  No masses  Chest:  Clear to auscultation, L SC device  Cardiac:  No palpable PMI  Normal S1 and S2  No murmur gallop or rub  Abdomen:  Soft and nontender  No palpable organomegaly or aortic enlargement  Extremities:  No peripheral edema  Musculoskeletal:  Symmetric  Vascular:  Pedal pulses are intact  Neuro:  Grossly symmetric  Psych:  Alert and oriented x3      No Known Allergies    Current Outpatient Medications:   •  ALPHAGAN P 0 1 %, , Disp: , Rfl:   •  amiodarone 200 mg tablet, Take 1 tablet (200 mg total) by mouth daily with breakfast, Disp: 30 tablet, Rfl: 11  •  apixaban (ELIQUIS) 5 mg, Take 1 tablet (5 mg total) by mouth 2 (two) times a day, Disp: 60 tablet, Rfl: 5  •  carvedilol (COREG) 12 5 mg tablet, Take 1 tablet (12 5 mg total) by mouth 2 (two) times a day with meals, Disp: 60 tablet, Rfl: 5  •  cyanocobalamin (VITAMIN B-12) 250 MCG tablet, Take 1 tablet (250 mcg total) by mouth daily Do not start before May 25, 2023 , Disp: 30 tablet, Rfl: 0  •  levothyroxine 50 mcg tablet, Take 1 tablet (50 mcg total) by mouth daily in the early morning Do not start before May 25, 2023 , Disp: 30 tablet, Rfl: 0  •  losartan (COZAAR) 25 mg tablet, Take 1 tablet (25 mg total) by mouth daily, Disp: 30 tablet, Rfl: 11  •  tamsulosin (FLOMAX) 0 4 mg, Take 1 capsule (0 4 mg total) by mouth daily with dinner, Disp: 30 capsule, Rfl: 0  •  travoprost (TRAVATAN-Z) 0 004 % ophthalmic solution, , Disp: , Rfl:   Past Medical History:   Diagnosis Date   • Atrial fibrillation (HCC)    • Hypertension    • SSS (sick sinus syndrome) ([facility] [address] )    • Torsades de pointes ([facility] [address] )    • VT [facility]ricular tachycardia) ([facility])     s/p dual chamber st yasmine ICD 5/16/2023     Past Surgical History:   Procedure Laterality Date   • APPENDECTOMY  2006   • CARDIAC DEFIBRILLATOR PLACEMENT Left 5/18/2023    Procedure: INSERTION AUTOMATIC IMPLANTABLE CARDIOVERTER DEFIBRILLATOR (AICD);   Surgeon: Kinga Valencia MD;  Location: [facility];  Service: Cardiology       CMP:   Lab Results   Component Value Date    K 4 0 06/01/2023     (H) 06/01/2023    CO2 23 06/01/2023    BUN 12 06/01/2023    CREATININE 0 94 06/01/2023    EGFR 80 06/01/2023     Lipid Profile:    Lab Results   Component Value Date    TRIG 178 (H) 05/19/2023    HDL 35 (L) 05/19/2023         Social History     Tobacco Use   Smoking Status Never   Smokeless Tobacco Never

## 2023-06-22 ENCOUNTER — HOME CARE VISIT (OUTPATIENT)
Dept: HOME HEALTH SERVICES | Facility: HOME HEALTHCARE | Age: 73
End: 2023-06-22
Payer: COMMERCIAL

## 2023-06-22 VITALS
DIASTOLIC BLOOD PRESSURE: 82 MMHG | RESPIRATION RATE: 18 BRPM | WEIGHT: 200 LBS | TEMPERATURE: 98.2 F | OXYGEN SATURATION: 98 % | BODY MASS INDEX: 27.12 KG/M2 | SYSTOLIC BLOOD PRESSURE: 140 MMHG | HEART RATE: 66 BPM

## 2023-06-22 DIAGNOSIS — I46.9 CARDIAC ARREST (HCC): ICD-10-CM

## 2023-06-22 DIAGNOSIS — E03.9 HYPOTHYROIDISM, UNSPECIFIED TYPE: ICD-10-CM

## 2023-06-22 DIAGNOSIS — I47.20 VENTRICULAR TACHYCARDIA (HCC): ICD-10-CM

## 2023-06-22 PROCEDURE — G0299 HHS/HOSPICE OF RN EA 15 MIN: HCPCS

## 2023-06-22 RX ORDER — TAMSULOSIN HYDROCHLORIDE 0.4 MG/1
0.4 CAPSULE ORAL
Qty: 30 CAPSULE | Refills: 0 | Status: SHIPPED | OUTPATIENT
Start: 2023-06-22 | End: 2023-07-22

## 2023-06-22 RX ORDER — LEVOTHYROXINE SODIUM 0.05 MG/1
50 TABLET ORAL
Qty: 30 TABLET | Refills: 0 | Status: SHIPPED | OUTPATIENT
Start: 2023-06-22

## 2023-06-28 ENCOUNTER — HOME CARE VISIT (OUTPATIENT)
Dept: HOME HEALTH SERVICES | Facility: HOME HEALTHCARE | Age: 73
End: 2023-06-28
Payer: COMMERCIAL

## 2023-06-28 ENCOUNTER — TRANSCRIBE ORDERS (OUTPATIENT)
Dept: SLEEP CENTER | Facility: CLINIC | Age: 73
End: 2023-06-28

## 2023-06-28 VITALS
OXYGEN SATURATION: 98 % | SYSTOLIC BLOOD PRESSURE: 140 MMHG | RESPIRATION RATE: 18 BRPM | HEART RATE: 76 BPM | TEMPERATURE: 98.1 F | BODY MASS INDEX: 27.4 KG/M2 | WEIGHT: 202 LBS | DIASTOLIC BLOOD PRESSURE: 80 MMHG

## 2023-06-28 DIAGNOSIS — R06.83 SNORING: ICD-10-CM

## 2023-06-28 DIAGNOSIS — I48.91 ATRIAL FIBRILLATION, UNSPECIFIED TYPE (HCC): ICD-10-CM

## 2023-06-28 DIAGNOSIS — G47.33 OSA (OBSTRUCTIVE SLEEP APNEA): Primary | ICD-10-CM

## 2023-06-28 PROCEDURE — G0299 HHS/HOSPICE OF RN EA 15 MIN: HCPCS

## 2023-07-05 ENCOUNTER — HOSPITAL ENCOUNTER (OUTPATIENT)
Dept: SLEEP CENTER | Facility: HOSPITAL | Age: 73
Discharge: HOME | End: 2023-07-05
Payer: COMMERCIAL

## 2023-07-05 DIAGNOSIS — I48.91 ATRIAL FIBRILLATION, UNSPECIFIED TYPE (HCC): ICD-10-CM

## 2023-07-05 DIAGNOSIS — G47.33 OSA (OBSTRUCTIVE SLEEP APNEA): ICD-10-CM

## 2023-07-05 DIAGNOSIS — R06.83 SNORING: ICD-10-CM

## 2023-07-05 PROCEDURE — 95810 POLYSOM 6/> YRS 4/> PARAM: CPT

## 2023-07-06 ENCOUNTER — TELEPHONE (OUTPATIENT)
Dept: SLEEP CENTER | Facility: CLINIC | Age: 73
End: 2023-07-06

## 2023-07-06 NOTE — PROGRESS NOTES
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  Caffeine use:NO    Alcohol:Dayshift workers after 5PM: Alcohol use:NO    Typical day for patient:YES       Study Documentation    Sleep Study Indications: The patient is here for snoring, nocturnal choking, witnessed apneas/gasping and excessive daytime sleepiness. Sleep Study: Diagnostic   Snore:None  Supplemental O2: no    O2 flow rate (L/min) range N/A  O2 flow rate (L/min) final N/A  Minimum SaO2 93  Baseline SaO2 95        Mode of Therapy:N/A    EKG abnormalities: no     EEG abnormalities: no    Sleep Study Recorded < 2 hours: N/A    Sleep Study Recorded > 2 hours but incomplete study: N/A    Sleep Study Recorded 6 hours but no sleep obtained: NO    Patient classification: retired       Post-Sleep Study    Medication used at bedtime or during sleep study:YES other prescription medications    Patient reports time it took to fall asleep:30 to 60 minutes    Patient reports waking up during study:1 to 2 times. Patient reports returning to sleep in greater than 30 minutes. Patient reports sleeping 2 to 4 hours without dreaming. Patient reports sleep during study:typical    Patient rated sleepiness: Not sleepy or tired    PAP treatment:no.

## 2023-07-06 NOTE — TELEPHONE ENCOUNTER
----- Message from Fernanda Umana MD sent at 7/6/2023  1:58 PM EDT -----  Approved    ----- Message -----  From: Radha Maldonado  Sent: 6/29/2023   9:26 AM EDT  To: Sleep Medicine [facility] Provider    This Diagnostic sleep study needs approval.     If approved please sign and return to clerical pool. If denied please include reasons why. Also provide alternative testing if warranted. Please sign and return to clerical pool.

## 2023-07-10 ENCOUNTER — IN-CLINIC DEVICE VISIT (OUTPATIENT)
Dept: CARDIOLOGY CLINIC | Facility: CLINIC | Age: 73
End: 2023-07-10
Payer: COMMERCIAL

## 2023-07-10 DIAGNOSIS — I46.9 CARDIAC ARREST (HCC): Primary | ICD-10-CM

## 2023-07-10 DIAGNOSIS — Z45.02 ENCOUNTER FOR IMPLANTABLE DEFIBRILLATOR REPROGRAMMING OR CHECK: ICD-10-CM

## 2023-07-10 PROCEDURE — 93283 PRGRMG EVAL IMPLANTABLE DFB: CPT | Performed by: INTERNAL MEDICINE

## 2023-07-11 ENCOUNTER — OFFICE VISIT (OUTPATIENT)
Dept: FAMILY MEDICINE CLINIC | Facility: CLINIC | Age: 73
End: 2023-07-11
Payer: COMMERCIAL

## 2023-07-11 ENCOUNTER — TELEPHONE (OUTPATIENT)
Dept: SLEEP CENTER | Facility: CLINIC | Age: 73
End: 2023-07-11

## 2023-07-11 VITALS
HEART RATE: 60 BPM | DIASTOLIC BLOOD PRESSURE: 98 MMHG | SYSTOLIC BLOOD PRESSURE: 160 MMHG | HEIGHT: 72 IN | BODY MASS INDEX: 27.9 KG/M2 | WEIGHT: 206 LBS | OXYGEN SATURATION: 97 % | TEMPERATURE: 97.8 F

## 2023-07-11 DIAGNOSIS — G47.33 OBSTRUCTIVE SLEEP APNEA HYPOPNEA, SEVERE: Primary | ICD-10-CM

## 2023-07-11 PROCEDURE — G0439 PPPS, SUBSEQ VISIT: HCPCS | Performed by: FAMILY MEDICINE

## 2023-07-11 NOTE — PATIENT INSTRUCTIONS
Medicare Preventive Visit Patient Instructions  Thank you for completing your Welcome to Medicare Visit or Medicare Annual Wellness Visit today. Your next wellness visit will be due in one year (7/11/2024). The screening/preventive services that you may require over the next 5-10 years are detailed below. Some tests may not apply to you based off risk factors and/or age. Screening tests ordered at today's visit but not completed yet may show as past due. Also, please note that scanned in results may not display below. Preventive Screenings:  Service Recommendations Previous Testing/Comments   Colorectal Cancer Screening  · Colonoscopy    · Fecal Occult Blood Test (FOBT)/Fecal Immunochemical Test (FIT)  · Fecal DNA/Cologuard Test  · Flexible Sigmoidoscopy Age: 43-73 years old   Colonoscopy: every 10 years (May be performed more frequently if at higher risk)  OR  FOBT/FIT: every 1 year  OR  Cologuard: every 3 years  OR  Sigmoidoscopy: every 5 years  Screening may be recommended earlier than age 39 if at higher risk for colorectal cancer. Also, an individualized decision between you and your healthcare provider will decide whether screening between the ages of 77-80 would be appropriate.  Colonoscopy: 08/01/2020  FOBT/FIT: 08/01/2020  Cologuard: Not on file  Sigmoidoscopy: Not on file    Screening Current     Prostate Cancer Screening Individualized decision between patient and health care provider in men between ages of 53-66   Medicare will cover every 12 months beginning on the day after your 50th birthday PSA: No results in last 5 years           Hepatitis C Screening Once for adults born between 1945 and 1965  More frequently in patients at high risk for Hepatitis C Hep C Antibody: 07/30/2020    Screening Current   Diabetes Screening 1-2 times per year if you're at risk for diabetes or have pre-diabetes Fasting glucose: 98 mg/dL (6/1/2023)  A1C: 5.3 % (5/16/2023)  Screening Current   Cholesterol Screening Once every 5 years if you don't have a lipid disorder. May order more often based on risk factors. Lipid panel: 05/19/2023  Screening Current      Other Preventive Screenings Covered by Medicare:  1. Abdominal Aortic Aneurysm (AAA) Screening: covered once if your at risk. You're considered to be at risk if you have a family history of AAA or a male between the age of 70-76 who smoking at least 100 cigarettes in your lifetime. 2. Lung Cancer Screening: covers low dose CT scan once per year if you meet all of the following conditions: (1) Age 48-67; (2) No signs or symptoms of lung cancer; (3) Current smoker or have quit smoking within the last 15 years; (4) You have a tobacco smoking history of at least 20 pack years (packs per day x number of years you smoked); (5) You get a written order from a healthcare provider. 3. Glaucoma Screening: covered annually if you're considered high risk: (1) You have diabetes OR (2) Family history of glaucoma OR (3)  aged 48 and older OR (3)  American aged 72 and older  3. Osteoporosis Screening: covered every 2 years if you meet one of the following conditions: (1) Have a vertebral abnormality; (2) On glucocorticoid therapy for more than 3 months; (3) Have primary hyperparathyroidism; (4) On osteoporosis medications and need to assess response to drug therapy. 5. HIV Screening: covered annually if you're between the age of 14-79. Also covered annually if you are younger than 13 and older than 72 with risk factors for HIV infection. For pregnant patients, it is covered up to 3 times per pregnancy.     Immunizations:  Immunization Recommendations   Influenza Vaccine Annual influenza vaccination during flu season is recommended for all persons aged >= 6 months who do not have contraindications   Pneumococcal Vaccine   * Pneumococcal conjugate vaccine = PCV13 (Prevnar 13), PCV15 (Vaxneuvance), PCV20 (Prevnar 20)  * Pneumococcal polysaccharide vaccine = PPSV23 (Pneumovax) Adults 2364 years old: 1-3 doses may be recommended based on certain risk factors  Adults 72 years old: 1-2 doses may be recommended based off what pneumonia vaccine you previously received   Hepatitis B Vaccine 3 dose series if at intermediate or high risk (ex: diabetes, end stage renal disease, liver disease)   Tetanus (Td) Vaccine - COST NOT COVERED BY MEDICARE PART B Following completion of primary series, a booster dose should be given every 10 years to maintain immunity against tetanus. Td may also be given as tetanus wound prophylaxis. Tdap Vaccine - COST NOT COVERED BY MEDICARE PART B Recommended at least once for all adults. For pregnant patients, recommended with each pregnancy. Shingles Vaccine (Shingrix) - COST NOT COVERED BY MEDICARE PART B  2 shot series recommended in those aged 48 and above     Health Maintenance Due:      Topic Date Due   • Colorectal Cancer Screening  08/01/2021   • Hepatitis C Screening  Completed     Immunizations Due:      Topic Date Due   • Pneumococcal Vaccine: 65+ Years (2 - PPSV23 if available, else PCV20) 08/13/2018   • COVID-19 Vaccine (3 - Moderna series) 05/05/2021   • Influenza Vaccine (1) 09/01/2023     Advance Directives   What are advance directives? Advance directives are legal documents that state your wishes and plans for medical care. These plans are made ahead of time in case you lose your ability to make decisions for yourself. Advance directives can apply to any medical decision, such as the treatments you want, and if you want to donate organs. What are the types of advance directives? There are many types of advance directives, and each state has rules about how to use them. You may choose a combination of any of the following:  · Living will: This is a written record of the treatment you want. You can also choose which treatments you do not want, which to limit, and which to stop at a certain time.  This includes surgery, medicine, IV fluid, and tube feedings. · Durable power of  for healthcare [facility]): This is a written record that states who you want to make healthcare choices for you when you are unable to make them for yourself. This person, called a proxy, is usually a family member or a friend. You may choose more than 1 proxy. · Do not resuscitate (DNR) order:  A DNR order is used in case your heart stops beating or you stop breathing. It is a request not to have certain forms of treatment, such as CPR. A DNR order may be included in other types of advance directives. · Medical directive: This covers the care that you want if you are in a coma, near death, or unable to make decisions for yourself. You can list the treatments you want for each condition. Treatment may include pain medicine, surgery, blood transfusions, dialysis, IV or tube feedings, and a ventilator (breathing machine). · Values history: This document has questions about your views, beliefs, and how you feel and think about life. This information can help others choose the care that you would choose. Why are advance directives important? An advance directive helps you control your care. Although spoken wishes may be used, it is better to have your wishes written down. Spoken wishes can be misunderstood, or not followed. Treatments may be given even if you do not want them. An advance directive may make it easier for your family to make difficult choices about your care. Weight Management   Why it is important to manage your weight:  Being overweight increases your risk of health conditions such as heart disease, high blood pressure, type 2 diabetes, and certain types of cancer. It can also increase your risk for osteoarthritis, sleep apnea, and other respiratory problems. Aim for a slow, steady weight loss. Even a small amount of weight loss can lower your risk of health problems.   How to lose weight safely:  A safe and healthy way to lose weight is to eat fewer calories and get regular exercise. You can lose up about 1 pound a week by decreasing the number of calories you eat by 500 calories each day. Healthy meal plan for weight management:  A healthy meal plan includes a variety of foods, contains fewer calories, and helps you stay healthy. A healthy meal plan includes the following:  · Eat whole-grain foods more often. A healthy meal plan should contain fiber. Fiber is the part of grains, fruits, and vegetables that is not broken down by your body. Whole-grain foods are healthy and provide extra fiber in your diet. Some examples of whole-grain foods are whole-wheat breads and pastas, oatmeal, brown rice, and bulgur. · Eat a variety of vegetables every day. Include dark, leafy greens such as spinach, kale, bakari greens, and mustard greens. Eat yellow and orange vegetables such as carrots, sweet potatoes, and winter squash. · Eat a variety of fruits every day. Choose fresh or canned fruit (canned in its own juice or light syrup) instead of juice. Fruit juice has very little or no fiber. · Eat low-fat dairy foods. Drink fat-free (skim) milk or 1% milk. Eat fat-free yogurt and low-fat cottage cheese. Try low-fat cheeses such as mozzarella and other reduced-fat cheeses. · Choose meat and other protein foods that are low in fat. Choose beans or other legumes such as split peas or lentils. Choose fish, skinless poultry (chicken or turkey), or lean cuts of red meat (beef or pork). Before you cook meat or poultry, cut off any visible fat. · Use less fat and oil. Try baking foods instead of frying them. Add less fat, such as margarine, sour cream, regular salad dressing and mayonnaise to foods. Eat fewer high-fat foods. Some examples of high-fat foods include french fries, doughnuts, ice cream, and cakes. · Eat fewer sweets. Limit foods and drinks that are high in sugar. This includes candy, cookies, regular soda, and sweetened drinks.   Exercise: Exercise at least 30 minutes per day on most days of the week. Some examples of exercise include walking, biking, dancing, and swimming. You can also fit in more physical activity by taking the stairs instead of the elevator or parking farther away from stores. Ask your healthcare provider about the best exercise plan for you. © Copyright [employer] 2018 Information is for End User's use only and may not be sold, redistributed or otherwise used for commercial purposes.  All illustrations and images included in CareNotes® are the copyrighted property of [employer] Inc. or  [employer]

## 2023-07-11 NOTE — PROGRESS NOTES
Assessment and Plan:     Problem List Items Addressed This Visit    None    BMI Counseling: Body mass index is 27.94 kg/m². The BMI is above normal. Nutrition recommendations include decreasing portion sizes, encouraging healthy choices of fruits and vegetables, moderation in carbohydrate intake and increasing intake of lean protein. Exercise recommendations include moderate physical activity 150 minutes/week. Rationale for BMI follow-up plan is due to patient being overweight or obese. Depression Screening and Follow-up Plan: Patient was screened for depression during today's encounter. They screened negative with a PHQ-2 score of 0. Preventive health issues were discussed with patient, and age appropriate screening tests were ordered as noted in patient's After Visit Summary. Personalized health advice and appropriate referrals for health education or preventive services given if needed, as noted in patient's After Visit Summary. History of Present Illness:     Patient presents for a Medicare Wellness Visit    Mr. Zaid Rothman here for a follow-up visit feels well he is anxious to do something because he is driving his wife crazy has not had any episodes of pacemaker discharge his insurance company has persistently refused to let him go to cardiac rehab I did try to call them while he was here but the telephone service required several prompts so I will do that after hours he is eating well not drinking more than 1 to 2 cups of coffee per day     Patient Care Team:  Ildefonso Torre DO as PCP - General (Family Medicine)     Review of Systems:     Review of Systems   Constitutional: Negative for activity change, appetite change, diaphoresis, fatigue and fever. HENT: Negative. Negative for dental problem. Eyes: Positive for visual disturbance. Respiratory: Negative for apnea, cough, chest tightness, shortness of breath and wheezing.     Cardiovascular: Negative for chest pain, palpitations and leg swelling. Gastrointestinal: Negative for abdominal distention, abdominal pain, anal bleeding, constipation, diarrhea, nausea and vomiting. Endocrine: Negative for cold intolerance, heat intolerance, polydipsia, polyphagia and polyuria. Genitourinary: Negative for difficulty urinating, dysuria, flank pain, hematuria and urgency. Musculoskeletal: Negative for arthralgias, back pain, gait problem, joint swelling and myalgias. Skin: Negative for color change, rash and wound. Allergic/Immunologic: Negative for environmental allergies, food allergies and immunocompromised state. Neurological: Negative for dizziness, seizures, syncope, speech difficulty, numbness and headaches. Hematological: Negative for adenopathy. Does not bruise/bleed easily. Psychiatric/Behavioral: Negative for agitation, behavioral problems, hallucinations, sleep disturbance and suicidal ideas. Problem List:     Patient Active Problem List   Diagnosis   • History of cardiac arrest   • Acute respiratory failure with hypoxia (HCC)   • Closed fracture of multiple ribs of both sides   • Aspiration pneumonia (HCC)   • Torsades de pointes (HCC)   • Anemia   • Paroxysmal atrial fibrillation (HCC)   • Delirium   • Essential hypertension   • Constipation   • Hypothyroidism   • Cardiac arrest ([address])   • ALBERT (obstructive sleep apnea)      Past Medical and Surgical History:     Past Medical History:   Diagnosis Date   • Atrial fibrillation ([address])    • Hypertension    • SSS (sick sinus syndrome) ([address])    • Torsades de pointes ([address])    • VT (ventricular tachycardia) ([address])     s/p dual chamber st yasmine ICD 5/16/2023     Past Surgical History:   Procedure Laterality Date   • APPENDECTOMY  2006   • CARDIAC DEFIBRILLATOR PLACEMENT Left 5/18/2023    Procedure: INSERTION AUTOMATIC IMPLANTABLE CARDIOVERTER DEFIBRILLATOR (AICD);   Surgeon: Alison Clark MD;  Location: [facility];  Service: Cardiology      Family History:     Family History Problem Relation Age of Onset   • Heart disease Father    • Colon cancer Brother    • Colon cancer Daughter       Social History:     Social History     Socioeconomic History   • Marital status: /Civil Union     Spouse name: None   • Number of children: None   • Years of education: None   • Highest education level: None   Occupational History   • None   Tobacco Use   • Smoking status: Never   • Smokeless tobacco: Never   Substance and Sexual Activity   • Alcohol use: Yes     Comment: Rare   • Drug use: Never   • Sexual activity: None   Other Topics Concern   • None   Social History Narrative   • None     Social Determinants of Health     Financial Resource Strain: Not on file   Food Insecurity: No Food Insecurity (5/19/2023)    Hunger Vital Sign    • Worried About Running Out of Food in the Last Year: Never true    • Ran Out of Food in the Last Year: Never true   Transportation Needs: No Transportation Needs (5/19/2023)    PRAPARE - Transportation    • Lack of Transportation (Medical): No    • Lack of Transportation (Non-Medical):  No   Physical Activity: Not on file   Stress: Not on file   Social Connections: Not on file   Intimate Partner Violence: Not on file   Housing Stability: Low Risk  (5/19/2023)    Housing Stability Vital Sign    • Unable to Pay for Housing in the Last Year: No    • Number of Places Lived in the Last Year: 1    • Unstable Housing in the Last Year: No      Medications and Allergies:     Current Outpatient Medications   Medication Sig Dispense Refill   • ALPHAGAN P 0.1 %      • amiodarone 200 mg tablet Take 1 tablet (200 mg total) by mouth daily with breakfast 30 tablet 11   • apixaban (ELIQUIS) 5 mg Take 1 tablet (5 mg total) by mouth 2 (two) times a day 60 tablet 5   • carvedilol (COREG) 12.5 mg tablet Take 1 tablet (12.5 mg total) by mouth 2 (two) times a day with meals 60 tablet 5   • cyanocobalamin (VITAMIN B-12) 250 MCG tablet Take 1 tablet (250 mcg total) by mouth daily 30 tablet 0   • levothyroxine 50 mcg tablet Take 1 tablet (50 mcg total) by mouth daily in the early morning 30 tablet 0   • losartan (COZAAR) 25 mg tablet Take 1 tablet (25 mg total) by mouth daily 30 tablet 11   • tamsulosin (FLOMAX) 0.4 mg Take 1 capsule (0.4 mg total) by mouth daily with dinner 30 capsule 0   • travoprost (TRAVATAN-Z) 0.004 % ophthalmic solution        No current facility-administered medications for this visit. No Known Allergies   Immunizations:     Immunization History   Administered Date(s) Administered   • COVID-19 MODERNA VACC 0.5 ML IM 02/12/2021, 03/10/2021   • INFLUENZA 09/10/2020   • Influenza, seasonal, injectable 10/04/2013, 10/08/2014, 11/09/2015, 09/07/2016   • Pneumococcal Conjugate 13-Valent 08/13/2017   • Tdap 09/07/2016   • Zoster 10/08/2014   • influenza, trivalent, adjuvanted 09/20/2019      Health Maintenance:         Topic Date Due   • Colorectal Cancer Screening  08/01/2021   • Hepatitis C Screening  Completed         Topic Date Due   • Pneumococcal Vaccine: 65+ Years (2 - PPSV23 if available, else PCV20) 08/13/2018   • COVID-19 Vaccine (3 - Moderna series) 05/05/2021   • Influenza Vaccine (1) 09/01/2023      Medicare Screening Tests and Risk Assessments:     Chapincito Tapia is here for his Subsequent Wellness visit. Health Risk Assessment:   Patient rates overall health as good. Patient feels that their physical health rating is same. Patient is very satisfied with their life. Eyesight was rated as same. Hearing was rated as same. Patient feels that their emotional and mental health rating is slightly better. Patients states they are never, rarely angry. Patient states they are never, rarely unusually tired/fatigued. Pain experienced in the last 7 days has been none. Patient states that he has experienced no weight loss or gain in last 6 months. Depression Screening:   PHQ-2 Score: 0      Fall Risk Screening:    In the past year, patient has experienced: no history of falling in past year      Home Safety:  Patient does not have trouble with stairs inside or outside of their home. Patient has working smoke alarms and has working carbon monoxide detector. Home safety hazards include: none. Nutrition:   Current diet is Regular and Limited junk food. Medications:   Patient is currently taking over-the-counter supplements. OTC medications include: see medication list. Patient is able to manage medications. Wife manages medication    Activities of Daily Living (ADLs)/Instrumental Activities of Daily Living (IADLs):   Walk and transfer into and out of bed and chair?: Yes  Dress and groom yourself?: Yes    Bathe or shower yourself?: Yes    Feed yourself?  Yes  Do your laundry/housekeeping?: Yes  Manage your money, pay your bills and track your expenses?: Yes  Make your own meals?: Yes    Do your own shopping?: Yes    Previous Hospitalizations:   Any hospitalizations or ED visits within the last 12 months?: Yes    How many hospitalizations have you had in the last year?: 1-2    Advance Care Planning:   Living will: No    Durable POA for healthcare: No    Advanced directive: No    Advanced directive counseling given: Yes    Five wishes given: Yes    Patient declined ACP directive: Yes    End of Life Decisions reviewed with patient: Yes    Provider agrees with end of life decisions: No      Cognitive Screening:   Provider or family/friend/caregiver concerned regarding cognition?: No    PREVENTIVE SCREENINGS      Cardiovascular Screening:    General: Screening Current      Diabetes Screening:     General: Screening Current      Colorectal Cancer Screening:     General: Screening Current      Prostate Cancer Screening:    General: Screening Not Indicated      Osteoporosis Screening:    General: Screening Not Indicated      Abdominal Aortic Aneurysm (AAA) Screening:    Risk factors include: age between 70-77 yo        Lung Cancer Screening:     General: Screening Not Indicated Hepatitis C Screening:    General: Screening Current    Screening, Brief Intervention, and Referral to Treatment (SBIRT)    Screening  Typical number of drinks in a day: 0  Typical number of drinks in a week: 0  Interpretation: Low risk drinking behavior. Single Item Drug Screening:  How often have you used an illegal drug (including marijuana) or a prescription medication for non-medical reasons in the past year? never    Single Item Drug Screen Score: 0  Interpretation: Negative screen for possible drug use disorder    No results found. Physical Exam:     BP (!) 188/108 (BP Location: Left arm, Patient Position: Sitting, Cuff Size: Standard)   Pulse 60   Temp 97.8 °F (36.6 °C) (Tympanic)   Ht 6' (1.829 m)   Wt 93.4 kg (206 lb)   SpO2 97%   BMI 27.94 kg/m²     Physical Exam  Constitutional:       Appearance: He is well-developed. HENT:      Head: Normocephalic and atraumatic. Right Ear: External ear normal.      Left Ear: External ear normal.      Nose: Nose normal.   Eyes:      Conjunctiva/sclera: Conjunctivae normal.      Pupils: Pupils are equal, round, and reactive to light. Cardiovascular:      Rate and Rhythm: Normal rate and regular rhythm. Heart sounds: Normal heart sounds. No murmur heard. No friction rub. Pulmonary:      Effort: Pulmonary effort is normal. No respiratory distress. Breath sounds: Normal breath sounds. No wheezing or rales. Chest:      Chest wall: No tenderness. Abdominal:      General: Bowel sounds are normal.      Palpations: Abdomen is soft. Musculoskeletal:         General: Normal range of motion. Cervical back: Normal range of motion and neck supple. Skin:     General: Skin is warm and dry. Capillary Refill: Capillary refill takes 2 to 3 seconds. Neurological:      Mental Status: He is alert and oriented to person, place, and time. Psychiatric:         Behavior: Behavior normal.         Thought Content:  Thought content normal. Judgment: Judgment normal.          Lito Mate, DO

## 2023-07-11 NOTE — TELEPHONE ENCOUNTER
Per Dr. German Barrett sleep study did not show ALBERT but did show PLMs. Per order Dr. Merna Roque to follow up. Patient also has consult scheduled with Dr. Cecillia Cheadle. Spoke with patient's wife and she will call Dr. Markel Guzmán office.

## 2023-07-12 ENCOUNTER — OFFICE VISIT (OUTPATIENT)
Dept: CARDIOLOGY CLINIC | Facility: CLINIC | Age: 73
End: 2023-07-12
Payer: COMMERCIAL

## 2023-07-12 VITALS
HEIGHT: 72 IN | DIASTOLIC BLOOD PRESSURE: 100 MMHG | WEIGHT: 206 LBS | BODY MASS INDEX: 27.9 KG/M2 | HEART RATE: 60 BPM | SYSTOLIC BLOOD PRESSURE: 140 MMHG

## 2023-07-12 DIAGNOSIS — I47.21 TORSADES DE POINTES (HCC): ICD-10-CM

## 2023-07-12 DIAGNOSIS — I48.0 PAROXYSMAL ATRIAL FIBRILLATION (HCC): Primary | ICD-10-CM

## 2023-07-12 DIAGNOSIS — I10 ESSENTIAL HYPERTENSION: Chronic | ICD-10-CM

## 2023-07-12 PROCEDURE — 99214 OFFICE O/P EST MOD 30 MIN: CPT | Performed by: INTERNAL MEDICINE

## 2023-07-12 NOTE — ASSESSMENT & PLAN NOTE
Occurred while he was ill in the hospital but so far none on monitoring. I would like a few months to go by with no atrial fibrillation before considering stopping apixaban but amiodarone can be held for now.

## 2023-07-12 NOTE — PROGRESS NOTES
Device check in person ICD. On eliquis for stroke risk. Rate response on. Normal device function.       Current Outpatient Medications:   •  ALPHAGAN P 0.1 %, , Disp: , Rfl:   •  amiodarone 200 mg tablet, Take 1 tablet (200 mg total) by mouth daily with breakfast, Disp: 30 tablet, Rfl: 11  •  apixaban (ELIQUIS) 5 mg, Take 1 tablet (5 mg total) by mouth 2 (two) times a day, Disp: 60 tablet, Rfl: 5  •  carvedilol (COREG) 12.5 mg tablet, Take 1 tablet (12.5 mg total) by mouth 2 (two) times a day with meals, Disp: 60 tablet, Rfl: 5  •  cyanocobalamin (VITAMIN B-12) 250 MCG tablet, Take 1 tablet (250 mcg total) by mouth daily, Disp: 30 tablet, Rfl: 0  •  levothyroxine 50 mcg tablet, Take 1 tablet (50 mcg total) by mouth daily in the early morning, Disp: 30 tablet, Rfl: 0  •  losartan (COZAAR) 25 mg tablet, Take 1 tablet (25 mg total) by mouth daily, Disp: 30 tablet, Rfl: 11  •  tamsulosin (FLOMAX) 0.4 mg, Take 1 capsule (0.4 mg total) by mouth daily with dinner, Disp: 30 capsule, Rfl: 0  •  travoprost (TRAVATAN-Z) 0.004 % ophthalmic solution, , Disp: , Rfl:

## 2023-07-12 NOTE — ASSESSMENT & PLAN NOTE
Bradycardia induced. No ventricular tachycardia thus far on device monitoring and I suspect there will be none with pacing preventing this.

## 2023-07-12 NOTE — PROGRESS NOTES
Patient ID: Debbie Gibbs is a 67 y.o. male. Plan:      Paroxysmal atrial fibrillation ([address])  Occurred while he was ill in the hospital but so far none on monitoring. I would like a few months to go by with no atrial fibrillation before considering stopping apixaban but amiodarone can be held for now. Torsades de pointes (HCC)  Bradycardia induced. No ventricular tachycardia thus far on device monitoring and I suspect there will be none with pacing preventing this. Essential hypertension  Blood pressure high today but has been very well controlled at home. Follow up Plan/Other summary comments:  Return in about 4 months (around 11/12/2023). At that point consider backing off on some of his other meds. HPI: Patient is seen in follow-up today regarding the above. He has felt great since hospital discharge. No chest pain. No palpitations. No syncope or near syncope. Certainly no shocks. Patient was admitted to [address] [place] [address] in May having been found on the floor at home. Both in the field and at least once in the hospital he had ventricular tachycardia. In the hospital it seemed like bradycardic induced torsade. Temporary pacer was utilized and then ultimately a dual-chamber Abbott ICD was placed on 5/18/2023. No results found for this visit on 07/12/23. Most recent or relevant cardiac/vascular testing:    TTE 5/16/2023: Normal LV and RV systolic function without valve abnormalities. Past Surgical History:   Procedure Laterality Date   • APPENDECTOMY  2006   • CARDIAC DEFIBRILLATOR PLACEMENT Left 5/18/2023    Procedure: INSERTION AUTOMATIC IMPLANTABLE CARDIOVERTER DEFIBRILLATOR (AICD);   Surgeon: Chasidy Juarez MD;  Location: [facility];  Service: Cardiology       Lipid Profile:   Lab Results   Component Value Date    TRIG 178 (H) 05/19/2023    HDL 35 (L) 05/19/2023         Review of Systems   10  point ROS  was otherwise non pertinent or negative except as per HPI or as below. Gait: Normal.        Objective:     /100   Pulse 60   Ht 6' (1.829 m)   Wt 93.4 kg (206 lb)   BMI 27.94 kg/m²     PHYSICAL EXAM:    General:  Normal appearance in no distress. Eyes:  Anicteric. Oral mucosa:  Moist.  Neck:  No JVD. Carotid upstrokes are brisk without bruits. No masses. Chest:  Clear to auscultation. ICD left subclavian well-healed. Cardiac:  No palpable PMI. Normal S1 and S2. No murmur gallop or rub. Abdomen:  Soft and nontender. No palpable organomegaly or aortic enlargement. Extremities:  No peripheral edema. Musculoskeletal:  Symmetric. Vascular:  Femoral pulses are brisk without bruits. Popliteal pulses are intact bilaterally. Pedal pulses are intact. Neuro:  Grossly symmetric. Psych:  Alert and oriented x3.         Current Outpatient Medications:   •  ALPHAGAN P 0.1 %, , Disp: , Rfl:   •  apixaban (ELIQUIS) 5 mg, Take 1 tablet (5 mg total) by mouth 2 (two) times a day, Disp: 60 tablet, Rfl: 5  •  carvedilol (COREG) 12.5 mg tablet, Take 1 tablet (12.5 mg total) by mouth 2 (two) times a day with meals, Disp: 60 tablet, Rfl: 5  •  cyanocobalamin (VITAMIN B-12) 250 MCG tablet, Take 1 tablet (250 mcg total) by mouth daily, Disp: 30 tablet, Rfl: 0  •  levothyroxine 50 mcg tablet, Take 1 tablet (50 mcg total) by mouth daily in the early morning, Disp: 30 tablet, Rfl: 0  •  losartan (COZAAR) 25 mg tablet, Take 1 tablet (25 mg total) by mouth daily, Disp: 30 tablet, Rfl: 11  •  tamsulosin (FLOMAX) 0.4 mg, Take 1 capsule (0.4 mg total) by mouth daily with dinner, Disp: 30 capsule, Rfl: 0  •  travoprost (TRAVATAN-Z) 0.004 % ophthalmic solution, , Disp: , Rfl:   No Known Allergies  Past Medical History:   Diagnosis Date   • Atrial fibrillation (HCC)    • Hypertension    • SSS (sick sinus syndrome) (HCC)    • Torsades de pointes (HCC)    • VT (ventricular tachycardia) (HCC)     s/p dual chamber st yasmine ICD 5/16/2023           Social History     Tobacco Use Smoking Status Never   Smokeless Tobacco Never

## 2023-07-20 DIAGNOSIS — I47.20 VENTRICULAR TACHYCARDIA (HCC): ICD-10-CM

## 2023-07-20 DIAGNOSIS — I46.9 CARDIAC ARREST (HCC): ICD-10-CM

## 2023-07-20 DIAGNOSIS — E03.9 HYPOTHYROIDISM, UNSPECIFIED TYPE: ICD-10-CM

## 2023-07-20 PROBLEM — J69.0 ASPIRATION PNEUMONIA (HCC): Status: RESOLVED | Noted: 2023-05-16 | Resolved: 2023-07-20

## 2023-07-20 RX ORDER — LEVOTHYROXINE SODIUM 0.05 MG/1
50 TABLET ORAL
Qty: 30 TABLET | Refills: 3 | Status: SHIPPED | OUTPATIENT
Start: 2023-07-20

## 2023-07-20 RX ORDER — TAMSULOSIN HYDROCHLORIDE 0.4 MG/1
0.4 CAPSULE ORAL
Qty: 30 CAPSULE | Refills: 3 | Status: SHIPPED | OUTPATIENT
Start: 2023-07-20 | End: 2023-08-19

## 2023-07-31 ENCOUNTER — CONSULT (OUTPATIENT)
Dept: PULMONOLOGY | Facility: CLINIC | Age: 73
End: 2023-07-31
Payer: COMMERCIAL

## 2023-07-31 VITALS
BODY MASS INDEX: 28.85 KG/M2 | DIASTOLIC BLOOD PRESSURE: 84 MMHG | TEMPERATURE: 96.5 F | RESPIRATION RATE: 18 BRPM | HEART RATE: 60 BPM | HEIGHT: 72 IN | SYSTOLIC BLOOD PRESSURE: 132 MMHG | WEIGHT: 213 LBS | OXYGEN SATURATION: 97 %

## 2023-07-31 DIAGNOSIS — G47.61 PERIODIC LIMB MOVEMENTS OF SLEEP: ICD-10-CM

## 2023-07-31 DIAGNOSIS — I48.0 PAROXYSMAL ATRIAL FIBRILLATION (HCC): ICD-10-CM

## 2023-07-31 DIAGNOSIS — G47.33 OSA (OBSTRUCTIVE SLEEP APNEA): ICD-10-CM

## 2023-07-31 DIAGNOSIS — G47.33 OBSTRUCTIVE SLEEP APNEA HYPOPNEA, SEVERE: Primary | ICD-10-CM

## 2023-07-31 DIAGNOSIS — I46.9 CARDIAC ARREST (HCC): ICD-10-CM

## 2023-07-31 PROCEDURE — 99205 OFFICE O/P NEW HI 60 MIN: CPT | Performed by: INTERNAL MEDICINE

## 2023-07-31 NOTE — ASSESSMENT & PLAN NOTE
Very high periodic movement index on his diagnostic sleep study  Unclear of the clinical significance of that overall he under went a significant cardiac event recently there are some literature to suggest the correlation between leg movements and cardiac history although the cardiac event might of caused by an upper airway compromise in the setting of chewing tobacco as detailed from the hospital records and the history provided by the patient's wife.   It is still worth it to check his iron panel and perhaps a consideration of iron supplement if he is low on iron storage/ferritin or saturation, if not consideration to add a small dose gabapentin to perhaps improve sleep quality might be considered

## 2023-07-31 NOTE — ASSESSMENT & PLAN NOTE
Sleep deprivation from an etiology could be a cause for worsening and progression of underlying cardiac arrhythmias

## 2023-07-31 NOTE — ASSESSMENT & PLAN NOTE
Enrrique Gil in lab diagnostic sleep study was negative for sleep disordered breathing however, he slept very poorly, likely from a first night effect. Also, he is accompanied by his wife today who described that witnessed episodes of gasping, snoring and apneas were mainly when he stepped in his back in the past/in supine position which could suggest a positional sleep apnea. The patient reports overall occasional sleepiness during the day, his sleep cycle has shifted/advanced at recently since he had his recent cardiac event in May currently he sleeps from 10 PM to 6 AM, he has nocturia which could be related to excessive water and tea drinking later during the day. He is on Flomax 0.4 mg every other day nocturnally for what suggested to be benign prostatic hyperplasia managed by primary care physician at this point.   I discussed with the patient the importance of overall sleep quality I cannot rule out entirely sleep apnea based on the test, they agreed to repeat a home sleep study which I ordered

## 2023-07-31 NOTE — PROGRESS NOTES
Sleep Consultation   Chris Rausch 67 y.o. male MRN: [id number]      Reason for consultation: Recent cardiac arrest concern for witnessed sleep apnea    Requesting physician: Dr. Farzana Xiao    Assessment/Plan  1. Obstructive sleep apnea hypopnea, severe  Assessment & Plan:  Ciro Rincon in lab diagnostic sleep study was negative for sleep disordered breathing however, he slept very poorly, likely from a first night effect. Also, he is accompanied by his wife today who described that witnessed episodes of gasping, snoring and apneas were mainly when he stepped in his back in the past/in supine position which could suggest a positional sleep apnea. The patient reports overall occasional sleepiness during the day, his sleep cycle has shifted/advanced at recently since he had his recent cardiac event in May currently he sleeps from 10 PM to 6 AM, he has nocturia which could be related to excessive water and tea drinking later during the day. He is on Flomax 0.4 mg every other day nocturnally for what suggested to be benign prostatic hyperplasia managed by primary care physician at this point. I discussed with the patient the importance of overall sleep quality I cannot rule out entirely sleep apnea based on the test, they agreed to repeat a home sleep study which I ordered    Orders:  -     Ambulatory Referral to Sleep Medicine  -     Home Study; Future    2. Periodic limb movements of sleep  Assessment & Plan:  Very high periodic movement index on his diagnostic sleep study  Unclear of the clinical significance of that overall he under went a significant cardiac event recently there are some literature to suggest the correlation between leg movements and cardiac history although the cardiac event might of caused by an upper airway compromise in the setting of chewing tobacco as detailed from the hospital records and the history provided by the patient's wife.   It is still worth it to check his iron panel and perhaps a consideration of iron supplement if he is low on iron storage/ferritin or saturation, if not consideration to add a small dose gabapentin to perhaps improve sleep quality might be considered    Orders:  -     Iron, TIBC and Ferritin Panel; Future    3. ALBERT (obstructive sleep apnea)  Assessment & Plan:  Johanna Camacho in lab diagnostic sleep study was negative for sleep disordered breathing however, he slept very poorly, likely from a first night effect. Also, he is accompanied by his wife today who described that witnessed episodes of gasping, snoring and apneas were mainly when he stepped in his back in the past/in supine position which could suggest a positional sleep apnea. The patient reports overall occasional sleepiness during the day, his sleep cycle has shifted/advanced at recently since he had his recent cardiac event in May currently he sleeps from 10 PM to 6 AM, he has nocturia which could be related to excessive water and tea drinking later during the day. He is on Flomax 0.4 mg every other day nocturnally for what suggested to be benign prostatic hyperplasia managed by primary care physician at this point. I discussed with the patient the importance of overall sleep quality I cannot rule out entirely sleep apnea based on the test, they agreed to repeat a home sleep study which I ordered      4. Paroxysmal atrial fibrillation (HCC)  Assessment & Plan:  Sleep deprivation from an etiology could be a cause for worsening and progression of underlying cardiac arrhythmias      5.  Cardiac arrest [facility])  Assessment & Plan:  Torsade the points and bradycardia-he may, currently status post AICD, follows up with cardiology            History of Present Illness   HPI:  Loi Larsen is a 67 y.o. male with PMHx as below who comes in for evaluation of is here for an evaluation after having a major cardiac event in May when he had a witnessed arrhythmia and used cardiac arrest underwent CPR and hospitalization, felt to be related to choking event due to chewing on cigar tips which the patient has quit. He has a long history detailed by his wife today who is accompanying him over snoring and witnessed gasping apneas especially when he sleeps on his back. During his CPR he had fractured ribs and he has not been able to sleep on his back since then, with witnessed improvement of his snoring and gasping. Due to the concern of sleep deprivation/sleep disordered breathing induced cardiac arrhythmias, the patient underwent an in lab diagnostic sleep study which was found to be negative for sleep disordered breathing despite having a very low sleep efficiency being in the sleep lab with high PL movement index. Overall the patient, denies overt sleepiness during the day occasionally he would wake up tired or have limited refreshed upon awakening or in the early afternoon but overall he is retired and stays active. He is predominantly sleeping on his right side now as he has an AICD on his left side and he cannot sleep on his back or stomach due to the fractured ribs. He is averaging sleep between 10 PM to 6 AM however he has multiple awakenings due to nocturia that is currently managed by primary care physician with Flomax as detailed above. Currently denies tobacco alcohol or drug abuse. And his [place] scale is as detailed below      Sitting and reading: Would never doze  Watching TV: Slight chance of dozing  Sitting, inactive in a public place (e.g. a theatre or a meeting):  Would never doze  As a passenger in a car for an hour without a break: Would never doze  Lying down to rest in the afternoon when circumstances permit: Slight chance of dozing  Sitting and talking to someone: Would never doze  Sitting quietly after a lunch without alcohol: Would never doze  In a car, while stopped for a few minutes in traffic: Would never doze  Total score: 2            Historical Information   Past Medical History:   Diagnosis Date   • Atrial fibrillation ([address])    • Hypertension    • SSS (sick sinus syndrome) ([address])    • Torsades de pointes ([address])    • VT (ventricular tachycardia) (HCC)     s/p dual chamber st yasmine ICD 5/16/2023     Past Surgical History:   Procedure Laterality Date   • APPENDECTOMY  2006   • CARDIAC DEFIBRILLATOR PLACEMENT Left 5/18/2023    Procedure: INSERTION AUTOMATIC IMPLANTABLE CARDIOVERTER DEFIBRILLATOR (AICD); Surgeon: Zainab Jhaveri MD;  Location: [facility];  Service: Cardiology     Family History   Problem Relation Age of Onset   • Heart disease Father    • Colon cancer Brother    • Colon cancer Daughter      Social History     Socioeconomic History   • Marital status: /Civil Union     Spouse name: Not on file   • Number of children: Not on file   • Years of education: Not on file   • Highest education level: Not on file   Occupational History   • Not on file   Tobacco Use   • Smoking status: Never   • Smokeless tobacco: Never   Vaping Use   • Vaping Use: Never used   Substance and Sexual Activity   • Alcohol use: Yes     Comment: Rare   • Drug use: Never   • Sexual activity: Not on file   Other Topics Concern   • Not on file   Social History Narrative   • Not on file     Social Determinants of Health     Financial Resource Strain: Low Risk  (7/11/2023)    Overall Financial Resource Strain (CARDIA)    • Difficulty of Paying Living Expenses: Not hard at all   Food Insecurity: No Food Insecurity (5/19/2023)    Hunger Vital Sign    • Worried About Running Out of Food in the Last Year: Never true    • Ran Out of Food in the Last Year: Never true   Transportation Needs: No Transportation Needs (7/11/2023)    PRAPARE - Transportation    • Lack of Transportation (Medical): No    • Lack of Transportation (Non-Medical):  No   Physical Activity: Not on file   Stress: Not on file   Social Connections: Not on file   Intimate Partner Violence: Not on file   Housing Stability: Low Risk  (5/19/2023)    Housing Stability Vital Sign    • Unable to Pay for Housing in the Last Year: No    • Number of Places Lived in the Last Year: 1    • Unstable Housing in the Last Year: No       Occupational History: retired     Meds/Allergies   No Known Allergies    Home medications:  Prior to Admission medications    Medication Sig Start Date End Date Taking? Authorizing Provider   ALPHAGAN P 0.1 %  7/20/20  Yes Historical Provider, MD   apixaban (ELIQUIS) 5 mg Take 1 tablet (5 mg total) by mouth 2 (two) times a day 6/1/23  Yes Yvonne Toro MD   carvedilol (COREG) 12.5 mg tablet Take 1 tablet (12.5 mg total) by mouth 2 (two) times a day with meals 6/1/23  Yes Yvonne Toro MD   cyanocobalamin (VITAMIN B-12) 250 MCG tablet Take 1 tablet (250 mcg total) by mouth daily 7/20/23 8/19/23 Yes Joannie Pallas, DO   levothyroxine 50 mcg tablet Take 1 tablet (50 mcg total) by mouth daily in the early morning 7/20/23  Yes Joannie Pallas, DO   tamsulosin [facility]) 0.4 mg Take 1 capsule (0.4 mg total) by mouth daily with dinner 7/20/23 8/19/23 Yes Joannie Pallas, DO   travoprost (TRAVATAN-Z) 0.004 % ophthalmic solution  7/20/20  Yes Historical Provider, MD   losartan (COZAAR) 25 mg tablet Take 1 tablet (25 mg total) by mouth daily 6/20/23 7/20/23  CARMEL Mariscal       Vitals:   Blood pressure 132/84, pulse 60, temperature (!) 96.5 °F (35.8 °C), temperature source Tympanic, resp. rate 18, height 6' (1.829 m), weight 96.6 kg (213 lb), SpO2 97 %. ,  Body mass index is 28.89 kg/m².   Neck Circumference: 16.5    Physical Exam  General:  Awake alert and oriented x 3, conversant without conversational dyspnea, NAD, normal affect  HEENT:   Sclera noninjected, nonicteric OU, Nares patent,  no craniofacial abnormalities, Mucous membranes, moist, no oral lesions, normal dentition, Mallampati class 1  NECK:  Trachea midline, no accessory muscle use, no stridor,  JVP not elevated  CARDIAC: Reg, single s1/S2, no m/r/g  PULM: CTA bilaterally no wheezing, rhonchi or rales  ABD: Soft nontender, nondistended, no rebound, no rigidity, no guarding  EXT: No cyanosis, no clubbing, no edema, normal capillary refill  NEURO: no focal neurologic deficits, AAOx3, moving all extremities appropriately    Labs: I have personally reviewed pertinent lab results. , ABG: No results found for: "PHART", "QJR9NHZ", "PO2ART", "YXM4TCD", "N2FOZXVE", "BEART", "SOURCE", BNP: No results found for: "BNP", CBC: No results found for: "WBC", "HGB", "HCT", "MCV", "PLT", "ADJUSTEDWBC", "RBC", "MCH", "MCHC", "RDW", "MPV", "NRBC", CMP: No results found for: "SODIUM", "K", "CL", "CO2", "ANIONGAP", "BUN", "CREATININE", "GLUCOSE", "CALCIUM", "AST", "ALT", "ALKPHOS", "PROT", "BILITOT", "EGFR", PT/INR: No results found for: "PT", "INR", Troponin: No results found for: "TROPONINI"  Lab Results   Component Value Date    WBC 6.76 06/01/2023    HGB 11.6 (L) 06/01/2023    HCT 35.3 (L) 06/01/2023    MCV 93 06/01/2023     06/01/2023      Lab Results   Component Value Date    CALCIUM 9.4 06/01/2023    K 4.0 06/01/2023    CO2 23 06/01/2023     (H) 06/01/2023    BUN 12 06/01/2023    CREATININE 0.94 06/01/2023     Lab Results   Component Value Date    IRON 17 (L) 05/16/2023    TIBC 231 (L) 05/16/2023    FERRITIN 446 (H) 05/16/2023     Lab Results   Component Value Date    CMBHENQU27 139 (L) 05/16/2023     Lab Results   Component Value Date    FOLATE 19.2 05/16/2023         Sleep studies:  Diagnostic 7/2023:  Very low sleep efficiency  AHI 0.4 events per hour  PL movement index of 138 events per hour  Suggestive of periodic limb movement with sleep    Theodor Damir, MD  [facility]       Portions of the record may have been created with voice recognition software. Occasional wrong word or "sound a like" substitutions may have occurred due to the inherent limitations of voice recognition software.  Read the chart carefully and recognize, using context, where substitutions have occurred.

## 2023-08-01 ENCOUNTER — APPOINTMENT (OUTPATIENT)
Dept: LAB | Facility: HOSPITAL | Age: 73
End: 2023-08-01
Payer: COMMERCIAL

## 2023-08-01 DIAGNOSIS — G47.61 PERIODIC LIMB MOVEMENTS OF SLEEP: ICD-10-CM

## 2023-08-01 LAB
FERRITIN SERPL-MCNC: 460 NG/ML (ref 24–336)
IRON SATN MFR SERPL: 28 % (ref 20–50)
IRON SERPL-MCNC: 108 UG/DL (ref 65–175)
TIBC SERPL-MCNC: 389 UG/DL (ref 250–450)

## 2023-08-01 PROCEDURE — 82728 ASSAY OF FERRITIN: CPT

## 2023-08-01 PROCEDURE — 83540 ASSAY OF IRON: CPT

## 2023-08-01 PROCEDURE — 83550 IRON BINDING TEST: CPT

## 2023-08-01 PROCEDURE — 36415 COLL VENOUS BLD VENIPUNCTURE: CPT

## 2023-08-03 ENCOUNTER — HOSPITAL ENCOUNTER (OUTPATIENT)
Dept: SLEEP CENTER | Facility: HOSPITAL | Age: 73
Discharge: HOME | End: 2023-08-03
Attending: INTERNAL MEDICINE
Payer: COMMERCIAL

## 2023-08-03 DIAGNOSIS — G47.33 OBSTRUCTIVE SLEEP APNEA HYPOPNEA, SEVERE: ICD-10-CM

## 2023-08-03 PROCEDURE — G0399 HOME SLEEP TEST/TYPE 3 PORTA: HCPCS

## 2023-08-06 NOTE — PROGRESS NOTES
Home Sleep Study Documentation    HOME STUDY DEVICE: Noxturnal no                                           Eliz G3 yes      Pre-Sleep Home Study:    Set-up and instructions performed by: Yee Gr performed demonstration for Patient: yes    Return demonstration performed by Patient: yes    Written instructions provided to Patient: yes    Patient signed consent form: yes        Post-Sleep Home Study:    Additional comments by Patient:     Home Sleep Study Failed:no:    Failure reason: N/A    Reported or Detected: N/A    Scored by: Kedar Leon       P-Flow absent in most part of the study.

## 2023-08-07 PROCEDURE — 95806 SLEEP STUDY UNATT&RESP EFFT: CPT | Performed by: INTERNAL MEDICINE

## 2023-08-14 ENCOUNTER — TELEPHONE (OUTPATIENT)
Dept: SLEEP CENTER | Facility: CLINIC | Age: 73
End: 2023-08-14

## 2023-08-14 DIAGNOSIS — G47.33 OBSTRUCTIVE SLEEP APNEA HYPOPNEA, SEVERE: Primary | ICD-10-CM

## 2023-08-14 DIAGNOSIS — G47.33 OSA (OBSTRUCTIVE SLEEP APNEA): ICD-10-CM

## 2023-08-14 NOTE — TELEPHONE ENCOUNTER
Patient saw Dr. Ildefonso Buitrago for sleep consultation in the [facility] pulmonary office 7/31/23. Home study resulted and shows moderate ALBERT. APAP recommended. Dr. Migdalia Thompson, did you want to place order for APAP? Patient will need to transition to sleep center.

## 2023-10-10 ENCOUNTER — REMOTE DEVICE CLINIC VISIT (OUTPATIENT)
Dept: CARDIOLOGY CLINIC | Facility: CLINIC | Age: 73
End: 2023-10-10
Payer: COMMERCIAL

## 2023-10-10 DIAGNOSIS — Z95.810 PRESENCE OF IMPLANTABLE CARDIOVERTER-DEFIBRILLATOR (ICD): Primary | ICD-10-CM

## 2023-10-10 PROCEDURE — 93296 REM INTERROG EVL PM/IDS: CPT | Performed by: INTERNAL MEDICINE

## 2023-10-10 PROCEDURE — 93295 DEV INTERROG REMOTE 1/2/MLT: CPT | Performed by: INTERNAL MEDICINE

## 2023-10-10 NOTE — PROGRESS NOTES
SJM DUAL ICD/ ACTIVE SYSTEM IS MRI CONDITIONAL   MERLIN TRANSMISSION:  BATTERY VOLTAGE ADEQUATE (7.6-8.1 YR.).  AP 99%  <1%.   ALL LEAD PARAMETERS WITHIN NORMAL LIMITS.  NO SIGNIFICANT HIGH RATE EPISODES.  CORVUE IMPEDANCE MONITORING IS OFF.  NORMAL DEVICE FUNCTION.  RG

## 2023-11-13 ENCOUNTER — OFFICE VISIT (OUTPATIENT)
Dept: CARDIOLOGY CLINIC | Facility: CLINIC | Age: 73
End: 2023-11-13
Payer: COMMERCIAL

## 2023-11-13 VITALS
BODY MASS INDEX: 29.55 KG/M2 | WEIGHT: 218.2 LBS | SYSTOLIC BLOOD PRESSURE: 110 MMHG | HEART RATE: 68 BPM | HEIGHT: 72 IN | DIASTOLIC BLOOD PRESSURE: 76 MMHG

## 2023-11-13 DIAGNOSIS — R09.89 BILATERAL CAROTID BRUITS: Primary | ICD-10-CM

## 2023-11-13 DIAGNOSIS — Z86.74 HISTORY OF CARDIAC ARREST: ICD-10-CM

## 2023-11-13 DIAGNOSIS — I10 ESSENTIAL HYPERTENSION: Chronic | ICD-10-CM

## 2023-11-13 PROCEDURE — 99214 OFFICE O/P EST MOD 30 MIN: CPT | Performed by: INTERNAL MEDICINE

## 2023-11-13 RX ORDER — BRIMONIDINE TARTRATE 1 MG/ML
SOLUTION/ DROPS OPHTHALMIC
COMMUNITY
Start: 2023-10-06

## 2023-11-13 NOTE — PROGRESS NOTES
Patient ID: Gabbi Kirkpatrick is a 67 y.o. male. Plan:      Bilateral carotid bruits  Not noted before. Will obtain a duplex exam.    Paroxysmal atrial fibrillation ([address])  Noted on device monitoring. Continue apixaban for stroke prevention and carvedilol for rate control. Essential hypertension  Too well-controlled and sometimes low in the morning. Will discontinue the losartan dose. History of cardiac arrest  In retrospect bradycardia related torsade was seen while in the hospital and is some some likelihood of this being the issue at home as well. No issues since his device was placed. Follow up Plan/Other summary comments:  Return in about 1 year (around 11/13/2024). HPI: Patient is seen in follow-up today regarding the above. Since the last visit he has felt well. He has a bit of lightheadedness in the morning associated with low blood pressure. Certainly no shocks or palpitations. He remains physically active. Patient was admitted to [address] [place] [address] in May having been found on the floor at home. Both in the field and at least once in the hospital he had ventricular tachycardia. In the hospital it seemed like bradycardic induced torsade. Temporary pacer was utilized and then ultimately a dual-chamber Abbott ICD was placed on 5/18/2023. Most recent or relevant cardiac/vascular testing:    TTE 5/16/2023: Normal LV and RV systolic function without valve abnormalities. Past Surgical History:   Procedure Laterality Date    APPENDECTOMY  2006    CARDIAC DEFIBRILLATOR PLACEMENT Left 5/18/2023    Procedure: INSERTION AUTOMATIC IMPLANTABLE CARDIOVERTER DEFIBRILLATOR (AICD); Surgeon: Kingston Cha MD;  Location: [facility];  Service: Cardiology       Lipid Profile:   Lab Results   Component Value Date    TRIG 178 (H) 05/19/2023    HDL 35 (L) 05/19/2023         Review of Systems   10  point ROS  was otherwise non pertinent or negative except as per HPI or as below. Gait: Normal.        Objective:     /76   Pulse 68   Ht 6' (1.829 m)   Wt 99 kg (218 lb 3.2 oz)   BMI 29.59 kg/m²     PHYSICAL EXAM:    General:  Normal appearance in no distress. Eyes:  Anicteric. Oral mucosa:  Moist.  Neck:  No JVD. Carotid upstrokes are brisk with bilateral bruits. No masses. Chest:  Clear to auscultation. ICD left subclavian well-healed. Cardiac:  No palpable PMI. Normal S1 and S2. No murmur gallop or rub. Abdomen:  Soft and nontender. No palpable organomegaly or aortic enlargement. Extremities:  No peripheral edema. Musculoskeletal:  Symmetric. Vascular:  Femoral pulses are brisk without bruits. Popliteal pulses are intact bilaterally. Pedal pulses are intact. Neuro:  Grossly symmetric. Psych:  Alert and oriented x3.         Current Outpatient Medications:     ALPHAGAN P 0.1 %, , Disp: , Rfl:     apixaban (ELIQUIS) 5 mg, Take 1 tablet (5 mg total) by mouth 2 (two) times a day, Disp: 60 tablet, Rfl: 5    brimonidine (ALPHAGAN P) 0.1 %, instill 1 drop into both eyes twice a day, Disp: , Rfl:     carvedilol (COREG) 12.5 mg tablet, Take 1 tablet (12.5 mg total) by mouth 2 (two) times a day with meals, Disp: 60 tablet, Rfl: 5    cyanocobalamin (VITAMIN B-12) 250 MCG tablet, Take 1 tablet (250 mcg total) by mouth daily, Disp: 30 tablet, Rfl: 3    levothyroxine 50 mcg tablet, Take 1 tablet (50 mcg total) by mouth daily in the early morning, Disp: 30 tablet, Rfl: 3    travoprost (TRAVATAN-Z) 0.004 % ophthalmic solution, , Disp: , Rfl:   No Known Allergies  Past Medical History:   Diagnosis Date    Cardiac arrest     Hypertension     Obstructive sleep apnea     PAF (paroxysmal atrial fibrillation)     SSS (sick sinus syndrome) (HCC)     Torsades de pointes (HCC)     VT (ventricular tachycardia) (HCC)     s/p dual chamber st yasmine ICD 5/16/2023           Social History     Tobacco Use   Smoking Status Never   Smokeless Tobacco Never

## 2023-11-13 NOTE — ASSESSMENT & PLAN NOTE
Noted on device monitoring. Continue apixaban for stroke prevention and carvedilol for rate control.

## 2023-11-13 NOTE — ASSESSMENT & PLAN NOTE
In retrospect bradycardia related torsade was seen while in the hospital and is some some likelihood of this being the issue at home as well. No issues since his device was placed.

## 2023-11-15 ENCOUNTER — HOSPITAL ENCOUNTER (OUTPATIENT)
Dept: NON INVASIVE DIAGNOSTICS | Facility: HOSPITAL | Age: 73
Discharge: HOME | End: 2023-11-15
Payer: COMMERCIAL

## 2023-11-15 DIAGNOSIS — I65.23 CAROTID STENOSIS, ASYMPTOMATIC, BILATERAL: ICD-10-CM

## 2023-11-15 DIAGNOSIS — R09.89 BILATERAL CAROTID BRUITS: ICD-10-CM

## 2023-11-15 DIAGNOSIS — I10 ESSENTIAL HYPERTENSION: ICD-10-CM

## 2023-11-15 DIAGNOSIS — R09.89 BILATERAL CAROTID BRUITS: Primary | ICD-10-CM

## 2023-11-15 PROCEDURE — 93880 EXTRACRANIAL BILAT STUDY: CPT

## 2023-11-15 PROCEDURE — 93880 EXTRACRANIAL BILAT STUDY: CPT | Performed by: SURGERY

## 2023-11-15 RX ORDER — LOSARTAN POTASSIUM 25 MG/1
25 TABLET ORAL DAILY
Qty: 90 TABLET | Refills: 5
Start: 2023-11-15

## 2023-11-15 NOTE — PROGRESS NOTES
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 Also HTN at home. Will resume losartan. Referral to vascular surgery is being arranged. Discussed with patient.

## 2023-11-16 ENCOUNTER — RA CDI HCC (OUTPATIENT)
Dept: OTHER | Facility: HOSPITAL | Age: 73
End: 2023-11-16

## 2023-11-16 NOTE — PROGRESS NOTES
[address] [facility] coding opportunities       Chart reviewed, no opportunity found: [id number]        Patients Insurance     Medicare Insurance: [employer]

## 2023-11-21 DIAGNOSIS — I48.0 PAROXYSMAL ATRIAL FIBRILLATION (HCC): ICD-10-CM

## 2023-11-21 RX ORDER — CARVEDILOL 12.5 MG/1
12.5 TABLET ORAL 2 TIMES DAILY WITH MEALS
Qty: 60 TABLET | Refills: 5 | Status: SHIPPED | OUTPATIENT
Start: 2023-11-21

## 2023-11-21 NOTE — TELEPHONE ENCOUNTER
Myanne-marie Davis called, she didn't hear anything yet about his carotid. She does have some questions and would like to speak to you directly- [phone] when you get a chance to call her. Thank you!

## 2023-11-22 DIAGNOSIS — E03.9 HYPOTHYROIDISM, UNSPECIFIED TYPE: ICD-10-CM

## 2023-11-22 RX ORDER — LEVOTHYROXINE SODIUM 0.05 MG/1
50 TABLET ORAL
Qty: 30 TABLET | Refills: 3 | Status: SHIPPED | OUTPATIENT
Start: 2023-11-22

## 2023-11-27 ENCOUNTER — OFFICE VISIT (OUTPATIENT)
Dept: FAMILY MEDICINE CLINIC | Facility: CLINIC | Age: 73
End: 2023-11-27
Payer: COMMERCIAL

## 2023-11-27 ENCOUNTER — APPOINTMENT (EMERGENCY)
Dept: RADIOLOGY | Facility: HOSPITAL | Age: 73
End: 2023-11-27
Payer: COMMERCIAL

## 2023-11-27 ENCOUNTER — HOSPITAL ENCOUNTER (OUTPATIENT)
Facility: HOSPITAL | Age: 73
Setting detail: OBSERVATION
Discharge: HOME | End: 2023-11-28
Attending: EMERGENCY MEDICINE | Admitting: INTERNAL MEDICINE
Payer: COMMERCIAL

## 2023-11-27 VITALS
HEIGHT: 72 IN | OXYGEN SATURATION: 99 % | BODY MASS INDEX: 29.66 KG/M2 | TEMPERATURE: 97.7 F | SYSTOLIC BLOOD PRESSURE: 230 MMHG | DIASTOLIC BLOOD PRESSURE: 118 MMHG | WEIGHT: 219 LBS | HEART RATE: 60 BPM

## 2023-11-27 DIAGNOSIS — I16.0 HYPERTENSIVE URGENCY: Primary | ICD-10-CM

## 2023-11-27 DIAGNOSIS — I16.0 ASYMPTOMATIC HYPERTENSIVE URGENCY: Primary | ICD-10-CM

## 2023-11-27 DIAGNOSIS — G47.33 OSA (OBSTRUCTIVE SLEEP APNEA): ICD-10-CM

## 2023-11-27 DIAGNOSIS — I65.23 BILATERAL CAROTID ARTERY STENOSIS: ICD-10-CM

## 2023-11-27 DIAGNOSIS — I48.0 PAROXYSMAL ATRIAL FIBRILLATION (HCC): ICD-10-CM

## 2023-11-27 DIAGNOSIS — Z86.74 HISTORY OF CARDIAC ARREST: ICD-10-CM

## 2023-11-27 LAB
2HR DELTA HS TROPONIN: 1 NG/L
4HR DELTA HS TROPONIN: 2 NG/L
ALBUMIN SERPL BCP-MCNC: 4.7 G/DL (ref 3.5–5)
ALP SERPL-CCNC: 84 U/L (ref 34–104)
ALT SERPL W P-5'-P-CCNC: 23 U/L (ref 7–52)
ANION GAP SERPL CALCULATED.3IONS-SCNC: 9 MMOL/L
APTT PPP: 28 SECONDS (ref 23–37)
AST SERPL W P-5'-P-CCNC: 17 U/L (ref 13–39)
ATRIAL RATE: 61 BPM
ATRIAL RATE: 65 BPM
BASOPHILS # BLD AUTO: 0.01 THOUSANDS/ÂΜL (ref 0–0.1)
BASOPHILS NFR BLD AUTO: 0 % (ref 0–1)
BILIRUB SERPL-MCNC: 0.8 MG/DL (ref 0.2–1)
BUN SERPL-MCNC: 17 MG/DL (ref 5–25)
CALCIUM SERPL-MCNC: 9.4 MG/DL (ref 8.4–10.2)
CARDIAC TROPONIN I PNL SERPL HS: 10 NG/L
CARDIAC TROPONIN I PNL SERPL HS: 11 NG/L
CARDIAC TROPONIN I PNL SERPL HS: 9 NG/L
CHLORIDE SERPL-SCNC: 107 MMOL/L (ref 96–108)
CO2 SERPL-SCNC: 23 MMOL/L (ref 21–32)
CREAT SERPL-MCNC: 0.98 MG/DL (ref 0.6–1.3)
EOSINOPHIL # BLD AUTO: 0.12 THOUSAND/ÂΜL (ref 0–0.61)
EOSINOPHIL NFR BLD AUTO: 2 % (ref 0–6)
ERYTHROCYTE [DISTWIDTH] IN BLOOD BY AUTOMATED COUNT: 12.3 % (ref 11.6–15.1)
GFR SERPL CREATININE-BSD FRML MDRD: 76 ML/MIN/1.73SQ M
GLUCOSE SERPL-MCNC: 101 MG/DL (ref 65–140)
HCT VFR BLD AUTO: 43.7 % (ref 36.5–49.3)
HGB BLD-MCNC: 14.3 G/DL (ref 12–17)
IMM GRANULOCYTES # BLD AUTO: 0.02 THOUSAND/UL (ref 0–0.2)
IMM GRANULOCYTES NFR BLD AUTO: 0 % (ref 0–2)
INR PPP: 1.04 (ref 0.84–1.19)
LYMPHOCYTES # BLD AUTO: 1.79 THOUSANDS/ÂΜL (ref 0.6–4.47)
LYMPHOCYTES NFR BLD AUTO: 23 % (ref 14–44)
MCH RBC QN AUTO: 30.2 PG (ref 26.8–34.3)
MCHC RBC AUTO-ENTMCNC: 32.7 G/DL (ref 31.4–37.4)
MCV RBC AUTO: 92 FL (ref 82–98)
MONOCYTES # BLD AUTO: 0.77 THOUSAND/ÂΜL (ref 0.17–1.22)
MONOCYTES NFR BLD AUTO: 10 % (ref 4–12)
NEUTROPHILS # BLD AUTO: 5 THOUSANDS/ÂΜL (ref 1.85–7.62)
NEUTS SEG NFR BLD AUTO: 65 % (ref 43–75)
NRBC BLD AUTO-RTO: 0 /100 WBCS
P AXIS: 10 DEGREES
P AXIS: 51 DEGREES
PLATELET # BLD AUTO: 136 THOUSANDS/UL (ref 149–390)
PMV BLD AUTO: 11.1 FL (ref 8.9–12.7)
POTASSIUM SERPL-SCNC: 4 MMOL/L (ref 3.5–5.3)
PR INTERVAL: 124 MS
PR INTERVAL: 136 MS
PROT SERPL-MCNC: 6.9 G/DL (ref 6.4–8.4)
PROTHROMBIN TIME: 13.8 SECONDS (ref 11.6–14.5)
QRS AXIS: -58 DEGREES
QRS AXIS: 20 DEGREES
QRSD INTERVAL: 106 MS
QRSD INTERVAL: 124 MS
QT INTERVAL: 422 MS
QT INTERVAL: 426 MS
QTC INTERVAL: 424 MS
QTC INTERVAL: 443 MS
RBC # BLD AUTO: 4.74 MILLION/UL (ref 3.88–5.62)
SODIUM SERPL-SCNC: 139 MMOL/L (ref 135–147)
T WAVE AXIS: -70 DEGREES
T WAVE AXIS: 84 DEGREES
VENTRICULAR RATE: 61 BPM
VENTRICULAR RATE: 65 BPM
WBC # BLD AUTO: 7.71 THOUSAND/UL (ref 4.31–10.16)

## 2023-11-27 PROCEDURE — 99215 OFFICE O/P EST HI 40 MIN: CPT | Performed by: FAMILY MEDICINE

## 2023-11-27 PROCEDURE — 85025 COMPLETE CBC W/AUTO DIFF WBC: CPT | Performed by: EMERGENCY MEDICINE

## 2023-11-27 PROCEDURE — 99223 1ST HOSP IP/OBS HIGH 75: CPT | Performed by: NURSE PRACTITIONER

## 2023-11-27 PROCEDURE — 84484 ASSAY OF TROPONIN QUANT: CPT | Performed by: EMERGENCY MEDICINE

## 2023-11-27 PROCEDURE — 96374 THER/PROPH/DIAG INJ IV PUSH: CPT

## 2023-11-27 PROCEDURE — 85610 PROTHROMBIN TIME: CPT | Performed by: EMERGENCY MEDICINE

## 2023-11-27 PROCEDURE — 96376 TX/PRO/DX INJ SAME DRUG ADON: CPT

## 2023-11-27 PROCEDURE — 71045 X-RAY EXAM CHEST 1 VIEW: CPT

## 2023-11-27 PROCEDURE — 99284 EMERGENCY DEPT VISIT MOD MDM: CPT

## 2023-11-27 PROCEDURE — 85730 THROMBOPLASTIN TIME PARTIAL: CPT | Performed by: EMERGENCY MEDICINE

## 2023-11-27 PROCEDURE — 93005 ELECTROCARDIOGRAM TRACING: CPT

## 2023-11-27 PROCEDURE — 80053 COMPREHEN METABOLIC PANEL: CPT | Performed by: EMERGENCY MEDICINE

## 2023-11-27 PROCEDURE — 36415 COLL VENOUS BLD VENIPUNCTURE: CPT | Performed by: EMERGENCY MEDICINE

## 2023-11-27 RX ORDER — AMLODIPINE BESYLATE 5 MG/1
5 TABLET ORAL ONCE
Status: COMPLETED | OUTPATIENT
Start: 2023-11-27 | End: 2023-11-27

## 2023-11-27 RX ORDER — LEVOTHYROXINE SODIUM 0.05 MG/1
50 TABLET ORAL
Status: DISCONTINUED | OUTPATIENT
Start: 2023-11-28 | End: 2023-11-28 | Stop reason: HOSPADM

## 2023-11-27 RX ORDER — ACETAMINOPHEN 325 MG/1
650 TABLET ORAL EVERY 6 HOURS PRN
Status: DISCONTINUED | OUTPATIENT
Start: 2023-11-27 | End: 2023-11-28 | Stop reason: HOSPADM

## 2023-11-27 RX ORDER — LOSARTAN POTASSIUM 25 MG/1
25 TABLET ORAL ONCE
Status: COMPLETED | OUTPATIENT
Start: 2023-11-27 | End: 2023-11-27

## 2023-11-27 RX ORDER — BRIMONIDINE TARTRATE 2 MG/ML
1 SOLUTION/ DROPS OPHTHALMIC 2 TIMES DAILY
Status: DISCONTINUED | OUTPATIENT
Start: 2023-11-27 | End: 2023-11-28 | Stop reason: HOSPADM

## 2023-11-27 RX ORDER — CLONIDINE HYDROCHLORIDE 0.1 MG/1
0.2 TABLET ORAL ONCE
Status: DISCONTINUED | OUTPATIENT
Start: 2023-11-27 | End: 2023-11-27

## 2023-11-27 RX ORDER — HYDRALAZINE HYDROCHLORIDE 20 MG/ML
10 INJECTION INTRAMUSCULAR; INTRAVENOUS EVERY 6 HOURS PRN
Status: DISCONTINUED | OUTPATIENT
Start: 2023-11-27 | End: 2023-11-28 | Stop reason: HOSPADM

## 2023-11-27 RX ORDER — HYDRALAZINE HYDROCHLORIDE 20 MG/ML
5 INJECTION INTRAMUSCULAR; INTRAVENOUS ONCE
Status: COMPLETED | OUTPATIENT
Start: 2023-11-27 | End: 2023-11-27

## 2023-11-27 RX ORDER — LOSARTAN POTASSIUM 25 MG/1
25 TABLET ORAL DAILY
Status: DISCONTINUED | OUTPATIENT
Start: 2023-11-28 | End: 2023-11-28

## 2023-11-27 RX ORDER — TRAVOPROST OPHTHALMIC SOLUTION 0.04 MG/ML
1 SOLUTION OPHTHALMIC
Status: DISCONTINUED | OUTPATIENT
Start: 2023-11-27 | End: 2023-11-27

## 2023-11-27 RX ORDER — HYDRALAZINE HYDROCHLORIDE 20 MG/ML
10 INJECTION INTRAMUSCULAR; INTRAVENOUS ONCE
Status: COMPLETED | OUTPATIENT
Start: 2023-11-27 | End: 2023-11-27

## 2023-11-27 RX ORDER — TRAVOPROST OPHTHALMIC SOLUTION 0.04 MG/ML
1 SOLUTION OPHTHALMIC DAILY
Status: DISCONTINUED | OUTPATIENT
Start: 2023-11-28 | End: 2023-11-28 | Stop reason: HOSPADM

## 2023-11-27 RX ORDER — CARVEDILOL 12.5 MG/1
12.5 TABLET ORAL 2 TIMES DAILY WITH MEALS
Status: DISCONTINUED | OUTPATIENT
Start: 2023-11-27 | End: 2023-11-28 | Stop reason: HOSPADM

## 2023-11-27 RX ADMIN — LOSARTAN POTASSIUM 25 MG: 25 TABLET, FILM COATED ORAL at 13:20

## 2023-11-27 RX ADMIN — BRIMONIDINE TARTRATE 1 DROP: 2 SOLUTION OPHTHALMIC at 21:04

## 2023-11-27 RX ADMIN — APIXABAN 5 MG: 5 TABLET, FILM COATED ORAL at 18:47

## 2023-11-27 RX ADMIN — CARVEDILOL 12.5 MG: 12.5 TABLET, FILM COATED ORAL at 18:47

## 2023-11-27 RX ADMIN — HYDRALAZINE HYDROCHLORIDE 10 MG: 20 INJECTION INTRAMUSCULAR; INTRAVENOUS at 14:50

## 2023-11-27 RX ADMIN — HYDRALAZINE HYDROCHLORIDE 5 MG: 20 INJECTION INTRAMUSCULAR; INTRAVENOUS at 11:47

## 2023-11-27 RX ADMIN — AMLODIPINE BESYLATE 5 MG: 5 TABLET ORAL at 13:20

## 2023-11-27 NOTE — ASSESSMENT & PLAN NOTE
Was sent to ER from PCP for hypertensive urgency  Was treated with hydralazine IV in ER and regular home meds  BP remains elevated will give additional hydralazine 10 mg IV Q6 PRN SBP >180  Monitor blood pressure closely

## 2023-11-27 NOTE — RESPIRATORY THERAPY NOTE
Resp care   11/27/23 183   Respiratory Assessment   Resp Comments Pt refusing CPAP-states he does not have one at home.

## 2023-11-27 NOTE — ED PROVIDER NOTES
History  Chief Complaint   Patient presents with    Hypertension     Pt had a follow up with PCP and had  systolic was told to come in.     66-year-old male presents emergency department at the request of his primary care physician after being seen in the office for regular checkup. The patient was found to have a very high blood pressure with systolic blood pressures in the mid 272K and diastolics in the low 715V. The patient denies any significant symptoms at present. Patient notes she has been distressed about his carotid disease. Patient is taking all the medicine that he is on for hypertension as prescribed. Patient here for evaluation. Prior to Admission Medications   Prescriptions Last Dose Informant Patient Reported? Taking?    ALPHAGAN P 0.1 % 11/27/2023 Spouse/Significant Other Yes Yes   apixaban (ELIQUIS) 5 mg 11/26/2023  No Yes   Sig: Take 1 tablet (5 mg total) by mouth 2 (two) times a day   brimonidine (ALPHAGAN P) 0.1 % 11/27/2023  Yes Yes   Sig: instill 1 drop into both eyes twice a day   carvedilol (COREG) 12.5 mg tablet 11/27/2023  No Yes   Sig: Take 1 tablet (12.5 mg total) by mouth 2 (two) times a day with meals   cyanocobalamin (VITAMIN B-12) 250 MCG tablet 11/26/2023 Spouse/Significant Other No Yes   Sig: Take 1 tablet (250 mcg total) by mouth daily   levothyroxine 50 mcg tablet 11/27/2023  No Yes   Sig: Take 1 tablet (50 mcg total) by mouth daily in the early morning   levothyroxine 50 mcg tablet 11/27/2023  No Yes   Sig: TAKE ONE TABLET BY MOUTH EVERY DAY IN THE EARLY MORNING   losartan (COZAAR) 25 mg tablet 11/26/2023  No Yes   Sig: Take 1 tablet (25 mg total) by mouth daily   travoprost (TRAVATAN-Z) 0.004 % ophthalmic solution 11/27/2023 Spouse/Significant Other Yes Yes      Facility-Administered Medications: None       Past Medical History:   Diagnosis Date    Cardiac arrest     Hypertension     Obstructive sleep apnea     PAF (paroxysmal atrial fibrillation)     SSS (sick sinus syndrome) ([address])     Torsades de pointes ([address])     VT (ventricular tachycardia) ([address])     s/p dual chamber st yasmine ICD 5/16/2023       Past Surgical History:   Procedure Laterality Date    APPENDECTOMY  2006    CARDIAC DEFIBRILLATOR PLACEMENT Left 5/18/2023    Procedure: INSERTION AUTOMATIC IMPLANTABLE CARDIOVERTER DEFIBRILLATOR (AICD); Surgeon: Kingston Cha MD;  Location: [facility];  Service: Cardiology       Family History   Problem Relation Age of Onset    Heart disease Father     Colon cancer Brother     Colon cancer Daughter      I have reviewed and agree with the history as documented. E-Cigarette/Vaping    E-Cigarette Use Never User      E-Cigarette/Vaping Substances    Nicotine No     THC No     CBD No     Flavoring No      Social History     Tobacco Use    Smoking status: Never    Smokeless tobacco: Never   Vaping Use    Vaping Use: Never used   Substance Use Topics    Alcohol use: Yes     Comment: Rare    Drug use: Never       Review of Systems   Constitutional:  Negative for chills and fever. HENT:  Negative for ear pain and sore throat. Eyes:  Negative for pain and visual disturbance. Respiratory:  Negative for cough and shortness of breath. Cardiovascular:  Negative for chest pain and palpitations. Gastrointestinal:  Negative for abdominal pain and vomiting. Genitourinary:  Negative for dysuria and hematuria. Musculoskeletal:  Negative for arthralgias and back pain. Skin:  Negative for color change and rash. Neurological:  Negative for seizures and syncope. All other systems reviewed and are negative. Physical Exam  Physical Exam  Constitutional:       General: He is not in acute distress. Appearance: Normal appearance. He is normal weight. He is not ill-appearing. HENT:      Head: Normocephalic and atraumatic.       Right Ear: External ear normal.      Left Ear: External ear normal.      Nose: Nose normal.      Mouth/Throat:      Mouth: Mucous membranes are moist.   Eyes:      Conjunctiva/sclera: Conjunctivae normal.   Cardiovascular:      Rate and Rhythm: Normal rate and regular rhythm. Pulses: Normal pulses. Heart sounds: Normal heart sounds. No murmur heard. Pulmonary:      Effort: Pulmonary effort is normal.      Breath sounds: Normal breath sounds. Abdominal:      General: Abdomen is flat. There is no distension. Palpations: Abdomen is soft. There is no mass. Tenderness: There is no abdominal tenderness. Musculoskeletal:         General: No swelling, tenderness or deformity. Normal range of motion. Cervical back: Normal range of motion. No rigidity. Skin:     General: Skin is warm and dry. Capillary Refill: Capillary refill takes 2 to 3 seconds. Coloration: Skin is not pale. Neurological:      General: No focal deficit present. Mental Status: He is alert and oriented to person, place, and time. Mental status is at baseline.    Psychiatric:         Mood and Affect: Mood normal.         Vital Signs  ED Triage Vitals   Temperature Pulse Respirations Blood Pressure SpO2   11/27/23 1137 11/27/23 1135 11/27/23 1136 11/27/23 1137 11/27/23 1133   98 °F (36.7 °C) 59 20 (!) 247/121 98 %      Temp Source Heart Rate Source Patient Position - Orthostatic VS BP Location FiO2 (%)   11/27/23 1137 11/27/23 1200 11/27/23 1544 11/27/23 1544 --   Temporal Monitor Sitting Left arm       Pain Score       11/27/23 1135       No Pain           Vitals:    11/27/23 2336 11/28/23 0733 11/28/23 1147 11/28/23 1147   BP: 99/52 (!) 176/100 152/80 152/80   Pulse: 60 60  56   Patient Position - Orthostatic VS:  Lying           Visual Acuity      ED Medications  Medications   hydrALAZINE (APRESOLINE) injection 5 mg (5 mg Intravenous Given 11/27/23 1147)   losartan (COZAAR) tablet 25 mg (25 mg Oral Given 11/27/23 1320)   amLODIPine (NORVASC) tablet 5 mg (5 mg Oral Given 11/27/23 1320)   hydrALAZINE (APRESOLINE) injection 10 mg (10 mg Intravenous Given 11/27/23 1450)       Diagnostic Studies  Results Reviewed       Procedure Component Value Units Date/Time    HS Troponin I 4hr [[id number]]  (Normal) Collected: 11/27/23 1605    Lab Status: Final result Specimen: Blood from Arm, Right Updated: 11/27/23 1642     hs TnI 4hr 11 ng/L      Delta 4hr hsTnI 2 ng/L     HS Troponin I 2hr [[id number]]  (Normal) Collected: 11/27/23 1342    Lab Status: Final result Specimen: Blood from Arm, Right Updated: 11/27/23 1422     hs TnI 2hr 10 ng/L      Delta 2hr hsTnI 1 ng/L     HS Troponin 0hr (reflex protocol) [[id number]]  (Normal) Collected: 11/27/23 1146    Lab Status: Final result Specimen: Blood from Arm, Right Updated: 11/27/23 1226     hs TnI 0hr 9 ng/L     Comprehensive metabolic panel [[id number]] Collected: 11/27/23 1146    Lab Status: Final result Specimen: Blood from Arm, Right Updated: 11/27/23 1219     Sodium 139 mmol/L      Potassium 4.0 mmol/L      Chloride 107 mmol/L      CO2 23 mmol/L      ANION GAP 9 mmol/L      BUN 17 mg/dL      Creatinine 0.98 mg/dL      Glucose 101 mg/dL      Calcium 9.4 mg/dL      AST 17 U/L      ALT 23 U/L      Alkaline Phosphatase 84 U/L      Total Protein 6.9 g/dL      Albumin 4.7 g/dL      Total Bilirubin 0.80 mg/dL      eGFR 76 ml/min/1.73sq m     Narrative:      [facility] guidelines for Chronic Kidney Disease (CKD):     Stage 1 with normal or high GFR (GFR > 90 mL/min/1.73 square meters)    Stage 2 Mild CKD (GFR = 60-89 mL/min/1.73 square meters)    Stage 3A Moderate CKD (GFR = 45-59 mL/min/1.73 square meters)    Stage 3B Moderate CKD (GFR = 30-44 mL/min/1.73 square meters)    Stage 4 Severe CKD (GFR = 15-29 mL/min/1.73 square meters)    Stage 5 End Stage CKD (GFR <15 mL/min/1.73 square meters)  Note: GFR calculation is accurate only with a steady state creatinine    Protime-INR [[id number]]  (Normal) Collected: 11/27/23 1146    Lab Status: Final result Specimen: Blood from Arm, Right Updated: 11/27/23 1212 Protime 13.8 seconds      INR 1.04    APTT [[id number]]  (Normal) Collected: 11/27/23 1146    Lab Status: Final result Specimen: Blood from Arm, Right Updated: 11/27/23 1214     PTT 28 seconds     CBC and differential [[id number]]  (Abnormal) Collected: 11/27/23 1146    Lab Status: Final result Specimen: Blood from Arm, Right Updated: 11/27/23 1204     WBC 7.71 Thousand/uL      RBC 4.74 Million/uL      Hemoglobin 14.3 g/dL      Hematocrit 43.7 %      MCV 92 fL      MCH 30.2 pg      MCHC 32.7 g/dL      RDW 12.3 %      MPV 11.1 fL      Platelets 213 Thousands/uL      nRBC 0 /100 WBCs      Neutrophils Relative 65 %      Immat GRANS % 0 %      Lymphocytes Relative 23 %      Monocytes Relative 10 %      Eosinophils Relative 2 %      Basophils Relative 0 %      Neutrophils Absolute 5.00 Thousands/µL      Immature Grans Absolute 0.02 Thousand/uL      Lymphocytes Absolute 1.79 Thousands/µL      Monocytes Absolute 0.77 Thousand/µL      Eosinophils Absolute 0.12 Thousand/µL      Basophils Absolute 0.01 Thousands/µL                    XR chest 1 view portable   Final Result by Indy Jon MD (11/27 1314)      No acute cardiopulmonary disease. Workstation performed: [id number]                    Procedures  ECG 12 Lead Documentation Only    Date/Time: 11/27/2023 11:45 AM    Performed by: Lisette Montgomery DO  Authorized by: Lisette Montgomery DO    ECG reviewed by me, the ED Provider: yes    Patient location:  ED  Comments:      EKG shows a atrially paced rhythm at 61 beats a minute there is a normal axis. There is an intraventricular conduction delay and questionable old anterior septal MI. No other definitive acute ST or T wave changes noted. CriticalCare Time    Date/Time: 11/27/2023 2:54 PM    Performed by: Lisette Montgomery DO  Authorized by:  Lisette Montgomery DO    Critical care provider statement:     Critical care time (minutes):  52    Critical care was necessary to treat or prevent imminent or life-threatening deterioration of the following conditions:  Circulatory failure    Critical care was time spent personally by me on the following activities:  Evaluation of patient's response to treatment, examination of patient, interpretation of cardiac output measurements, discussions with consultants, ordering and review of radiographic studies, ordering and review of laboratory studies, ordering and performing treatments and interventions, re-evaluation of patient's condition and review of old charts           ED Course  ED Course as of 11/28/23 1423   Mon Nov 27, 2023   1235 hs TnI 0hr: 9   1244 Discussed with Dr. Doroteo Pillai, recommends seeing if he is still on losartan because that was just removed due to his blood pressure being too low. Dr. Doroteo Pillai feels he may be sensitive to the medication. [address]. Doroteo Pillai recommends that the patient be placed on amlodipine as well as increasing his losartan from 25-50.   1443 Despite multiple medications, IV and p.o., the patient blood pressure still is anywhere between 849 and 747 systolic. Patient will need to be admitted                                 SBIRT 22yo+      Flowsheet Row Most Recent Value   Initial Alcohol Screen: US AUDIT-C     1. How often do you have a drink containing alcohol? 0 Filed at: 11/27/2023 1206   2. How many drinks containing alcohol do you have on a typical day you are drinking? 0 Filed at: 11/27/2023 1206   3a. Male UNDER 65: How often do you have five or more drinks on one occasion? 0 Filed at: 11/27/2023 1206   3b. FEMALE Any Age, or MALE 65+: How often do you have 4 or more drinks on one occassion? 0 Filed at: 11/27/2023 1206   Audit-C Score 0 Filed at: 11/27/2023 1206   MILLICENT: How many times in the past year have you. .. Used an illegal drug or used a prescription medication for non-medical reasons?  Never Filed at: 11/27/2023 1206                      Medical Decision Making  Patient is a 59-year-old male who presents emergency department complaining of hypertension which was diagnosed at his family doctor's office. Patient is been having blood pressures in the mid 490I systolics to the mid 293U diastolic. Patient is asymptomatic at the time, but he does note that he has been stressed over his current health situation as well as having been told he has carotid artery disease. Patient notes he has had no change in medicine. He was temporarily decreased on one of his blood pressure pills however due to a resurgence of hypertension, he was restarted. Patient has been taking his pills as indicated. Differential diagnosis at the time evaluation is hypertension, possibly due to stress, or potentially due to worsening essential hypertension or renal disease. Can have lab work done which showed no definitive acute abnormalities however despite 2 doses of IV antihypertensives and to oral agents, the patient's blood pressure remains in the mid 200s. Patient will need to be placed in observation for further treatment to get the blood pressure to a more reasonable level. Patient care transferred to Dr. Larry Sevilla. Amount and/or Complexity of Data Reviewed  Labs: ordered. Decision-making details documented in ED Course. Radiology: ordered. Risk  Prescription drug management. Decision regarding hospitalization. Disposition  Final diagnoses:   Hypertensive urgency     Time reflects when diagnosis was documented in both MDM as applicable and the Disposition within this note       Time User Action Codes Description Comment    11/27/2023  2:50 PM Nehal Sands Add [I16.0] Hypertensive urgency           ED Disposition       ED Disposition   Admit    Condition   Stable    Date/Time   Mon Nov 27, 2023 7896    Comment   Case was discussed with Dr. Larry Sevilla and the patient's admission status was agreed to be Admission Status: observation status to the service of Dr. Larry Sevilla .                Follow-up Information Follow up With Specialties Details Why [address], DO Family Medicine Follow up Please call and schedule a posthospital discharge follow-up visit to ideally occur within 7 to 10 days [address]  [facility]      Angela Choudhary MD Cardiology Follow up Please follow-up as previously scheduled [facility]. [address]              Discharge Medication List as of 11/28/2023 11:32 AM        START taking these medications    Details   hydrALAZINE (APRESOLINE) 25 mg tablet Take 1 tablet (25 mg total) by mouth every 12 (twelve) hours, Starting Tue 11/28/2023, Until Thu 12/28/2023, Normal           CONTINUE these medications which have NOT CHANGED    Details   !! ALPHAGAN P 0.1 % Starting Mon 7/20/2020, Historical Med      apixaban (ELIQUIS) 5 mg Take 1 tablet (5 mg total) by mouth 2 (two) times a day, Starting Tue 11/21/2023, Normal      !! brimonidine (ALPHAGAN P) 0.1 % instill 1 drop into both eyes twice a day, Historical Med      carvedilol (COREG) 12.5 mg tablet Take 1 tablet (12.5 mg total) by mouth 2 (two) times a day with meals, Starting Tue 11/21/2023, Normal      cyanocobalamin (VITAMIN B-12) 250 MCG tablet Take 1 tablet (250 mcg total) by mouth daily, Starting Thu 7/20/2023, Normal      !! levothyroxine 50 mcg tablet Take 1 tablet (50 mcg total) by mouth daily in the early morning, Starting Wed 11/22/2023, Normal      !! levothyroxine 50 mcg tablet TAKE ONE TABLET BY MOUTH EVERY DAY IN THE EARLY MORNING, Normal      travoprost (TRAVATAN-Z) 0.004 % ophthalmic solution Starting Mon 7/20/2020, Historical Med       !! - Potential duplicate medications found. Please discuss with provider.         STOP taking these medications       losartan (COZAAR) 25 mg tablet Comments:   Reason for Stopping:               Outpatient Discharge Orders   Discharge Diet     Activity as tolerated     Call provider for:  persistent nausea or vomiting     Call provider for:  severe uncontrolled pain     Call provider for:  difficulty breathing, headache or visual disturbances     Call provider for:  persistent dizziness or light-headedness     Call provider for:  extreme fatigue       PDMP Review         Value Time User    PDMP Reviewed  Yes 7/30/2020  7:45 AM Christal Carrillo DO            ED Provider  Electronically Signed by             Taniya Raya.,   11/28/23 8823

## 2023-11-27 NOTE — H&P
[address] [facility]  H&P  Name: Kitty Mcpherson 67 y.o. male I MRN: [id number]  Unit/Bed#: -01 I Date of Admission: 11/27/2023   Date of Service: 11/27/2023 I Hospital Day: 0      Assessment/Plan     * Hypertensive urgency  Assessment & Plan  Was sent to ER from PCP for hypertensive urgency  Was treated with hydralazine IV in ER and regular home meds  BP remains elevated will give additional hydralazine 10 mg IV Q6 PRN SBP >180  Monitor blood pressure closely    Paroxysmal atrial fibrillation (HCC)  Assessment & Plan  Continue Eliquis  Continue carvedilol    ALBERT (obstructive sleep apnea)  Assessment & Plan  CPAP at bedtime    Hypothyroidism  Assessment & Plan  Continue levothyroxine    Essential hypertension  Assessment & Plan  Continue home medications including carvedilol 12.5 mg BID and losartan 25 mg daily  Monitor blood pressure    History of cardiac arrest  Assessment & Plan  History of cardiac arrest in May 2023 thought to be due to torsades  Status post dual ICD           VTE Pharmacologic Prophylaxis: VTE Score: 3 Moderate Risk (Score 3-4) - Pharmacological DVT Prophylaxis Ordered: apixaban (Eliquis). Code Status: Level 1 - Full Code   Discussion with family: Patient declined call to . Anticipated Length of Stay: Patient will be admitted on an observation basis with an anticipated length of stay of less than 2 midnights secondary to hypertensive urgency. Total Time Spent on Date of Encounter in care of patient: 50 mins. This time was spent on one or more of the following: performing physical exam; counseling and coordination of care; obtaining or reviewing history; documenting in the medical record; reviewing/ordering tests, medications or procedures; communicating with other healthcare professionals and discussing with patient's family/caregivers.     Chief Complaint: Sent from PCP for high blood pressure    History of Present Illness:  Kitty Mcpherson is a 67 y.o. male with a PMH of atrial fibrillation on Eliquis, essential hypertension, ALBERT, cardiac arrest status post AICD who presents with high blood pressure. He was sent from his primary care physician. He was treated with IV medication and his routine home meds but the hypertension persisted and he was admitted to telemetry observation. The patient says that he had his carotids evaluated and he has been nervous and anxious ever since. He feels that this has been playing a role in his elevated blood pressure. He denies fever, cough, shortness of breath, headache, palpitations, abdominal pain, nausea, vomiting, epistaxis, or chest pain. Review of Systems:  Review of Systems   Constitutional:  Negative for chills and fever. HENT:  Negative for congestion. Eyes:  Negative for visual disturbance. Respiratory:  Negative for cough and shortness of breath. Cardiovascular:  Negative for chest pain, palpitations and leg swelling. Gastrointestinal:  Negative for abdominal distention, abdominal pain, blood in stool, constipation, diarrhea, nausea and vomiting. Genitourinary:  Negative for dysuria and flank pain. Musculoskeletal:  Negative for arthralgias and myalgias. Skin:  Negative for pallor and rash. Neurological:  Negative for dizziness, seizures, syncope, weakness, numbness and headaches. Psychiatric/Behavioral:  Negative for confusion. All other systems reviewed and are negative.       Past Medical and Surgical History:   Past Medical History:   Diagnosis Date    Cardiac arrest     Hypertension     Obstructive sleep apnea     PAF (paroxysmal atrial fibrillation)     SSS (sick sinus syndrome) ([facility])     Torsades de pointes (HCC)     VT (ventricular tachycardia) ([facility])     s/p dual chamber st yasmine ICD 5/16/2023       Past Surgical History:   Procedure Laterality Date    APPENDECTOMY  2006    CARDIAC DEFIBRILLATOR PLACEMENT Left 5/18/2023    Procedure: INSERTION AUTOMATIC IMPLANTABLE CARDIOVERTER DEFIBRILLATOR (AICD); Surgeon: Alicia Shipley MD;  Location: [facility];  Service: Cardiology       Meds/Allergies:  Prior to Admission medications    Medication Sig Start Date End Date Taking? Authorizing Provider   apixaban (ELIQUIS) 5 mg Take 1 tablet (5 mg total) by mouth 2 (two) times a day 11/21/23  Yes Alicia Shipley MD   brimonidine (ALPHAGAN P) 0.1 % instill 1 drop into both eyes twice a day 10/6/23  Yes Historical Provider, MD   carvedilol (COREG) 12.5 mg tablet Take 1 tablet (12.5 mg total) by mouth 2 (two) times a day with meals 11/21/23  Yes Alicia Shipley MD   cyanocobalamin (VITAMIN B-12) 250 MCG tablet Take 1 tablet (250 mcg total) by mouth daily 7/20/23  Yes Nettie Gardiner DO   levothyroxine 50 mcg tablet Take 1 tablet (50 mcg total) by mouth daily in the early morning 11/22/23  Yes Nettie Gardiner DO   losartan (COZAAR) 25 mg tablet Take 1 tablet (25 mg total) by mouth daily 11/15/23  Yes Alicia Shipley MD   travoprost (TRAVATAN-Z) 0.004 % ophthalmic solution  7/20/20  Yes Historical Provider, MD   ALPHAGAN P 0.1 %  7/20/20   Historical Provider, MD   levothyroxine 50 mcg tablet TAKE ONE TABLET BY MOUTH EVERY DAY IN THE EARLY MORNING 11/22/23   Nettie Gardiner DO     I have reviewed home medications with patient personally.     Allergies: No Known Allergies    Social History:  Marital Status: /Civil Union   Occupation: Not discussed  Patient Pre-hospital Living Situation: Home  Patient Pre-hospital Level of Mobility: walks  Patient Pre-hospital Diet Restrictions: Heart healthy  Substance Use History:   Social History     Substance and Sexual Activity   Alcohol Use Yes    Comment: Rare     Social History     Tobacco Use   Smoking Status Never   Smokeless Tobacco Never     Social History     Substance and Sexual Activity   Drug Use Never       Family History:  Family History   Problem Relation Age of Onset    Heart disease Father     Colon cancer Brother     Colon cancer Daughter        Physical Exam:     Vitals:   Blood Pressure: (!) 185/89 (11/27/23 1847)  Pulse: 60 (11/27/23 1847)  Temperature: 97.9 °F (36.6 °C) (11/27/23 1544)  Temp Source: Oral (11/27/23 1544)  Respirations: 20 (11/27/23 1544)  Height: 6' (182.9 cm) (11/27/23 1544)  Weight - Scale: 98.2 kg (216 lb 7.9 oz) (11/27/23 1544)  SpO2: 98 % (11/27/23 1846)    Physical Exam  Vitals and nursing note reviewed. Constitutional:       General: He is not in acute distress. Appearance: He is not ill-appearing. HENT:      Head: Normocephalic and atraumatic. Nose: Nose normal.      Mouth/Throat:      Mouth: Mucous membranes are moist.      Pharynx: Oropharynx is clear. Eyes:      Pupils: Pupils are equal, round, and reactive to light. Cardiovascular:      Rate and Rhythm: Normal rate and regular rhythm. Pulses: Normal pulses. Pulmonary:      Effort: Pulmonary effort is normal. No respiratory distress. Breath sounds: Normal breath sounds. Abdominal:      General: Bowel sounds are normal.      Palpations: Abdomen is soft. Tenderness: There is no abdominal tenderness. Musculoskeletal:      Cervical back: Neck supple. Right lower leg: No edema. Left lower leg: No edema. Skin:     General: Skin is warm and dry. Capillary Refill: Capillary refill takes less than 2 seconds. Neurological:      General: No focal deficit present. Mental Status: He is alert and oriented to person, place, and time.           Additional Data:     Lab Results:  Results from last 7 days   Lab Units 11/27/23  1146   WBC Thousand/uL 7.71   HEMOGLOBIN g/dL 14.3   HEMATOCRIT % 43.7   PLATELETS Thousands/uL 136*   NEUTROS PCT % 65   LYMPHS PCT % 23   MONOS PCT % 10   EOS PCT % 2     Results from last 7 days   Lab Units 11/27/23  1146   SODIUM mmol/L 139   POTASSIUM mmol/L 4.0   CHLORIDE mmol/L 107   CO2 mmol/L 23   BUN mg/dL 17   CREATININE mg/dL 0.98   ANION GAP mmol/L 9   CALCIUM mg/dL 9.4   ALBUMIN g/dL 4.7   TOTAL BILIRUBIN mg/dL 0.80   ALK PHOS U/L 84   ALT U/L 23   AST U/L 17   GLUCOSE RANDOM mg/dL 101     Results from last 7 days   Lab Units 11/27/23  1146   INR  1.04                   Lines/Drains:  Invasive Devices       Peripheral Intravenous Line  Duration             Peripheral IV 11/27/23 Distal;Right;Ventral (anterior) Forearm <1 day                        Imaging: Reviewed radiology reports from this admission including: chest xray  XR chest 1 view portable   Final Result by Wilber Morales MD (11/27 9424)      No acute cardiopulmonary disease. Workstation performed: [id number]             EKG and Other Studies Reviewed on Admission:   EKG: Paced rhythm. HR 65.    ** Please Note: This note has been constructed using a voice recognition system.  **

## 2023-11-27 NOTE — PROGRESS NOTES
Depression Screening and Follow-up Plan: Patient was screened for depression during today's encounter. They screened negative with a PHQ-2 score of 0. Assessment/Plan: Patient was advised to go immediately to the [facility] emergency room not to go home he is leaving by car he is totally asymptomatic to his hypertensive urgency I did call the emergency room at the [facility] and made them aware that the patient is arriving and complete history on the patient    Problem List Items Addressed This Visit    None  Visit Diagnoses       Asymptomatic hypertensive urgency    -  Primary             Diagnoses and all orders for this visit:    Asymptomatic hypertensive urgency        No problem-specific Assessment & Plan notes found for this encounter. Subjective:      Patient ID: Krystal Soliman is a 67 y.o. male.     Patient presented to the office and for what was scheduled to be routine follow-up visit blood pressure by both the medical assistants and myself was elevated medical assistance got 230/118 and then in the with a large cuff got 218/112 I rechecked the blood pressure using a large adult cuff to that I got no better than 561 for systolic over 360 diastolic he is asymptomatic patient is also concerned and worried because he had a carotid Doppler done that showed critical stenosis of his 1 side of his neck and a 50 to 79% blockage on the other I discussed this with him and try to reassure him that this is something that can be treated but he is quite upset about this he denies any chest pain shortness of breath headaches or any stroke symptoms he was advised to go to the emergency room at the [facility] so he can be near his cardiologist and I did call and speak to the emergent the emergency room at the [facility] and made them aware that he is going to arrive by car he left our office in stable medical condition asymptomatic for his urgency blood pressure urgency        The following portions of the patient's history were reviewed and updated as appropriate:   He has a past medical history of Cardiac arrest, Hypertension, Obstructive sleep apnea, PAF (paroxysmal atrial fibrillation), SSS (sick sinus syndrome) ([address]), Torsades de pointes ([address]), and VT (ventricular tachycardia) ([address]). ,  does not have any pertinent problems on file. ,   has a past surgical history that includes Appendectomy (2006) and Cardiac defibrillator placement (Left, 5/18/2023). ,  family history includes Colon cancer in his brother and daughter; Heart disease in his father. ,   reports that he has never smoked. He has never used smokeless tobacco. He reports current alcohol use. He reports that he does not use drugs. ,  has No Known Allergies. .  Current Outpatient Medications   Medication Sig Dispense Refill    ALPHAGAN P 0.1 %       apixaban (ELIQUIS) 5 mg Take 1 tablet (5 mg total) by mouth 2 (two) times a day 60 tablet 5    brimonidine (ALPHAGAN P) 0.1 % instill 1 drop into both eyes twice a day      carvedilol (COREG) 12.5 mg tablet Take 1 tablet (12.5 mg total) by mouth 2 (two) times a day with meals 60 tablet 5    cyanocobalamin (VITAMIN B-12) 250 MCG tablet Take 1 tablet (250 mcg total) by mouth daily 30 tablet 3    levothyroxine 50 mcg tablet TAKE ONE TABLET BY MOUTH EVERY DAY IN THE EARLY MORNING 30 tablet 0    losartan (COZAAR) 25 mg tablet Take 1 tablet (25 mg total) by mouth daily 90 tablet 5    travoprost (TRAVATAN-Z) 0.004 % ophthalmic solution       levothyroxine 50 mcg tablet Take 1 tablet (50 mcg total) by mouth daily in the early morning 30 tablet 3     No current facility-administered medications for this visit. Review of Systems   Constitutional:  Negative for activity change, appetite change, diaphoresis, fatigue and fever. HENT: Negative. Eyes: Negative. Respiratory:  Negative for apnea, cough, chest tightness, shortness of breath and wheezing.     Cardiovascular:  Negative for chest pain, palpitations and leg swelling. History of prior cardiac arrest and has a pacemaker inserted   Gastrointestinal:  Negative for abdominal distention, abdominal pain, anal bleeding, constipation, diarrhea, nausea and vomiting. Endocrine: Negative for cold intolerance, heat intolerance, polydipsia, polyphagia and polyuria. Genitourinary:  Negative for difficulty urinating, dysuria, flank pain, hematuria and urgency. Musculoskeletal:  Negative for arthralgias, back pain, gait problem, joint swelling and myalgias. Skin:  Negative for color change, rash and wound. Allergic/Immunologic: Negative for environmental allergies, food allergies and immunocompromised state. Neurological:  Negative for dizziness, seizures, syncope, speech difficulty, numbness and headaches. Hematological:  Negative for adenopathy. Does not bruise/bleed easily. Psychiatric/Behavioral:  Negative for agitation, behavioral problems, hallucinations, sleep disturbance and suicidal ideas. Objective:  Vitals:    11/27/23 1019   BP: (!) 230/118   BP Location: Left arm   Patient Position: Sitting   Cuff Size: Standard   Pulse: 60   Temp: 97.7 °F (36.5 °C)   TempSrc: Temporal   SpO2: 99%   Weight: 99.3 kg (219 lb)   Height: 6' (1.829 m)     Body mass index is 29.7 kg/m². Physical Exam  Constitutional:       General: He is not in acute distress. Appearance: He is well-developed. He is not diaphoretic. HENT:      Head: Normocephalic. Right Ear: External ear normal.      Left Ear: External ear normal.      Nose: Nose normal.   Eyes:      General: No scleral icterus. Right eye: No discharge. Left eye: No discharge. Conjunctiva/sclera: Conjunctivae normal.      Pupils: Pupils are equal, round, and reactive to light. Neck:      Thyroid: No thyromegaly. Trachea: No tracheal deviation. Cardiovascular:      Rate and Rhythm: Normal rate and regular rhythm. Heart sounds: Normal heart sounds.  No murmur heard.     No friction rub. No gallop. Pulmonary:      Effort: Pulmonary effort is normal. No respiratory distress. Breath sounds: Normal breath sounds. No wheezing. Abdominal:      General: Bowel sounds are normal.      Palpations: Abdomen is soft. There is no mass. Tenderness: There is no abdominal tenderness. There is no guarding. Musculoskeletal:         General: No deformity. Cervical back: Normal range of motion. Lymphadenopathy:      Cervical: No cervical adenopathy. Skin:     General: Skin is warm and dry. Findings: No erythema or rash. Neurological:      Mental Status: He is alert and oriented to person, place, and time. Cranial Nerves: No cranial nerve deficit. Psychiatric:         Thought Content:  Thought content normal.

## 2023-11-28 ENCOUNTER — TRANSITIONAL CARE MANAGEMENT (OUTPATIENT)
Dept: FAMILY MEDICINE CLINIC | Facility: CLINIC | Age: 73
End: 2023-11-28

## 2023-11-28 VITALS
SYSTOLIC BLOOD PRESSURE: 152 MMHG | HEIGHT: 72 IN | BODY MASS INDEX: 29.32 KG/M2 | TEMPERATURE: 98.4 F | DIASTOLIC BLOOD PRESSURE: 80 MMHG | OXYGEN SATURATION: 95 % | WEIGHT: 216.49 LBS | HEART RATE: 56 BPM | RESPIRATION RATE: 18 BRPM

## 2023-11-28 LAB
ANION GAP SERPL CALCULATED.3IONS-SCNC: 8 MMOL/L
BASOPHILS # BLD AUTO: 0.01 THOUSANDS/ÂΜL (ref 0–0.1)
BASOPHILS NFR BLD AUTO: 0 % (ref 0–1)
BUN SERPL-MCNC: 18 MG/DL (ref 5–25)
CALCIUM SERPL-MCNC: 8.7 MG/DL (ref 8.4–10.2)
CHLORIDE SERPL-SCNC: 109 MMOL/L (ref 96–108)
CO2 SERPL-SCNC: 23 MMOL/L (ref 21–32)
CREAT SERPL-MCNC: 1.04 MG/DL (ref 0.6–1.3)
EOSINOPHIL # BLD AUTO: 0.15 THOUSAND/ÂΜL (ref 0–0.61)
EOSINOPHIL NFR BLD AUTO: 2 % (ref 0–6)
ERYTHROCYTE [DISTWIDTH] IN BLOOD BY AUTOMATED COUNT: 12.5 % (ref 11.6–15.1)
GFR SERPL CREATININE-BSD FRML MDRD: 71 ML/MIN/1.73SQ M
GLUCOSE P FAST SERPL-MCNC: 100 MG/DL (ref 65–99)
GLUCOSE SERPL-MCNC: 100 MG/DL (ref 65–140)
HCT VFR BLD AUTO: 35.3 % (ref 36.5–49.3)
HGB BLD-MCNC: 12.1 G/DL (ref 12–17)
IMM GRANULOCYTES # BLD AUTO: 0.02 THOUSAND/UL (ref 0–0.2)
IMM GRANULOCYTES NFR BLD AUTO: 0 % (ref 0–2)
LYMPHOCYTES # BLD AUTO: 2 THOUSANDS/ÂΜL (ref 0.6–4.47)
LYMPHOCYTES NFR BLD AUTO: 33 % (ref 14–44)
MCH RBC QN AUTO: 31.3 PG (ref 26.8–34.3)
MCHC RBC AUTO-ENTMCNC: 34.3 G/DL (ref 31.4–37.4)
MCV RBC AUTO: 91 FL (ref 82–98)
MONOCYTES # BLD AUTO: 0.75 THOUSAND/ÂΜL (ref 0.17–1.22)
MONOCYTES NFR BLD AUTO: 12 % (ref 4–12)
NEUTROPHILS # BLD AUTO: 3.23 THOUSANDS/ÂΜL (ref 1.85–7.62)
NEUTS SEG NFR BLD AUTO: 53 % (ref 43–75)
NRBC BLD AUTO-RTO: 0 /100 WBCS
PLATELET # BLD AUTO: 124 THOUSANDS/UL (ref 149–390)
PMV BLD AUTO: 10.9 FL (ref 8.9–12.7)
POTASSIUM SERPL-SCNC: 3.7 MMOL/L (ref 3.5–5.3)
RBC # BLD AUTO: 3.87 MILLION/UL (ref 3.88–5.62)
SODIUM SERPL-SCNC: 140 MMOL/L (ref 135–147)
WBC # BLD AUTO: 6.16 THOUSAND/UL (ref 4.31–10.16)

## 2023-11-28 PROCEDURE — 80048 BASIC METABOLIC PNL TOTAL CA: CPT | Performed by: NURSE PRACTITIONER

## 2023-11-28 PROCEDURE — 85025 COMPLETE CBC W/AUTO DIFF WBC: CPT | Performed by: NURSE PRACTITIONER

## 2023-11-28 PROCEDURE — 99239 HOSP IP/OBS DSCHRG MGMT >30: CPT | Performed by: HOSPITALIST

## 2023-11-28 RX ORDER — HYDRALAZINE HYDROCHLORIDE 25 MG/1
25 TABLET, FILM COATED ORAL EVERY 12 HOURS
Qty: 60 TABLET | Refills: 0 | Status: SHIPPED | OUTPATIENT
Start: 2023-11-28 | End: 2023-12-28

## 2023-11-28 RX ORDER — HYDRALAZINE HYDROCHLORIDE 25 MG/1
25 TABLET, FILM COATED ORAL EVERY 12 HOURS
Status: DISCONTINUED | OUTPATIENT
Start: 2023-11-28 | End: 2023-11-28 | Stop reason: HOSPADM

## 2023-11-28 RX ADMIN — CYANOCOBALAMIN TAB 500 MCG 250 MCG: 500 TAB at 08:06

## 2023-11-28 RX ADMIN — CARVEDILOL 12.5 MG: 12.5 TABLET, FILM COATED ORAL at 08:07

## 2023-11-28 RX ADMIN — HYDRALAZINE HYDROCHLORIDE 25 MG: 25 TABLET, FILM COATED ORAL at 11:47

## 2023-11-28 RX ADMIN — LOSARTAN POTASSIUM 25 MG: 25 TABLET, FILM COATED ORAL at 08:06

## 2023-11-28 RX ADMIN — BRIMONIDINE TARTRATE 1 DROP: 2 SOLUTION OPHTHALMIC at 08:09

## 2023-11-28 RX ADMIN — APIXABAN 5 MG: 5 TABLET, FILM COATED ORAL at 08:06

## 2023-11-28 RX ADMIN — TRAVOPROST 1 DROP: 0.04 SOLUTION OPHTHALMIC at 08:09

## 2023-11-28 RX ADMIN — LEVOTHYROXINE SODIUM 50 MCG: 50 TABLET ORAL at 04:58

## 2023-11-28 NOTE — ASSESSMENT & PLAN NOTE
Discharge home on carvedilol as the patient was taking prior to arrival for rate control  Discharge home on Eliquis as the patient was taking prior to arrival for continued anticoagulation purposes

## 2023-11-28 NOTE — DISCHARGE INSTR - AVS FIRST PAGE
Dear Bernard Kamara,     It was our pleasure to care for you here at [facility], [address] [facility]. It is our hope that we were always able to exceed the expected standards for your care during your stay. You were hospitalized due to hypertension. You were cared for on the medical/surgical floor by Maite Richardson MD with the [facility] who covers for your primary care physician (PCP), Mitali Walsh DO, while you were hospitalized. If you have any questions or concerns related to this hospitalization, you may contact us at [phone]. For follow up as well as any medication refills, we recommend that you follow up with your primary care physician. A registered nurse will reach out to you by phone within a few days after your discharge to answer any additional questions that you may have after going home.   However, at this time we provide for you here, the most important instructions / recommendations at discharge:     Notable Medication Adjustments -   Stop losartan  New prescription hydralazine 25 mg-take 1 tablet twice a day  Okay to resume all other preadmission medications at the preadmission doses  Testing Required after Discharge -   To be further determined in the outpatient setting by your primary care provider, and or cardiology  Important follow up information -   Please follow-up with the providers as outlined in this discharge packet  Other Instructions -   Please maintain a healthy low-sodium diet  Please monitor your blood pressure twice a day, first thing in the morning, and then the last thing at night  Please maintain a blood pressure diary, present that diary to your primary care provider for further antihypertensive medication management  Okay to take the new prescription hydralazine at 8 AM, and 8 PM  Please review this entire after visit summary as additional general instructions including medication list, appointments, activity, diet, any pertinent wound care, and other additional recommendations from your care team that may be provided for you.       Sincerely,     Teddy Raya MD

## 2023-11-28 NOTE — DISCHARGE SUMMARY
[address] [facility]  Discharge- Daphnie Hackett 1950, 67 y.o. male MRN: [id number]  Unit/Bed#: -[facility] Encounter: [id number]  Primary Care Provider: Bridgette Berg DO   Date and time admitted to hospital: 11/27/2023 11:30 AM    * Hypertensive urgency  Assessment & Plan  Patient has continued volatility in his blood pressure  Patient is otherwise completely asymptomatic and feels well enough to go home  Losartan has been discontinued  Prescription provided for hydralazine 25 mg p.o. every 12 hours scheduled  Patient was instructed in the presence of his wife to maintain a blood pressure diary for at least the next 5 to 7 days  The diary will be presented to the patient's outpatient PCP who will further optimize antihypertensive medications  DC on Coreg otherwise  No further inpatient testing, treatment, and/or workup is needed    Paroxysmal atrial fibrillation [facility])  Assessment & Plan  Discharge home on carvedilol as the patient was taking prior to arrival for rate control  Discharge home on Eliquis as the patient was taking prior to arrival for continued anticoagulation purposes    ALBERT (obstructive sleep apnea)  Assessment & Plan  Continue CPAP at bedtime post discharge    Essential hypertension  Assessment & Plan  DC home on carvedilol and losartan as outlined above    Hypothyroidism  Assessment & Plan  Continue levothyroxine post discharge    History of cardiac arrest  Assessment & Plan  History of cardiac arrest in May 2023 thought to be due to torsades  Status post dual ICD        Medical Problems       Resolved Problems  Date Reviewed: 11/13/2023   None       Discharging Physician / Practitioner: Renuka Bailey MD  PCP: Bridgette Berg DO  Admission Date:   Admission Orders (From admission, onward)       Ordered        11/27/23 1453  Place in Observation  Once                          Discharge Date: 11/28/23    Consultations During Hospital Stay:  None    Procedures Performed: None    Significant Findings / Test Results:   Chest x-ray-no acute cardiopulmonary disease    Incidental Findings:   None    Test Results Pending at Discharge (will require follow up): None     Outpatient Tests Requested:  None    Complications: None    Reason for Admission: Hypertensive urgency    Hospital Course:   Elizabeth Montoya is a 67 y.o. male patient who originally presented to the hospital on 11/27/2023 due to elevated blood pressures. Please refer to the initial history and physical examination completed by CARMEL Matthews for the initial presenting features and complaints. In brief, the patient is a 80-year-old male, who was sent into the hospital by his PCP for further evaluation of periods of hypotension. In the emergency room, he was diagnosed with hypertensive urgency. He was admitted to [address] [facility] telemetry observation. He was monitored overnight, he was noted to have periods of volatility in his blood pressure. Ultimately, losartan was discontinued, and the patient was placed on hydralazine 25 mg p.o. twice daily. Blood work was reassuring. He will follow-up in the near future in the outpatient setting with his PCP, and cardiology for further antihypertensive medication management. He was instructed on maintaining a blood pressure diary. Of note, the patient remained asymptomatic throughout. Please refer to the assessment/plan portion of this discharge summary as outlined above for additional details regarding his stay. Please see above list of diagnoses and related plan for additional information.      Condition at Discharge: good    Discharge Day Visit / Exam:   Subjective: Patient seen, looks and feels well, no new complaints  Vitals: Blood Pressure: (!) 176/100 (11/28/23 0733)  Pulse: 60 (11/28/23 0733)  Temperature: 98.4 °F (36.9 °C) (11/28/23 0733)  Temp Source: Oral (11/28/23 0733)  Respirations: 18 (11/28/23 0733)  Height: 6' (182.9 cm) (11/27/23 1544)  Weight - Scale: 98.2 kg (216 lb 7.9 oz) (11/27/23 1544)  SpO2: 95 % (11/28/23 0733)  Exam:   Physical Exam  Vitals and nursing note reviewed. Constitutional:       General: He is not in acute distress. Appearance: Normal appearance. He is not ill-appearing. HENT:      Head: Normocephalic and atraumatic. Nose: Nose normal.   Eyes:      Extraocular Movements: Extraocular movements intact. Pupils: Pupils are equal, round, and reactive to light. Cardiovascular:      Rate and Rhythm: Normal rate and regular rhythm. Pulses: Normal pulses. Heart sounds: Normal heart sounds. No murmur heard. No friction rub. No gallop. Pulmonary:      Effort: Pulmonary effort is normal.      Breath sounds: Normal breath sounds. Abdominal:      General: There is no distension. Palpations: Abdomen is soft. There is no mass. Tenderness: There is no abdominal tenderness. There is no guarding or rebound. Musculoskeletal:         General: No swelling or tenderness. Normal range of motion. Cervical back: Normal range of motion and neck supple. No rigidity. No muscular tenderness. Right lower leg: No edema. Left lower leg: No edema. Skin:     General: Skin is warm. Capillary Refill: Capillary refill takes less than 2 seconds. Findings: No erythema or rash. Neurological:      General: No focal deficit present. Mental Status: He is alert and oriented to person, place, and time. Mental status is at baseline. Psychiatric:         Mood and Affect: Mood normal.         Behavior: Behavior normal.          Discussion with Family: Updated  (wife) at bedside. Discharge instructions/Information to patient and family:   See after visit summary for information provided to patient and family. Provisions for Follow-Up Care:  See after visit summary for information related to follow-up care and any pertinent home health orders.       Mobility at time of Discharge:   Basic Mobility Inpatient Raw Score: 24  JH-HLM Goal: 8: Walk 250 feet or more  JH-HLM Achieved: 7: Walk 25 feet or more  HLM Goal achieved. Continue to encourage appropriate mobility. Disposition:   Home    Planned Readmission: None     Discharge Statement:  I spent 40 minutes discharging the patient. This time was spent on the day of discharge. I had direct contact with the patient on the day of discharge. Greater than 50% of the total time was spent examining patient, answering all patient questions, arranging and discussing plan of care with patient as well as directly providing post-discharge instructions. Additional time then spent on discharge activities. Discharge Medications:  See after visit summary for reconciled discharge medications provided to patient and/or family.       **Please Note: This note may have been constructed using a voice recognition system**

## 2023-11-28 NOTE — NURSING NOTE
Patient's iv removed with catheter tip intact. DSD and pressure applied. AVS reviewed with patient and family at bedside. All questions and concerns addressed at this time.

## 2023-11-28 NOTE — PLAN OF CARE
Problem: Potential for Falls  Goal: Patient will remain free of falls  Description: INTERVENTIONS:  - Educate patient/family on patient safety including physical limitations  - Instruct patient to call for assistance with activity   - Consult OT/PT to assist with strengthening/mobility   - Keep Call bell within reach  - Keep bed low and locked with side rails adjusted as appropriate  - Keep care items and personal belongings within reach  - Initiate and maintain comfort rounds  - Make Fall Risk Sign visible to staff  - Apply yellow socks and bracelet for high fall risk patients  - Consider moving patient to room near nurses station  Outcome: Adequate for Discharge     Problem: PAIN - ADULT  Goal: Verbalizes/displays adequate comfort level or baseline comfort level  Description: Interventions:  - Encourage patient to monitor pain and request assistance  - Assess pain using appropriate pain scale  - Administer analgesics based on type and severity of pain and evaluate response  - Implement non-pharmacological measures as appropriate and evaluate response  - Consider cultural and social influences on pain and pain management  - Notify physician/advanced practitioner if interventions unsuccessful or patient reports new pain  11/28/2023 1130 by Jose D Murcia RN  Outcome: Adequate for Discharge  11/28/2023 1129 by Jose D Murcia RN  Outcome: Progressing     Problem: INFECTION - ADULT  Goal: Absence or prevention of progression during hospitalization  Description: INTERVENTIONS:  - Assess and monitor for signs and symptoms of infection  - Monitor lab/diagnostic results  - Monitor all insertion sites, i.e. indwelling lines, tubes, and drains  - Monitor endotracheal if appropriate and nasal secretions for changes in amount and color  - [place] appropriate cooling/warming therapies per order  - Administer medications as ordered  - Instruct and encourage patient and family to use good hand hygiene technique  - Identify and instruct in appropriate isolation precautions for identified infection/condition  Outcome: Adequate for Discharge  Goal: Absence of fever/infection during neutropenic period  Description: INTERVENTIONS:  - Monitor WBC    Outcome: Adequate for Discharge     Problem: SAFETY ADULT  Goal: Patient will remain free of falls  Description: INTERVENTIONS:  - Educate patient/family on patient safety including physical limitations  - Instruct patient to call for assistance with activity   - Consult OT/PT to assist with strengthening/mobility   - Keep Call bell within reach  - Keep bed low and locked with side rails adjusted as appropriate  - Keep care items and personal belongings within reach  - Initiate and maintain comfort rounds  - Make Fall Risk Sign visible to staff  - Apply yellow socks and bracelet for high fall risk patients  - Consider moving patient to room near nurses station  Outcome: Adequate for Discharge  Goal: Maintain or return to baseline ADL function  Description: INTERVENTIONS:  -  Assess patient's ability to carry out ADLs; assess patient's baseline for ADL function and identify physical deficits which impact ability to perform ADLs (bathing, care of mouth/teeth, toileting, grooming, dressing, etc.)  - Assess/evaluate cause of self-care deficits   - Assess range of motion  - Assess patient's mobility; develop plan if impaired  - Assess patient's need for assistive devices and provide as appropriate  - Encourage maximum independence but intervene and supervise when necessary  - Involve family in performance of ADLs  - Assess for home care needs following discharge   - Consider OT consult to assist with ADL evaluation and planning for discharge  - Provide patient education as appropriate  Outcome: Adequate for Discharge  Goal: Maintains/Returns to pre admission functional level  Description: INTERVENTIONS:  - Perform AM-PAC 6 Click Basic Mobility/ Daily Activity assessment daily.  - Set and communicate daily mobility goal to care team and patient/family/caregiver. - Collaborate with rehabilitation services on mobility goals if consulted  - Record patient progress and toleration of activity level   Outcome: Adequate for Discharge     Problem: DISCHARGE PLANNING  Goal: Discharge to home or other facility with appropriate resources  Description: INTERVENTIONS:  - Identify barriers to discharge w/patient and caregiver  - Arrange for needed discharge resources and transportation as appropriate  - Identify discharge learning needs (meds, wound care, etc.)  - Arrange for interpretive services to assist at discharge as needed  - Refer to Case Management Department for coordinating discharge planning if the patient needs post-hospital services based on physician/advanced practitioner order or complex needs related to functional status, cognitive ability, or social support system  Outcome: Adequate for Discharge     Problem: Knowledge Deficit  Goal: Patient/family/caregiver demonstrates understanding of disease process, treatment plan, medications, and discharge instructions  Description: Complete learning assessment and assess knowledge base.   Interventions:  - Provide teaching at level of understanding  - Provide teaching via preferred learning methods  Outcome: Adequate for Discharge

## 2023-11-28 NOTE — ASSESSMENT & PLAN NOTE
Continue home medications including carvedilol 12.5 mg BID and losartan 25 mg daily  Monitor blood pressure

## 2023-11-28 NOTE — ASSESSMENT & PLAN NOTE
Patient has continued volatility in his blood pressure  Patient is otherwise completely asymptomatic and feels well enough to go home  Losartan has been discontinued  Prescription provided for hydralazine 25 mg p.o. every 12 hours scheduled  Patient was instructed in the presence of his wife to maintain a blood pressure diary for at least the next 5 to 7 days  The diary will be presented to the patient's outpatient PCP who will further optimize antihypertensive medications  DC on Coreg otherwise  No further inpatient testing, treatment, and/or workup is needed

## 2023-11-28 NOTE — PLAN OF CARE

## 2023-11-28 NOTE — UTILIZATION REVIEW
Initial Clinical Review    Admission: Date/Time/Statement:   Admission Orders (From admission, onward)       Ordered        11/27/23 1453  Place in Observation  Once                          Orders Placed This Encounter   Procedures    Place in Observation     Standing Status:   Standing     Number of Occurrences:   1     Order Specific Question:   Level of Care     Answer:   Med Surg [16]     ED Arrival Information       Expected   -    Arrival   11/27/2023 11:27    Acuity   Urgent              Means of arrival   Walk-In    Escorted by   Spouse    Service   Hospitalist    Admission type   Emergency              Arrival complaint   high blood pressure             Chief Complaint   Patient presents with    Hypertension     Pt had a follow up with PCP and had  systolic was told to come in. Initial Presentation: 67 y.o. male to the ED from PCP with complaints of elevated bp. Admitted under observation for hypertensive urgency, afib. H/O atrial fibrillation on Eliquis, essential hypertension, ALBERT, cardiac arrest status post AICD . ON arrival, bp 247/121. Given IV hyrdralazine in the ED. No complaints. Has been feeling nervous, anxious. Troponin neg.  Continue with eliquis, coreg    Date:     Day 2:      ED Triage Vitals   Temperature Pulse Respirations Blood Pressure SpO2   11/27/23 1137 11/27/23 1135 11/27/23 1136 11/27/23 1137 11/27/23 1133   98 °F (36.7 °C) 59 20 (!) 247/121 98 %      Temp Source Heart Rate Source Patient Position - Orthostatic VS BP Location FiO2 (%)   11/27/23 1137 11/27/23 1200 11/27/23 1544 11/27/23 1544 --   Temporal Monitor Sitting Left arm       Pain Score       11/27/23 1135       No Pain          Wt Readings from Last 1 Encounters:   11/27/23 98.2 kg (216 lb 7.9 oz)     Additional Vital Signs:   ate/Time Temp Pulse Resp BP MAP (mmHg) SpO2 O2 Device Patient Position - Orthostatic VS   11/28/23 07:33:36 98.4 °F (36.9 °C) 60 18 176/100 Abnormal  125 95 % -- Lying   11/27/23 23:36:29 97.7 °F (36.5 °C) 60 20 99/52 68 95 % -- --   11/27/23 22:51:20 -- 60 -- 101/50 67 95 % -- --   11/27/23 21:03:15 -- 60 -- 162/93 116 95 % -- --   11/27/23 1847 -- 60 -- 185/89 Abnormal  -- -- -- --   11/27/23 18:46:45 -- -- -- 185/89 Abnormal  121 98 % -- --   11/27/23 1833 -- 60 -- -- -- 94 % -- --   11/27/23 16:51:54 -- 60 -- 191/91 Abnormal  124 96 % -- --   11/27/23 1613 -- -- -- -- -- 96 % None (Room air) --   11/27/23 15:44:19 97.9 °F (36.6 °C) 60 20 205/100 Abnormal  135 97 % None (Room air) Sitting   11/27/23 1500 -- 60 17 193/88 Abnormal  127 96 % -- --   11/27/23 1400 -- 60 18 208/96 Abnormal  143 98 % -- --   11/27/23 1300 -- 60 16 227/107 Abnormal  153 98 % -- --   11/27/23 1230 -- 60 16 197/101 Abnormal  142 97 % -- --   11/27/23 1200 -- 60 14 203/100 Abnormal  144 98 % -- --   11/27/23 1147 -- -- -- 220/100 Abnormal  -- -- -- --   11/27/23 1137 98 °F (36.7 °C) -- -- 247/121 Abnormal  -- -- -- --   11/27/23 1136 -- -- 20 -- -- -- -- --   11/27/23 1135 -- 59 -- -- -- 99 % -- --   11/27/23 1133 -- -- -- -- -- 98 % -- --     Pertinent Labs/Diagnostic Test Results:   XR chest 1 view portable   Final Result by Paola Nixon MD (11/27 1314)      No acute cardiopulmonary disease.                   Workstation performed: [id number]               Results from last 7 days   Lab Units 11/28/23  0452 11/27/23  1146   WBC Thousand/uL 6.16 7.71   HEMOGLOBIN g/dL 12.1 14.3   HEMATOCRIT % 35.3* 43.7   PLATELETS Thousands/uL 124* 136*   NEUTROS ABS Thousands/µL 3.23 5.00         Results from last 7 days   Lab Units 11/28/23  0452 11/27/23  1146   SODIUM mmol/L 140 139   POTASSIUM mmol/L 3.7 4.0   CHLORIDE mmol/L 109* 107   CO2 mmol/L 23 23   ANION GAP mmol/L 8 9   BUN mg/dL 18 17   CREATININE mg/dL 1.04 0.98   EGFR ml/min/1.73sq m 71 76   CALCIUM mg/dL 8.7 9.4     Results from last 7 days   Lab Units 11/27/23  1146   AST U/L 17   ALT U/L 23   ALK PHOS U/L 84   TOTAL PROTEIN g/dL 6.9   ALBUMIN g/dL 4.7 TOTAL BILIRUBIN mg/dL 0.80         Results from last 7 days   Lab Units 11/28/23  0452 11/27/23  1146   GLUCOSE RANDOM mg/dL 100 101         Results from last 7 days   Lab Units 11/27/23  1605 11/27/23  1342 11/27/23  1146   HS TNI 0HR ng/L  --   --  9   HS TNI 2HR ng/L  --  10  --    HSTNI D2 ng/L  --  1  --    HS TNI 4HR ng/L 11  --   --    HSTNI D4 ng/L 2  --   --          Results from last 7 days   Lab Units 11/27/23  1146   PROTIME seconds 13.8   INR  1.04   PTT seconds 28       ED Treatment:   Medication Administration from 11/27/2023 1127 to 11/27/2023 1538         Date/Time Order Dose Route Action Comments     11/27/2023 1147 EST hydrALAZINE (APRESOLINE) injection 5 mg 5 mg Intravenous Given --     11/27/2023 1320 EST losartan (COZAAR) tablet 25 mg 25 mg Oral Given --     11/27/2023 1320 EST amLODIPine (NORVASC) tablet 5 mg 5 mg Oral Given --     11/27/2023 1450 EST hydrALAZINE (APRESOLINE) injection 10 mg 10 mg Intravenous Given --          Past Medical History:   Diagnosis Date    Cardiac arrest     Hypertension     Obstructive sleep apnea     PAF (paroxysmal atrial fibrillation)     SSS (sick sinus syndrome) (HCC)     Torsades de pointes (HCC)     VT (ventricular tachycardia) ([facility])     s/p dual chamber [facility] ICD 5/16/2023     Present on Admission:   Paroxysmal atrial fibrillation (HCC)   Hypothyroidism   ALBERT (obstructive sleep apnea)   Essential hypertension      Admitting Diagnosis: Hypertension [I10]  Hypertensive urgency [I16.0]  Age/Sex: 67 y.o. male  Admission Orders:  Scheduled Medications:  apixaban, 5 mg, Oral, BID  brimonidine tartrate, 1 drop, Both Eyes, BID  carvedilol, 12.5 mg, Oral, BID With Meals  cyanocobalamin, 250 mcg, Oral, Daily  levothyroxine, 50 mcg, Oral, Early Morning  losartan, 25 mg, Oral, Daily  travoprost, 1 drop, Both Eyes, Daily      Continuous IV Infusions:     PRN Meds:  acetaminophen, 650 mg, Oral, Q6H PRN  hydrALAZINE, 10 mg, Intravenous, Q6H PRN          Network Utilization Review Department  ATTENTION: Please call with any questions or concerns to [phone] and carefully listen to the prompts so that you are directed to the right person. All voicemails are confidential.   For Discharge needs, contact Care Management DC Support Team at [phone] opt. [phone]  Send all requests for admission clinical reviews, approved or denied determinations and any other requests to dedicated fax number below belonging to the campus where the patient is receiving treatment.  List of dedicated fax numbers for the Facilities:  [place] DENIALS (Administrative/Medical Necessity) [phone]   DISCHARGE SUPPORT TEAM (NETWORK) [id number] [facility] (Maternity/NICU/Pediatrics) [phone]   [address] [place] [address] [facility] [phone]   [address] [place] [address] [address] [facility] [address] [address] [facility] [address] [phone]

## 2023-12-05 ENCOUNTER — OFFICE VISIT (OUTPATIENT)
Dept: FAMILY MEDICINE CLINIC | Facility: CLINIC | Age: 73
End: 2023-12-05
Payer: COMMERCIAL

## 2023-12-05 VITALS
HEART RATE: 61 BPM | SYSTOLIC BLOOD PRESSURE: 220 MMHG | TEMPERATURE: 96.7 F | BODY MASS INDEX: 29.39 KG/M2 | WEIGHT: 217 LBS | OXYGEN SATURATION: 97 % | DIASTOLIC BLOOD PRESSURE: 118 MMHG | HEIGHT: 72 IN

## 2023-12-05 DIAGNOSIS — Z76.89 ENCOUNTER FOR SUPPORT AND COORDINATION OF TRANSITION OF CARE: Primary | ICD-10-CM

## 2023-12-05 DIAGNOSIS — F41.1 GENERALIZED ANXIETY DISORDER: ICD-10-CM

## 2023-12-05 DIAGNOSIS — I10 ESSENTIAL HYPERTENSION: Chronic | ICD-10-CM

## 2023-12-05 PROCEDURE — 99495 TRANSJ CARE MGMT MOD F2F 14D: CPT | Performed by: FAMILY MEDICINE

## 2023-12-05 RX ORDER — LORAZEPAM 0.5 MG/1
0.5 TABLET ORAL
Qty: 15 TABLET | Refills: 0 | Status: SHIPPED | OUTPATIENT
Start: 2023-12-05

## 2023-12-05 NOTE — PROGRESS NOTES
Assessment/Plan:    Problem List Items Addressed This Visit          Cardiovascular and Mediastinum    Essential hypertension (Chronic)     Other Visit Diagnoses       Encounter for support and coordination of transition of care    -  Primary             Diagnoses and all orders for this visit:    Encounter for support and coordination of transition of care    Essential hypertension        No problem-specific Assessment & Plan notes found for this encounter. Subjective:      Patient ID: Ruth Kimble is a 67 y.o. male. This is a transition of care patient was hospitalized at the [facility] for urgent hypertension overnight was discontinued from his losartan placed on hydralazine 25 mg twice daily he continues his [address] all as previously prescribed patient's wife brought along blood pressure diary which looks like it is much improved over the blood pressure he had the day he was admitted to the hospital he is asymptomatic        The following portions of the patient's history were reviewed and updated as appropriate:   He has a past medical history of Cardiac arrest, Hypertension, Obstructive sleep apnea, PAF (paroxysmal atrial fibrillation), SSS (sick sinus syndrome) ([address]), Torsades de pointes ([address]), and VT (ventricular tachycardia) ([address]). ,  does not have any pertinent problems on file. ,   has a past surgical history that includes Appendectomy (2006) and Cardiac defibrillator placement (Left, 5/18/2023). ,  family history includes Colon cancer in his brother and daughter; Heart disease in his father. ,   reports that he has never smoked. He has never used smokeless tobacco. He reports current alcohol use. He reports that he does not use drugs. ,  has No Known Allergies. .  Current Outpatient Medications   Medication Sig Dispense Refill    apixaban (ELIQUIS) 5 mg Take 1 tablet (5 mg total) by mouth 2 (two) times a day 60 tablet 5    brimonidine (ALPHAGAN P) 0.1 % instill 1 drop into both eyes twice a day      carvedilol (COREG) 12.5 mg tablet Take 1 tablet (12.5 mg total) by mouth 2 (two) times a day with meals 60 tablet 5    cyanocobalamin (VITAMIN B-12) 250 MCG tablet Take 1 tablet (250 mcg total) by mouth daily 30 tablet 3    hydrALAZINE (APRESOLINE) 25 mg tablet Take 1 tablet (25 mg total) by mouth every 12 (twelve) hours 60 tablet 0    levothyroxine 50 mcg tablet Take 1 tablet (50 mcg total) by mouth daily in the early morning 30 tablet 3    travoprost (TRAVATAN-Z) 0.004 % ophthalmic solution       levothyroxine 50 mcg tablet TAKE ONE TABLET BY MOUTH EVERY DAY IN THE EARLY MORNING 30 tablet 0     No current facility-administered medications for this visit. Review of Systems   Constitutional:  Negative for activity change, appetite change, diaphoresis, fatigue and fever. HENT: Negative. Negative for dental problem. Eyes: Negative. Respiratory:  Negative for apnea, cough, chest tightness, shortness of breath and wheezing. Cardiovascular:  Negative for chest pain, palpitations and leg swelling. Gastrointestinal:  Negative for abdominal distention, abdominal pain, anal bleeding, constipation, diarrhea, nausea and vomiting. Endocrine: Negative for cold intolerance, heat intolerance, polydipsia, polyphagia and polyuria. Genitourinary:  Negative for difficulty urinating, dysuria, flank pain, hematuria and urgency. Musculoskeletal:  Negative for arthralgias, back pain, gait problem, joint swelling and myalgias. Skin:  Negative for color change, rash and wound. Allergic/Immunologic: Negative for environmental allergies, food allergies and immunocompromised state. Neurological:  Negative for dizziness, seizures, syncope, speech difficulty, numbness and headaches. Hematological:  Negative for adenopathy. Does not bruise/bleed easily. Psychiatric/Behavioral:  Negative for agitation, behavioral problems, hallucinations, sleep disturbance and suicidal ideas. Objective:  Vitals:    12/05/23 1226   BP: (!) 220/118   BP Location: Left arm   Patient Position: Sitting   Cuff Size: Standard   Pulse: 61   Temp: (!) 96.7 °F (35.9 °C)   TempSrc: Temporal   SpO2: 97%   Weight: 98.4 kg (217 lb)   Height: 6' (1.829 m)     Body mass index is 29.43 kg/m². Physical Exam  Constitutional:       General: He is not in acute distress. Appearance: He is well-developed. He is not diaphoretic. HENT:      Head: Normocephalic. Right Ear: External ear normal.      Left Ear: External ear normal.      Nose: Nose normal.   Eyes:      General: No scleral icterus. Right eye: No discharge. Left eye: No discharge. Conjunctiva/sclera: Conjunctivae normal.      Pupils: Pupils are equal, round, and reactive to light. Neck:      Thyroid: No thyromegaly. Trachea: No tracheal deviation. Cardiovascular:      Rate and Rhythm: Normal rate and regular rhythm. Heart sounds: Normal heart sounds. No murmur heard. No friction rub. No gallop. Pulmonary:      Effort: Pulmonary effort is normal. No respiratory distress. Breath sounds: Normal breath sounds. No wheezing. Abdominal:      General: Bowel sounds are normal.      Palpations: Abdomen is soft. There is no mass. Tenderness: There is no abdominal tenderness. There is no guarding. Musculoskeletal:         General: No deformity. Cervical back: Normal range of motion. Lymphadenopathy:      Cervical: No cervical adenopathy. Skin:     General: Skin is warm and dry. Findings: No erythema or rash. Neurological:      Mental Status: He is alert and oriented to person, place, and time. Cranial Nerves: No cranial nerve deficit. Psychiatric:         Thought Content:  Thought content normal.

## 2023-12-15 DIAGNOSIS — F41.1 GENERALIZED ANXIETY DISORDER: ICD-10-CM

## 2023-12-18 DIAGNOSIS — F41.1 GENERALIZED ANXIETY DISORDER: ICD-10-CM

## 2023-12-18 RX ORDER — LORAZEPAM 0.5 MG/1
0.5 TABLET ORAL
Qty: 15 TABLET | Refills: 0 | OUTPATIENT
Start: 2023-12-18

## 2023-12-18 RX ORDER — LORAZEPAM 0.5 MG/1
0.5 TABLET ORAL
Qty: 15 TABLET | Refills: 0 | Status: SHIPPED | OUTPATIENT
Start: 2023-12-18 | End: 2023-12-27

## 2023-12-19 ENCOUNTER — CONSULT (OUTPATIENT)
Dept: VASCULAR SURGERY | Facility: CLINIC | Age: 73
End: 2023-12-19
Payer: COMMERCIAL

## 2023-12-19 VITALS
HEART RATE: 60 BPM | HEIGHT: 72 IN | OXYGEN SATURATION: 98 % | BODY MASS INDEX: 29.2 KG/M2 | SYSTOLIC BLOOD PRESSURE: 186 MMHG | DIASTOLIC BLOOD PRESSURE: 92 MMHG | WEIGHT: 215.6 LBS | TEMPERATURE: 97.6 F

## 2023-12-19 DIAGNOSIS — I65.23 CAROTID STENOSIS, ASYMPTOMATIC, BILATERAL: Primary | ICD-10-CM

## 2023-12-19 PROBLEM — R09.89 BILATERAL CAROTID BRUITS: Status: RESOLVED | Noted: 2023-01-01 | Resolved: 2023-01-01

## 2023-12-19 PROCEDURE — 99204 OFFICE O/P NEW MOD 45 MIN: CPT | Performed by: SURGERY

## 2023-12-19 RX ORDER — ATORVASTATIN CALCIUM 40 MG/1
40 TABLET, FILM COATED ORAL DAILY
Qty: 90 TABLET | Refills: 4 | Status: SHIPPED | OUTPATIENT
Start: 2023-12-19 | End: 2023-12-27

## 2023-12-19 RX ORDER — ASPIRIN 81 MG/1
81 TABLET, CHEWABLE ORAL DAILY
Start: 2023-12-19 | End: 2023-12-27

## 2023-12-19 NOTE — PROGRESS NOTES
Assessment/Plan:    Pt is a 72 yo M w/ hypothyroidism, ALBERT, HTN, HLD, afib (Eliquis), torsade c/b cardiac arrest s/p ICD/pacer, B asymptomatic ICA stenosis    Carotid stenosis, asymptomatic, bilateral  -     Ambulatory referral to Vascular Surgery  -     aspirin 81 mg chewable tablet; Chew 1 tablet (81 mg total) daily  -     atorvastatin (LIPITOR) 40 mg tablet; Take 1 tablet (40 mg total) by mouth daily  -     CTA neck w wo contrast; Future    -no hx of CVA; asymptomatic  -reviewed carotid DU which shows R ICA 50-69%  and L ICA >70% w/ elba 336/121 ratio 4.87; the L vertebral is retrograde but the BP is oddly significantly higher on the L  -given high grade stenosis; will get CTA to better evaluate and determine treatment plan, likely surgery  -f/u after testing complete    Medications  -continue eliquis (for afib)  -will start on aspirin 81mg once daily for carotid stenosis  -will start on atorvastatin for carotid stenosis and hyperlipidemia    Subjective:      Patient ID: Dieter Saravia is a 73 y.o. male.      New patient referred by Dr. Frederic Yang for carotid stenosis had CV done 11/15/23. PT denies TIA/Stroke like symptoms. Pt is taking Eliquis. Pt is a non smoker.     HPI:    Patient referred by Dr. Yang for evaluation of carotid stenosis    Patient underwent carotid DU mid Nov after hearing bruit on physical exam.    Denies hx of stroke.  Denies any symptoms of amaurosis, facial droop, altered speech, unilateral weakness/numbness.    He was recently admitted for hypertensive urgency.  He notes increased anxiety since his carotid diagnosis and attributes his HTN to this.    He had a cardiac arrest in May '23, thought to be due to torsades.  He is s/p dual chamber ICD/pacer.    His father  of cardiac disease at 48yrs old    Takes eliquis for afib.      Doesn't take aspirin or statin.  Never smoker        The following portions of the patient's history were reviewed and updated as appropriate:  allergies, current medications, past family history, past medical history, past social history, past surgical history, and problem list.    Review of Systems   Constitutional: Negative.    HENT: Negative.     Eyes: Negative.  Negative for visual disturbance.   Respiratory: Negative.  Negative for shortness of breath.    Cardiovascular: Negative.  Negative for chest pain.   Gastrointestinal: Negative.    Endocrine: Negative.    Genitourinary: Negative.    Musculoskeletal:  Positive for back pain.   Skin: Negative.    Allergic/Immunologic: Negative.    Neurological: Negative.  Negative for facial asymmetry, speech difficulty, weakness and numbness.   Hematological: Negative.    Psychiatric/Behavioral: Negative.           Objective:      BP (!) 186/92 (BP Location: Right arm, Patient Position: Sitting, Cuff Size: Standard)   Pulse 60   Temp 97.6 °F (36.4 °C) (Temporal)   Ht 6' (1.829 m)   Wt 97.8 kg (215 lb 9.6 oz)   SpO2 98%   BMI 29.24 kg/m²          Physical Exam  Cardiovascular:      Rate and Rhythm: Normal rate and regular rhythm.      Pulses:           Carotid pulses are  on the right side with bruit and  on the left side with bruit.       Radial pulses are 2+ on the right side and 2+ on the left side.        Dorsalis pedis pulses are 2+ on the right side and 2+ on the left side.        Posterior tibial pulses are 2+ on the right side and 2+ on the left side.      Heart sounds: No murmur heard.     Comments: Radial pulses are both 2+ and equal timing  Pulmonary:      Effort: No respiratory distress.      Breath sounds: No wheezing or rales.   Musculoskeletal:      Right lower leg: No edema.      Left lower leg: No edema.   Skin:     Comments: No foot wounds           I have reviewed and made appropriate changes to the review of systems input by the medical assistant.    Vitals:    12/19/23 1522 12/19/23 1524   BP: (!) 182/84 (!) 186/92   BP Location: Left arm Right arm   Patient Position: Sitting Sitting    Cuff Size: Standard Standard   Pulse: 60    Temp: 97.6 °F (36.4 °C)    TempSrc: Temporal    SpO2: 98%    Weight: 97.8 kg (215 lb 9.6 oz)    Height: 6' (1.829 m)        Patient Active Problem List   Diagnosis   • History of cardiac arrest   • Acute respiratory failure with hypoxia (HCC)   • Closed fracture of multiple ribs of both sides   • Torsades de pointes (HCC)   • Anemia   • Paroxysmal atrial fibrillation (HCC)   • Delirium   • Essential hypertension   • Constipation   • Hypothyroidism   • Cardiac arrest (HCC)   • Obstructive sleep apnea hypopnea, severe   • Periodic limb movements of sleep   • ALBERT (obstructive sleep apnea)   • Hypertensive urgency   • Carotid stenosis, asymptomatic, bilateral       Past Surgical History:   Procedure Laterality Date   • APPENDECTOMY  2006   • CARDIAC DEFIBRILLATOR PLACEMENT Left 5/18/2023    Procedure: INSERTION AUTOMATIC IMPLANTABLE CARDIOVERTER DEFIBRILLATOR (AICD);  Surgeon: Frederic Yang MD;  Location: [facility] OR;  Service: Cardiology       Family History   Problem Relation Age of Onset   • Heart disease Father    • Colon cancer Brother    • Colon cancer Daughter        Social History     Socioeconomic History   • Marital status: /Civil Union     Spouse name: Not on file   • Number of children: Not on file   • Years of education: Not on file   • Highest education level: Not on file   Occupational History   • Not on file   Tobacco Use   • Smoking status: Never   • Smokeless tobacco: Never   Vaping Use   • Vaping status: Never Used   Substance and Sexual Activity   • Alcohol use: Yes     Comment: Rare   • Drug use: Never   • Sexual activity: Not on file   Other Topics Concern   • Not on file   Social History Narrative   • Not on file     Social Determinants of Health     Financial Resource Strain: Low Risk  (7/11/2023)    Overall Financial Resource Strain (CARDIA)    • Difficulty of Paying Living Expenses: Not hard at all   Food Insecurity: No Food Insecurity  (5/19/2023)    Hunger Vital Sign    • Worried About Running Out of Food in the Last Year: Never true    • Ran Out of Food in the Last Year: Never true   Transportation Needs: No Transportation Needs (7/11/2023)    PRAPARE - Transportation    • Lack of Transportation (Medical): No    • Lack of Transportation (Non-Medical): No   Physical Activity: Not on file   Stress: Not on file   Social Connections: Not on file   Intimate Partner Violence: Not on file   Housing Stability: Low Risk  (5/19/2023)    Housing Stability Vital Sign    • Unable to Pay for Housing in the Last Year: No    • Number of Places Lived in the Last Year: 1    • Unstable Housing in the Last Year: No       No Known Allergies      Current Outpatient Medications:   •  apixaban (ELIQUIS) 5 mg, Take 1 tablet (5 mg total) by mouth 2 (two) times a day, Disp: 60 tablet, Rfl: 5  •  aspirin 81 mg chewable tablet, Chew 1 tablet (81 mg total) daily, Disp: , Rfl:   •  atorvastatin (LIPITOR) 40 mg tablet, Take 1 tablet (40 mg total) by mouth daily, Disp: 90 tablet, Rfl: 4  •  brimonidine (ALPHAGAN P) 0.1 %, instill 1 drop into both eyes twice a day, Disp: , Rfl:   •  carvedilol (COREG) 12.5 mg tablet, Take 1 tablet (12.5 mg total) by mouth 2 (two) times a day with meals, Disp: 60 tablet, Rfl: 5  •  cyanocobalamin (VITAMIN B-12) 250 MCG tablet, Take 1 tablet (250 mcg total) by mouth daily, Disp: 30 tablet, Rfl: 3  •  hydrALAZINE (APRESOLINE) 25 mg tablet, Take 1 tablet (25 mg total) by mouth every 12 (twelve) hours, Disp: 60 tablet, Rfl: 0  •  levothyroxine 50 mcg tablet, Take 1 tablet (50 mcg total) by mouth daily in the early morning, Disp: 30 tablet, Rfl: 3  •  levothyroxine 50 mcg tablet, TAKE ONE TABLET BY MOUTH EVERY DAY IN THE EARLY MORNING, Disp: 30 tablet, Rfl: 0  •  LORazepam (Ativan) 0.5 mg tablet, Take 1 tablet (0.5 mg total) by mouth daily at bedtime, Disp: 15 tablet, Rfl: 0  •  travoprost (TRAVATAN-Z) 0.004 % ophthalmic solution, , Disp: , Rfl:

## 2023-12-20 ENCOUNTER — TELEPHONE (OUTPATIENT)
Dept: CARDIOLOGY CLINIC | Facility: CLINIC | Age: 73
End: 2023-12-20

## 2023-12-20 NOTE — TELEPHONE ENCOUNTER
Pt's wife called in stating Dr. Cooley Doctor wants pt on aspirin but, he has a pacemaker ICD and his wife states don't take aspirin. He also is on Eliquis. She is concerned and is checking on what to do.

## 2023-12-21 DIAGNOSIS — E03.9 HYPOTHYROIDISM, UNSPECIFIED TYPE: ICD-10-CM

## 2023-12-21 RX ORDER — LEVOTHYROXINE SODIUM 0.05 MG/1
50 TABLET ORAL
Qty: 30 TABLET | Refills: 3 | Status: SHIPPED | OUTPATIENT
Start: 2023-12-21 | End: 2023-12-27

## 2023-12-24 NOTE — ED PROVIDER NOTES
History  Chief Complaint   Patient presents with    Cardiac Arrest     Witness by wife     73-year-old male presenting as a cardiac arrest.  EMS reports that his spouse already thawed at 8:19 AM today.  She arrived to find him unresponsive.  EMS was summoned.  CPR initiated from time of onset to arrival in the emergency department.  Patient has pacemaker AICD in place.        None       No past medical history on file.    No past surgical history on file.    No family history on file.  I have reviewed and agree with the history as documented.    No existing history information found.  No existing history information found.       Review of Systems   Unable to perform ROS: Patient unresponsive       Physical Exam  Physical Exam  Vitals and nursing note reviewed.   Constitutional:       General: He is in acute distress.   HENT:      Head: Contusion present.     Eyes:      Comments: Pupils nonreactive   Cardiovascular:      Comments: Initially asystole/PEA but then had episodes of atrial pacing   Pulmonary:      Breath sounds: Rhonchi present.      Comments: No spontaneous respirations initially, currently has spontaneous respirations.  Musculoskeletal:      Comments: Unable to assess   Skin:     General: Skin is warm.   Neurological:      Comments: Unresponsive         Vital Signs  ED Triage Vitals   Temp Pulse Respirations Blood Pressure SpO2   -- 12/24/23 0924 12/24/23 0924 12/24/23 0924 12/24/23 0924    60 22 (!) 210/111 92 %      Temp src Heart Rate Source Patient Position - Orthostatic VS BP Location FiO2 (%)   -- 12/24/23 1045 12/24/23 1045 12/24/23 1045 --    Monitor Lying Right arm       Pain Score       --                  Vitals:    12/24/23 1045 12/24/23 1049 12/24/23 1100 12/24/23 1203   BP: (!) 69/42 (!) 53/37 (!) 80/53 (!) 0/0   Pulse: (!) 120 (!) 120 (!) 120 (!) 0   Patient Position - Orthostatic VS: Lying  Lying          Visual Acuity      ED Medications  Medications   EPINEPHrine 5,000 mcg (STANDARD  CONCENTRATION) IV in sodium chloride 0.9% 250 mL (15 mcg/min Intravenous Rate/Dose Change 12/24/23 1049)   sodium bicarbonate 8.4 % injection **ADS Override Pull** (has no administration in time range)   dexmedeTOMIDine (Precedex) 400 mcg in sodium chloride 0.9% 100 mL (0.2 mcg/kg/hr × 100 kg Intravenous New Bag 12/24/23 1022)   vasopressin (PITRESSIN) 20 Units in sodium chloride 0.9 % 100 mL infusion (0.04 Units/min Intravenous New Bag 12/24/23 1127)   multi-electrolyte (ISOLYTE-S PH 7.4) bolus 2,000 mL (has no administration in time range)   clindamycin in dextrose 5% (Cleocin) IVPB (premix) 600 mg 50 mL (has no administration in time range)   magnesium sulfate 2 g/50 mL IVPB (premix) 2 g (0 g Intravenous Stopped 12/24/23 1018)   EPINEPHrine (ADRENALIN) injection (1 mg Intravenous Given 12/24/23 0920)   fentanyl citrate (PF) 100 MCG/2ML 50 mcg (50 mcg Intravenous Given 12/24/23 1018)   sodium bicarbonate 50 mEq/50 mL injection (50 mEq Intravenous Given 12/24/23 0917)   amiodarone 150 mg/3 mL injection (300 mg Intravenous Given 12/24/23 0909)   calcium chloride 1 g/10 mL injection (1 g Intravenous Given 12/24/23 0914)   dexmedeTOMIDine (Precedex) 100 mcg in sodium chloride 0.9 % 50 mL bolus (0 mcg Intravenous Stopped 12/24/23 1045)   sodium bicarbonate 8.4 % injection 50 mEq (50 mEq Intravenous Given 12/24/23 1052)   multi-electrolyte (ISOLYTE-S PH 7.4) bolus 1,000 mL (1,000 mL Intravenous New Bag 12/24/23 1135)   EPINEPHrine (ADRENALIN) injection (1 mg Intravenous Given 12/24/23 1200)   sodium bicarbonate 50 mEq/50 mL injection (50 mEq Intravenous Given 12/24/23 1201)       Diagnostic Studies  Results Reviewed       Procedure Component Value Units Date/Time    HS Troponin I 4hr [[id number]]     Lab Status: No result Specimen: Blood     RBC Morphology Reflex Test [[id number]] Collected: 12/24/23 1032    Lab Status: Final result Specimen: Blood from Arm, Right Updated: 12/24/23 1101    HS Troponin 0hr (reflex  protocol) [[id number]]  (Normal) Collected: 12/24/23 1032    Lab Status: Final result Specimen: Blood from Arm, Right Updated: 12/24/23 1058     hs TnI 0hr 44 ng/L     HS Troponin I 2hr [[id number]]     Lab Status: No result Specimen: Blood     Lactic acid, plasma (w/reflex if result > 2.0) [[id number]]  (Abnormal) Collected: 12/24/23 1032    Lab Status: Final result Specimen: Blood from Arm, Right Updated: 12/24/23 1055     LACTIC ACID 8.0 mmol/L     Narrative:      Result may be elevated if tourniquet was used during collection.    Lactic acid 2 Hours [[id number]]     Lab Status: No result Specimen: Blood     Comprehensive metabolic panel [[id number]]  (Abnormal) Collected: 12/24/23 1032    Lab Status: Final result Specimen: Blood from Arm, Right Updated: 12/24/23 1053     Sodium 141 mmol/L      Potassium 3.9 mmol/L      Chloride 106 mmol/L      CO2 21 mmol/L      ANION GAP 14 mmol/L      BUN 23 mg/dL      Creatinine 1.33 mg/dL      Glucose 192 mg/dL      Calcium 9.4 mg/dL      AST 40 U/L      ALT 59 U/L      Alkaline Phosphatase 72 U/L      Total Protein 5.8 g/dL      Albumin 4.1 g/dL      Total Bilirubin 0.82 mg/dL      eGFR 52 ml/min/1.73sq m     Narrative:      [employer] guidelines for Chronic Kidney Disease (CKD):     Stage 1 with normal or high GFR (GFR > 90 mL/min/1.73 square meters)    Stage 2 Mild CKD (GFR = 60-89 mL/min/1.73 square meters)    Stage 3A Moderate CKD (GFR = 45-59 mL/min/1.73 square meters)    Stage 3B Moderate CKD (GFR = 30-44 mL/min/1.73 square meters)    Stage 4 Severe CKD (GFR = 15-29 mL/min/1.73 square meters)    Stage 5 End Stage CKD (GFR <15 mL/min/1.73 square meters)  Note: GFR calculation is accurate only with a steady state creatinine    CK [[id number]]  (Normal) Collected: 12/24/23 1032    Lab Status: Final result Specimen: Blood from Arm, Right Updated: 12/24/23 1053     Total  U/L     CBC and differential [[id number]]  (Abnormal) Collected: 12/24/23 1037     Lab Status: Final result Specimen: Blood from Arm, Right Updated: 12/24/23 1050     WBC 11.63 Thousand/uL      RBC 4.61 Million/uL      Hemoglobin 14.0 g/dL      Hematocrit 45.1 %      MCV 98 fL      MCH 30.4 pg      MCHC 31.0 g/dL      RDW 12.5 %      MPV 12.3 fL      Platelets 130 Thousands/uL     Narrative:      This is an appended report.  These results have been appended to a previously verified report.    Manual Differential(PHLEBS Do Not Order) [[id number]]  (Abnormal) Collected: 12/24/23 1032    Lab Status: Final result Specimen: Blood from Arm, Right Updated: 12/24/23 1050     Segmented % 32 %      Lymphocytes % 49 %      Monocytes % 5 %      Eosinophils, % 1 %      Basophils % 0 %      Atypical Lymphocytes % 13 %      Absolute Neutrophils 3.72 Thousand/uL      Lymphocytes Absolute 7.21 Thousand/uL      Monocytes Absolute 0.58 Thousand/uL      Eosinophils Absolute 0.12 Thousand/uL      Basophils Absolute 0.00 Thousand/uL      Total Counted --     RBC Morphology Normal     Platelet Estimate Decreased    Blood gas, arterial [[id number]]  (Abnormal) Collected: 12/24/23 1038    Lab Status: Final result Specimen: Blood, Arterial from Radial, Right Updated: 12/24/23 1046     pH, Arterial 7.047     pCO2, Arterial 74.2 mm Hg      pO2, Arterial 53.3 mm Hg      HCO3, Arterial 19.9 mmol/L      Base Excess, Arterial -12.1 mmol/L      O2 Content, Arterial 16.3 mL/dL      O2 HGB,Arterial  75.1 %      SOURCE Radial, Right     MARCIN TEST Yes     Vent Type- AC AC     AC Rate 18     Tidal Volume 470 ml      Inspired Air (FIO2) 100     PEEP 10                   CT chest without contrast   Final Result by Will Downing MD (12/24 1148)      Multiple bilateral rib fractures and comminuted sternal fracture with retrosternal hematoma attributable to recent CPR.   Extensive bilateral consolidations predominantly in the dependent portions of the lungs likely represents aspiration pneumonia.            I personally discussed this  study with NEAL CHAPPELL on 12/24/2023 11:47 AM.            Workstation performed: [id number]         CT head without contrast   Final Result by Will Downing MD (12/24 9313)      No acute intracranial abnormality.                  Workstation performed: [id number]         XR chest 1 view portable   Final Result by Samir Ocampo MD (12/24 1303)      Endotracheal tube in appropriate position.      Bilateral consolidation involving the upper and mid lung zones, consistent with atypical pulmonary edema or, possibly, multifocal pneumonia. No pneumothorax.                  Resident: EDENILSON GNA I, the attending radiologist, have reviewed the images and agree with the final report above.      Workstation performed: [id number]                    Procedures  Intubation    Date/Time: 12/24/2023 8:32 AM    Performed by: Neal Chappell DO  Authorized by: Neal Chappell DO    Patient location:  ED  Other Assisting Provider: No    Consent:     Consent obtained:  Emergent situation  Universal protocol:     Patient identity confirmed:  Provided demographic data  Pre-procedure details:     Patient status:  Unresponsive    Mallampati score:  2    Pretreatment medications:  None    Paralytics:  None  Indications:     Indications for intubation: respiratory failure    Procedure details:     Preoxygenation:  Bag valve mask    CPR in progress: yes      Intubation method:  Oral    Oral intubation technique:  Direct    Laryngoscope blade:  Mac 4    Tube size (mm):  8.0    Tube type:  Cuffed    Number of attempts:  2    Ventilation between attempts: yes      Cricoid pressure: yes      Tube visualized through cords: yes    Placement assessment:     ETT to lip:  25    Tube secured with:  ETT harding    Breath sounds:  Equal    Placement verification: chest rise, condensation, CXR verification, direct visualization, equal breath sounds, ETCO2 detector and tube exhalation      CXR findings:  ETT  in proper place  Post-procedure details:     Patient tolerance of procedure:  Tolerated well, no immediate complications  ECG 12 Lead Documentation Only    Date/Time: 12/24/2023 10:34 AM    Performed by: Brandon Chappell DO  Authorized by: Brandon Chappell DO    Indications / Diagnosis:  Cardiac arrest  ECG reviewed by me, the ED Provider: yes    Patient location:  ED  Rate:     ECG rate:  60    ECG rate assessment: normal    Rhythm:     Rhythm: paced    Pacing:     Type of pacing:  Atrial  ST segments:     ST segments:  Non-specific  T waves:     T waves: non-specific    CriticalCare Time    Date/Time: 12/24/2023 1:13 PM    Performed by: Brandon Chappell DO  Authorized by: Brandon Chappell DO    Critical care provider statement:     Critical care time (minutes):  85    Critical care time was exclusive of:  Separately billable procedures and treating other patients and teaching time    Critical care was necessary to treat or prevent imminent or life-threatening deterioration of the following conditions:  Cardiac failure and respiratory failure    Critical care was time spent personally by me on the following activities:  Blood draw for specimens, obtaining history from patient or surrogate, development of treatment plan with patient or surrogate, discussions with consultants, discussions with primary provider, evaluation of patient's response to treatment, examination of patient, ventilator management, review of old charts, re-evaluation of patient's condition, ordering and review of radiographic studies, ordering and review of laboratory studies and ordering and performing treatments and interventions    I assumed direction of critical care for this patient from another provider in my specialty: no             ED Course         1140 am: vent settings   AC VC +  R: 26  TV: 470  100%  Peep 12                                    Medical Decision Making  Amount and/or Complexity of Data  Reviewed  External Data Reviewed: notes.  Labs: ordered. Decision-making details documented in ED Course.  Radiology: ordered and independent interpretation performed. Decision-making details documented in ED Course.  ECG/medicine tests: ordered and independent interpretation performed. Decision-making details documented in ED Course.  Discussion of management or test interpretation with external provider(s): Discussed with [facility] critical care, .  Requested head CT, labs, EKG, will reassess if patient remains suitable for transfer to [facility] ICU.  Family updated numerous times.  As of 10:40 AM, patient has epi drip infusing, IV fluids, Precedex infusion.  He remains ventilated but is having spontaneous breathing and reacting to external stimuli.             Disposition  Final diagnoses:   Cardiac arrest (HCC)   Respiratory failure (HCC)   Aspiration pneumonia (HCC)     Time reflects when diagnosis was documented in both MDM as applicable and the Disposition within this note       Time User Action Codes Description Comment    2023 11:55 AM Brandon Chappell Add [I46.9] Cardiac arrest (HCC)     2023 11:55 AM Brandon Chappell Add [J96.90] Respiratory failure (HCC)     2023 12:15 PM Brandon Chappell Add [J69.0] Aspiration pneumonia (HCC)           ED Disposition       ED Disposition       Condition   --    Date/Time   Sun Dec 24, 2023 12:15 PM    Comment   --             Follow-up Information    None       Date, Time and Cause of Death    Date of Death: 23  Time of Death: 12:03 PM  Preliminary Cause of Death: Cardiac arrest (HCC)  Entered by: Dr. MARII Chappell[JB1.1]       Attribution       JB1.1 Brandon Chappell DO 23 12:16          Patient's Medications    No medications on file       No discharge procedures on file.    PDMP Review       None            ED Provider  Electronically Signed by             Brandon Chappell  DO  12/24/23 1314

## 2023-12-24 NOTE — RESPIRATORY THERAPY NOTE
Latest Reference Range & Units 12/24/23 10:38   MARCIN TEST  Yes   pH, Arterial 7.350 - 7.450  7.047 (LL)   pCO2, Arterial 36.0 - 44.0 mm Hg 74.2 (HH)   pO2, Arterial 75.0 - 129.0 mm Hg 53.3 (LL)   HCO3, Arterial 22.0 - 28.0 mmol/L 19.9 (L)   Base Excess, Arterial mmol/L -12.1   O2 Content, Arterial 16.0 - 23.0 mL/dL 16.3   O2 HGB,Arterial 94.0 - 97.0 % 75.1 (L)   ABG SOURCE  Radial, Right   AC Rate  18   Tidal Volume ml 470   Inspired Air (FIO2)  100   PEEP  10   Vent Type- AC  AC   (LL): Data is critically low  (HH): Data is critically high  (L): Data is abnormally low   Change made to vent after ABG increased RR rate to 26

## 2023-12-24 NOTE — RESPIRATORY THERAPY NOTE
12/24/23 0945   Respiratory Assessment   General Appearance Unresponsive   Resp Comments 0830 called to ER tr 5  for cardiac arrest, upon arrival pt being bagged and compressions in progress.  therapist continued bagging, pt intubated size 8.0 tube placement 23 at teeth, later after xray tube moved to 26 at lips. 0945 pt placed on vent AC/VC 18,470,100%, peep 6. moved peep to 8 to improve oxygenation, then moved to 10. requested ABG due to pulse ox not picking up well   Vent Information   Vent type     Vent Mode AC/VC   $ Vent Charge-INITIAL Yes   Ventilator Start Yes   $ Pulse Oximetry Spot Check Charge Completed   AC/VC Settings   Resp Rate (BPM) 18 BPM   Vt (mL) 470 mL   FIO2 (%) 100 %   PEEP (cmH2O) 10 cmH2O   Flow Pattern (LPM) 60 L/min   Trigger Sensitivity Flow (lpm) 3 %   Humidification Heater   Heater Temperature (Set) 98.6 °F (37 °C)   AC/VC Actuals   Resp Rate (BPM) 24 BPM   VT (mL) 547   MV 12.2   MAP (cmH2O) 12 cmH2O   Peak Pressure (cmH2O) 23 cmH2O   I/E Ratio (Obs) 1:2.0   Heater Temperature (Obs) 98.4 °F (36.9 °C)   AC/VC Alarms   High Peak Pressure (cmH2O) 40   High Resp Rate (BPM) 35 BPM   High MV (L/min) 20 L/min   Low MV (L/min) 4 L/min   Vt High (mL) 850 mL   Vt Low (mL) 300 mL   AC/VC Apnea Settings   Resp Rate (BPM) 18 BPM   VT (mL) 470 mL   FIO2 (%) 100 %   Apnea Time (s) 20 S   Apnea Flow (L/min) 60 L/min   Maintenance   Alarm (pink) cable attached No   Resuscitation bag with peep valve at bedside Yes   Water bag changed   (new)   Circuit changed No   IHI Ventilator Associated Pneumonia Bundle   Daily Assessment of Readiness to Extubate Not applicable (Comment)   Head of Bed Elevated HOB less than 20  (BP low)   Oral Care Mouth suctioned   Adult IBW/VT Calculations   Height 6' (1.829 m)   IBW (Ideal Body Weight) 77.6 kg   Range VT 6 ML/Kg 465.6 mL/kg   Range VT 8 ML/Kg 620.8 mL/kg   ETT  Oral 8 mm   Placement Date/Time: 12/24/23 0900   Type: Oral  Tube Size: 8 mm  Location: Oral   Secured at (cm): 22 at the lip   Secured at (cm) 23   Measured from Teeth   Secured Location Center   Secured by Commercial tube harding   Site Condition Dry   Cuff Pressure (color) Green   HI-LO Suction  Intermittent suction   HI-LO Secretions Blood tinged;Moderate   HI-LO Intervention Patent   Respiratory Charges    $ Intubation/Intubation Assist Yes   $ Resuscitation CPR Yes

## 2023-12-24 NOTE — ED PROCEDURE NOTE
Procedure  Central Line    Date/Time: 12/24/2023 11:59 AM    Performed by: Samir Barber PA-C  Authorized by: Samir Barber PA-C    Patient location:  Bedside  Other Assisting Provider: No    Consent:     Consent obtained:  Emergent situation  Universal protocol:     Patient identity confirmed:  Arm band  Pre-procedure details:     Skin preparation:  2% chlorhexidine  Indications:     Central line indications: medications requiring central line    Anesthesia (see MAR for exact dosages):     Anesthesia method:  None  Procedure details:     Location:  Left femoral    Vessel type: vein      Laterality:  Left    Approach: percutaneous technique used      Patient position:  Flat    Catheter type:  Triple lumen    Catheter size:  7.5 Fr    Landmarks identified: yes      Ultrasound guidance: yes      Ultrasound image availability:  Not saved    Manometry confirmation: no      Number of attempts:  1    Successful placement: yes    Post-procedure details:     Post-procedure:  Line sutured and dressing applied    Assessment:  Blood return through all ports    Post-procedure complications: none                     Samir Barber PA-C  12/24/23 8433

## 2023-12-24 NOTE — RESPIRATORY THERAPY NOTE
1055 RR rate increased to 26 and pt taken to CT scan on transport vent without complications and returned to tr5. Pt placed back on previous settings and not tolerating well, vent mode changed to AV/VC+ and pt tolerating well and oxygenation improved. Settings AC/VC+ 26,470, 100%, peep 12, I time .75, Pk press 20, mn press 15, 1:1.9 pulse ox 93%

## 2023-12-24 NOTE — ED NOTES
Received call back from coroners office, [facility] sone one will be sent out.      Suzanne Horne RN  12/24/23 7916

## 2023-12-24 NOTE — RESPIRATORY THERAPY NOTE
12/24/23 1141   Respiratory Assessment   Resp Comments chnaged vent mode to AC/VC+   Vent Information   Vent type     Vent Mode AC/VC+   AC/VC+ Settings   Resp Rate (BPM) 26 BPM   VT (mL) 470 mL   Insp Time (S) 0.75 S   FIO2 (%) 100 %   PEEP (cmH2O) 12 cmH2O   Humidification Heater   Heater Temp 98.6 °F (37 °C)   AC/VC+ Actuals   Resp Rate (BPM) 26 BPM   VT (mL) 490 mL   MV (Obs) 13   MAP (cmH2O) 15 cmH2O   Peak Pressure (cmH2O) 20 cmH2O   I:E Ratio (Obs) 1:1.9   AC/VC+ ALARMS   High Peak Pressure (cmH2O) 40 cmH2O   High Resp Rate (BPM) 45 BPM   High MV (L/min) 25 L/min   Low MV (L/min) 4 L/min   High Ander VTE (mL) 850 mL   Low Ander VTE (mL) 300 mL   High Spont VTE (mL) 850 mL   Low Spont VTE (mL) 300 mL   AC/VC+ Apnea Settings   Resp Rate (BPM) 26 BPM   VT (mL) 470 mL   FIO2 (%) 100 %   Apnea Time (s) 20 S   Maintenance   Resuscitation bag with peep valve at bedside Yes   ETT  Oral 8 mm   Placement Date/Time: 12/24/23 0900   Type: Oral  Tube Size: 8 mm  Location: Oral  Secured at (cm): 22 at the lip   Secured at (cm) 26   Measured from Lips   Secured Location Center   Secured by Commercial tube harding   Site Condition Dry   Cuff Pressure (color) Green   HI-LO Suction  Intermittent suction   HI-LO Secretions Moderate;Blood tinged   HI-LO Intervention Patent

## 2023-12-26 ENCOUNTER — TELEPHONE (OUTPATIENT)
Dept: CARDIOLOGY CLINIC | Facility: CLINIC | Age: 73
End: 2023-12-26

## 2023-12-26 NOTE — TELEPHONE ENCOUNTER
Evette called to speak with you in regards to patient's pacer and any information that would explain why the patient passed away on Sunday.

## 2024-01-12 LAB
ATRIAL RATE: 117 BPM
ATRIAL RATE: 133 BPM
ATRIAL RATE: 22 BPM
ATRIAL RATE: 234 BPM
ATRIAL RATE: 36 BPM
ATRIAL RATE: 37 BPM
ATRIAL RATE: 45 BPM
ATRIAL RATE: 45 BPM
ATRIAL RATE: 48 BPM
ATRIAL RATE: 48 BPM
ATRIAL RATE: 51 BPM
ATRIAL RATE: 53 BPM
ATRIAL RATE: 576 BPM
ATRIAL RATE: 60 BPM
ATRIAL RATE: 61 BPM
ATRIAL RATE: 61 BPM
ATRIAL RATE: 62 BPM
ATRIAL RATE: 65 BPM
ATRIAL RATE: 70 BPM
ATRIAL RATE: 88 BPM
ATRIAL RATE: 89 BPM
P AXIS: 10 DEGREES
P AXIS: 31 DEGREES
P AXIS: 39 DEGREES
P AXIS: 51 DEGREES
P AXIS: 52 DEGREES
P AXIS: 69 DEGREES
P AXIS: 70 DEGREES
P AXIS: 72 DEGREES
P AXIS: 74 DEGREES
P AXIS: 78 DEGREES
P AXIS: 79 DEGREES
P AXIS: 80 DEGREES
P AXIS: 85 DEGREES
P AXIS: 88 DEGREES
PR INTERVAL: 124 MS
PR INTERVAL: 136 MS
PR INTERVAL: 182 MS
PR INTERVAL: 190 MS
PR INTERVAL: 190 MS
PR INTERVAL: 194 MS
PR INTERVAL: 198 MS
PR INTERVAL: 200 MS
PR INTERVAL: 200 MS
PR INTERVAL: 202 MS
PR INTERVAL: 204 MS
PR INTERVAL: 206 MS
PR INTERVAL: 222 MS
QRS AXIS: -11 DEGREES
QRS AXIS: -13 DEGREES
QRS AXIS: -16 DEGREES
QRS AXIS: -18 DEGREES
QRS AXIS: -19 DEGREES
QRS AXIS: -34 DEGREES
QRS AXIS: -4 DEGREES
QRS AXIS: -44 DEGREES
QRS AXIS: -5 DEGREES
QRS AXIS: -58 DEGREES
QRS AXIS: -6 DEGREES
QRS AXIS: -7 DEGREES
QRS AXIS: -76 DEGREES
QRS AXIS: 0 DEGREES
QRS AXIS: 20 DEGREES
QRSD INTERVAL: 100 MS
QRSD INTERVAL: 102 MS
QRSD INTERVAL: 104 MS
QRSD INTERVAL: 106 MS
QRSD INTERVAL: 106 MS
QRSD INTERVAL: 118 MS
QRSD INTERVAL: 124 MS
QRSD INTERVAL: 124 MS
QRSD INTERVAL: 160 MS
QRSD INTERVAL: 86 MS
QRSD INTERVAL: 92 MS
QRSD INTERVAL: 98 MS
QRSD INTERVAL: 98 MS
QT INTERVAL: 364 MS
QT INTERVAL: 398 MS
QT INTERVAL: 402 MS
QT INTERVAL: 412 MS
QT INTERVAL: 420 MS
QT INTERVAL: 422 MS
QT INTERVAL: 426 MS
QT INTERVAL: 428 MS
QT INTERVAL: 442 MS
QT INTERVAL: 450 MS
QT INTERVAL: 456 MS
QT INTERVAL: 456 MS
QT INTERVAL: 460 MS
QT INTERVAL: 476 MS
QT INTERVAL: 484 MS
QT INTERVAL: 490 MS
QT INTERVAL: 492 MS
QT INTERVAL: 494 MS
QT INTERVAL: 500 MS
QT INTERVAL: 536 MS
QT INTERVAL: 554 MS
QTC INTERVAL: 414 MS
QTC INTERVAL: 420 MS
QTC INTERVAL: 422 MS
QTC INTERVAL: 424 MS
QTC INTERVAL: 427 MS
QTC INTERVAL: 432 MS
QTC INTERVAL: 432 MS
QTC INTERVAL: 434 MS
QTC INTERVAL: 443 MS
QTC INTERVAL: 447 MS
QTC INTERVAL: 456 MS
QTC INTERVAL: 459 MS
QTC INTERVAL: 460 MS
QTC INTERVAL: 474 MS
QTC INTERVAL: 484 MS
QTC INTERVAL: 497 MS
QTC INTERVAL: 547 MS
QTC INTERVAL: 584 MS
QTC INTERVAL: 626 MS
T WAVE AXIS: -70 DEGREES
T WAVE AXIS: 102 DEGREES
T WAVE AXIS: 102 DEGREES
T WAVE AXIS: 106 DEGREES
T WAVE AXIS: 109 DEGREES
T WAVE AXIS: 122 DEGREES
T WAVE AXIS: 139 DEGREES
T WAVE AXIS: 164 DEGREES
T WAVE AXIS: 169 DEGREES
T WAVE AXIS: 68 DEGREES
T WAVE AXIS: 75 DEGREES
T WAVE AXIS: 78 DEGREES
T WAVE AXIS: 79 DEGREES
T WAVE AXIS: 81 DEGREES
T WAVE AXIS: 82 DEGREES
T WAVE AXIS: 84 DEGREES
T WAVE AXIS: 91 DEGREES
T WAVE AXIS: 95 DEGREES
T WAVE AXIS: 95 DEGREES
T WAVE AXIS: 96 DEGREES
T WAVE AXIS: 96 DEGREES
VENTRICULAR RATE: 102 BPM
VENTRICULAR RATE: 112 BPM
VENTRICULAR RATE: 37 BPM
VENTRICULAR RATE: 44 BPM
VENTRICULAR RATE: 45 BPM
VENTRICULAR RATE: 45 BPM
VENTRICULAR RATE: 48 BPM
VENTRICULAR RATE: 51 BPM
VENTRICULAR RATE: 53 BPM
VENTRICULAR RATE: 56 BPM
VENTRICULAR RATE: 60 BPM
VENTRICULAR RATE: 60 BPM
VENTRICULAR RATE: 61 BPM
VENTRICULAR RATE: 61 BPM
VENTRICULAR RATE: 62 BPM
VENTRICULAR RATE: 65 BPM
VENTRICULAR RATE: 70 BPM
VENTRICULAR RATE: 71 BPM
VENTRICULAR RATE: 77 BPM
VENTRICULAR RATE: 89 BPM
VENTRICULAR RATE: 89 BPM

## 2024-05-09 NOTE — CASE COMMUNICATION
Occupational Therapy 1w1 Evaluation and Discharge 5 31 23   OT included ADL training for Bathing self care task with energy conservation education to increase safety and independence  OT goals achieved in one visit  Instructed patient on OT discharge with good understanding demonstrated by patient and caregiver  CT/MRI results

## (undated) DEVICE — ADHESIVE SKIN HIGH VISCOSITY EXOFIN 1ML

## (undated) DEVICE — SUT VICRYL 4-0 SH-1 27 IN J218H

## (undated) DEVICE — DRAPE C-ARM X-RAY

## (undated) DEVICE — DECANTER: Brand: UNBRANDED

## (undated) DEVICE — CHLORAPREP HI-LITE 26ML ORANGE

## (undated) DEVICE — PLUMEPEN PRO 10FT

## (undated) DEVICE — 3M™ DEFIBRULATOR PADS 2346N: Brand: 3M™

## (undated) DEVICE — VIOLET BRAIDED (POLYGLACTIN 910), SYNTHETIC ABSORBABLE SUTURE: Brand: COATED VICRYL

## (undated) DEVICE — GLOVE SRG BIOGEL 7.5

## (undated) DEVICE — INTRO SHEATH SAFE 7FR ULTRA TEAR AWAY

## (undated) DEVICE — 3M™ STERI-DRAPE™ INCISE DRAPE 1040 (35CM X 35CM): Brand: STERI-DRAPE™

## (undated) DEVICE — SYRINGE 10ML LL

## (undated) DEVICE — NEEDLE 25G X 1 1/2

## (undated) DEVICE — BETHLEHEM UNIVERSAL MINOR GEN: Brand: CARDINAL HEALTH

## (undated) DEVICE — INTENDED FOR TISSUE SEPARATION, AND OTHER PROCEDURES THAT REQUIRE A SHARP SURGICAL BLADE TO PUNCTURE OR CUT.: Brand: BARD-PARKER ® CARBON RIB-BACK BLADES

## (undated) DEVICE — SUT SILK 0 CT-1 30 IN 424H